# Patient Record
Sex: FEMALE | Race: WHITE | Employment: PART TIME | URBAN - METROPOLITAN AREA
[De-identification: names, ages, dates, MRNs, and addresses within clinical notes are randomized per-mention and may not be internally consistent; named-entity substitution may affect disease eponyms.]

---

## 2017-04-26 ENCOUNTER — ANESTHESIA EVENT (OUTPATIENT)
Dept: ENDOSCOPY | Age: 67
End: 2017-04-26
Payer: COMMERCIAL

## 2017-04-26 ENCOUNTER — ANESTHESIA (OUTPATIENT)
Dept: ENDOSCOPY | Age: 67
End: 2017-04-26
Payer: COMMERCIAL

## 2017-04-26 ENCOUNTER — HOSPITAL ENCOUNTER (OUTPATIENT)
Age: 67
Setting detail: OUTPATIENT SURGERY
Discharge: HOME | End: 2017-04-26
Attending: SURGERY | Admitting: SURGERY
Payer: COMMERCIAL

## 2017-04-26 VITALS
HEART RATE: 81 BPM | WEIGHT: 163 LBS | DIASTOLIC BLOOD PRESSURE: 82 MMHG | SYSTOLIC BLOOD PRESSURE: 138 MMHG | HEIGHT: 62 IN | BODY MASS INDEX: 30 KG/M2 | OXYGEN SATURATION: 99 % | RESPIRATION RATE: 18 BRPM | TEMPERATURE: 98.2 F

## 2017-04-26 PROCEDURE — 74011000250 HC RX REV CODE- 250: Performed by: ANESTHESIOLOGY

## 2017-04-26 PROCEDURE — 76060000032 HC ANESTHESIA 0.5 TO 1 HR: Performed by: SURGERY

## 2017-04-26 PROCEDURE — 74011250636 HC RX REV CODE- 250/636

## 2017-04-26 PROCEDURE — 76040000025: Performed by: SURGERY

## 2017-04-26 PROCEDURE — 74011250636 HC RX REV CODE- 250/636: Performed by: ANESTHESIOLOGY

## 2017-04-26 RX ORDER — SODIUM CHLORIDE, SODIUM LACTATE, POTASSIUM CHLORIDE, CALCIUM CHLORIDE 600; 310; 30; 20 MG/100ML; MG/100ML; MG/100ML; MG/100ML
100 INJECTION, SOLUTION INTRAVENOUS CONTINUOUS
Status: CANCELLED | OUTPATIENT
Start: 2017-04-26

## 2017-04-26 RX ORDER — SODIUM CHLORIDE, SODIUM LACTATE, POTASSIUM CHLORIDE, CALCIUM CHLORIDE 600; 310; 30; 20 MG/100ML; MG/100ML; MG/100ML; MG/100ML
100 INJECTION, SOLUTION INTRAVENOUS CONTINUOUS
Status: DISCONTINUED | OUTPATIENT
Start: 2017-04-26 | End: 2017-04-26 | Stop reason: HOSPADM

## 2017-04-26 RX ORDER — FAMOTIDINE 10 MG/ML
20 INJECTION INTRAVENOUS
Status: COMPLETED | OUTPATIENT
Start: 2017-04-26 | End: 2017-04-26

## 2017-04-26 RX ORDER — PROPOFOL 10 MG/ML
INJECTION, EMULSION INTRAVENOUS
Status: DISCONTINUED | OUTPATIENT
Start: 2017-04-26 | End: 2017-04-26 | Stop reason: HOSPADM

## 2017-04-26 RX ORDER — SODIUM CHLORIDE 0.9 % (FLUSH) 0.9 %
5-10 SYRINGE (ML) INJECTION AS NEEDED
Status: CANCELLED | OUTPATIENT
Start: 2017-04-26

## 2017-04-26 RX ORDER — PROPOFOL 10 MG/ML
INJECTION, EMULSION INTRAVENOUS AS NEEDED
Status: DISCONTINUED | OUTPATIENT
Start: 2017-04-26 | End: 2017-04-26 | Stop reason: HOSPADM

## 2017-04-26 RX ADMIN — PROPOFOL 30 MG: 10 INJECTION, EMULSION INTRAVENOUS at 10:00

## 2017-04-26 RX ADMIN — PROPOFOL 30 MG: 10 INJECTION, EMULSION INTRAVENOUS at 10:03

## 2017-04-26 RX ADMIN — SODIUM CHLORIDE, SODIUM LACTATE, POTASSIUM CHLORIDE, AND CALCIUM CHLORIDE 100 ML/HR: 600; 310; 30; 20 INJECTION, SOLUTION INTRAVENOUS at 09:50

## 2017-04-26 RX ADMIN — PROPOFOL 140 MCG/KG/MIN: 10 INJECTION, EMULSION INTRAVENOUS at 10:00

## 2017-04-26 RX ADMIN — FAMOTIDINE 20 MG: 10 INJECTION, SOLUTION INTRAVENOUS at 10:01

## 2017-04-26 NOTE — ANESTHESIA PREPROCEDURE EVALUATION
Anesthetic History               Review of Systems / Medical History      Pulmonary    COPD: mild    Sleep apnea: No treatment           Neuro/Psych         Psychiatric history     Cardiovascular    Hypertension: well controlled          Hyperlipidemia         GI/Hepatic/Renal     GERD: well controlled           Endo/Other    Diabetes    Arthritis    Comments: Diet controlled DM Other Findings              Physical Exam    Airway  Mallampati: II  TM Distance: 4 - 6 cm  Neck ROM: normal range of motion   Mouth opening: Normal     Cardiovascular    Rhythm: regular           Dental    Dentition: Upper partial plate, Caps/crowns and Implants     Pulmonary  Breath sounds clear to auscultation               Abdominal         Other Findings            Anesthetic Plan    ASA: 2  Anesthesia type: total IV anesthesia          Induction: Intravenous  Anesthetic plan and risks discussed with: Patient

## 2017-04-26 NOTE — PROGRESS NOTES
Patient passing flatus, tolerating po fluids well. Patient escorted to car via wheelchair  by Norma Crane  for discharge home with family. Discharge instructions reviewed. Dr. Bianca Miller spoke with pt and family.

## 2017-04-26 NOTE — ANESTHESIA POSTPROCEDURE EVALUATION
Post-Anesthesia Evaluation and Assessment    Patient: Shanda Jimenez MRN: [id number]  SSN: [id number]    YOB: 1950  Age: 61 y.o. Sex: female       Cardiovascular Function/Vital Signs  Visit Vitals    BP (!) 134/94    Pulse 83    Temp 36.8 °C (98.2 °F)    Resp 17    Ht 5' 2\" (1.575 m)    Wt 73.9 kg (163 lb)    SpO2 97%    BMI 29.81 kg/m2       Patient is status post total IV anesthesia anesthesia for Procedure(s):  COLONOSCOPY / BMI=30. Nausea/Vomiting: None    Postoperative hydration reviewed and adequate. Pain:  Pain Scale 1: Numeric (0 - 10) (04/26/17 0953)  Pain Intensity 1: 0 (04/26/17 0953)   Managed    Neurological Status: At baseline    Mental Status and Level of Consciousness: Arousable    Pulmonary Status:   O2 Device: Nasal cannula (04/26/17 1034)   Adequate oxygenation and airway patent    Complications related to anesthesia: None    Post-anesthesia assessment completed.  No concerns    Signed By: Sami Alvarez MD     April 26, 2017

## 2017-04-26 NOTE — PROCEDURES
[facility]   PROCEDURE NOTE       Name:  Yokasta Carmichael   MR#:  [id number]   :  1950   Account #:  [de-identified]   Date of Adm:  2017       DATE OF PROCEDURE: 2017     PREOPERATIVE DIAGNOSIS: Request for screening colonoscopy. PROCEDURE: Colonoscopy. SURGEON: Danny Cline MD.    ANESTHESIA: Monitored anesthesia care with Dr. Meenu Rodriguez and Amanda Miller CRNA.    ESTIMATED BLOOD LOSS: None. SPECIMENS: None. FINDINGS:   1. Fair prep. 2. Pan diverticular disease. 3. Internal hemorrhoids. 4. External hemorrhoids. HISTORY: A 72-year-old female who I was asked to see for her   initial screening colonoscopy. She never had a colonoscopy   before. She denied any family history of colon cancer, melena,   bright red blood per rectum, weight loss, loss of appetite, or   change in bowel habits. She underwent a bowel prep, which she   said was successful on 2017 and came to the [facility] for the procedure on 2017. I saw   the patient. She was having an IV placed. I went over the   procedure, the risks of bleeding, infection, anesthesia,   perforation of colon. She agreed again to the procedure. DESCRIPTION OF PROCEDURE: She was transported to room #4 [place] [address]. Time-out was done identifying   patient, surgeon, procedure and her birth date 1950. Once   everyone in the room agreed with the time-out, we began the   procedure. Monitored anesthesia care was done by Dr. Meenu Rodriguez and   Amanda Victoria, the CRNA. A PCF-H190L scope was advanced through the anus and all the way   to the cecum. Cecum was identified with the ileocecal valve,   cecal folds, external compression. Right lower quadrant   corresponded to an indentation seen with the scope. The bowel   prep was fair. There was some food material and a lot of viscous   liquid stool throughout the colon.  We withdrew the scope over a   7 minute period of time. She had pan diverticular disease with   some scattered diverticula in the cecum, ascending colon, and   more numerous as you got closer to the sigmoid colon. There were   no lesions noted in the cecum, ascending colon, transverse   colon, descending colon, sigmoid colon. The diverticula in   sigmoid colon were more numerous than in the cecum. The scope   was brought back to the rectum where it was retroflexed. She had   some internal hemorrhoids. Scope was withdrawn. Digital rectal   exam revealed external hemorrhoids as well as good sphincter   tone. No masses were palpated. PLAN: Recommended repeat colonoscopy in 10 years. I do not think   she needs anything else done unless symptoms develop.         Brittany Steiner MD      [facility]   D:  04/26/2017   10:32   T:  04/26/2017   11:20   Job #:  [id number]

## 2017-04-26 NOTE — INTERVAL H&P NOTE
H&P Update:  Jammie Durham was seen and examined. History and physical has been reviewed. The patient has been examined.  There have been no significant clinical changes since the completion of the originally dated History and Physical.    Signed By: Lilibeth Cronin MD     April 26, 2017 8:49 AM

## 2017-04-26 NOTE — IP AVS SNAPSHOT
Konstantin David 
 
 
 [address] [facility]  [address] [facility] 
[phone] Patient: Hadley Casas MRN: [id number] BXK:[id number]/[id number] You are allergic to the following Allergen Reactions Codeine Rash Lidocaine Rash  
    
 Sulfa (Sulfonamide Antibiotics) Nausea and Vomiting Recent Documentation Height Weight BMI OB Status Smoking Status 1.575 m 73.9 kg 29.81 kg/m2 Postmenopausal Former Smoker Emergency Contacts Name Discharge Info Relation Home Work Mobile Tra Costello DISCHARGE CAREGIVER [3] Life Partner [7] [phone] About your hospitalization You were admitted on:  April 26, 2017 You last received care in the:  [facility] You were discharged on:  April 26, 2017 Unit phone number:  [phone] Why you were hospitalized Your primary diagnosis was:  Not on File Providers Seen During Your Hospitalizations Provider Role Specialty Primary office phone Gladys Guillen MD Attending Provider General Surgery [phone] Your Primary Care Physician (PCP) Primary Care Physician Office Phone Office Fax Padilla Lima [phone] Follow-up Information None Your Appointments Monday May 15, 2017 10:30 AM EDT  
LAB with [facility] [address] [place] [address] Primary Care ([facility] INTERNAL MEDICINE) [address] [place] [address] [address] [place] [id number]  
[phone] Monday May 22, 2017  3:00 PM EDT Office Visit with ADAMARIS Owen [address] [place] [address] Primary Care ([facility] MEDIC[facility]) [address] [place] [address] [address] [place] [id number]  
[phone] Monday May 22, 2017  5:00 PM EDT  
[facility] SCREENING with [facility] BI ROOM 2 [address] [facility] ([facility] [address]) [address] [place] [address]  
[phone] ***** NOTE: Appointments for the Mobile Mammography UNIT CANNOT be made on My Chart *****  PATIENT ARRIVAL - Please report 30 minutes early to check in. GENERAL INSTRUCTIONS -  On the day of your exam do not use any bath powder, deodorant or lotions on the chest or armpit area. Wear two-piece outfit for ease of changing. Allow at least 1 hour for test.  
  
    
  
  
 
  
  
  
Current Discharge Medication List  
  
ASK your doctor about these medications Dose & Instructions Dispensing Information Comments Morning Noon Evening Bedtime  
 aspirin 325 mg tablet Commonly known as:  ASPIRIN Your last dose was: Your next dose is:    
   
   
 Dose:  325 mg Take 325 mg by mouth daily. Indications: myocardial infarction prevention, prevention of transient ischemic attack Refills:  0  
     
   
   
   
  
 CALCIUM 600 WITH VITAMIN D3 600 mg(1,500mg) -400 unit Cap Generic drug:  Calcium-Cholecalciferol (D3) Your last dose was: Your next dose is: Take  by mouth daily. Refills:  0  
     
   
   
   
  
 cyclobenzaprine 10 mg tablet Commonly known as:  FLEXERIL Your last dose was: Your next dose is:    
   
   
 Dose:  10 mg Take 1 Tab by mouth two (2) times a day. Quantity:  60 Tab Refills:  2  
     
   
   
   
  
 fenofibric acid 135 mg capsule Commonly known as:  TRILIPIX Your last dose was: Your next dose is:    
   
   
 Dose:  135 mg Take 1 Cap by mouth daily. Quantity:  30 Cap Refills:  5  
     
   
   
   
  
 garlic 8,943 mg Cap Your last dose was: Your next dose is: Take  by mouth daily. Refills:  0 KRILL -20-40-50 mg Cap Generic drug:  krill-omega-3-dha-epa-lipids Your last dose was: Your next dose is:    
   
   
 Dose:  2 Cap Take 2 Caps by mouth daily. Refills:  0 lisinopril 20 mg tablet Commonly known as:  Mervin Bazan Your last dose was: Your next dose is:    
   
   
 Dose:  20 mg Take 1 Tab by mouth daily. Quantity:  30 Tab Refills:  5  
     
   
   
   
  
 melatonin 10 mg Tab Your last dose was: Your next dose is:    
   
   
 Dose:  1 Tab Take 1 Tab by mouth nightly. Refills:  0 MUCINEX PO Your last dose was: Your next dose is: Take  by mouth as needed. Refills:  0  
     
   
   
   
  
 NASONEX 50 mcg/actuation nasal spray Generic drug:  mometasone Your last dose was: Your next dose is:    
   
   
 Dose:  2 Spray 2 Sprays daily as needed. Refills:  0 PARoxetine 40 mg tablet Commonly known as:  PAXIL Your last dose was: Your next dose is:    
   
   
 Dose:  40 mg Take 1 Tab by mouth daily. Quantity:  30 Tab Refills:  11 PRILOSEC OTC PO Your last dose was: Your next dose is:    
   
   
 Dose:  20 mg Take 20 mg by mouth every morning. Refills:  0 PROAIR HFA 90 mcg/actuation inhaler Generic drug:  albuterol Your last dose was: Your next dose is: Take  by inhalation every four (4) hours as needed. Refills:  0 PROBIOTIC PO Your last dose was: Your next dose is:    
   
   
 Dose:  1 Tab Take 1 Tab by mouth daily. Refills:  0  
     
   
   
   
  
 rosuvastatin 10 mg tablet Commonly known as:  CRESTOR Your last dose was: Your next dose is:    
   
   
 Dose:  10 mg Take 1 Tab by mouth nightly. Quantity:  30 Tab Refills:  5  
     
   
   
   
  
 * VITAMIN B-12 500 mcg tablet Generic drug:  cyanocobalamin Your last dose was: Your next dose is:    
   
   
 Dose:  500 mcg Take 500 mcg by mouth daily. Refills:  0 * cyanocobalamin 1,000 mcg/mL injection Commonly known as:  VITAMIN B-12 Your last dose was: Your next dose is:    
   
   
 Dose:  1000 mcg 1 mL by IntraMUSCular route every thirty (30) days. Quantity:  1 mL Refills:  11 VITAMIN D3 5,000 unit Tab tablet Generic drug:  cholecalciferol (VITAMIN D3) Your last dose was: Your next dose is:    
   
   
 Dose:  5000 Units Take 5,000 Units by mouth daily. Refills:  0  
     
   
   
   
  
 * Notice: This list has 2 medication(s) that are the same as other medications prescribed for you. Read the directions carefully, and ask your doctor or other care provider to review them with you. Discharge Instructions Gastrointestinal Colonoscopy/Flexible Sigmoidoscopy - Lower Exam Discharge Instructions 1. Call Dr. Yenifer Miller at [phone] for any problems or questions. 2. Contact the doctors office for follow up appointment as directed 3. Medication may cause drowsiness for several hours, therefore, do not drive or operate machinery for remainder of the day. 4. No alcohol today. 5. Ordinarily, you may resume regular diet and activity after exam unless otherwise specified by your physician. 6. Because of air put into your colon during exam, you may experience some abdominal distension, relieved by the passage of gas, for several hours. 7. Contact your physician if you have any of the following: 
a. Excessive amount of bleeding  large amount when having a bowel movement. Occasional specks of blood with bowel movement would not be unusual. 
b. Severe abdominal pain 
c. Fever or Chills 8. Polyp Removal  follow these additional instructions 
a. Clear liquid diet for the next meal (jell-o, broth, clear drinks) b. Soft diet for 24 hours, then resume regular diet  
c. Take Metamucil  1 tablespoon in juice every morning for 3 days d. No Aspirin, Advil, Aleve, Nuprin, Ibuprofen, or medications that contain these drugs for 2 weeks. Any additional instructions:  Plan to repeat colonoscopy in 10 years or sooner if you have problems. Instructions given to Conner العراقي and other family members. Instructions given by:  Sangeetha Blackmon RN Discharge Orders None Introducing [facility]! Reyna Narvaezafshin introduces Zapproved patient portal. Now you can access parts of your medical record, email your doctor's office, and request medication refills online. 1. In your internet browser, go to [email or web address] 2. Click on the First Time User? Click Here link in the Sign In box. You will see the New Member Sign Up page. 3. Enter your Zapproved Access Code exactly as it appears below. You will not need to use this code after youve completed the sign-up process. If you do not sign up before the expiration date, you must request a new code. · Zapproved Access Code: [email or web address] Expires: 5/21/2017  4:01 PM 
 
4. Enter the last four digits of your Social Security Number (xxxx) and Date of Birth (mm/dd/yyyy) as indicated and click Submit. You will be taken to the next sign-up page. 5. Create a Zapproved ID. This will be your Zapproved login ID and cannot be changed, so think of one that is secure and easy to remember. 6. Create a Zapproved password. You can change your password at any time. 7. Enter your Password Reset Question and Answer. This can be used at a later time if you forget your password. 8. Enter your e-mail address. You will receive e-mail notification when new information is available in [address] 9. Click Sign Up. You can now view and download portions of your medical record. 10. Click the Download Summary menu link to download a portable copy of your medical information.  
 
If you have questions, please visit the Frequently Asked Questions section of the [employer]. Remember, MyChart is NOT to be used for urgent needs. For medical emergencies, dial 911. Now available from your iPhone and Android! General Information Please provide this summary of care documentation to your next provider. Patient Signature:  ____________________________________________________________ Date:  ____________________________________________________________  
  
Theone Doss Provider Signature:  ____________________________________________________________ Date:  ____________________________________________________________

## 2017-04-26 NOTE — DISCHARGE INSTRUCTIONS
Gastrointestinal Colonoscopy/Flexible Sigmoidoscopy - Lower Exam Discharge Instructions  1. Call Dr. Shannan Cosby at [phone] for any problems or questions. 2. Contact the doctors office for follow up appointment as directed  3. Medication may cause drowsiness for several hours, therefore, do not drive or operate machinery for remainder of the day. 4. No alcohol today. 5. Ordinarily, you may resume regular diet and activity after exam unless otherwise specified by your physician. 6. Because of air put into your colon during exam, you may experience some abdominal distension, relieved by the passage of gas, for several hours. 7. Contact your physician if you have any of the following:  a. Excessive amount of bleeding - large amount when having a bowel movement. Occasional specks of blood with bowel movement would not be unusual.  b. Severe abdominal pain  c. Fever or Chills  8. Polyp Removal - follow these additional instructions  a. Clear liquid diet for the next meal (jell-o, broth, clear drinks)  b. Soft diet for 24 hours, then resume regular diet   c. Take Metamucil - 1 tablespoon in juice every morning for 3 days  d. No Aspirin, Advil, Aleve, Nuprin, Ibuprofen, or medications that contain these drugs for 2 weeks. Any additional instructions:  Plan to repeat colonoscopy in 10 years or sooner if you have problems. Instructions given to Tony Jose De Jesus and other family members.   Instructions given by:  Jerardo Montanez RN

## 2017-04-26 NOTE — H&P
Progress Notes  Encounter Date: 17  Cecy Neumann MD   General Surgery      []Hide copied text  []Hover for attribution information           Name: Jerry Ames    MRN: [id number]   : 1950    Age: 61 y.o. Sex: female  Margarita Tai MD         CC:        Chief Complaint   Patient presents with    New Patient       colonoscopy         HPI: The patient is referred here for a colonoscopy by Dr. Rosa Blair. The patient has never had a colonoscopy before. No abdominal pain, nausea or vomiting.       Family hx of colon cancer NO       Previous colonoscopy NO   BRBPR NO   Melena NO   Loss of appetite NO   Weight loss NO       Change in bowel habits NO         HISTORY:          Past Medical History:   Diagnosis Date    Arthritis      Asthma      Burning with urination      Chronic obstructive pulmonary disease (HCC)      CRP elevated 2013    DM (diabetes mellitus), type 2 ([address] 2013    Fibromyalgia 2013    GERD (gastroesophageal reflux disease)      HTN (hypertension), benign 2013    Hypercholesterolemia      Sleep apnea      Vitamin B12 deficiency 2013    Vitamin D deficiency 2013            Past Surgical History:   Procedure Laterality Date    HX BREAST BIOPSY Bilateral 10-15 YRS AGO    HX BREAST LUMPECTOMY         bilateral    HX CARPAL TUNNEL RELEASE         bilateral    HX CHOLECYSTECTOMY        HX GYN         pptl    HX ORTHOPAEDIC                  Prior to Admission medications    Medication Sig Start Date End Date Taking? Authorizing Provider   Calcium-Cholecalciferol, D3, (CALCIUM 600 WITH VITAMIN D3) 600 mg(1,500mg) -400 unit cap Take by mouth.       Historical Provider   rosuvastatin (CRESTOR) 10 mg tablet Take 1 Tab by mouth nightly. 16     ADAMARIS Dsouza   fenofibric acid (TRILIPIX) 135 mg capsule Take 1 Cap by mouth daily.  16     ADAMARIS Higgins   cyanocobalamin (VITAMIN B-12) 1,000 mcg/mL injection 1 mL by IntraMUSCular route every thirty (30) days. 11/21/16     ADAMARIS Dsouza   lisinopril (PRINIVIL, ZESTRIL) 20 mg tablet Take 1 Tab by mouth daily. 11/21/16     ADAMARIS Park   PARoxetine (PAXIL) 40 mg tablet Take 1 Tab by mouth daily. 7/11/16     ADAMARIS Park   cholecalciferol, VITAMIN D3, (VITAMIN D3) 5,000 unit tab tablet Take by mouth daily.       Historical Provider   cyclobenzaprine (FLEXERIL) 10 mg tablet Take 1 Tab by mouth two (2) times a day.  6/22/15     ADAMARIS Park   aspirin (ASPIRIN) 325 mg tablet Take 325 mg by mouth daily.       Historical Provider   melatonin 10 mg tab Take 1 Tab by mouth nightly.       Historical Provider   krill-omega-3-dha-epa-lipids (KRILL OIL) 900-01-95-48 mg cap Take 2 Caps by mouth two (2) times a day.       Historical Provider   garlic 1,940 mg cap Take by mouth daily.       Historical Provider   LACTOBACILLUS ACIDOPHILUS (PROBIOTIC PO) Take 1 Tab by mouth daily.       Historical Provider   OMEPRAZOLE MAGNESIUM (PRILOSEC OTC PO) Take 1 Tab by mouth daily.       Historical Provider   CA CMB NO.1/VIT D3/B-6/FA/B12 (VITAMIN D3, CALCIUM CIT-PHOS, PO) Take by mouth.       Historical Provider   cyanocobalamin (VITAMIN B-12) 500 mcg tablet Take 2,500 mcg by mouth daily.       Historical Provider   albuterol (PROAIR HFA) 90 mcg/actuation inhaler Take by inhalation every four (4) hours as needed.       Historical Provider   mometasone (NASONEX) 50 mcg/actuation nasal spray 2 Sprays daily as needed.       Historical Provider   GUAIFENESIN (MUCINEX PO) Take by mouth as needed.       Historical Provider            Social History   Substance Use Topics    Smoking status: Former Smoker       Packs/day: 1.50       Years: 35.00       Types: Cigarettes       Quit date: 9/15/2016    Smokeless tobacco: Never Used    Alcohol use No      Family History   Problem Relation Age of Onset    Cancer Mother         cervical    Diabetes Mother      Heart Disease Father      Hypertension Father      Elevated Lipids Father      Asthma Sister      Cancer Sister         skin    Cancer Cousin         Lung Cancer    Breast Cancer Neg Hx        .       Allergies   Allergen Reactions    Codeine Unknown (comments)    Lidocaine Unknown (comments)    Sulfa (Sulfonamide Antibiotics) Nausea and Vomiting         Physical Exam:           Visit Vitals    /89    Pulse 79    Ht 5' 2\" (1.575 m)    Wt 163 lb 9.6 oz (74.2 kg)    BMI 29.92 kg/m2         General: Alert, oriented, cooperative white female in no acute distress.         Resp: Breathing is non-labored. Lungs clear to auscultation without wheezing or rhonchi   CV: RRR. No murmurs, rubs or gallops appreciated. Abd: soft non-tender and non-distended without peritoneal signs. +bs   Extremities: No clubbing, cyanosis or edema. Strength intact.        Assessment/Plan: Zandra Hu is a 61 y.o. female who is here for a colonoscopy     1. Off ASA for one week, if on aspirin     2. Bowel prep     3. Colonoscopy with MAC.  I went through the risks of bleeding, perforation of the colon and cardiopulmonary problems that can develop with the use of anesthesia.     Kyle Slaughter MD     [facility] 4/26/17                Electronically signed by Kyle Slaughter MD at 03/24/17 1996 4/26/17             Revision History              Detailed Report             Note shared with patient   Note Details   Author Kyle Slaughter MD File Time 4/26/17   Author Type Physician Status Signed   Last  Kyle Slaughter MD Specialty General Surgery

## 2017-05-22 ENCOUNTER — HOSPITAL ENCOUNTER (OUTPATIENT)
Dept: MAMMOGRAPHY | Age: 67
Discharge: HOME | End: 2017-05-22
Attending: PHYSICIAN ASSISTANT
Payer: COMMERCIAL

## 2017-05-22 DIAGNOSIS — Z12.31 VISIT FOR SCREENING MAMMOGRAM: ICD-10-CM

## 2017-05-22 PROCEDURE — 77067 SCR MAMMO BI INCL CAD: CPT

## 2017-07-10 ENCOUNTER — HOSPITAL ENCOUNTER (OUTPATIENT)
Dept: MAMMOGRAPHY | Age: 67
Discharge: HOME | End: 2017-07-10
Attending: PHYSICIAN ASSISTANT
Payer: MEDICARE

## 2017-07-10 DIAGNOSIS — Z78.0 POSTMENOPAUSAL: ICD-10-CM

## 2017-07-10 PROCEDURE — 77080 DXA BONE DENSITY AXIAL: CPT

## 2018-08-10 ENCOUNTER — HOSPITAL ENCOUNTER (OUTPATIENT)
Dept: MAMMOGRAPHY | Age: 68
Discharge: HOME | End: 2018-08-10
Attending: PHYSICIAN ASSISTANT
Payer: MEDICARE

## 2018-08-10 DIAGNOSIS — Z12.31 ENCOUNTER FOR SCREENING MAMMOGRAM FOR BREAST CANCER: ICD-10-CM

## 2018-08-10 PROCEDURE — 77067 SCR MAMMO BI INCL CAD: CPT

## 2018-08-10 NOTE — PROGRESS NOTES
Normal mammogram other than dense breast tissue. Remind to do monthly self breast exams. Plan to repeat imaging in 1 year.

## 2019-09-23 ENCOUNTER — HOSPITAL ENCOUNTER (OUTPATIENT)
Dept: MAMMOGRAPHY | Age: 69
Discharge: HOME | End: 2019-09-23
Attending: PHYSICIAN ASSISTANT
Payer: MEDICARE

## 2019-09-23 DIAGNOSIS — M89.9 BONE DISORDER: ICD-10-CM

## 2019-09-23 DIAGNOSIS — M85.89 OSTEOPENIA OF MULTIPLE SITES: ICD-10-CM

## 2019-09-23 DIAGNOSIS — Z12.31 ENCOUNTER FOR SCREENING MAMMOGRAM FOR BREAST CANCER: ICD-10-CM

## 2019-09-23 DIAGNOSIS — R92.2 DENSE BREAST TISSUE: ICD-10-CM

## 2019-09-23 PROCEDURE — 77063 BREAST TOMOSYNTHESIS BI: CPT

## 2019-09-23 PROCEDURE — 77080 DXA BONE DENSITY AXIAL: CPT

## 2019-09-23 NOTE — PROGRESS NOTES
Bone density shows osteopenia in multiple regions. We can discuss more of what this means and treatment options at her next visit - just have her remind me.

## 2019-09-24 NOTE — PROGRESS NOTES
Mammogram is stable without any new suspicious findings. Remind to do monthly self breast exams. Plan to repeat imaging in 1 year.

## 2020-06-09 NOTE — H&P (VIEW-ONLY)
Name: Hilda Costa MRN: [id number] : 1950 Age: 78 y.o. Sex: female Becki Dong MD  
 
 
CC:  No chief complaint on file. HPI:  The patient is referred here for a colonoscopy by ADAMARIS Lilly, who works with Dr. Cely Sanchez at [facility]. The patient has been having lower abdominal pain with a recent Hemoccult positive test as well as a positive Cologuard test. I performed her last colonoscopy on 17 with pandiverticular disease and internal/external hemorrhoids found. Family hx of colon cancer NO Previous colonoscopy YES  
BRBPR NO  
Melena YES Loss of appetite NO Weight loss NO Change in bowel habits NO  
 
 
HISTORY: 
 
Past Medical History:  
Diagnosis Date  Chronic obstructive pulmonary disease ([address] PRN inhaler, no home 02  Depression with anxiety  Diverticulosis of colon 2017 Widespread per Colonoscopy  Elevated C-reactive protein (CRP)  Fibromyalgia  GERD (gastroesophageal reflux disease)  Hemorrhoids 2017 Internal & External Per Colonoscopy  HTN (hypertension), benign  Microalbuminuria due to type 2 diabetes mellitus ([address]  Mixed hyperlipidemia  Osteoarthritis  Osteopenia 07/10/2017 Per DEXA  Perennial allergic rhinitis with seasonal variation  Sleep apnea   
 no CPAP  Type 2 diabetes mellitus ([address] 2016  Vitamin B12 deficiency  Vitamin D deficiency Past Surgical History:  
Procedure Laterality Date  COLONOSCOPY N/A 2017 COLONOSCOPY / BMI=30 performed by Juma Nagy MD at [facility] ENDOSCOPY  
 HX BREAST LUMPECTOMY Bilateral   
 2 from R Side & 1 from L Side - All Benign  HX CARPAL TUNNEL RELEASE Right  HX CHOLECYSTECTOMY  HX TUBAL LIGATION Prior to Admission medications Medication Sig Start Date End Date Taking? Authorizing Provider lisinopriL (PRINIVIL, ZESTRIL) 10 mg tablet Take 1 Tab by mouth daily. 3/26/20   Divine Calhoun PA-C  
cyanocobalamin (Vitamin B-12) 1,000 mcg/mL injection 1 mL by IntraMUSCular route every thirty (30) days. 3/26/20   Divine Calhoun PA-C  
apple cider vinegar 500 mg tab Take  by mouth. Provider, Historical  
omeprazole (PRILOSEC) 20 mg capsule Take 20 mg by mouth daily. Provider, Historical  
fenofibrate nanocrystallized 160 mg tab Take 160 mg by mouth daily. 12/30/19   Divine Calhoun PA-C  
glucose blood VI test strips (RELION PRIME TEST STRIPS) strip Use to check blood sugar once daily. Dx: E11.29 12/30/19   Nicholas Moura PA-C  
lancets (RELION ULTRA THIN PLUS LANCETS) misc Use to check glucose once daily. Dx: E11.29 12/30/19   Nicholas Moura PA-C  
linaGLIPtin (TRADJENTA) 5 mg tablet Take 1 Tab by mouth daily. 12/30/19   Herb Calhoun PA-C  
metFORMIN ER (GLUCOPHAGE XR) 750 mg tablet Take 1 Tab by mouth daily (with dinner). 12/30/19   Divine Calhoun PA-C  
PARoxetine (PAXIL) 40 mg tablet Take 1 Tab by mouth daily. 12/30/19   Divine Calhoun PA-C  
rosuvastatin (CRESTOR) 10 mg tablet Take 1 Tab by mouth nightly. 12/30/19   Divine Calhoun PA-C  
xjdce-kbqux-9-dha-epa-lipids (KRILL OIL) 046-21-52-50 mg cap Take  by mouth. Provider, Historical  
guaifenesin/phenylephrine HCl (MUCINEX COLD PO) Take  by mouth. Provider, Historical  
cyclobenzaprine (FLEXERIL) 10 mg tablet Take 1 Tab by mouth two (2) times a day. 3/26/19   Herb Calhoun PA-C  
ascorbic acid, vitamin C, (VITAMIN C) 500 mg tablet Take  by mouth. Provider, Historical  
vitamin E (AQUA GEMS) 400 unit capsule Take  by mouth daily. Provider, Historical  
Blood-Glucose Meter (RELION MICRO GLUCOSE MONITOR) misc Use to check blood sugar once daily.   Dx: E11.29 6/29/18   Nicholas Moura PA-C  
cholecalciferol, VITAMIN D3, (VITAMIN D3) 5,000 unit tab tablet Take 5,000 Units by mouth daily. Provider, Historical  
aspirin (ASPIRIN) 325 mg tablet Take 325 mg by mouth daily. Indications: myocardial infarction prevention, prevention of transient ischemic attack    Provider, Historical  
melatonin 10 mg tab Take 1 Tab by mouth nightly. Provider, Historical  
garlic 2,102 mg cap Take  by mouth daily. Provider, Historical  
LACTOBACILLUS ACIDOPHILUS (PROBIOTIC PO) Take 1 Tab by mouth daily. Provider, Historical  
cyanocobalamin (VITAMIN B-12) 500 mcg tablet Take 500 mcg by mouth daily. Provider, Historical  
albuterol (PROAIR HFA) 90 mcg/actuation inhaler Take  by inhalation every four (4) hours as needed. Provider, Historical  
mometasone (NASONEX) 50 mcg/actuation nasal spray 2 Sprays daily as needed. Provider, Historical  
 
Social History Tobacco Use  Smoking status: Former Smoker Packs/day: 1.50 Years: 35.00 Pack years: 52.50 Types: Cigarettes Last attempt to quit: 9/15/2016 Years since quitting: 3.7  Smokeless tobacco: Never Used Substance Use Topics  Alcohol use: Yes Comment: Rarely  Drug use: No  
 
Family History Problem Relation Age of Onset  Cancer Mother Cervical  
 Diabetes Mother  Heart Disease Father  Hypertension Father  Elevated Lipids Father  Other Father ITP  Cancer Cousin Lung Cancer  Other Sister Skin Cancer  Asthma Sister  Diabetes Sister  Kidney Disease Maternal Grandmother  Kidney Disease Maternal Grandfather  Arthritis Sister  Diabetes Sister  Breast Cancer Neg Hx Herb Salter Allergies Allergen Reactions  Codeine Rash  Lidocaine Rash  Sulfa (Sulfonamide Antibiotics) Nausea and Vomiting Physical Exam:  
 
There were no vitals taken for this visit. General: Alert, oriented, cooperative white female in no acute distress. Resp: Breathing is  non-labored.  Lungs clear to auscultation without wheezing or rhonchi CV: RRR. No murmurs, rubs or gallops appreciated. Abd: soft non-tender and non-distended without peritoneal signs. +bs Extremities: No clubbing, cyanosis or edema. Strength intact. Assessment/Plan:  Yury Alamo is a 78 y.o. female who is here for a colonoscopy due lower abdominal pain, a Hemoccult positive test and a positive Cologuard test as well as some melena. 1. Off ASA for one week, if on aspirin 2. Bowel prep 3. Colonoscopy with MAC. I went through the risks of bleeding, perforation of the colon and cardiopulmonary problems that can develop with the use of anesthesia.  
 
Andrew Black MD 
 
 FACS   6/9/2020  4:26 PM

## 2020-06-18 ENCOUNTER — ANESTHESIA EVENT (OUTPATIENT)
Dept: ENDOSCOPY | Age: 70
End: 2020-06-18
Payer: MEDICARE

## 2020-06-19 ENCOUNTER — HOSPITAL ENCOUNTER (OUTPATIENT)
Age: 70
Setting detail: OUTPATIENT SURGERY
Discharge: HOME | End: 2020-06-19
Attending: SURGERY | Admitting: SURGERY
Payer: MEDICARE

## 2020-06-19 ENCOUNTER — ANESTHESIA (OUTPATIENT)
Dept: ENDOSCOPY | Age: 70
End: 2020-06-19
Payer: MEDICARE

## 2020-06-19 VITALS
BODY MASS INDEX: 28.16 KG/M2 | HEART RATE: 109 BPM | SYSTOLIC BLOOD PRESSURE: 117 MMHG | HEIGHT: 62 IN | OXYGEN SATURATION: 95 % | RESPIRATION RATE: 16 BRPM | DIASTOLIC BLOOD PRESSURE: 66 MMHG | WEIGHT: 153 LBS | TEMPERATURE: 98.6 F

## 2020-06-19 LAB — GLUCOSE BLD STRIP.AUTO-MCNC: 163 MG/DL (ref 65–100)

## 2020-06-19 PROCEDURE — 76040000025: Performed by: SURGERY

## 2020-06-19 PROCEDURE — 74011250636 HC RX REV CODE- 250/636: Performed by: ANESTHESIOLOGY

## 2020-06-19 PROCEDURE — 76060000032 HC ANESTHESIA 0.5 TO 1 HR: Performed by: SURGERY

## 2020-06-19 PROCEDURE — 82962 GLUCOSE BLOOD TEST: CPT

## 2020-06-19 PROCEDURE — 88305 TISSUE EXAM BY PATHOLOGIST: CPT

## 2020-06-19 PROCEDURE — 74011250636 HC RX REV CODE- 250/636: Performed by: NURSE ANESTHETIST, CERTIFIED REGISTERED

## 2020-06-19 PROCEDURE — 77030021593 HC FCPS BIOP ENDOSC BSC -A: Performed by: SURGERY

## 2020-06-19 RX ORDER — OXYCODONE HYDROCHLORIDE 5 MG/1
5 TABLET ORAL
Status: DISCONTINUED | OUTPATIENT
Start: 2020-06-19 | End: 2020-06-19 | Stop reason: HOSPADM

## 2020-06-19 RX ORDER — MIDAZOLAM HYDROCHLORIDE 1 MG/ML
2 INJECTION, SOLUTION INTRAMUSCULAR; INTRAVENOUS
Status: DISCONTINUED | OUTPATIENT
Start: 2020-06-19 | End: 2020-06-19 | Stop reason: HOSPADM

## 2020-06-19 RX ORDER — MIDAZOLAM HYDROCHLORIDE 1 MG/ML
2 INJECTION, SOLUTION INTRAMUSCULAR; INTRAVENOUS ONCE
Status: DISCONTINUED | OUTPATIENT
Start: 2020-06-19 | End: 2020-06-19 | Stop reason: HOSPADM

## 2020-06-19 RX ORDER — OXYCODONE HYDROCHLORIDE 5 MG/1
10 TABLET ORAL
Status: DISCONTINUED | OUTPATIENT
Start: 2020-06-19 | End: 2020-06-19 | Stop reason: HOSPADM

## 2020-06-19 RX ORDER — SODIUM CHLORIDE, SODIUM LACTATE, POTASSIUM CHLORIDE, CALCIUM CHLORIDE 600; 310; 30; 20 MG/100ML; MG/100ML; MG/100ML; MG/100ML
100 INJECTION, SOLUTION INTRAVENOUS CONTINUOUS
Status: DISCONTINUED | OUTPATIENT
Start: 2020-06-19 | End: 2020-06-19 | Stop reason: HOSPADM

## 2020-06-19 RX ORDER — DIPHENHYDRAMINE HYDROCHLORIDE 50 MG/ML
12.5 INJECTION, SOLUTION INTRAMUSCULAR; INTRAVENOUS
Status: DISCONTINUED | OUTPATIENT
Start: 2020-06-19 | End: 2020-06-19 | Stop reason: HOSPADM

## 2020-06-19 RX ORDER — HYDROMORPHONE HYDROCHLORIDE 1 MG/ML
0.5 INJECTION, SOLUTION INTRAMUSCULAR; INTRAVENOUS; SUBCUTANEOUS
Status: DISCONTINUED | OUTPATIENT
Start: 2020-06-19 | End: 2020-06-19 | Stop reason: HOSPADM

## 2020-06-19 RX ORDER — PROPOFOL 10 MG/ML
INJECTION, EMULSION INTRAVENOUS AS NEEDED
Status: DISCONTINUED | OUTPATIENT
Start: 2020-06-19 | End: 2020-06-19 | Stop reason: HOSPADM

## 2020-06-19 RX ORDER — PROPOFOL 10 MG/ML
INJECTION, EMULSION INTRAVENOUS
Status: DISCONTINUED | OUTPATIENT
Start: 2020-06-19 | End: 2020-06-19 | Stop reason: HOSPADM

## 2020-06-19 RX ORDER — ALBUTEROL SULFATE 0.83 MG/ML
2.5 SOLUTION RESPIRATORY (INHALATION) AS NEEDED
Status: DISCONTINUED | OUTPATIENT
Start: 2020-06-19 | End: 2020-06-19 | Stop reason: HOSPADM

## 2020-06-19 RX ORDER — FENTANYL CITRATE 50 UG/ML
100 INJECTION, SOLUTION INTRAMUSCULAR; INTRAVENOUS ONCE
Status: DISCONTINUED | OUTPATIENT
Start: 2020-06-19 | End: 2020-06-19 | Stop reason: HOSPADM

## 2020-06-19 RX ORDER — ONDANSETRON 2 MG/ML
4 INJECTION INTRAMUSCULAR; INTRAVENOUS ONCE
Status: DISCONTINUED | OUTPATIENT
Start: 2020-06-19 | End: 2020-06-19 | Stop reason: HOSPADM

## 2020-06-19 RX ORDER — NALOXONE HYDROCHLORIDE 0.4 MG/ML
0.1 INJECTION, SOLUTION INTRAMUSCULAR; INTRAVENOUS; SUBCUTANEOUS AS NEEDED
Status: DISCONTINUED | OUTPATIENT
Start: 2020-06-19 | End: 2020-06-19 | Stop reason: HOSPADM

## 2020-06-19 RX ADMIN — PROPOFOL 140 MCG/KG/MIN: 10 INJECTION, EMULSION INTRAVENOUS at 09:41

## 2020-06-19 RX ADMIN — SODIUM CHLORIDE, SODIUM LACTATE, POTASSIUM CHLORIDE, AND CALCIUM CHLORIDE 100 ML/HR: 600; 310; 30; 20 INJECTION, SOLUTION INTRAVENOUS at 09:18

## 2020-06-19 RX ADMIN — PROPOFOL 50 MG: 10 INJECTION, EMULSION INTRAVENOUS at 09:41

## 2020-06-19 NOTE — DISCHARGE INSTRUCTIONS
Gastrointestinal Colonoscopy/Flexible Sigmoidoscopy - Lower Exam Discharge Instructions  1. Call Dr. Bo Stoll at [phone] for any problems or questions. 2. Contact the doctors office for follow up appointment as directed  3. Medication may cause drowsiness for several hours, therefore:  · Do not drive or operate machinery for reminder of the day. · No alcohol today. · Do not make any important or legal decisions for 24 hours. · Do not sign any legal documents for 24 hours. 4. Ordinarily, you may resume regular diet and activity after exam unless otherwise specified by your physician. 5. Because of air put into your colon during exam, you may experience some abdominal distension, relieved by the passage of gas, for several hours. 6. Contact your physician if you have any of the following:  · Excessive amount of bleeding - large amount when having a bowel movement. Occasional specks of blood with bowel movement would not be unusual.  · Severe abdominal pain  · Fever or Chills  Any additional instructions: Follow up in office on Tuesday 06/23/20 at 1105. Instructions given to [employer] and other family members.

## 2020-06-19 NOTE — OP NOTES
[address] [place] [address]  OPERATIVE REPORT    Name:  Juan Antonio Blanchard  MR#:  [id number]  :  1950  ACCOUNT #:  [de-identified]  DATE OF SERVICE:  2020    PREOPERATIVE DIAGNOSES:  Positive Hemoccult test and some lower abdominal pain. POSTPROCEDURE DIAGNOSIS:  She had a friable rectal mass at 8-10 cm. She had a stricture at 40 cm which was biopsied but I was unable to traverse this stricture even with a pediatric scope. This basically was a flexible sigmoidoscopy with biopsies rather than a colonoscopy. PROCEDURE PERFORMED:  Flexible Sigmoidoscopy with biopsies. SURGEON:  Shawn Adorno MD    ASSISTANT:  None. ANESTHESIA:  Monitored anesthesia care with Dr. Yolanda Johnson. ESTIMATED BLOOD LOSS:  10 mL. SPECIMENS REMOVED:  Six biopsies were taken of the rectal mass and two biopsies were taken of the sigmoid stricture area. DRAINS:  None. COMPLICATIONS:  None. IMPLANTS:  None. HISTORY:  This is a 28-year-old female who I had seen in the past.  She had a colonoscopy over three years ago which showed pandiverticular disease with internal and external hemorrhoids. She has reported a six-month history of worsening lower abdominal pain. She had a Hemoccult test that was positive and a colonoscopy was scheduled. She was seen in the office. I went through the procedure, risks of bleeding, infection, anesthesia, perforation of the colon. She was agreeable, signed a consent form. PROCEDURE:  The patient was seen in the preop area with her sister and then transported to room #4 at [address] [place] [address]. She was placed on a stretcher in left lateral decubitus position. Oxygen via nasal cannula was administered. The O2 sats and vital signs were monitored throughout the procedure. Time-out was done identifying the patient, surgeon, procedure and her birth date of 1950. When everyone in the room agreed, we began the procedure.   Monitored anesthesia care was done by Dr. Yifan Talley. Because she is an older female, I elected to start with a pediatric scope. The scope was advanced through the anus and in the rectum, there was a friable mass noted right away at 8-10 cm. We biopsied this six times with the biopsy forceps, then we advanced the scope. I got to 40 cm but I could not get past the mucosa. I would normally biopsied it twice but the biopsy forceps and could not traverse this area that was narrowed and strictured, unsure whether this represents a second metachronous lesion. Scope was brought back to the rectum. We retroflexed. She had some grade I internal hemorrhoids which have been noted previously. There was no evidence of any active bleeding from the biopsy sites in the rectal mass. FINDINGS:  1. Friable rectal mass at 8-10 cm.  2.  Stricture at 40 cm which was biopsied, internal hemorrhoids. I was unable to get the pediatric scope through the strictured or narrowed area. PLAN:  CT scan of the abdomen and pelvis with oral and IV contrast for staging. Check a CEA level.       MD PATRICIO [email or web address]  D:  06/19/2020 10:12  T:  06/19/2020 19:43  JOB #:  [id number]

## 2020-06-19 NOTE — PROGRESS NOTES
Bedside report received from Ernesto Piña RN at this time. Care assumed. Pt resting quietly with family at bedside. No acute distress noted. Awaiting Dr. Deepak Shell to come speak with patient and family before discharge.

## 2020-06-19 NOTE — ANESTHESIA POSTPROCEDURE EVALUATION
Procedure(s):  COLONOSCOPY/BMI 28  COLON BIOPSY. total IV anesthesia    Anesthesia Post Evaluation      Multimodal analgesia: multimodal analgesia used between 6 hours prior to anesthesia start to PACU discharge  Patient location during evaluation: PACU  Patient participation: complete - patient participated  Level of consciousness: awake and awake and alert  Pain management: adequate  Airway patency: patent  Anesthetic complications: no  Cardiovascular status: acceptable  Respiratory status: acceptable  Hydration status: acceptable  Post anesthesia nausea and vomiting:  controlled      INITIAL Post-op Vital signs:   Vitals Value Taken Time   /69 6/19/2020 10:51 AM   Temp     Pulse 107 6/19/2020 10:52 AM   Resp 20 6/19/2020 10:42 AM   SpO2 95 % 6/19/2020 10:52 AM   Vitals shown include unvalidated device data.

## 2020-06-19 NOTE — PROGRESS NOTES
Discharge instructions provided to patient and sister. Opportunity for questions provided, all answered. Patient voices no complaints or concerns at this time. Patient wheeled to car by staff and discharged home with sister.

## 2020-06-19 NOTE — INTERVAL H&P NOTE
Update History & Physical 
 
The Patient's History and Physical of 6/11/20 was reviewed with the patient and I examined the patient. There was no change. The surgical site was confirmed by the patient and me. Plan:  The risk, benefits, expected outcome, and alternative to the recommended procedure have been discussed with the patient. Patient understands and wants to proceed with the procedure.  
 
Electronically signed by Bozena Arenas MD on 6/19/2020 at 8:49 AM

## 2020-06-19 NOTE — ROUTINE PROCESS
Patient discharged. Passing flatus. Tolerating po fluids. No acute distress noted. Escorted to car, with family by Yogi Starkey RN.

## 2020-06-23 ENCOUNTER — HOSPITAL ENCOUNTER (OUTPATIENT)
Dept: CT IMAGING | Age: 70
Discharge: HOME | End: 2020-06-23
Attending: SURGERY
Payer: MEDICARE

## 2020-06-23 ENCOUNTER — HOSPITAL ENCOUNTER (OUTPATIENT)
Dept: LAB | Age: 70
Discharge: HOME | End: 2020-06-23
Payer: MEDICARE

## 2020-06-23 DIAGNOSIS — C20 RECTAL CANCER (HCC): ICD-10-CM

## 2020-06-23 DIAGNOSIS — K62.89 RECTAL MASS: ICD-10-CM

## 2020-06-23 LAB
CEA SERPL-MCNC: 2.3 NG/ML (ref 0–3)
CREAT BLD-MCNC: 1 MG/DL (ref 0.8–1.5)

## 2020-06-23 PROCEDURE — 74011636320 HC RX REV CODE- 636/320: Performed by: SURGERY

## 2020-06-23 PROCEDURE — 74177 CT ABD & PELVIS W/CONTRAST: CPT

## 2020-06-23 PROCEDURE — 36415 COLL VENOUS BLD VENIPUNCTURE: CPT

## 2020-06-23 PROCEDURE — 74011000258 HC RX REV CODE- 258: Performed by: SURGERY

## 2020-06-23 PROCEDURE — 82378 CARCINOEMBRYONIC ANTIGEN: CPT

## 2020-06-23 PROCEDURE — 82565 ASSAY OF CREATININE: CPT

## 2020-06-23 RX ORDER — SODIUM CHLORIDE 0.9 % (FLUSH) 0.9 %
10 SYRINGE (ML) INJECTION
Status: COMPLETED | OUTPATIENT
Start: 2020-06-23 | End: 2020-06-23

## 2020-06-23 RX ADMIN — IOPAMIDOL 100 ML: 755 INJECTION, SOLUTION INTRAVENOUS at 13:54

## 2020-06-23 RX ADMIN — Medication 10 ML: at 13:54

## 2020-06-23 RX ADMIN — SODIUM CHLORIDE 100 ML: 900 INJECTION, SOLUTION INTRAVENOUS at 13:54

## 2020-06-23 RX ADMIN — DIATRIZOATE MEGLUMINE AND DIATRIZOATE SODIUM 15 ML: 660; 100 LIQUID ORAL; RECTAL at 13:54

## 2020-06-29 ENCOUNTER — ANESTHESIA EVENT (OUTPATIENT)
Dept: ENDOSCOPY | Age: 70
End: 2020-06-29
Payer: MEDICARE

## 2020-06-29 RX ORDER — SODIUM CHLORIDE 0.9 % (FLUSH) 0.9 %
5-40 SYRINGE (ML) INJECTION AS NEEDED
Status: CANCELLED | OUTPATIENT
Start: 2020-06-29

## 2020-06-29 RX ORDER — SODIUM CHLORIDE 0.9 % (FLUSH) 0.9 %
5-40 SYRINGE (ML) INJECTION EVERY 8 HOURS
Status: CANCELLED | OUTPATIENT
Start: 2020-06-29

## 2020-06-29 RX ORDER — SODIUM CHLORIDE, SODIUM LACTATE, POTASSIUM CHLORIDE, CALCIUM CHLORIDE 600; 310; 30; 20 MG/100ML; MG/100ML; MG/100ML; MG/100ML
100 INJECTION, SOLUTION INTRAVENOUS CONTINUOUS
Status: CANCELLED | OUTPATIENT
Start: 2020-06-29

## 2020-06-29 NOTE — PROGRESS NOTES
Confirmed arrival time with patient. No concerns noted at this time.  will be present. Patient aware of COVID precautions.

## 2020-06-30 ENCOUNTER — ANESTHESIA (OUTPATIENT)
Dept: ENDOSCOPY | Age: 70
End: 2020-06-30
Payer: MEDICARE

## 2020-06-30 ENCOUNTER — HOSPITAL ENCOUNTER (OUTPATIENT)
Age: 70
Setting detail: OUTPATIENT SURGERY
Discharge: HOME | End: 2020-06-30
Attending: INTERNAL MEDICINE | Admitting: INTERNAL MEDICINE
Payer: MEDICARE

## 2020-06-30 VITALS
HEART RATE: 72 BPM | RESPIRATION RATE: 18 BRPM | OXYGEN SATURATION: 98 % | DIASTOLIC BLOOD PRESSURE: 72 MMHG | WEIGHT: 153 LBS | HEIGHT: 62 IN | SYSTOLIC BLOOD PRESSURE: 144 MMHG | TEMPERATURE: 98 F | BODY MASS INDEX: 28.16 KG/M2

## 2020-06-30 LAB — GLUCOSE BLD STRIP.AUTO-MCNC: 95 MG/DL (ref 65–100)

## 2020-06-30 PROCEDURE — 88305 TISSUE EXAM BY PATHOLOGIST: CPT

## 2020-06-30 PROCEDURE — 77030013991 HC SNR POLYP ENDOSC BSC -A: Performed by: INTERNAL MEDICINE

## 2020-06-30 PROCEDURE — 82962 GLUCOSE BLOOD TEST: CPT

## 2020-06-30 PROCEDURE — 74011000250 HC RX REV CODE- 250: Performed by: ANESTHESIOLOGY

## 2020-06-30 PROCEDURE — 74011250636 HC RX REV CODE- 250/636: Performed by: NURSE ANESTHETIST, CERTIFIED REGISTERED

## 2020-06-30 PROCEDURE — 76060000032 HC ANESTHESIA 0.5 TO 1 HR: Performed by: INTERNAL MEDICINE

## 2020-06-30 PROCEDURE — 76040000025: Performed by: INTERNAL MEDICINE

## 2020-06-30 PROCEDURE — 74011250636 HC RX REV CODE- 250/636: Performed by: ANESTHESIOLOGY

## 2020-06-30 PROCEDURE — 77030008969: Performed by: INTERNAL MEDICINE

## 2020-06-30 RX ORDER — SODIUM CHLORIDE, SODIUM LACTATE, POTASSIUM CHLORIDE, CALCIUM CHLORIDE 600; 310; 30; 20 MG/100ML; MG/100ML; MG/100ML; MG/100ML
100 INJECTION, SOLUTION INTRAVENOUS CONTINUOUS
Status: DISCONTINUED | OUTPATIENT
Start: 2020-06-30 | End: 2020-06-30 | Stop reason: HOSPADM

## 2020-06-30 RX ORDER — PROPOFOL 10 MG/ML
INJECTION, EMULSION INTRAVENOUS
Status: DISCONTINUED | OUTPATIENT
Start: 2020-06-30 | End: 2020-06-30 | Stop reason: HOSPADM

## 2020-06-30 RX ORDER — PROPOFOL 10 MG/ML
INJECTION, EMULSION INTRAVENOUS AS NEEDED
Status: DISCONTINUED | OUTPATIENT
Start: 2020-06-30 | End: 2020-06-30 | Stop reason: HOSPADM

## 2020-06-30 RX ORDER — SODIUM CHLORIDE 9 MG/ML
10 INJECTION, SOLUTION INTRAVENOUS CONTINUOUS
Status: DISCONTINUED | OUTPATIENT
Start: 2020-06-30 | End: 2020-06-30 | Stop reason: HOSPADM

## 2020-06-30 RX ADMIN — PROPOFOL 50 MG: 10 INJECTION, EMULSION INTRAVENOUS at 14:20

## 2020-06-30 RX ADMIN — PROPOFOL 140 MCG/KG/MIN: 10 INJECTION, EMULSION INTRAVENOUS at 14:20

## 2020-06-30 RX ADMIN — SODIUM CHLORIDE, SODIUM LACTATE, POTASSIUM CHLORIDE, AND CALCIUM CHLORIDE 100 ML/HR: 600; 310; 30; 20 INJECTION, SOLUTION INTRAVENOUS at 14:00

## 2020-06-30 RX ADMIN — FAMOTIDINE 20 MG: 10 INJECTION, SOLUTION INTRAVENOUS at 14:08

## 2020-06-30 NOTE — ANESTHESIA POSTPROCEDURE EVALUATION
Procedure(s):  RECTAL ENDOSCOPIC ULTRASOUND (EUS)/ 28  SIGMOIDOSCOPY FLEXIBLE  ENDOSCOPIC POLYPECTOMY.     total IV anesthesia    Anesthesia Post Evaluation      Multimodal analgesia: multimodal analgesia used between 6 hours prior to anesthesia start to PACU discharge  Patient location during evaluation: bedside  Patient participation: complete - patient participated  Level of consciousness: awake  Pain management: adequate  Airway patency: patent  Anesthetic complications: no  Cardiovascular status: acceptable and stable  Respiratory status: acceptable and room air  Hydration status: acceptable  Post anesthesia nausea and vomiting:  none      INITIAL Post-op Vital signs:   Vitals Value Taken Time   BP 93/56 6/30/2020  3:00 PM   Temp     Pulse 69 6/30/2020  3:00 PM   Resp 16 6/30/2020  3:00 PM   SpO2 98 % 6/30/2020  3:00 PM

## 2020-06-30 NOTE — ROUTINE PROCESS
Discharge instructions were reviewed with patient. An opportunity was given for questions. Patient verbalized understanding, and has no questions at this time.

## 2020-06-30 NOTE — ANESTHESIA PREPROCEDURE EVALUATION
Relevant Problems   No relevant active problems       Anesthetic History               Review of Systems / Medical History  Patient summary reviewed and pertinent labs reviewed    Pulmonary    COPD: moderate    Sleep apnea           Neuro/Psych              Cardiovascular    Hypertension          Hyperlipidemia         GI/Hepatic/Renal     GERD           Endo/Other    Diabetes: well controlled    Arthritis     Other Findings   Comments: Fibromyalgia  IBS           Physical Exam    Airway  Mallampati: II  TM Distance: 4 - 6 cm  Neck ROM: normal range of motion   Mouth opening: Normal     Cardiovascular  Regular rate and rhythm,  S1 and S2 normal,  no murmur, click, rub, or gallop  Rhythm: regular  Rate: normal         Dental      Comments: Loose upper right side tooth   Pulmonary  Breath sounds clear to auscultation               Abdominal  GI exam deferred       Other Findings            Anesthetic Plan    ASA: 3  Anesthesia type: total IV anesthesia          Induction: Intravenous  Anesthetic plan and risks discussed with: Patient and Spouse

## 2020-06-30 NOTE — PROCEDURES
ENDOSCOPIC ULTRASOUND PROCEDURE NOTE    DATE OF PROCEDURE: 6/30/2020    PRE-OP DIAGNOSIS:  Rectal cancer   Sigmoid stricture     POST-OP DIAGNOSIS:  Rectal cancer-T1 N0 by EUS   Sigmoid polyp   Severe diverticulosis     MEDICATIONS ADMINISTERED: MAC    INSTRUMENT: GFUE [id number]    PROCEDURE:  After obtaining informed consent, the patient was placed in the left lateral position and sedated. The echoendoscope was advanced to the lower GI tract without difficulty. The scope was slowly removed while detailed images of the adjacent organs were obtained. The patient was taken to the recovery area in stable condition. ENDOSCOPIC FINDINGS: Because of the severe sigmoid tortuosity and possible stricture, an EGD scope was advanced into the anus. This was advanced through the sigmoid colon into the descending colon, up to 45 cm. This appears to be approximately descending colon near the splenic flexure. It could not be advanced further because of scope looping. There was severe diverticulosis, and the bowel prep was poor at this level. On slow withdrawal of the scope, there is no definite stricture present, but severe tortuosity and diverticular disease. A 5 mm sessile polyp was identified in the proximal sigmoid colon. Polypectomy performed with a cold snare. The previously identified or rectal cancer is again visualized 8-10 cm from the anal verge. It has a lobular, polypoid appearance. It occupies proximately 25% or less of the lumen circumference. It is located on a fold, with involvement possibly on both sides. Sonographically, the lesion appears fairly superficial. There is definite invasion into the submucosa, but node clear involvement of the muscularis propria. This is consistent with a T1 tumor. There is no evidence of invasion into adjacent structures. The tumor measures at least 6 x 14 mm in cross section. It is hypoechoic, and homogenous. OTHER ORGANS: There was no ascites in the pelvis.   No significant Perirectal or iliac  lymphadenopathy. Estimated blood loss: 0-minimal     PLAN:  For path from colon polyp   The lesion appears to be superficial, and likely amenable to transanal excision. This will likely prove more effective than endoscopic excision given the location on a fold   While the sigmoid colon is severely tortuous, no definite stricture is identified. Consider follow up colonoscopy after treatment of the rectal cancer to ensure there are no additional lesions in the proximal colon. She may require a 2-day prep to ensure adequate visualization, given her severe diverticular disease.   This could be performed by GI or surgery    Jane Pedroza MD  [facility]

## 2020-06-30 NOTE — DISCHARGE INSTRUCTIONS
Gastrointestinal Colonoscopy/Flexible Sigmoidoscopy - Lower Exam Discharge Instructions  1. Call Dr. Robbin Ferreira at [phone] for any problems or questions. 2. Contact the doctors office for follow up appointment as directed  3. Medication may cause drowsiness for several hours, therefore:  · Do not drive or operate machinery for reminder of the day. · No alcohol today. · Do not make any important or legal decisions for 24 hours. · Do not sign any legal documents for 24 hours. 4. Ordinarily, you may resume regular diet and activity after exam unless otherwise specified by your physician. 5. Because of air put into your colon during exam, you may experience some abdominal distension, relieved by the passage of gas, for several hours. 6. Contact your physician if you have any of the following:  · Excessive amount of bleeding - large amount when having a bowel movement. Occasional specks of blood with bowel movement would not be unusual.  · Severe abdominal pain  · Fever or Chills  Any additional instructions:    1. Follow up with surgery and pathology. Instructions given to [employer] and other family members.

## 2020-06-30 NOTE — H&P
Gastroenterology Outpatient History and Physical    Patient: Luane Dandy    Physician: Trina Gordon MD    Chief Complaint: Rectal cancer    History of Present Illness: Sigmoid stricture    Justification for Procedure: As above    History:  Past Medical History:   Diagnosis Date    [facility] ([facility]     NO MEDS X 2 YRS    Depression with anxiety     Diverticulosis of colon 04/26/2017    Widespread per Colonoscopy    Elevated C-reactive protein (CRP)     Fibromyalgia     GERD (gastroesophageal reflux disease)     controlled with med    Hemorrhoids 04/26/2017    Internal & External Per Colonoscopy    HTN (hypertension), benign     controlled with med    IBS (irritable bowel syndrome)     Microalbuminuria due to type 2 diabetes mellitus ([facility]     Mixed hyperlipidemia     Osteoarthritis     Osteopenia 07/10/2017    Per DEXA    Perennial allergic rhinitis with seasonal variation     Rectal bleeding ONSET 10/2019    Sleep apnea     insurance wouldnt pay for cpap-- so pt doesnt have    Type 2 diabetes mellitus ([facility]     type 2- sqbs am avg 120-150's--- no hypo    Vitamin B12 deficiency     Vitamin D deficiency       Past Surgical History:   Procedure Laterality Date    COLONOSCOPY N/A 4/26/2017    COLONOSCOPY / BMI=30 performed by Peggy Obrien MD at [facility] ENDOSCOPY    COLONOSCOPY N/A 6/19/2020    COLONOSCOPY/BMI 28 performed by Yaya Coles MD at [facility] ENDOSCOPY    HX BREAST LUMPECTOMY Bilateral     2 from R Side & 1 from L Side - All Benign    HX CARPAL TUNNEL RELEASE Right     HX CHOLECYSTECTOMY      HX HEENT  22 YRS AGO    MOLE REMOVED FROM NOSE    HX TUBAL LIGATION      SINUS SURGERY PROC UNLISTED  25 YRS AGO      Social History     Socioeconomic History    Marital status:      Spouse name: Not on file    Number of children: Not on file    Years of education: Not on file    Highest education level: Not on file   Tobacco Use    Smoking status: Former Smoker     Packs/day: 1.50     Years: 35.00     Pack years: 52.50     Types: Cigarettes     Last attempt to quit: 9/15/2016     Years since quitting: 3.7    Smokeless tobacco: Never Used   Substance and Sexual Activity    Alcohol use: Not Currently     Comment: none x 3-4 yrs     Drug use: No      Family History   Problem Relation Age of Onset   [address] [facility] Cancer Mother         Cervical    Diabetes Mother     Heart Disease Father     Hypertension Father     Elevated Lipids Father     Other Father         ITP    Cancer Cousin         Lung Cancer    Other Sister         Skin Cancer    Asthma Sister     Diabetes Sister     Kidney Disease Maternal Grandmother     Kidney Disease Maternal Grandfather     Arthritis Sister     Diabetes Sister     Cancer Maternal Uncle     Cancer Paternal Uncle     Breast Cancer Neg Hx        Allergies: Allergies   Allergen Reactions    Codeine Rash    Lidocaine Rash    Sulfa (Sulfonamide Antibiotics) Nausea and Vomiting       Medications:   Prior to Admission medications    Medication Sig Start Date End Date Taking? Authorizing Provider   lisinopriL (PRINIVIL, ZESTRIL) 10 mg tablet Take 1 Tab by mouth daily. 3/26/20  Yes Divine Calhoun PA-C   cyanocobalamin (Vitamin B-12) 1,000 mcg/mL injection 1 mL by IntraMUSCular route every thirty (30) days. 3/26/20  Yes Divine Calhoun PA-C   apple cider vinegar 500 mg tab Take  by mouth. Yes Provider, Historical   omeprazole (PRILOSEC) 20 mg capsule Take 20 mg by mouth daily. Yes Provider, Historical   fenofibrate nanocrystallized 160 mg tab Take 160 mg by mouth daily. 12/30/19  Yes Divine Calhoun PA-C   glucose blood VI test strips (RELION PRIME TEST STRIPS) strip Use to check blood sugar once daily. Dx: E11.29 12/30/19  Yes Divine Calhoun PA-C   lancets (RELION ULTRA THIN PLUS LANCETS) misc Use to check glucose once daily.   Dx: E11.29 12/30/19  Yes Jade Nagy PA-C   linaGLIPtin (TRADJENTA) 5 mg tablet Take 1 Tab by mouth daily. 12/30/19  Yes Divine Calhoun PA-C   metFORMIN ER (GLUCOPHAGE XR) 750 mg tablet Take 1 Tab by mouth daily (with dinner). Patient taking differently: Take 750 mg by mouth daily. 12/30/19  Yes Divine Calhoun PA-C   PARoxetine (PAXIL) 40 mg tablet Take 1 Tab by mouth daily. 12/30/19  Yes Divine Calhoun PA-C   rosuvastatin (CRESTOR) 10 mg tablet Take 1 Tab by mouth nightly. 12/30/19  Yes Divine Calhoun PA-C   gslzo-uwiod-8-dha-epa-lipids (KRILL OIL) 133-54-95-50 mg cap Take  by mouth. Yes Provider, Historical   guaifenesin/phenylephrine HCl (MUCINEX COLD PO) Take  by mouth daily as needed. Yes Provider, Historical   cyclobenzaprine (FLEXERIL) 10 mg tablet Take 1 Tab by mouth two (2) times a day. Patient taking differently: Take 10 mg by mouth two (2) times daily as needed. 3/26/19  Yes Divine Calhoun PA-C   ascorbic acid, vitamin C, (VITAMIN C) 500 mg tablet Take  by mouth. Yes Provider, Historical   vitamin E (AQUA GEMS) 400 unit capsule Take  by mouth daily. Yes Provider, Historical   cholecalciferol, VITAMIN D3, (VITAMIN D3) 5,000 unit tab tablet Take 5,000 Units by mouth daily. Yes Provider, Historical   aspirin (ASPIRIN) 325 mg tablet Take 325 mg by mouth daily. Indications: myocardial infarction prevention, prevention of transient ischemic attack   Yes Provider, Historical   melatonin 10 mg tab Take 1 Tab by mouth nightly. Per pt takes 20 mg each pm   Yes Provider, Historical   garlic 3,477 mg cap Take  by mouth daily. Yes Provider, Historical   LACTOBACILLUS ACIDOPHILUS (PROBIOTIC PO) Take 1 Tab by mouth daily. Yes Provider, Historical   cyanocobalamin (VITAMIN B-12) 500 mcg tablet Take 500 mcg by mouth daily. Yes Provider, Historical   mometasone (NASONEX) 50 mcg/actuation nasal spray 2 Sprays daily as needed. Yes Provider, Historical       Vital Signs: Blood pressure 128/72, pulse 76, temperature 98.3 °F (36.8 °C), resp. rate 16, height 5' 2\" (1.575 m), weight 69.4 kg (153 lb), SpO2 96 %, not currently breastfeeding.     Physical Exam:   General: alert      Heart: regular rate and rhythm   Lungs: no tachypnea, retractions or cyanosis, Heart exam - S1, S2 normal, no murmur, no gallop, rate regular   Abdominal: Bowel sounds are normal, soft, non distended             Plan of Care/Planned Procedure: EUS/ colonoscopy    Signed:  Chang Salas MD 6/30/2020

## 2020-07-05 NOTE — H&P (VIEW-ONLY)
aDate: 2020 Name: Gabino Jo MRN: [id number] : 1950 Age: 78 y.o. Sex: female Deyanira Tejada MD  
 
 
CC:  No chief complaint on file. HPI: 
 
 Gabino Jo is a 78 y.o. female who presents for follow up of rectal cancer after an EUS/colonoscopy was done by Dr. Luna Kumar on 20. Dr. Key Patel procedure note reads as follows: DATE OF PROCEDURE: 2020 
  
PRE-OP DIAGNOSIS: 
Rectal cancer Sigmoid stricture  
  POST-OP DIAGNOSIS: 
Rectal cancer-T1 N0 by EUS Sigmoid polyp Severe diverticulosis  
  MEDICATIONS ADMINISTERED: MAC 
  
INSTRUMENT: GFUE 160 
  
PROCEDURE:  After obtaining informed consent, the patient was placed in the left lateral position and sedated. The echoendoscope was advanced to the lower GI tract without difficulty. The scope was slowly removed while detailed images of the adjacent organs were obtained. The patient was taken to the recovery area in stable condition. 
  
ENDOSCOPIC FINDINGS: Because of the severe sigmoid tortuosity and possible stricture, an EGD scope was advanced into the anus. This was advanced through the sigmoid colon into the descending colon, up to 45 cm. This appears to be approximately descending colon near the splenic flexure. It could not be advanced further because of scope looping. There was severe diverticulosis, and the bowel prep was poor at this level. On slow withdrawal of the scope, there is no definite stricture present, but severe tortuosity and diverticular disease. A 5 mm sessile polyp was identified in the proximal sigmoid colon. Polypectomy performed with a cold snare. The previously identified or rectal cancer is again visualized 8-10 cm from the anal verge. It has a lobular, polypoid appearance. It occupies proximately 25% or less of the lumen circumference. It is located on a fold, with involvement possibly on both sides. Sonographically, the lesion appears fairly superficial. There is definite invasion into the submucosa, but node clear involvement of the muscularis propria. This is consistent with a T1 tumor. There is no evidence of invasion into adjacent structures. The tumor measures at least 6 x 14 mm in cross section. It is hypoechoic, and homogenous. OTHER ORGANS: There was no ascites in the pelvis. No significant Perirectal or iliac  lymphadenopathy. 
  
Estimated blood loss: 0-minimal  
  PLAN: 
For path from colon polyp The lesion appears to be superficial, and likely amenable to transanal excision. This will likely prove more effective than endoscopic excision given the location on a fold While the sigmoid colon is severely tortuous, no definite stricture is identified. Consider follow up colonoscopy after treatment of the rectal cancer to ensure there are no additional lesions in the proximal colon. She may require a 2-day prep to ensure adequate visualization, given her severe diverticular disease. This could be performed by GI or surgery No new problems. PMH: 
 
Past Medical History:  
Diagnosis Date  Chronic obstructive pulmonary disease ([address] NO MEDS X 2 YRS  
 Depression with anxiety  Diverticulosis of colon 04/26/2017 Widespread per Colonoscopy  Elevated C-reactive protein (CRP)  Fibromyalgia  GERD (gastroesophageal reflux disease)   
 controlled with med  Hemorrhoids 04/26/2017 Internal & External Per Colonoscopy  HTN (hypertension), benign   
 controlled with med  IBS (irritable bowel syndrome)  Microalbuminuria due to type 2 diabetes mellitus ([address]  Mixed hyperlipidemia  Osteoarthritis  Osteopenia 07/10/2017 Per DEXA  Perennial allergic rhinitis with seasonal variation  Rectal bleeding ONSET 10/2019  Sleep apnea   
 insurance wouldnt pay for cpap-- so pt doesnt have  Type 2 diabetes mellitus ([address] type 2- sqbs am avg 120-150's--- no hypo  Vitamin B12 deficiency  Vitamin D deficiency PSH: 
 
Past Surgical History:  
Procedure Laterality Date  COLONOSCOPY N/A 4/26/2017 COLONOSCOPY / BMI=30 performed by Vito Layne MD at [facility] ENDOSCOPY  COLONOSCOPY N/A 6/19/2020 COLONOSCOPY/BMI 28 performed by Miguelito Rdz MD at [facility] ENDOSCOPY  FLEXIBLE SIGMOIDOSCOPY N/A 6/30/2020 SIGMOIDOSCOPY FLEXIBLE performed by Philipp Shelton MD at [facility] ENDOSCOPY  
 HX BREAST LUMPECTOMY Bilateral   
 2 from R Side & 1 from L Side - All Benign  HX CARPAL TUNNEL RELEASE Right  HX CHOLECYSTECTOMY  HX HEENT  25 YRS AGO  
 MOLE REMOVED FROM NOSE  
 HX TUBAL LIGATION    
 SINUS SURGERY PROC UNLISTED  25 YRS AGO MEDS: 
 
Current Outpatient Medications Medication Sig  
 lisinopriL (PRINIVIL, ZESTRIL) 10 mg tablet Take 1 Tab by mouth daily.  cyanocobalamin (Vitamin B-12) 1,000 mcg/mL injection 1 mL by IntraMUSCular route every thirty (30) days.  apple cider vinegar 500 mg tab Take  by mouth.  omeprazole (PRILOSEC) 20 mg capsule Take 20 mg by mouth daily.  fenofibrate nanocrystallized 160 mg tab Take 160 mg by mouth daily.  glucose blood VI test strips (RELION PRIME TEST STRIPS) strip Use to check blood sugar once daily. Dx: E11.29  
 lancets (RELION ULTRA THIN PLUS LANCETS) misc Use to check glucose once daily. Dx: E11.29  
 linaGLIPtin (TRADJENTA) 5 mg tablet Take 1 Tab by mouth daily.  metFORMIN ER (GLUCOPHAGE XR) 750 mg tablet Take 1 Tab by mouth daily (with dinner). (Patient taking differently: Take 750 mg by mouth daily.)  PARoxetine (PAXIL) 40 mg tablet Take 1 Tab by mouth daily.  rosuvastatin (CRESTOR) 10 mg tablet Take 1 Tab by mouth nightly.  krill-omega-3-dha-epa-lipids (KRILL OIL) 175-75-38-59 mg cap Take  by mouth.  guaifenesin/phenylephrine HCl (MUCINEX COLD PO) Take  by mouth daily as needed.  cyclobenzaprine (FLEXERIL) 10 mg tablet Take 1 Tab by mouth two (2) times a day. (Patient taking differently: Take 10 mg by mouth two (2) times daily as needed.)  ascorbic acid, vitamin C, (VITAMIN C) 500 mg tablet Take  by mouth.  vitamin E (AQUA GEMS) 400 unit capsule Take  by mouth daily.  cholecalciferol, VITAMIN D3, (VITAMIN D3) 5,000 unit tab tablet Take 5,000 Units by mouth daily.  aspirin (ASPIRIN) 325 mg tablet Take 325 mg by mouth daily. Indications: myocardial infarction prevention, prevention of transient ischemic attack  melatonin 10 mg tab Take 1 Tab by mouth nightly. Per pt takes 20 mg each pm  
 garlic 4,002 mg cap Take  by mouth daily.  LACTOBACILLUS ACIDOPHILUS (PROBIOTIC PO) Take 1 Tab by mouth daily.  cyanocobalamin (VITAMIN B-12) 500 mcg tablet Take 500 mcg by mouth daily.  mometasone (NASONEX) 50 mcg/actuation nasal spray 2 Sprays daily as needed. No current facility-administered medications for this visit. ALLERGIES:   
 
Allergies Allergen Reactions  Codeine Rash  Lidocaine Rash  Sulfa (Sulfonamide Antibiotics) Nausea and Vomiting SH: Social History Tobacco Use  Smoking status: Former Smoker Packs/day: 1.50 Years: 35.00 Pack years: 52.50 Types: Cigarettes Last attempt to quit: 9/15/2016 Years since quitting: 3.8  Smokeless tobacco: Never Used Substance Use Topics  Alcohol use: Not Currently Comment: none x 3-4 yrs  Drug use: No  
 
 
FH: 
 
Family History Problem Relation Age of Onset  Cancer Mother Cervical  
 Diabetes Mother  Heart Disease Father  Hypertension Father  Elevated Lipids Father  Other Father ITP  Cancer Cousin Lung Cancer  Other Sister Skin Cancer  Asthma Sister  Diabetes Sister  Kidney Disease Maternal Grandmother  Kidney Disease Maternal Grandfather  Arthritis Sister  Diabetes Sister  Cancer Maternal Uncle  Cancer Paternal Uncle  Breast Cancer Neg Hx   
 
 
ROS: The patient has no difficulty with chest pain or shortness of breath. No fever or chills. Comprehensive 13 point review of systems was otherwise unremarkable except as noted above. Physical Exam:  
 
There were no vitals taken for this visit. General: Alert, oriented, cooperative white female in no acute distress. Eyes: Sclera are clear. Conjunctiva and lids within normal limits. No icterus. Ears and Nose: no gross deformities to visual inspection, gross hearing intact Neck: Supple, trachea midline. No lymphadenopathy. Resp: Breathing is  non-labored. Lungs clear to auscultation without wheezing or rhonchi CV: RRR. No murmurs, rubs or gallops appreciated. Abd: soft, mild LLQ tenderness to palpation, active BS'S. Psych:  Mood and affect appropriate. Short-term memory and understanding intact Assessment/Plan:  Thelma Mahan is a 78 y.o. female who has signs and symptoms consistent with a superficial rectal cancer. 1.  Bowel prep 2. Transanal excision of a rectal cancer. 3.  I went through the risks of bleeding, infection and anesthesia. I went through potential injury to the large bowel, small bowel bladder and ureters. It may not be possible to excise this lesion via the anus. Other potential problems include DVT/PE, pneumonia, stroke, heart attack, UTI and wound infection. The patient understood the risks and signed the consent form.   
 
Breezy Melgar MD 
 
 [facility]   7/5/2020  9:18 AM

## 2020-07-08 ENCOUNTER — HOSPITAL ENCOUNTER (OUTPATIENT)
Dept: SURGERY | Age: 70
Discharge: HOME | End: 2020-07-08

## 2020-07-09 ENCOUNTER — ANESTHESIA EVENT (OUTPATIENT)
Dept: SURGERY | Age: 70
DRG: 349 | End: 2020-07-09
Payer: MEDICARE

## 2020-07-09 RX ORDER — FENTANYL CITRATE 50 UG/ML
25 INJECTION, SOLUTION INTRAMUSCULAR; INTRAVENOUS ONCE
Status: CANCELLED | OUTPATIENT
Start: 2020-07-09 | End: 2020-07-09

## 2020-07-09 RX ORDER — SODIUM CHLORIDE 9 MG/ML
50 INJECTION, SOLUTION INTRAVENOUS CONTINUOUS
Status: CANCELLED | OUTPATIENT
Start: 2020-07-09 | End: 2020-07-10

## 2020-07-09 NOTE — PERIOP NOTES
Patient verified name and . Order for consent found in EHR; patient verifies procedure. Type 1B surgery, phone assessment update complete- pt had phone assessment 2020 and surgery 2020 at [facility]- pt reports no changes to medical hx or medications since assessment and admission. Orders received. Labs per surgeon: none  Labs per anesthesia protocol: SQBS DOS    Pt questioned if she needed to do bowel prep today. Pt states she has not received any instructions re bowel prep from office. Surgeon's office note 2020 states pt is to perform \"bowel prep. \" Called surgeon's office and spoke with Bianca Alfaro- office staff informed of above and Bianca Alfaro states will call pt on home number now to give instructions. Patient answered medical/surgical history questions at their best of ability. All prior to admission medications documented in [facility]. Patient instructed to take the following medications the day of surgery according to anesthesia guidelines with a small sip of water: prilosec, paxil. Hold all vitamins 7 days prior to surgery and NSAIDS 5 days prior to surgery. Prescription meds to hold: fenofibrate DOS, tradjenta DOS, lisinopril DOS, metformin DOS. Patient instructed on the following:  > 1 visitor allowed at this time. >Arrive at [facility], C Entrance, time of arrival to be called the day before by 1700. >NPO after midnight including gum, mints, and ice chips. >Responsible adult must drive patient to the hospital, stay during surgery, and patient will need supervision 24 hours after anesthesia. >Use antibacterial soap in shower the night before surgery and on the morning of surgery. >All piercings must be removed prior to arrival.    >Leave all valuables (money and jewelry) at home but bring insurance card and ID on DOS. >Do not wear make-up, nail polish, lotions, cologne, perfumes, powders, or oil on skin.     Patient teach back successful and patient demonstrates knowledge of instruction.

## 2020-07-10 ENCOUNTER — ANESTHESIA (OUTPATIENT)
Dept: SURGERY | Age: 70
DRG: 349 | End: 2020-07-10
Payer: MEDICARE

## 2020-07-10 ENCOUNTER — HOSPITAL ENCOUNTER (INPATIENT)
Age: 70
LOS: 2 days | Discharge: HOME | DRG: 349 | End: 2020-07-13
Attending: SURGERY | Admitting: SURGERY
Payer: MEDICARE

## 2020-07-10 PROBLEM — K62.89 RECTAL MASS: Status: ACTIVE | Noted: 2020-07-10

## 2020-07-10 LAB
GLUCOSE BLD STRIP.AUTO-MCNC: 151 MG/DL (ref 65–100)
GLUCOSE BLD STRIP.AUTO-MCNC: 161 MG/DL (ref 65–100)
GLUCOSE BLD STRIP.AUTO-MCNC: 229 MG/DL (ref 65–100)

## 2020-07-10 PROCEDURE — 88305 TISSUE EXAM BY PATHOLOGIST: CPT

## 2020-07-10 PROCEDURE — 74011250636 HC RX REV CODE- 250/636: Performed by: NURSE ANESTHETIST, CERTIFIED REGISTERED

## 2020-07-10 PROCEDURE — 74011000250 HC RX REV CODE- 250

## 2020-07-10 PROCEDURE — 77030014007 HC SPNG HEMSTAT J&J -B: Performed by: SURGERY

## 2020-07-10 PROCEDURE — 74011000250 HC RX REV CODE- 250: Performed by: ANESTHESIOLOGY

## 2020-07-10 PROCEDURE — 74011250637 HC RX REV CODE- 250/637: Performed by: ANESTHESIOLOGY

## 2020-07-10 PROCEDURE — 76010000161 HC OR TIME 1 TO 1.5 HR INTENSV-TIER 1: Performed by: SURGERY

## 2020-07-10 PROCEDURE — 74011250637 HC RX REV CODE- 250/637: Performed by: SURGERY

## 2020-07-10 PROCEDURE — 77030031139 HC SUT VCRL2 J&J -A: Performed by: SURGERY

## 2020-07-10 PROCEDURE — 0DBP7ZZ EXCISION OF RECTUM, VIA NATURAL OR ARTIFICIAL OPENING: ICD-10-PCS | Performed by: SURGERY

## 2020-07-10 PROCEDURE — 74011000250 HC RX REV CODE- 250: Performed by: NURSE ANESTHETIST, CERTIFIED REGISTERED

## 2020-07-10 PROCEDURE — 77030040361 HC SLV COMPR DVT MDII -B: Performed by: SURGERY

## 2020-07-10 PROCEDURE — 82962 GLUCOSE BLOOD TEST: CPT

## 2020-07-10 PROCEDURE — 74011250636 HC RX REV CODE- 250/636: Performed by: SURGERY

## 2020-07-10 PROCEDURE — 76210000006 HC OR PH I REC 0.5 TO 1 HR: Performed by: SURGERY

## 2020-07-10 PROCEDURE — 74011250636 HC RX REV CODE- 250/636: Performed by: ANESTHESIOLOGY

## 2020-07-10 PROCEDURE — 99218 HC RM OBSERVATION: CPT

## 2020-07-10 PROCEDURE — 94760 N-INVAS EAR/PLS OXIMETRY 1: CPT

## 2020-07-10 PROCEDURE — 77030010509 HC AIRWY LMA MSK TELE -A: Performed by: ANESTHESIOLOGY

## 2020-07-10 PROCEDURE — 77030018836 HC SOL IRR NACL ICUM -A: Performed by: SURGERY

## 2020-07-10 PROCEDURE — 76060000033 HC ANESTHESIA 1 TO 1.5 HR: Performed by: SURGERY

## 2020-07-10 PROCEDURE — 77030027138 HC INCENT SPIROMETER -A

## 2020-07-10 RX ORDER — LIDOCAINE HYDROCHLORIDE 20 MG/ML
INJECTION, SOLUTION EPIDURAL; INFILTRATION; INTRACAUDAL; PERINEURAL AS NEEDED
Status: DISCONTINUED | OUTPATIENT
Start: 2020-07-10 | End: 2020-07-10 | Stop reason: HOSPADM

## 2020-07-10 RX ORDER — SODIUM CHLORIDE 0.9 % (FLUSH) 0.9 %
5-40 SYRINGE (ML) INJECTION EVERY 8 HOURS
Status: DISCONTINUED | OUTPATIENT
Start: 2020-07-10 | End: 2020-07-10 | Stop reason: HOSPADM

## 2020-07-10 RX ORDER — DIPHENHYDRAMINE HYDROCHLORIDE 50 MG/ML
12.5 INJECTION, SOLUTION INTRAMUSCULAR; INTRAVENOUS
Status: DISCONTINUED | OUTPATIENT
Start: 2020-07-10 | End: 2020-07-10 | Stop reason: HOSPADM

## 2020-07-10 RX ORDER — ENOXAPARIN SODIUM 100 MG/ML
40 INJECTION SUBCUTANEOUS EVERY 24 HOURS
Status: DISCONTINUED | OUTPATIENT
Start: 2020-07-11 | End: 2020-07-11

## 2020-07-10 RX ORDER — FLUTICASONE PROPIONATE 50 MCG
2 SPRAY, SUSPENSION (ML) NASAL DAILY
Status: DISCONTINUED | OUTPATIENT
Start: 2020-07-11 | End: 2020-07-13 | Stop reason: HOSPADM

## 2020-07-10 RX ORDER — SODIUM CHLORIDE 0.9 % (FLUSH) 0.9 %
5-40 SYRINGE (ML) INJECTION EVERY 8 HOURS
Status: DISCONTINUED | OUTPATIENT
Start: 2020-07-10 | End: 2020-07-13 | Stop reason: HOSPADM

## 2020-07-10 RX ORDER — OXYCODONE AND ACETAMINOPHEN 5; 325 MG/1; MG/1
1 TABLET ORAL
Status: DISCONTINUED | OUTPATIENT
Start: 2020-07-10 | End: 2020-07-13 | Stop reason: HOSPADM

## 2020-07-10 RX ORDER — OXYCODONE AND ACETAMINOPHEN 5; 325 MG/1; MG/1
1 TABLET ORAL AS NEEDED
Status: DISCONTINUED | OUTPATIENT
Start: 2020-07-10 | End: 2020-07-10 | Stop reason: HOSPADM

## 2020-07-10 RX ORDER — LORAZEPAM 2 MG/ML
1 INJECTION INTRAMUSCULAR
Status: DISCONTINUED | OUTPATIENT
Start: 2020-07-10 | End: 2020-07-13 | Stop reason: HOSPADM

## 2020-07-10 RX ORDER — FENTANYL CITRATE 50 UG/ML
INJECTION, SOLUTION INTRAMUSCULAR; INTRAVENOUS AS NEEDED
Status: DISCONTINUED | OUTPATIENT
Start: 2020-07-10 | End: 2020-07-10 | Stop reason: HOSPADM

## 2020-07-10 RX ORDER — CYCLOBENZAPRINE HCL 10 MG
10 TABLET ORAL
Status: DISCONTINUED | OUTPATIENT
Start: 2020-07-10 | End: 2020-07-13 | Stop reason: HOSPADM

## 2020-07-10 RX ORDER — HYDROMORPHONE HYDROCHLORIDE 1 MG/ML
1 INJECTION, SOLUTION INTRAMUSCULAR; INTRAVENOUS; SUBCUTANEOUS
Status: DISCONTINUED | OUTPATIENT
Start: 2020-07-10 | End: 2020-07-13 | Stop reason: HOSPADM

## 2020-07-10 RX ORDER — OXYCODONE HYDROCHLORIDE 5 MG/1
5 TABLET ORAL
Status: DISCONTINUED | OUTPATIENT
Start: 2020-07-10 | End: 2020-07-10 | Stop reason: HOSPADM

## 2020-07-10 RX ORDER — SODIUM CHLORIDE 0.9 % (FLUSH) 0.9 %
5-40 SYRINGE (ML) INJECTION AS NEEDED
Status: DISCONTINUED | OUTPATIENT
Start: 2020-07-10 | End: 2020-07-10 | Stop reason: HOSPADM

## 2020-07-10 RX ORDER — PROPOFOL 10 MG/ML
INJECTION, EMULSION INTRAVENOUS AS NEEDED
Status: DISCONTINUED | OUTPATIENT
Start: 2020-07-10 | End: 2020-07-10 | Stop reason: HOSPADM

## 2020-07-10 RX ORDER — LABETALOL HYDROCHLORIDE 5 MG/ML
5 INJECTION, SOLUTION INTRAVENOUS ONCE
Status: COMPLETED | OUTPATIENT
Start: 2020-07-10 | End: 2020-07-10

## 2020-07-10 RX ORDER — LISINOPRIL 5 MG/1
10 TABLET ORAL DAILY
Status: DISCONTINUED | OUTPATIENT
Start: 2020-07-11 | End: 2020-07-13 | Stop reason: HOSPADM

## 2020-07-10 RX ORDER — SODIUM CHLORIDE AND POTASSIUM CHLORIDE .9; .15 G/100ML; G/100ML
100 SOLUTION INTRAVENOUS CONTINUOUS
Status: DISCONTINUED | OUTPATIENT
Start: 2020-07-10 | End: 2020-07-12

## 2020-07-10 RX ORDER — METFORMIN HYDROCHLORIDE 850 MG/1
850 TABLET ORAL
Status: DISCONTINUED | OUTPATIENT
Start: 2020-07-10 | End: 2020-07-13 | Stop reason: HOSPADM

## 2020-07-10 RX ORDER — SODIUM CHLORIDE 0.9 % (FLUSH) 0.9 %
5-40 SYRINGE (ML) INJECTION AS NEEDED
Status: DISCONTINUED | OUTPATIENT
Start: 2020-07-10 | End: 2020-07-13 | Stop reason: HOSPADM

## 2020-07-10 RX ORDER — ZOLPIDEM TARTRATE 5 MG/1
5 TABLET ORAL
Status: DISCONTINUED | OUTPATIENT
Start: 2020-07-10 | End: 2020-07-13 | Stop reason: HOSPADM

## 2020-07-10 RX ORDER — PAROXETINE HYDROCHLORIDE 20 MG/1
40 TABLET, FILM COATED ORAL DAILY
Status: DISCONTINUED | OUTPATIENT
Start: 2020-07-11 | End: 2020-07-13 | Stop reason: HOSPADM

## 2020-07-10 RX ORDER — DEXTROSE 40 %
1 GEL (GRAM) ORAL AS NEEDED
Status: DISCONTINUED | OUTPATIENT
Start: 2020-07-10 | End: 2020-07-13 | Stop reason: HOSPADM

## 2020-07-10 RX ORDER — METOPROLOL TARTRATE 5 MG/5ML
1.25 INJECTION INTRAVENOUS
Status: DISCONTINUED | OUTPATIENT
Start: 2020-07-10 | End: 2020-07-13 | Stop reason: HOSPADM

## 2020-07-10 RX ORDER — HYDROMORPHONE HYDROCHLORIDE 1 MG/ML
0.5 INJECTION, SOLUTION INTRAMUSCULAR; INTRAVENOUS; SUBCUTANEOUS
Status: DISCONTINUED | OUTPATIENT
Start: 2020-07-10 | End: 2020-07-13 | Stop reason: HOSPADM

## 2020-07-10 RX ORDER — MIDAZOLAM HYDROCHLORIDE 1 MG/ML
2 INJECTION, SOLUTION INTRAMUSCULAR; INTRAVENOUS ONCE
Status: COMPLETED | OUTPATIENT
Start: 2020-07-10 | End: 2020-07-10

## 2020-07-10 RX ORDER — MIDAZOLAM HYDROCHLORIDE 1 MG/ML
2 INJECTION, SOLUTION INTRAMUSCULAR; INTRAVENOUS
Status: DISCONTINUED | OUTPATIENT
Start: 2020-07-10 | End: 2020-07-10 | Stop reason: HOSPADM

## 2020-07-10 RX ORDER — HYDROMORPHONE HYDROCHLORIDE 2 MG/ML
0.5 INJECTION, SOLUTION INTRAMUSCULAR; INTRAVENOUS; SUBCUTANEOUS
Status: DISCONTINUED | OUTPATIENT
Start: 2020-07-10 | End: 2020-07-10 | Stop reason: HOSPADM

## 2020-07-10 RX ORDER — LIDOCAINE HYDROCHLORIDE 10 MG/ML
0.1 INJECTION INFILTRATION; PERINEURAL AS NEEDED
Status: DISCONTINUED | OUTPATIENT
Start: 2020-07-10 | End: 2020-07-10 | Stop reason: HOSPADM

## 2020-07-10 RX ORDER — SODIUM CHLORIDE, SODIUM LACTATE, POTASSIUM CHLORIDE, CALCIUM CHLORIDE 600; 310; 30; 20 MG/100ML; MG/100ML; MG/100ML; MG/100ML
75 INJECTION, SOLUTION INTRAVENOUS CONTINUOUS
Status: DISCONTINUED | OUTPATIENT
Start: 2020-07-10 | End: 2020-07-10 | Stop reason: HOSPADM

## 2020-07-10 RX ORDER — LABETALOL HYDROCHLORIDE 5 MG/ML
INJECTION, SOLUTION INTRAVENOUS
Status: COMPLETED
Start: 2020-07-10 | End: 2020-07-10

## 2020-07-10 RX ORDER — PANTOPRAZOLE SODIUM 40 MG/1
40 TABLET, DELAYED RELEASE ORAL
Status: DISCONTINUED | OUTPATIENT
Start: 2020-07-11 | End: 2020-07-13 | Stop reason: HOSPADM

## 2020-07-10 RX ORDER — FAMOTIDINE 20 MG/1
20 TABLET, FILM COATED ORAL ONCE
Status: DISCONTINUED | OUTPATIENT
Start: 2020-07-10 | End: 2020-07-10 | Stop reason: HOSPADM

## 2020-07-10 RX ORDER — SIMETHICONE 80 MG
80 TABLET,CHEWABLE ORAL
Status: DISCONTINUED | OUTPATIENT
Start: 2020-07-10 | End: 2020-07-13 | Stop reason: HOSPADM

## 2020-07-10 RX ORDER — ENALAPRILAT 1.25 MG/ML
1.25 INJECTION INTRAVENOUS
Status: DISCONTINUED | OUTPATIENT
Start: 2020-07-10 | End: 2020-07-13 | Stop reason: HOSPADM

## 2020-07-10 RX ORDER — CEFAZOLIN SODIUM/WATER 2 G/20 ML
2 SYRINGE (ML) INTRAVENOUS
Status: COMPLETED | OUTPATIENT
Start: 2020-07-10 | End: 2020-07-10

## 2020-07-10 RX ORDER — NALOXONE HYDROCHLORIDE 0.4 MG/ML
0.4 INJECTION, SOLUTION INTRAMUSCULAR; INTRAVENOUS; SUBCUTANEOUS AS NEEDED
Status: DISCONTINUED | OUTPATIENT
Start: 2020-07-10 | End: 2020-07-13 | Stop reason: HOSPADM

## 2020-07-10 RX ORDER — OXYCODONE AND ACETAMINOPHEN 5; 325 MG/1; MG/1
2 TABLET ORAL
Status: DISCONTINUED | OUTPATIENT
Start: 2020-07-10 | End: 2020-07-13 | Stop reason: HOSPADM

## 2020-07-10 RX ORDER — DIPHENHYDRAMINE HYDROCHLORIDE 50 MG/ML
12.5 INJECTION, SOLUTION INTRAMUSCULAR; INTRAVENOUS
Status: DISCONTINUED | OUTPATIENT
Start: 2020-07-10 | End: 2020-07-13 | Stop reason: HOSPADM

## 2020-07-10 RX ORDER — DEXTROSE 50 % IN WATER (D50W) INTRAVENOUS SYRINGE
25-50 AS NEEDED
Status: DISCONTINUED | OUTPATIENT
Start: 2020-07-10 | End: 2020-07-13 | Stop reason: HOSPADM

## 2020-07-10 RX ORDER — SODIUM CHLORIDE, SODIUM LACTATE, POTASSIUM CHLORIDE, CALCIUM CHLORIDE 600; 310; 30; 20 MG/100ML; MG/100ML; MG/100ML; MG/100ML
100 INJECTION, SOLUTION INTRAVENOUS CONTINUOUS
Status: DISCONTINUED | OUTPATIENT
Start: 2020-07-10 | End: 2020-07-10 | Stop reason: HOSPADM

## 2020-07-10 RX ORDER — INSULIN LISPRO 100 [IU]/ML
INJECTION, SOLUTION INTRAVENOUS; SUBCUTANEOUS
Status: DISCONTINUED | OUTPATIENT
Start: 2020-07-10 | End: 2020-07-13 | Stop reason: HOSPADM

## 2020-07-10 RX ORDER — ONDANSETRON 2 MG/ML
INJECTION INTRAMUSCULAR; INTRAVENOUS AS NEEDED
Status: DISCONTINUED | OUTPATIENT
Start: 2020-07-10 | End: 2020-07-10 | Stop reason: HOSPADM

## 2020-07-10 RX ADMIN — FENTANYL CITRATE 25 MCG: 50 INJECTION INTRAMUSCULAR; INTRAVENOUS at 10:42

## 2020-07-10 RX ADMIN — FENTANYL CITRATE 25 MCG: 50 INJECTION INTRAMUSCULAR; INTRAVENOUS at 10:52

## 2020-07-10 RX ADMIN — SODIUM CHLORIDE AND POTASSIUM CHLORIDE 100 ML/HR: 9; 1.49 INJECTION, SOLUTION INTRAVENOUS at 15:21

## 2020-07-10 RX ADMIN — FENTANYL CITRATE 25 MCG: 50 INJECTION INTRAMUSCULAR; INTRAVENOUS at 10:35

## 2020-07-10 RX ADMIN — PROPOFOL 50 MG: 10 INJECTION, EMULSION INTRAVENOUS at 10:49

## 2020-07-10 RX ADMIN — SODIUM CHLORIDE, SODIUM LACTATE, POTASSIUM CHLORIDE, AND CALCIUM CHLORIDE 75 ML/HR: 600; 310; 30; 20 INJECTION, SOLUTION INTRAVENOUS at 09:14

## 2020-07-10 RX ADMIN — METFORMIN HYDROCHLORIDE 850 MG: 850 TABLET, FILM COATED ORAL at 17:20

## 2020-07-10 RX ADMIN — PROPOFOL 50 MG: 10 INJECTION, EMULSION INTRAVENOUS at 10:28

## 2020-07-10 RX ADMIN — Medication 10 ML: at 15:21

## 2020-07-10 RX ADMIN — FENTANYL CITRATE 25 MCG: 50 INJECTION INTRAMUSCULAR; INTRAVENOUS at 10:48

## 2020-07-10 RX ADMIN — FENTANYL CITRATE 50 MCG: 50 INJECTION INTRAMUSCULAR; INTRAVENOUS at 10:13

## 2020-07-10 RX ADMIN — Medication 2 G: at 10:19

## 2020-07-10 RX ADMIN — LIDOCAINE HYDROCHLORIDE 100 MG: 20 INJECTION, SOLUTION EPIDURAL; INFILTRATION; INTRACAUDAL; PERINEURAL at 10:13

## 2020-07-10 RX ADMIN — LABETALOL HYDROCHLORIDE 5 MG: 5 INJECTION, SOLUTION INTRAVENOUS at 11:31

## 2020-07-10 RX ADMIN — PROMETHAZINE HYDROCHLORIDE 6.25 MG: 25 INJECTION INTRAMUSCULAR; INTRAVENOUS at 12:10

## 2020-07-10 RX ADMIN — OXYCODONE HYDROCHLORIDE AND ACETAMINOPHEN 2 TABLET: 5; 325 TABLET ORAL at 23:18

## 2020-07-10 RX ADMIN — LABETALOL HYDROCHLORIDE 5 MG: 5 INJECTION INTRAVENOUS at 11:31

## 2020-07-10 RX ADMIN — Medication 10 ML: at 21:40

## 2020-07-10 RX ADMIN — FENTANYL CITRATE 50 MCG: 50 INJECTION INTRAMUSCULAR; INTRAVENOUS at 10:18

## 2020-07-10 RX ADMIN — ONDANSETRON 4 MG: 2 INJECTION INTRAMUSCULAR; INTRAVENOUS at 10:44

## 2020-07-10 RX ADMIN — OXYCODONE HYDROCHLORIDE AND ACETAMINOPHEN 2 TABLET: 5; 325 TABLET ORAL at 17:23

## 2020-07-10 RX ADMIN — MIDAZOLAM 2 MG: 1 INJECTION INTRAMUSCULAR; INTRAVENOUS at 09:19

## 2020-07-10 RX ADMIN — PROPOFOL 200 MG: 10 INJECTION, EMULSION INTRAVENOUS at 10:13

## 2020-07-10 NOTE — OP NOTES
[address] [address]  OPERATIVE REPORT    Name:  Sha Christianson  MR#:  [id number]  :  1950  ACCOUNT #:  [de-identified]  DATE OF SERVICE:  07/10/2020    PREOPERATIVE DIAGNOSIS:  Rectal mass with biopsy proven cancer. POSTOPERATIVE DIAGNOSIS:  Rectal mass with biopsy proven cancer. PROCEDURE PERFORMED:  Transanal excision of a rectal mass. SURGEON:  Ramon Heller. Benjamín Rizzo MD    ASSISTANT:  None. ANESTHESIA:  General endotracheal anesthesia with Dr. Zahira Shepard. COMPLICATIONS:  None. SPECIMENS REMOVED:  Rectal mass. IMPLANTS:  None. ESTIMATED BLOOD LOSS:  50 mL. DRAINS:  None. HISTORY:  A 57-year-old female who had a colonoscopy done for abdominal pain and some bright red blood per rectum. She was found to have a rectal mass. I did a biopsy that came back as adenocarcinoma. Dr. Timothy May did an endoscopic ultrasound and found that this was a superficial lesion with 25% circumference of the rectum; however, it was on a fold and he said it would be impossible to get out with the colonoscope. I have recommended to the patient a transanal excision. I went through risks of bleeding, infection, anesthesia, injury to the colon, potential incontinence afterwards, potential for recurrence of this lesion. The patient was agreeable, signed the consent form, and was scheduled for today. PROCEDURE:  The patient underwent bowel prep on 2020, came into the [address] operating room where she was seen in the preop area with her long-term boyfriend. She was transported to room #4 where general endotracheal anesthesia was done by Dr. Zahira Shepard and the nurse anesthetist.  The patient was placed in stirrups. The perineum was prepped and draped in the usual sterile manner. A time-out was done identifying the patient, surgeon, procedure, and her birth date of 1950. When everyone in the room agreed, we began the procedure. I palpated the lesion.   It was slightly more proximal than I had anticipated based on my colonoscopy as well as a rectal exam that I did at the end of the colonoscopy. We dilated the anus and then placed the anal retractors to allow exposure. We placed 2 Allis clamps on the mass and 2 Allis clamps on either side of the mass to help suture this tissue together once I had excised the mass. We picked up the 2 Allis clamps with the mass and I put a curved Paez scissors, I cut off the mucosa and the mass came with the 2 Allis clamps. There was a fair amount of bleeding. After this, I quickly put interrupted figure-of-eight sutures of 2-0 Vicryl to bring the tissue together and stop the bleeding. Sutures were somewhat difficult to place as it was further proximal may be 12-14 cm inside from the anal verge. No further bleeding was noted. I irrigated and packed the rectum with 2 large Gelfoams. I injected 30 mL of 0.5% Marcaine around the anus and she was taken to the recovery room in stable condition. The patient tolerated the procedure well. We will keep her for overnight admission and to check her hemoglobin to make sure she does not continue to bleed as this was more proximal and more difficult than I had anticipated.       MD PATRICIO Arredondo/[id number]  D:  07/10/2020 14:00  T:  07/10/2020 15:56  JOB #:  [id number]

## 2020-07-10 NOTE — PERIOP NOTES
Pt hypertensive upon arrival to pacu. Dr. Christoph Silva paged to bedside. Labetalol given per md order.

## 2020-07-10 NOTE — PROGRESS NOTES
07/10/20 1332   Dual Skin Pressure Injury Assessment   Dual Skin Pressure Injury Assessment WDL   Second Care Provider (Based on [address] [place] [address]) Hever Bear RN   Skin Integumentary   Skin Integumentary (WDL) X    Pressure  Injury Documentation No Pressure Injury Noted-Pressure Ulcer Prevention Initiated   Skin Integrity Incision (comment)  (rectum)

## 2020-07-10 NOTE — INTERVAL H&P NOTE
Update History & Physical 
 
The Patient's History and Physical of 7/7/20 was reviewed with the patient and I examined the patient. There was no change. The surgical site was confirmed by the patient and me. Plan:  The risk, benefits, expected outcome, and alternative to the recommended procedure have been discussed with the patient. Patient understands and wants to proceed with the procedure.  
 
Electronically signed by Elo Izquierdo MD on 7/10/2020 at 8:28 AM

## 2020-07-10 NOTE — BRIEF OP NOTE
Brief Postoperative Note    Patient: Juan Jose Estrella  YOB: 1950  MRN: [id number]    Date of Procedure: 7/10/2020     Pre-Op Diagnosis: Rectal cancer ([facility] [C20]    Post-Op Diagnosis: Same    Procedure(s):  TRANS ANAL EXCISION OF A RECTAL MASS    Surgeon(s): Reg Nguyen MD    Surgical Assistant: None    Anesthesia: General plus local    Estimated Blood Loss (mL): less than 339     Complications: None    Specimens:   ID Type Source Tests Collected by Time Destination   1 : RECTAL MASS Preservative   Reg Nguyen MD 7/10/2020 1045 Pathology        Implants: * No implants in log *    Drains: * No LDAs found *    Findings: Rectal mass more proximal than I had anticipated.      Electronically Signed by Dave Rehman MD on 7/10/2020 at 11:05 AM

## 2020-07-10 NOTE — PERIOP NOTES
TRANSFER - OUT REPORT:    Verbal report given to Ludin(name) on [place] ([facility])  being transferred to [facility](unit) for routine post - op       Report consisted of patients Situation, Background, Assessment and   Recommendations(SBAR). Information from the following report(s) SBAR, OR Summary and MAR was reviewed with the receiving nurse. Lines:   Peripheral IV 07/10/20 Right Wrist (Active)   Site Assessment Clean, dry, & intact 07/10/20 1116   Phlebitis Assessment 0 07/10/20 1116   Infiltration Assessment 0 07/10/20 1116   Dressing Status Clean, dry, & intact 07/10/20 1116   Dressing Type Transparent;Tape 07/10/20 1116   Hub Color/Line Status Green 07/10/20 1116   Alcohol Cap Used No 07/10/20 1116        Opportunity for questions and clarification was provided.       Patient transported with:   O2 @ 2 liters  Tech        2

## 2020-07-10 NOTE — PROGRESS NOTES
END OF SHIFT NOTE:    Intake/Output  07/10 0701 - 07/10 1900  In: 8147 [P.O.:300; I.V.:942]  Out: 10    Voiding: YES  Catheter: NO  Drain:              Stool:  0 occurrences. Emesis:  0 occurrences. VITAL SIGNS  Patient Vitals for the past 12 hrs:   Temp Pulse Resp BP SpO2   07/10/20 1622 98.1 °F (36.7 °C) 92 18 110/60 98 %   07/10/20 1332 97.7 °F (36.5 °C) 97 18 107/70 96 %   07/10/20 1231  92 18 (!) 112/97 99 %   07/10/20 1215  99 18 142/90 99 %   07/10/20 1200  88 18 125/85    07/10/20 1155    121/78    07/10/20 1140  79 18 139/74    07/10/20 1135  99 18 (!) 150/93 100 %   07/10/20 1130  98 18 (!) 182/100 100 %   07/10/20 1125  91 18 (!) 200/101    07/10/20 1120  89 18 (!) 204/99    07/10/20 1116 97.4 °F (36.3 °C) 87 18 (!) 197/111 94 %   07/10/20 0825 97.9 °F (36.6 °C) 72 18 156/85 96 %       Pain Assessment  Pain 1  Pain Scale 1: Numeric (0 - 10) (07/10/20 1813)  Pain Intensity 1: 2 (07/10/20 1813)  Patient Stated Pain Goal: 0 (07/10/20 1723)  Pain Reassessment 1: Yes (07/10/20 1813)  Pain Location 1: Rectal (07/10/20 1723)  Pain Description 1: Aching (07/10/20 1723)  Pain Intervention(s) 1: Medication (see MAR) (07/10/20 1723)    Ambulating  Yes with assistance    Additional Information: patient has continued to have slow bleed from the anus. Patient thought she had a bowel movement but it turned out to be a lot of blood clots. 2 percocet 5-325 mg given for pain x 1. Patient was able to walk about 30 feet when she woke up more from the anesthesia. Shift report to be given to oncoming nurse at the bedside.     Chase Judge RN

## 2020-07-10 NOTE — ANESTHESIA PREPROCEDURE EVALUATION
Relevant Problems   No relevant active problems       Anesthetic History   No history of anesthetic complications            Review of Systems / Medical History  Patient summary reviewed and pertinent labs reviewed    Pulmonary    COPD: moderate    Sleep apnea           Neuro/Psych              Cardiovascular    Hypertension: well controlled          Hyperlipidemia    Exercise tolerance: >4 METS     GI/Hepatic/Renal     GERD: well controlled           Endo/Other    Diabetes: well controlled    Arthritis     Other Findings   Comments: Fibromyalgia  IBS           Physical Exam    Airway  Mallampati: II  TM Distance: 4 - 6 cm  Neck ROM: normal range of motion   Mouth opening: Normal     Cardiovascular  Regular rate and rhythm,  S1 and S2 normal,  no murmur, click, rub, or gallop  Rhythm: regular  Rate: normal         Dental      Comments: Loose upper right side tooth  crowns   Pulmonary  Breath sounds clear to auscultation               Abdominal  GI exam deferred       Other Findings            Anesthetic Plan    ASA: 3  Anesthesia type: general          Induction: Intravenous  Anesthetic plan and risks discussed with: Patient and Spouse

## 2020-07-10 NOTE — PROGRESS NOTES
TRANSFER - IN REPORT:    Verbal report received from Delmy, RN(name) on Daija See ([facility])  being received from [facility]) for routine post - op      Report consisted of patients Situation, Background, Assessment and   Recommendations(SBAR). Information from the following report(s) SBAR, Kardex, ED Summary and Procedure Summary was reviewed with the receiving nurse. Opportunity for questions and clarification was provided. Assessment completed upon patients arrival to unit and care assumed.

## 2020-07-11 LAB
ANION GAP SERPL CALC-SCNC: 6 MMOL/L (ref 7–16)
BUN SERPL-MCNC: 17 MG/DL (ref 8–23)
CALCIUM SERPL-MCNC: 7.9 MG/DL (ref 8.3–10.4)
CHLORIDE SERPL-SCNC: 104 MMOL/L (ref 98–107)
CO2 SERPL-SCNC: 26 MMOL/L (ref 21–32)
CREAT SERPL-MCNC: 0.96 MG/DL (ref 0.6–1)
ERYTHROCYTE [DISTWIDTH] IN BLOOD BY AUTOMATED COUNT: 13.2 % (ref 11.9–14.6)
GLUCOSE BLD STRIP.AUTO-MCNC: 130 MG/DL (ref 65–100)
GLUCOSE BLD STRIP.AUTO-MCNC: 132 MG/DL (ref 65–100)
GLUCOSE BLD STRIP.AUTO-MCNC: 181 MG/DL (ref 65–100)
GLUCOSE BLD STRIP.AUTO-MCNC: 206 MG/DL (ref 65–100)
GLUCOSE SERPL-MCNC: 169 MG/DL (ref 65–100)
HCT VFR BLD AUTO: 23.1 % (ref 35.8–46.3)
HCT VFR BLD AUTO: 23.7 % (ref 35.8–46.3)
HCT VFR BLD AUTO: 30.6 % (ref 35.8–46.3)
HGB BLD-MCNC: 10 G/DL (ref 11.7–15.4)
HGB BLD-MCNC: 7.4 G/DL (ref 11.7–15.4)
HGB BLD-MCNC: 7.6 G/DL (ref 11.7–15.4)
MCH RBC QN AUTO: 27.9 PG (ref 26.1–32.9)
MCHC RBC AUTO-ENTMCNC: 32.1 G/DL (ref 31.4–35)
MCV RBC AUTO: 87.1 FL (ref 79.6–97.8)
NRBC # BLD: 0 K/UL (ref 0–0.2)
PLATELET # BLD AUTO: 244 K/UL (ref 150–450)
PMV BLD AUTO: 10.8 FL (ref 9.4–12.3)
POTASSIUM SERPL-SCNC: 4.4 MMOL/L (ref 3.5–5.1)
RBC # BLD AUTO: 2.72 M/UL (ref 4.05–5.2)
SODIUM SERPL-SCNC: 136 MMOL/L (ref 136–145)
WBC # BLD AUTO: 8.3 K/UL (ref 4.3–11.1)

## 2020-07-11 PROCEDURE — 74011250636 HC RX REV CODE- 250/636: Performed by: SURGERY

## 2020-07-11 PROCEDURE — 85027 COMPLETE CBC AUTOMATED: CPT

## 2020-07-11 PROCEDURE — 86900 BLOOD TYPING SEROLOGIC ABO: CPT

## 2020-07-11 PROCEDURE — 30233N1 TRANSFUSION OF NONAUTOLOGOUS RED BLOOD CELLS INTO PERIPHERAL VEIN, PERCUTANEOUS APPROACH: ICD-10-PCS | Performed by: SURGERY

## 2020-07-11 PROCEDURE — 36430 TRANSFUSION BLD/BLD COMPNT: CPT

## 2020-07-11 PROCEDURE — P9016 RBC LEUKOCYTES REDUCED: HCPCS

## 2020-07-11 PROCEDURE — 65270000029 HC RM PRIVATE

## 2020-07-11 PROCEDURE — 74011250637 HC RX REV CODE- 250/637: Performed by: SURGERY

## 2020-07-11 PROCEDURE — 36415 COLL VENOUS BLD VENIPUNCTURE: CPT

## 2020-07-11 PROCEDURE — 86923 COMPATIBILITY TEST ELECTRIC: CPT

## 2020-07-11 PROCEDURE — 82962 GLUCOSE BLOOD TEST: CPT

## 2020-07-11 PROCEDURE — 85018 HEMOGLOBIN: CPT

## 2020-07-11 PROCEDURE — 80048 BASIC METABOLIC PNL TOTAL CA: CPT

## 2020-07-11 RX ORDER — SODIUM CHLORIDE 9 MG/ML
250 INJECTION, SOLUTION INTRAVENOUS AS NEEDED
Status: DISCONTINUED | OUTPATIENT
Start: 2020-07-11 | End: 2020-07-13 | Stop reason: HOSPADM

## 2020-07-11 RX ADMIN — SODIUM CHLORIDE 250 ML: 900 INJECTION, SOLUTION INTRAVENOUS at 08:20

## 2020-07-11 RX ADMIN — PANTOPRAZOLE SODIUM 40 MG: 40 TABLET, DELAYED RELEASE ORAL at 08:19

## 2020-07-11 RX ADMIN — OXYCODONE HYDROCHLORIDE AND ACETAMINOPHEN 2 TABLET: 5; 325 TABLET ORAL at 04:45

## 2020-07-11 RX ADMIN — SODIUM CHLORIDE AND POTASSIUM CHLORIDE 100 ML/HR: 9; 1.49 INJECTION, SOLUTION INTRAVENOUS at 01:13

## 2020-07-11 RX ADMIN — OXYCODONE HYDROCHLORIDE AND ACETAMINOPHEN 2 TABLET: 5; 325 TABLET ORAL at 21:24

## 2020-07-11 RX ADMIN — LISINOPRIL 10 MG: 5 TABLET ORAL at 08:19

## 2020-07-11 RX ADMIN — Medication 10 ML: at 21:24

## 2020-07-11 RX ADMIN — Medication 10 ML: at 13:14

## 2020-07-11 RX ADMIN — OXYCODONE HYDROCHLORIDE AND ACETAMINOPHEN 2 TABLET: 5; 325 TABLET ORAL at 13:27

## 2020-07-11 RX ADMIN — METFORMIN HYDROCHLORIDE 850 MG: 850 TABLET, FILM COATED ORAL at 16:53

## 2020-07-11 RX ADMIN — PAROXETINE HYDROCHLORIDE 40 MG: 20 TABLET, FILM COATED ORAL at 08:19

## 2020-07-11 RX ADMIN — FLUTICASONE PROPIONATE 2 SPRAY: 50 SPRAY, METERED NASAL at 08:18

## 2020-07-11 RX ADMIN — Medication 10 ML: at 05:01

## 2020-07-11 RX ADMIN — SODIUM CHLORIDE AND POTASSIUM CHLORIDE 100 ML/HR: 9; 1.49 INJECTION, SOLUTION INTRAVENOUS at 16:53

## 2020-07-11 NOTE — PROGRESS NOTES
General Surgery Progress Note    7/11/2020    Admit Date: 7/10/2020    Subjective:     Surgery POD #1  Significant drop in H/H after surgery. The tumor was more proximal than I had anticipated with difficulty getting exposure. She has had some old blood noted when she had a BM last night. No chest pain, dizziness or SOB. Objective:     Visit Vitals  /85 (BP 1 Location: Left arm, BP Patient Position: Supine; Head of bed elevated (Comment degrees))   Pulse 98   Temp 98.1 °F (36.7 °C)   Resp 20   Ht 5' 2\" (1.575 m)   Wt 152 lb (68.9 kg)   SpO2 93%   BMI 27.80 kg/m²         Intake/Output Summary (Last 24 hours) at 7/11/2020 0734  Last data filed at 7/11/2020 0454  Gross per 24 hour   Intake 2952.08 ml   Output 710 ml   Net 2242.08 ml        EXAM:  Rectal: eccymosis around the anus, some dried blood noted.         Data Review    Recent Results (from the past 24 hour(s))   GLUCOSE, POC    Collection Time: 07/10/20  9:18 AM   Result Value Ref Range    Glucose (POC) 151 (H) 65 - 100 mg/dL   GLUCOSE, POC    Collection Time: 07/10/20  4:41 PM   Result Value Ref Range    Glucose (POC) 229 (H) 65 - 100 mg/dL   GLUCOSE, POC    Collection Time: 07/10/20  8:42 PM   Result Value Ref Range    Glucose (POC) 161 (H) 65 - 889 mg/dL   METABOLIC PANEL, BASIC    Collection Time: 07/11/20  6:00 AM   Result Value Ref Range    Sodium 136 136 - 145 mmol/L    Potassium 4.4 3.5 - 5.1 mmol/L    Chloride 104 98 - 107 mmol/L    CO2 26 21 - 32 mmol/L    Anion gap 6 (L) 7 - 16 mmol/L    Glucose 169 (H) 65 - 100 mg/dL    BUN 17 8 - 23 MG/DL    Creatinine 0.96 0.6 - 1.0 MG/DL    GFR est AA >60 >60 ml/min/1.73m2    GFR est non-AA >60 >60 ml/min/1.73m2    Calcium 7.9 (L) 8.3 - 10.4 MG/DL   CBC W/O DIFF    Collection Time: 07/11/20  6:00 AM   Result Value Ref Range    WBC 8.3 4.3 - 11.1 K/uL    RBC 2.72 (L) 4.05 - 5.2 M/uL    HGB 7.6 (L) 11.7 - 15.4 g/dL    HCT 23.7 (L) 35.8 - 46.3 %    MCV 87.1 79.6 - 97.8 FL    MCH 27.9 26.1 - 32.9 PG    MCHC 32.1 31.4 - 35.0 g/dL    RDW 13.2 11.9 - 14.6 %    PLATELET 476 693 - 130 K/uL    MPV 10.8 9.4 - 12.3 FL    ABSOLUTE NRBC 0.00 0.0 - 0.2 K/uL        Hospital Problems  Date Reviewed: 7/7/2020          Codes Class Noted POA    * (Principal) Rectal mass ICD-10-CM: K62.89  ICD-9-CM: 787.99  7/10/2020 Yes          1. Type and screen now  2. Transfuse 2 units PRBC'S  3. Hold Lovenox  4. H/H every 12 hours. 5. Continue IVF'S  6. Follow clinically. 7. Dr. Samantha Goetz on call this weekend.   Abdiel Merchant MD.

## 2020-07-11 NOTE — PROGRESS NOTES
END OF SHIFT NOTE:    Intake/Output  07/10 1901 - 07/11 0700  In: 1370.3 [P.O.:600; I.V.:770.3]  Out: 700 [Urine:700]   Voiding: YES  Catheter: YES  Drain:              Stool:  0 occurrences. Stool Assessment  Stool Appearance: Bloody; Other (comment)(w/ clots,s/p rectal mass excision) (07/10/20 1910)    Emesis:  0 occurrences. VITAL SIGNS  Patient Vitals for the past 12 hrs:   Temp Pulse Resp BP SpO2   07/11/20 0300 98.1 °F (36.7 °C) 98 20 145/85 93 %   07/10/20 2315 98.1 °F (36.7 °C) 99 20 112/67 95 %   07/10/20 2144     95 %   07/10/20 1910 98.3 °F (36.8 °C) 93 18 106/71 93 %       Pain Assessment  Pain 1  Pain Scale 1: Visual (07/11/20 0017)  Pain Intensity 1: 0 (07/11/20 0017)  Patient Stated Pain Goal: 0 (07/11/20 0017)  Pain Reassessment 1: Patient resting w/respiratory rate greater than 10 (07/11/20 0017)  Pain Onset 1: post op (07/10/20 2318)  Pain Location 1: Rectal (07/10/20 2318)  Pain Orientation 1: Inner (07/10/20 2318)  Pain Description 1: Aching;Burning (07/10/20 2318)  Pain Intervention(s) 1: Medication (see MAR); Repositioned (07/10/20 2318)    Ambulating  Yes    Additional Information:   Post op care continued. Still w/ blood/clots from rectum;moderate amount;peripad in place. Pain mngt as ordered. Encouraged OOB/ambulate/use of IS. Possible D/C. Shift report given to oncoming nurse LudinRN at the bedside.     Adrian Jacob RN

## 2020-07-12 LAB
ABO + RH BLD: NORMAL
ANION GAP SERPL CALC-SCNC: 5 MMOL/L (ref 7–16)
BLD PROD TYP BPU: NORMAL
BLD PROD TYP BPU: NORMAL
BLOOD GROUP ANTIBODIES SERPL: NORMAL
BPU ID: NORMAL
BPU ID: NORMAL
BUN SERPL-MCNC: 14 MG/DL (ref 8–23)
CALCIUM SERPL-MCNC: 8 MG/DL (ref 8.3–10.4)
CHLORIDE SERPL-SCNC: 111 MMOL/L (ref 98–107)
CO2 SERPL-SCNC: 26 MMOL/L (ref 21–32)
CREAT SERPL-MCNC: 0.85 MG/DL (ref 0.6–1)
CROSSMATCH RESULT,%XM: NORMAL
CROSSMATCH RESULT,%XM: NORMAL
ERYTHROCYTE [DISTWIDTH] IN BLOOD BY AUTOMATED COUNT: 13.5 % (ref 11.9–14.6)
GLUCOSE BLD STRIP.AUTO-MCNC: 133 MG/DL (ref 65–100)
GLUCOSE BLD STRIP.AUTO-MCNC: 136 MG/DL (ref 65–100)
GLUCOSE BLD STRIP.AUTO-MCNC: 156 MG/DL (ref 65–100)
GLUCOSE BLD STRIP.AUTO-MCNC: 185 MG/DL (ref 65–100)
GLUCOSE SERPL-MCNC: 125 MG/DL (ref 65–100)
HCT VFR BLD AUTO: 27.9 % (ref 35.8–46.3)
HCT VFR BLD AUTO: 28.8 % (ref 35.8–46.3)
HGB BLD-MCNC: 9.2 G/DL (ref 11.7–15.4)
HGB BLD-MCNC: 9.4 G/DL (ref 11.7–15.4)
MCH RBC QN AUTO: 28.9 PG (ref 26.1–32.9)
MCHC RBC AUTO-ENTMCNC: 33 G/DL (ref 31.4–35)
MCV RBC AUTO: 87.7 FL (ref 79.6–97.8)
NRBC # BLD: 0 K/UL (ref 0–0.2)
PLATELET # BLD AUTO: 197 K/UL (ref 150–450)
PMV BLD AUTO: 10.8 FL (ref 9.4–12.3)
POTASSIUM SERPL-SCNC: 3.8 MMOL/L (ref 3.5–5.1)
RBC # BLD AUTO: 3.18 M/UL (ref 4.05–5.2)
SODIUM SERPL-SCNC: 142 MMOL/L (ref 136–145)
SPECIMEN EXP DATE BLD: NORMAL
STATUS OF UNIT,%ST: NORMAL
STATUS OF UNIT,%ST: NORMAL
UNIT DIVISION, %UDIV: 0
UNIT DIVISION, %UDIV: 0
WBC # BLD AUTO: 7.6 K/UL (ref 4.3–11.1)

## 2020-07-12 PROCEDURE — 82962 GLUCOSE BLOOD TEST: CPT

## 2020-07-12 PROCEDURE — 65270000029 HC RM PRIVATE

## 2020-07-12 PROCEDURE — 36415 COLL VENOUS BLD VENIPUNCTURE: CPT

## 2020-07-12 PROCEDURE — 80048 BASIC METABOLIC PNL TOTAL CA: CPT

## 2020-07-12 PROCEDURE — 74011250636 HC RX REV CODE- 250/636: Performed by: SURGERY

## 2020-07-12 PROCEDURE — 85018 HEMOGLOBIN: CPT

## 2020-07-12 PROCEDURE — 85027 COMPLETE CBC AUTOMATED: CPT

## 2020-07-12 PROCEDURE — 74011250637 HC RX REV CODE- 250/637: Performed by: SURGERY

## 2020-07-12 RX ADMIN — FLUTICASONE PROPIONATE 2 SPRAY: 50 SPRAY, METERED NASAL at 08:41

## 2020-07-12 RX ADMIN — OXYCODONE HYDROCHLORIDE AND ACETAMINOPHEN 2 TABLET: 5; 325 TABLET ORAL at 20:55

## 2020-07-12 RX ADMIN — Medication 10 ML: at 22:15

## 2020-07-12 RX ADMIN — LISINOPRIL 10 MG: 5 TABLET ORAL at 08:41

## 2020-07-12 RX ADMIN — Medication 10 ML: at 05:44

## 2020-07-12 RX ADMIN — SODIUM CHLORIDE AND POTASSIUM CHLORIDE 100 ML/HR: 9; 1.49 INJECTION, SOLUTION INTRAVENOUS at 04:21

## 2020-07-12 RX ADMIN — PAROXETINE HYDROCHLORIDE 40 MG: 20 TABLET, FILM COATED ORAL at 08:41

## 2020-07-12 RX ADMIN — Medication 10 ML: at 13:28

## 2020-07-12 RX ADMIN — OXYCODONE HYDROCHLORIDE AND ACETAMINOPHEN 2 TABLET: 5; 325 TABLET ORAL at 13:34

## 2020-07-12 RX ADMIN — METFORMIN HYDROCHLORIDE 850 MG: 850 TABLET, FILM COATED ORAL at 16:16

## 2020-07-12 RX ADMIN — PANTOPRAZOLE SODIUM 40 MG: 40 TABLET, DELAYED RELEASE ORAL at 08:41

## 2020-07-12 NOTE — PROGRESS NOTES
END OF SHIFT NOTE:    Intake/Output  07/12 0701 - 07/12 1900  In: 600 [P.O.:600]  Out: 1600 [Urine:1600]   Voiding: YES  Catheter: NO  Drain:              Stool:  2 occurrences. Stool Assessment  Stool Color: Red streaks;Brown (07/12/20 1526)  Stool Appearance: Bloody; Loose (07/12/20 1526)  Stool Amount: Medium (07/12/20 1526)  Stool Source/Status: Rectum (07/12/20 1526)    Emesis:  0 occurrences. VITAL SIGNS  Patient Vitals for the past 12 hrs:   Temp Pulse Resp BP SpO2   07/12/20 1525 98.2 °F (36.8 °C) 82 18 110/68 95 %   07/12/20 1122 99 °F (37.2 °C) (!) 110 18 127/75 95 %   07/12/20 0734 98.8 °F (37.1 °C) 88 18 111/67 98 %       Pain Assessment  Pain 1  Pain Scale 1: Visual (07/12/20 1415)  Pain Intensity 1: 0 (07/12/20 1415)  Patient Stated Pain Goal: 0 (07/12/20 1415)  Pain Reassessment 1: Patient resting w/respiratory rate greater than 10 (07/12/20 1415)  Pain Onset 1: post op (07/12/20 1343)  Pain Location 1: Back;Rectal (07/12/20 1343)  Pain Orientation 1: Lower; Inner (07/12/20 1343)  Pain Description 1: Aching;Burning (07/12/20 1343)  Pain Intervention(s) 1: Medication (see MAR) (07/12/20 1343)    Ambulating  Yes    Additional Information: Patient still having some bloody stools. It is improving. The brief seems to be staying dry and the only time she sees blood is when she wipes now. Given percocet once for pain. Feeling much better today. VSS. No needs voiced. Shift report given to oncoming nurse at the bedside.     Antonio Neil

## 2020-07-12 NOTE — PROGRESS NOTES
END OF SHIFT NOTE:    Intake/Output  07/11 1901 - 07/12 0700  In: 1196.5 [P.O.:360; I.V.:836.5]  Out: 1600 [Urine:1600]   Voiding: YES  Catheter: NO  Drain:              Stool:  0 occurrences. Stool Assessment  Stool Appearance: Bloody; Other (comment)(post rectal mass excision;some rectal bleeding) (07/11/20 1920)    Emesis:  0 occurrences. VITAL SIGNS  Patient Vitals for the past 12 hrs:   Temp Pulse Resp BP SpO2   07/12/20 0257 98.5 °F (36.9 °C) 87 16 105/51 97 %   07/11/20 2326 99.2 °F (37.3 °C) 97 16 102/50 94 %   07/11/20 1937 98.4 °F (36.9 °C) (!) 101 14 119/67 92 %   07/11/20 1828 98.8 °F (37.1 °C) 87 16 122/61 97 %   07/11/20 1745 98.6 °F (37 °C) 91 17 114/57 97 %   07/11/20 1655 98.5 °F (36.9 °C) 95 18 113/61 95 %       Pain Assessment  Pain 1  Pain Scale 1: Numeric (0 - 10) (07/12/20 0115)  Pain Intensity 1: 0 (07/12/20 0115)  Patient Stated Pain Goal: 0 (07/12/20 0115)  Pain Reassessment 1: Yes (07/11/20 2223)  Pain Onset 1: post op (07/11/20 2124)  Pain Location 1: Rectal;Back (07/11/20 2124)  Pain Orientation 1: Inner; Lower (07/11/20 2124)  Pain Description 1: Aching;Burning (07/11/20 2124)  Pain Intervention(s) 1: Medication (see MAR); Environmental changes;Repositioned (07/11/20 2124)    Ambulating  Yes    Additional Information:   Still having rectal bleeding w/ clots overnight. Last hgb 9.2. Pain controlled per pt. Post op care continued. Shift report given to oncoming nurse VANNA Batres at the bedside.     Caro Morales RN

## 2020-07-12 NOTE — PROGRESS NOTES
General Surgery Progress Note    7/12/2020    Admit Date: 7/10/2020    Subjective:     Surgery POD #2  Hgb today 9.4. Received 2u PRBC yesterday. States feeling better today. Reports no longer passing blood clots with BM. Denies chest pain, dizziness or SOB. Objective:     Visit Vitals  /68 (BP 1 Location: Right arm, BP Patient Position: Supine)   Pulse 82   Temp 98.2 °F (36.8 °C)   Resp 18   Ht 5' 2\" (1.575 m)   Wt 152 lb (68.9 kg)   SpO2 95%   BMI 27.80 kg/m²         Intake/Output Summary (Last 24 hours) at 7/12/2020 1728  Last data filed at 7/12/2020 1526  Gross per 24 hour   Intake 2331.5 ml   Output 3200 ml   Net -868.5 ml        EXAM:  Rectal: eccymosis around the anus, some dried blood noted.         Data Review    Recent Results (from the past 24 hour(s))   HGB & HCT    Collection Time: 07/11/20  7:52 PM   Result Value Ref Range    HGB 10.0 (L) 11.7 - 15.4 g/dL    HCT 30.6 (L) 35.8 - 46.3 %   GLUCOSE, POC    Collection Time: 07/11/20  9:09 PM   Result Value Ref Range    Glucose (POC) 132 (H) 65 - 100 mg/dL   CBC W/O DIFF    Collection Time: 07/12/20  3:22 AM   Result Value Ref Range    WBC 7.6 4.3 - 11.1 K/uL    RBC 3.18 (L) 4.05 - 5.2 M/uL    HGB 9.2 (L) 11.7 - 15.4 g/dL    HCT 27.9 (L) 35.8 - 46.3 %    MCV 87.7 79.6 - 97.8 FL    MCH 28.9 26.1 - 32.9 PG    MCHC 33.0 31.4 - 35.0 g/dL    RDW 13.5 11.9 - 14.6 %    PLATELET 482 595 - 868 K/uL    MPV 10.8 9.4 - 12.3 FL    ABSOLUTE NRBC 0.00 0.0 - 0.2 K/uL   METABOLIC PANEL, BASIC    Collection Time: 07/12/20  3:22 AM   Result Value Ref Range    Sodium 142 136 - 145 mmol/L    Potassium 3.8 3.5 - 5.1 mmol/L    Chloride 111 (H) 98 - 107 mmol/L    CO2 26 21 - 32 mmol/L    Anion gap 5 (L) 7 - 16 mmol/L    Glucose 125 (H) 65 - 100 mg/dL    BUN 14 8 - 23 MG/DL    Creatinine 0.85 0.6 - 1.0 MG/DL    GFR est AA >60 >60 ml/min/1.73m2    GFR est non-AA >60 >60 ml/min/1.73m2    Calcium 8.0 (L) 8.3 - 10.4 MG/DL   GLUCOSE, POC    Collection Time: 07/12/20  7:32 AM Result Value Ref Range    Glucose (POC) 136 (H) 65 - 100 mg/dL   GLUCOSE, POC    Collection Time: 07/12/20 11:19 AM   Result Value Ref Range    Glucose (POC) 156 (H) 65 - 100 mg/dL   HGB & HCT    Collection Time: 07/12/20  3:16 PM   Result Value Ref Range    HGB 9.4 (L) 11.7 - 15.4 g/dL    HCT 28.8 (L) 35.8 - 46.3 %   GLUCOSE, POC    Collection Time: 07/12/20  4:02 PM   Result Value Ref Range    Glucose (POC) 185 (H) 65 - 100 mg/dL        Hospital Problems  Date Reviewed: 7/7/2020          Codes Class Noted POA    * (Principal) Rectal mass ICD-10-CM: K62.89  ICD-9-CM: 787.99  7/10/2020 Yes            1. Follow labs  2. Hold Lovenox  3. Diabetic diet  4. DC IVF  5.  Possibly home tomorrow      Marquis Elizabet NP

## 2020-07-12 NOTE — PROGRESS NOTES
Problem: Falls - Risk of  Goal: *Absence of Falls  Description: Document Pedro Brown Fall Risk and appropriate interventions in the flowsheet.   Outcome: Progressing Towards Goal  Note: Fall Risk Interventions:            Medication Interventions: Teach patient to arise slowly    Elimination Interventions: Call light in reach, Elevated toilet seat, Patient to call for help with toileting needs, Stay With Me (per policy), Toilet paper/wipes in reach, Toileting schedule/hourly rounds

## 2020-07-13 VITALS
HEART RATE: 92 BPM | BODY MASS INDEX: 27.97 KG/M2 | TEMPERATURE: 98.6 F | RESPIRATION RATE: 18 BRPM | SYSTOLIC BLOOD PRESSURE: 132 MMHG | DIASTOLIC BLOOD PRESSURE: 80 MMHG | OXYGEN SATURATION: 96 % | WEIGHT: 152 LBS | HEIGHT: 62 IN

## 2020-07-13 LAB
ANION GAP SERPL CALC-SCNC: 4 MMOL/L (ref 7–16)
BUN SERPL-MCNC: 9 MG/DL (ref 8–23)
CALCIUM SERPL-MCNC: 8.8 MG/DL (ref 8.3–10.4)
CHLORIDE SERPL-SCNC: 109 MMOL/L (ref 98–107)
CO2 SERPL-SCNC: 28 MMOL/L (ref 21–32)
CREAT SERPL-MCNC: 0.71 MG/DL (ref 0.6–1)
ERYTHROCYTE [DISTWIDTH] IN BLOOD BY AUTOMATED COUNT: 13.7 % (ref 11.9–14.6)
GLUCOSE BLD STRIP.AUTO-MCNC: 161 MG/DL (ref 65–100)
GLUCOSE SERPL-MCNC: 120 MG/DL (ref 65–100)
HCT VFR BLD AUTO: 28.8 % (ref 35.8–46.3)
HCT VFR BLD AUTO: 29.6 % (ref 35.8–46.3)
HGB BLD-MCNC: 9.4 G/DL (ref 11.7–15.4)
HGB BLD-MCNC: 9.9 G/DL (ref 11.7–15.4)
MCH RBC QN AUTO: 28.6 PG (ref 26.1–32.9)
MCHC RBC AUTO-ENTMCNC: 32.6 G/DL (ref 31.4–35)
MCV RBC AUTO: 87.5 FL (ref 79.6–97.8)
NRBC # BLD: 0 K/UL (ref 0–0.2)
PLATELET # BLD AUTO: 202 K/UL (ref 150–450)
PMV BLD AUTO: 11 FL (ref 9.4–12.3)
POTASSIUM SERPL-SCNC: 3.8 MMOL/L (ref 3.5–5.1)
RBC # BLD AUTO: 3.29 M/UL (ref 4.05–5.2)
SODIUM SERPL-SCNC: 141 MMOL/L (ref 136–145)
WBC # BLD AUTO: 6.6 K/UL (ref 4.3–11.1)

## 2020-07-13 PROCEDURE — 80048 BASIC METABOLIC PNL TOTAL CA: CPT

## 2020-07-13 PROCEDURE — 74011250637 HC RX REV CODE- 250/637: Performed by: SURGERY

## 2020-07-13 PROCEDURE — 85018 HEMOGLOBIN: CPT

## 2020-07-13 PROCEDURE — 36415 COLL VENOUS BLD VENIPUNCTURE: CPT

## 2020-07-13 PROCEDURE — 85027 COMPLETE CBC AUTOMATED: CPT

## 2020-07-13 PROCEDURE — 82962 GLUCOSE BLOOD TEST: CPT

## 2020-07-13 RX ORDER — SIMETHICONE 80 MG
80 TABLET,CHEWABLE ORAL
Status: SHIPPED | COMMUNITY
Start: 2020-07-13 | End: 2020-08-24

## 2020-07-13 RX ADMIN — FLUTICASONE PROPIONATE 2 SPRAY: 50 SPRAY, METERED NASAL at 09:07

## 2020-07-13 RX ADMIN — PAROXETINE HYDROCHLORIDE 40 MG: 20 TABLET, FILM COATED ORAL at 09:04

## 2020-07-13 RX ADMIN — PANTOPRAZOLE SODIUM 40 MG: 40 TABLET, DELAYED RELEASE ORAL at 09:04

## 2020-07-13 RX ADMIN — OXYCODONE HYDROCHLORIDE AND ACETAMINOPHEN 2 TABLET: 5; 325 TABLET ORAL at 10:00

## 2020-07-13 RX ADMIN — LISINOPRIL 10 MG: 5 TABLET ORAL at 09:04

## 2020-07-13 NOTE — DISCHARGE SUMMARY
[facility] [address]  [place], [address] [facility]  [phone]   Discharge Summary     Shazia Han  MRN: [id number]     : 1950     Age: 78 y.o. Admit date: 7/10/2020     Discharge date: 2020  Attending Physician: Suri Medina MD  Primary Discharge Diagnosis:   Principal Problem:    Rectal mass (7/10/2020)      Primary Operations or Procedures Performed :  Procedure(s):  TRANS ANAL EXCISION     Brief History and Reason for Admission: Shazia Han was admitted with the following history of present illness. Shazia Han is a 78 y.o. female who presents for follow up of rectal cancer after an EUS/colonoscopy was done by Dr. Janet Wilkerson on 20. Dr. Rafael Zamora procedure note reads as follows:     DATE OF PROCEDURE: 2020     PRE-OP DIAGNOSIS:  Rectal cancer   Sigmoid stricture      POST-OP DIAGNOSIS:  Rectal cancer-T1 N0 by EUS   Sigmoid polyp   Severe diverticulosis      MEDICATIONS ADMINISTERED: MAC     INSTRUMENT: GFUE 160     PROCEDURE:  After obtaining informed consent, the patient was placed in the left lateral position and sedated.  The echoendoscope was advanced to the lower GI tract without difficulty.  The scope was slowly removed while detailed images of the adjacent organs were obtained. The patient was taken to the recovery area in stable condition.     ENDOSCOPIC FINDINGS: Because of the severe sigmoid tortuosity and possible stricture, an EGD scope was advanced into the anus.  This was advanced through the sigmoid colon into the descending colon, up to 45 cm. This appears to be approximately descending colon near the splenic flexure. It could not be advanced further because of scope looping. There was severe diverticulosis, and the bowel prep was poor at this level. On slow withdrawal of the scope, there is no definite stricture present, but severe tortuosity and diverticular disease.  A 5 mm sessile polyp was identified in the proximal sigmoid colon. Polypectomy performed with a cold snare.  The previously identified or rectal cancer is again visualized 8-10 cm from the anal verge.  It has a lobular, polypoid appearance. It occupies proximately 25% or less of the lumen circumference. It is located on a fold, with involvement possibly on both sides. Sonographically, the lesion appears fairly superficial. There is definite invasion into the submucosa, but node clear involvement of the muscularis propria. This is consistent with a T1 tumor. Dexter Hidden is no evidence of invasion into adjacent structures. The tumor measures at least 6 x 14 mm in cross section. It is hypoechoic, and homogenous.   OTHER ORGANS: There was no ascites in the pelvis.  No significant Perirectal or iliac  lymphadenopathy.     Estimated blood loss: 0-minimal      PLAN:  For path from colon polyp   The lesion appears to be superficial, and likely amenable to transanal excision.  This will likely prove more effective than endoscopic excision given the location on a fold   While the sigmoid colon is severely tortuous, no definite stricture is identified. Consider follow up colonoscopy after treatment of the rectal cancer to ensure there are no additional lesions in the proximal colon.  She may require a 2-day prep to ensure adequate visualization, given her severe diverticular disease.  This could be performed by GI or surgery          Hospital Course: The patient underwent Procedure(s):  TRANS ANAL EXCISION on 7/10/2020. Surgery POD #1  Significant drop in H/H after surgery. The tumor was more proximal than I had anticipated with difficulty getting exposure. She has had some old blood noted when she had a BM last night. No chest pain, dizziness or SOB. Surgery POD #2  Hgb today 9.4. Received 2u PRBC yesterday. States feeling better today. Reports no longer passing blood clots with BM. Denies chest pain, dizziness or SOB.   Anemia due to expected postoperative blood loss      Risk Factors: Rectal mass with Transanal excision.      Clinical Indicators: Post Transanal Excision Rectal Mass  HGB 7.6---7.4 Post transfusion PRBC--HGB 10.00---9.2---9.4. PN 7/11 \" Significant drop in H/H after surgery. The tumor was more proximal than I had anticipated with difficulty getting exposure. She has had some old blood noted when she had a BM last night\"   Treatment: Monitoring, Serial HH, Transfusion 2 U PRBC   Surgery POD #3  The patient is still having some blood in her bowel movements. She has some anxiety on any given day, but is very worried about going home today. She wants to stay until the bleeding has ceased. No SOB, dizziness or SOB. The patient progressed satisfactorily meeting milestones necessary for successful discharge including tolerating a diet, adequate mobility, adequate pain control, and active bowel function. Patient was deemed a good candidate for discharge at the time of morning rounding. They are to follow up as indicated in their provided discharge paperwork. The patient helped develop and voices understanding with the plan of care. They are amenable without reservations at this time to moving forward with discharge. Condition at Discharge: Good    Discharge Medications:   Current Discharge Medication List      START taking these medications    Details   simethicone (MYLICON) 80 mg chewable tablet Take 1 Tab by mouth four (4) times daily as needed for GI Pain. Qty:           CONTINUE these medications which have NOT CHANGED    Details   lisinopriL (PRINIVIL, ZESTRIL) 10 mg tablet Take 1 Tab by mouth daily. Qty: 90 Tab, Refills: 3      cyanocobalamin (Vitamin B-12) 1,000 mcg/mL injection 1 mL by IntraMUSCular route every thirty (30) days. Qty: 3 mL, Refills: 3    Associated Diagnoses: Vitamin B12 deficiency      apple cider vinegar 500 mg tab Take  by mouth. omeprazole (PRILOSEC) 20 mg capsule Take 20 mg by mouth daily. fenofibrate nanocrystallized 160 mg tab Take 160 mg by mouth daily. Qty: 90 Tab, Refills: 3    Associated Diagnoses: Mixed hyperlipidemia      linaGLIPtin (TRADJENTA) 5 mg tablet Take 1 Tab by mouth daily. Qty: 30 Tab, Refills: 5    Associated Diagnoses: Controlled type 2 diabetes mellitus with microalbuminuria, without long-term current use of insulin (HCC)      PARoxetine (PAXIL) 40 mg tablet Take 1 Tab by mouth daily. Qty: 90 Tab, Refills: 3    Associated Diagnoses: Depression with anxiety      rosuvastatin (CRESTOR) 10 mg tablet Take 1 Tab by mouth nightly. Qty: 90 Tab, Refills: 3    Associated Diagnoses: Mixed hyperlipidemia      krill-omega-3-dha-epa-lipids (KRILL OIL) 835-09-73-50 mg cap Take  by mouth. ascorbic acid, vitamin C, (VITAMIN C) 500 mg tablet Take  by mouth.      vitamin E (AQUA GEMS) 400 unit capsule Take  by mouth daily. cholecalciferol, VITAMIN D3, (VITAMIN D3) 5,000 unit tab tablet Take 5,000 Units by mouth daily. melatonin 10 mg tab Take 1 Tab by mouth nightly. Per pt takes 20 mg each pm      garlic 6,496 mg cap Take  by mouth daily. LACTOBACILLUS ACIDOPHILUS (PROBIOTIC PO) Take 1 Tab by mouth daily. cyanocobalamin (VITAMIN B-12) 500 mcg tablet Take 500 mcg by mouth daily. mometasone (NASONEX) 50 mcg/actuation nasal spray 2 Sprays daily as needed. glucose blood VI test strips (RELION PRIME TEST STRIPS) strip Use to check blood sugar once daily. Dx: E11.29  Qty: 100 Strip, Refills: 3    Associated Diagnoses: Controlled type 2 diabetes mellitus with microalbuminuria, without long-term current use of insulin (HCC)      lancets (RELION ULTRA THIN PLUS LANCETS) misc Use to check glucose once daily.   Dx: E11.29  Qty: 100 Each, Refills: 3    Associated Diagnoses: Controlled type 2 diabetes mellitus with microalbuminuria, without long-term current use of insulin (HCC)      metFORMIN ER (GLUCOPHAGE XR) 750 mg tablet Take 1 Tab by mouth daily (with dinner). Qty: 90 Tab, Refills: 3    Associated Diagnoses: Controlled type 2 diabetes mellitus with microalbuminuria, without long-term current use of insulin (HCC)      guaifenesin/phenylephrine HCl (MUCINEX COLD PO) Take  by mouth daily as needed. cyclobenzaprine (FLEXERIL) 10 mg tablet Take 1 Tab by mouth two (2) times a day. Qty: 60 Tab, Refills: 0    Associated Diagnoses: Fibromyalgia         STOP taking these medications       aspirin (ASPIRIN) 325 mg tablet Comments:   Reason for Stopping:                 Disposition: Home    Discharge Instructions/Follow-up Care:        Discharge Instructions/Follow-up Plans:   MD Instructions:     Follow-up with Dr. Ludwin Duggan in 1 week. Keep incisions clean and dry, may remain uncovered. Do not apply lotions, creams or ointments to incisions.     Diet - as tolerated - Soft foods diet. Activity - ambulate - as tolerated - no heavy lifting >10lb. May shower - no tub baths or soaking/submerging.     No driving while taking narcotics. Do not drink alcohol while taking narcotics. Resume other home medications.    Hold your Aspirin until you see Dr. Ludwin Duggan in follow up.      If problems or questions arise, please call our office at [phone].     Greater than 30 minutes were spent discharging the patient       Signed:  Mark Lynch   7/13/2020  11:16 AM

## 2020-07-13 NOTE — CDMP QUERY
Pt admitted with Rectal Mass, Pt noted to have Drop in HGB following Transanal excision of a rectal mass. . If possible, please document in the progress notes and d/c summary if you are evaluating and / or treating any of the following: ? Anemia due to acute blood loss ? Anemia due to chronic blood loss ? Anemia due to postoperative blood loss (please specify if expected or a complication of the surgery) ? Anemia due to chronic disease, please specify disease ? Dilutional anemia 
? Other, please specify ? Clinically unable to determine The medical record reflects the following: 
   Risk Factors: Rectal mass with Transanal excision. Clinical Indicators: Post Transanal Excision Rectal Mass  HGB 7.6---7.4 Post transfusion PRBC--HGB 10.00---9.2---9.4. PN 7/11 \" Significant drop in H/H after surgery. The tumor was more proximal than I had anticipated with difficulty getting exposure. She has had some old blood noted when she had a BM last night\" Treatment: Monitoring, Serial HH, Transfusion 2 U PRBC

## 2020-07-13 NOTE — DISCHARGE INSTRUCTIONS
DISCHARGE SUMMARY from Nurse    PATIENT INSTRUCTIONS:    After general anesthesia or intravenous sedation, for 24 hours or while taking prescription Narcotics:  · Limit your activities  · Do not drive and operate hazardous machinery  · Do not make important personal or business decisions  · Do  not drink alcoholic beverages  · If you have not urinated within 8 hours after discharge, please contact your surgeon on call. Report the following to your surgeon:  · Excessive pain, swelling, redness or odor of or around the surgical area  · Temperature over 100.5  · Nausea and vomiting lasting longer than 4 hours or if unable to take medications  · Any signs of decreased circulation or nerve impairment to extremity: change in color, persistent  numbness, tingling, coldness or increase pain  · Any questions    What to do at Home:  Recommended activity: Activity as tolerated,    *  Please give a list of your current medications to your Primary Care Provider. *  Please update this list whenever your medications are discontinued, doses are      changed, or new medications (including over-the-counter products) are added. *  Please carry medication information at all times in case of emergency situations. These are general instructions for a healthy lifestyle:    No smoking/ No tobacco products/ Avoid exposure to second hand smoke  Surgeon General's Warning:  Quitting smoking now greatly reduces serious risk to your health. Obesity, smoking, and sedentary lifestyle greatly increases your risk for illness    A healthy diet, regular physical exercise & weight monitoring are important for maintaining a healthy lifestyle    You may be retaining fluid if you have a history of heart failure or if you experience any of the following symptoms:  Weight gain of 3 pounds or more overnight or 5 pounds in a week, increased swelling in our hands or feet or shortness of breath while lying flat in bed.   Please call your doctor as soon as you notice any of these symptoms; do not wait until your next office visit. The discharge information has been reviewed with the patient. The patient verbalized understanding. Discharge medications reviewed with the patient and appropriate educational materials and side effects teaching were provided. ___________________________________________________________________________________________________________________________________  Discharge Instructions/Follow-up Plans:   MD Instructions:     Follow-up with Dr. Stan Castañeda in 1 week. Keep incisions clean and dry, may remain uncovered. Do not apply lotions, creams or ointments to incisions.     Diet - as tolerated - Soft foods diet. Activity - ambulate - as tolerated - no heavy lifting >10lb. May shower - no tub baths or soaking/submerging.     No driving while taking narcotics. Do not drink alcohol while taking narcotics. Resume other home medications.    Hold your Aspirin until you see Dr. Stan Castañeda in follow up.      If problems or questions arise, please call our office at [phone].     Greater than 30 minutes were spent discharging the patient

## 2020-07-13 NOTE — PROGRESS NOTES
Care Management Interventions  Transition of Care Consult (CM Consult): Discharge Planning  Current Support Network: Own Home  Confirm Follow Up Transport: Family  Discharge Location  Discharge Placement: Home      Chart screened by  for discharge planning. No needs identified at this time. Please consult  if any new issues arise.

## 2020-07-13 NOTE — PROGRESS NOTES
End of Shift Note: 7p ~ 7a    Intake/Output  Taking PO without difficulty  Voiding: YES    Stool:  2 occurrences. ( Mixed with urine )  Stool Assessment  Stool Color: Red streaks;Brown (07/13/20 0624)  Stool Appearance: Bloody; Loose; Watery (07/13/20 9582)  Stool Amount: Large (07/13/20 6625)  Stool Source/Status: Rectum (07/13/20 0921)    Emesis:  0 occurrences. VITAL SIGNS  Patient Vitals for the past 12 hrs:   Temp Pulse Resp BP SpO2   07/13/20 0250 98.9 °F (37.2 °C) 86 18 106/61 96 %   07/12/20 2336 98.3 °F (36.8 °C) 89 18 104/68 96 %       Pain Assessment  Pain 1  Pain Scale 1: Numeric (0 - 10) (07/13/20 0250)  Pain Intensity 1: 0 (07/13/20 0250)  Patient Stated Pain Goal: 0 (07/13/20 0250)  Pain Reassessment 1: Yes (07/12/20 2135)  Pain Onset 1: Post Op (07/12/20 2055)  Pain Location 1: Back;Rectal (07/12/20 2055)  Pain Orientation 1: Inner; Lower (07/12/20 2055)  Pain Description 1: Aching;Burning (07/12/20 2055)  Pain Intervention(s) 1: Medication (see MAR) (07/12/20 2055)    Ambulating  Yes    Additional Information: Anxious regarding going home secondary to rectal bleeding continuing. Emotional support given. Medicated for pain x 1 this shift. Hgb stable 9.4    Shift report given to oncoming nurse at the bedside.     Deion Carr, RN

## 2020-07-13 NOTE — PROGRESS NOTES
Problem: Falls - Risk of  Goal: *Absence of Falls  Description: Document Crystal Patches Fall Risk and appropriate interventions in the flowsheet.   Outcome: Progressing Towards Goal  Note: Fall Risk Interventions:            Medication Interventions: Teach patient to arise slowly    Elimination Interventions: Call light in reach

## 2020-07-13 NOTE — PROGRESS NOTES
General Surgery Progress Note    7/13/2020    Admit Date: 7/10/2020    Subjective:     Surgery POD #3  The patient is still having some blood in her bowel movements. She has some anxiety on any given day, but is very worried about going home today. She wants to stay until the bleeding has ceased. No SOB, dizziness or SOB. Objective:     Visit Vitals  /61 (BP 1 Location: Right arm, BP Patient Position: At rest)   Pulse 86   Temp 98.9 °F (37.2 °C)   Resp 18   Ht 5' 2\" (1.575 m)   Wt 152 lb (68.9 kg)   SpO2 96%   BMI 27.80 kg/m²         Intake/Output Summary (Last 24 hours) at 7/13/2020 0736  Last data filed at 7/13/2020 6051  Gross per 24 hour   Intake 1320 ml   Output 3350 ml   Net -2030 ml        EXAM:  Rectal: eccymosis around anus, decreased tone.         Data Review    Recent Results (from the past 24 hour(s))   GLUCOSE, POC    Collection Time: 07/12/20 11:19 AM   Result Value Ref Range    Glucose (POC) 156 (H) 65 - 100 mg/dL   HGB & HCT    Collection Time: 07/12/20  3:16 PM   Result Value Ref Range    HGB 9.4 (L) 11.7 - 15.4 g/dL    HCT 28.8 (L) 35.8 - 46.3 %   GLUCOSE, POC    Collection Time: 07/12/20  4:02 PM   Result Value Ref Range    Glucose (POC) 185 (H) 65 - 100 mg/dL   GLUCOSE, POC    Collection Time: 07/12/20  9:09 PM   Result Value Ref Range    Glucose (POC) 133 (H) 65 - 100 mg/dL   CBC W/O DIFF    Collection Time: 07/13/20  3:18 AM   Result Value Ref Range    WBC 6.6 4.3 - 11.1 K/uL    RBC 3.29 (L) 4.05 - 5.2 M/uL    HGB 9.4 (L) 11.7 - 15.4 g/dL    HCT 28.8 (L) 35.8 - 46.3 %    MCV 87.5 79.6 - 97.8 FL    MCH 28.6 26.1 - 32.9 PG    MCHC 32.6 31.4 - 35.0 g/dL    RDW 13.7 11.9 - 14.6 %    PLATELET 337 902 - 765 K/uL    MPV 11.0 9.4 - 12.3 FL    ABSOLUTE NRBC 0.00 0.0 - 0.2 K/uL   METABOLIC PANEL, BASIC    Collection Time: 07/13/20  3:18 AM   Result Value Ref Range    Sodium 141 136 - 145 mmol/L    Potassium 3.8 3.5 - 5.1 mmol/L    Chloride 109 (H) 98 - 107 mmol/L    CO2 28 21 - 32 mmol/L    Anion gap 4 (L) 7 - 16 mmol/L    Glucose 120 (H) 65 - 100 mg/dL    BUN 9 8 - 23 MG/DL    Creatinine 0.71 0.6 - 1.0 MG/DL    GFR est AA >60 >60 ml/min/1.73m2    GFR est non-AA >60 >60 ml/min/1.73m2    Calcium 8.8 8.3 - 10.4 MG/DL        Hospital Problems  Date Reviewed: 7/7/2020          Codes Class Noted POA    * (Principal) Rectal mass ICD-10-CM: K62.89  ICD-9-CM: 787.99  7/10/2020 Yes          1. Follow H/H  2. Stop IVF'S  3. Hold Lovenox  4. OOB/IS. 5. Hopeful for discharge tomorrow.   Conrado Barkley MD.

## 2020-07-14 ENCOUNTER — PATIENT OUTREACH (OUTPATIENT)
Dept: CASE MANAGEMENT | Age: 70
End: 2020-07-14

## 2020-07-14 ENCOUNTER — HOSPITAL ENCOUNTER (EMERGENCY)
Age: 70
Discharge: STILL ADMITTED OR ADMINISTRATIVE | End: 2020-07-14

## 2020-07-14 NOTE — PROGRESS NOTES
This note will not be viewable in [address] 1st Attempt to contact patient for [facility] call, no answer, no VM picked up.  Will attempt to contact patient again within 24 hours

## 2020-07-15 ENCOUNTER — PATIENT OUTREACH (OUTPATIENT)
Dept: CASE MANAGEMENT | Age: 70
End: 2020-07-15

## 2020-07-15 NOTE — PROGRESS NOTES
This note will not be viewable in [address] 2nd attempt to contact patient for [facility] call, no answer, no VM picked up. Will attempt 3rd call within 5 business days.

## 2020-07-16 ENCOUNTER — PATIENT OUTREACH (OUTPATIENT)
Dept: CASE MANAGEMENT | Age: 70
End: 2020-07-16

## 2020-07-16 NOTE — PROGRESS NOTES
This note will not be viewable in [address] 3rd attempt to contact patient for [facility] Call. No answer, left VM for returned call. Episode will be closed at this time as [address] has been unsuccessful in reaching the patient.

## 2020-08-12 ENCOUNTER — PATIENT OUTREACH (OUTPATIENT)
Dept: CASE MANAGEMENT | Age: 70
End: 2020-08-12

## 2020-08-12 ENCOUNTER — HOSPITAL ENCOUNTER (OUTPATIENT)
Dept: LAB | Age: 70
Discharge: HOME | End: 2020-08-12
Payer: MEDICARE

## 2020-08-12 DIAGNOSIS — C20 RECTAL CANCER (HCC): ICD-10-CM

## 2020-08-12 LAB
ALBUMIN SERPL-MCNC: 3.9 G/DL (ref 3.2–4.6)
ALBUMIN/GLOB SERPL: 0.9 {RATIO} (ref 1.2–3.5)
ALP SERPL-CCNC: 64 U/L (ref 50–136)
ALT SERPL-CCNC: 25 U/L (ref 12–65)
ANION GAP SERPL CALC-SCNC: 7 MMOL/L (ref 7–16)
AST SERPL-CCNC: 19 U/L (ref 15–37)
BASOPHILS # BLD: 0.1 K/UL (ref 0–0.2)
BASOPHILS NFR BLD: 2 % (ref 0–2)
BILIRUB SERPL-MCNC: 0.3 MG/DL (ref 0.2–1.1)
BUN SERPL-MCNC: 15 MG/DL (ref 8–23)
CALCIUM SERPL-MCNC: 9.6 MG/DL (ref 8.3–10.4)
CEA SERPL-MCNC: 2.6 NG/ML (ref 0–3)
CHLORIDE SERPL-SCNC: 106 MMOL/L (ref 98–107)
CO2 SERPL-SCNC: 25 MMOL/L (ref 21–32)
CREAT SERPL-MCNC: 1.1 MG/DL (ref 0.6–1)
DIFFERENTIAL METHOD BLD: NORMAL
EOSINOPHIL # BLD: 0.3 K/UL (ref 0–0.8)
EOSINOPHIL NFR BLD: 5 % (ref 0.5–7.8)
ERYTHROCYTE [DISTWIDTH] IN BLOOD BY AUTOMATED COUNT: 13 % (ref 11.9–14.6)
FERRITIN SERPL-MCNC: 13 NG/ML (ref 8–388)
GLOBULIN SER CALC-MCNC: 4.2 G/DL (ref 2.3–3.5)
GLUCOSE SERPL-MCNC: 123 MG/DL (ref 65–100)
HCT VFR BLD AUTO: 39.7 % (ref 35.8–46.3)
HGB BLD-MCNC: 12.7 G/DL (ref 11.7–15.4)
HGB RETIC QN AUTO: 28 PG (ref 29–35)
IMM GRANULOCYTES # BLD AUTO: 0 K/UL (ref 0–0.5)
IMM GRANULOCYTES NFR BLD AUTO: 0 % (ref 0–5)
IMM RETICS NFR: 11.7 % (ref 3–15.9)
IRON SATN MFR SERPL: 13 %
IRON SERPL-MCNC: 55 UG/DL (ref 35–150)
LYMPHOCYTES # BLD: 1.3 K/UL (ref 0.5–4.6)
LYMPHOCYTES NFR BLD: 23 % (ref 13–44)
MAGNESIUM SERPL-MCNC: 1.9 MG/DL (ref 1.8–2.4)
MCH RBC QN AUTO: 27.7 PG (ref 26.1–32.9)
MCHC RBC AUTO-ENTMCNC: 32 G/DL (ref 31.4–35)
MCV RBC AUTO: 86.5 FL (ref 79.6–97.8)
MONOCYTES # BLD: 0.4 K/UL (ref 0.1–1.3)
MONOCYTES NFR BLD: 7 % (ref 4–12)
NEUTS SEG # BLD: 3.5 K/UL (ref 1.7–8.2)
NEUTS SEG NFR BLD: 64 % (ref 43–78)
NRBC # BLD: 0 K/UL (ref 0–0.2)
PLATELET # BLD AUTO: 313 K/UL (ref 150–450)
PMV BLD AUTO: 10.7 FL (ref 9.4–12.3)
POTASSIUM SERPL-SCNC: 4.1 MMOL/L (ref 3.5–5.1)
PROT SERPL-MCNC: 8.1 G/DL (ref 6.3–8.2)
RBC # BLD AUTO: 4.59 M/UL (ref 4.05–5.25)
RETICS # AUTO: 0.07 M/UL (ref 0.03–0.1)
RETICS/RBC NFR AUTO: 1.5 % (ref 0.3–2)
SODIUM SERPL-SCNC: 138 MMOL/L (ref 136–145)
TIBC SERPL-MCNC: 431 UG/DL (ref 250–450)
WBC # BLD AUTO: 5.5 K/UL (ref 4.3–11.1)

## 2020-08-12 PROCEDURE — 83735 ASSAY OF MAGNESIUM: CPT

## 2020-08-12 PROCEDURE — 83540 ASSAY OF IRON: CPT

## 2020-08-12 PROCEDURE — 85046 RETICYTE/HGB CONCENTRATE: CPT

## 2020-08-12 PROCEDURE — 82728 ASSAY OF FERRITIN: CPT

## 2020-08-12 PROCEDURE — 36415 COLL VENOUS BLD VENIPUNCTURE: CPT

## 2020-08-12 PROCEDURE — 80053 COMPREHEN METABOLIC PANEL: CPT

## 2020-08-12 PROCEDURE — 82378 CARCINOEMBRYONIC ANTIGEN: CPT

## 2020-08-12 PROCEDURE — 85025 COMPLETE CBC W/AUTO DIFF WBC: CPT

## 2020-08-12 NOTE — PROGRESS NOTES
Reason for Referral:  Rectal Ca     Referring Provider:  Dr. Estela Moncada. Reuben Orona     Primary Care Provider:  Dr. Marciano Trinidad. Joshua     Family History of Cancer/Hematologic disorders:  Father, ITP; Mother, Cervical Ca; Maternal Uncle, unspecified ca; Cousin, Lung Ca; Paternal Uncle, unspecified ca     Presenting Symptoms:  Fatigue, rectal bleeding     Narrative with recent Results/Procedures/Biopsies and Dates completed:  Ms. Veronica Juárez is a 78year old female. Her medical history includes DM2, osteoarthritis, mixed hyperlipidemia, HTN, GERD, fibromyalgia, depression w/anxiety, COPD, Vit D & Vit B12 deficiency, sleep apnea, rectal bleeding (2019), allergic rhinitis, osteopenia, IBS, diverticulosis. She quit smoking in 2016. Her last colonoscopy was 2017. Ms. Sherrill Urbano first c/o rectal bleeding at 9/302019 OV with ADAMARIS Pichardo. She reported she didn't know if it was from hemorrhoids or something else, but it was not consistent. Hemoccult cards were positive so a cologard study was ordered for screening. There were insurance difficulties so this was postponed. In March 2020, Ms. Sherrill Urbano had c/o of episodic sharp abdominal pain with stool color change, and intermittent blood in stool. Cologard was done in May and was positive. She was referred to GI for colonoscopy and saw Dr. Reuben Orona on 6/11/2020. She had a colonoscopy on 6/19/2020. Pathology revealed rectal adenocarcinoma. 6/23/2020 CT showed chronic sigmoid diverticulitis and tiny pulmonary nodule along periphery of right middle lobe. On 7/10/2020 Ms. Sherrill Urbano underwent an trans anal excision of rectal mass. Pathology again revealed adenocarcinoma. Ms. Sherrill Urbano is referred to [facility] for evaluation and discussion of treatment necessity for rectal adenocarcinoma.     6/19/2020 PATHOLOGY  A: RECTAL MASS BIOPSIES: ADENOCARCINOMA.    B: SIGMOID COLON BIOPSIES: COLONIC MUCOSA WITH REACTIVE/REPARATIVE CHANGES.      6/23/2020 CT ABD PELV W CONT  FINDINGS:    Abdomen:  Lung bases: There is a tiny 4 mm nodule at the periphery of the right middle lobe (series 2 image 1). Liver: 2 cm cyst in lateral left liver lobe. Diffuse mild hepatic steatosis. Biliary: Cholecystectomy. No evidence of intra or extrahepatic biliary dilatation. Pancreas: Normal in size and contour without focal lesion. Spleen: Normal in size and contour without focal lesion. Adrenal glands: Normal in size without focal lesion. Kidneys: Symmetric without evidence of hydronephrosis or nephrolithiasis. Normal enhancement throughout all visualized phases of contrast excretion. Incidental small renal cysts. Bowel: The sigmoid colon is diffusely thickened with numerous predominantly sigmoid, and few descending colonic diverticula, however without evidence of diverticular abscess. A [place] rectal mass is not appreciated. There is no  significant pelvic lymphadenopathy. Retroperitoneum/vasculature: No evidence of significant adenopathy. Unremarkable aorta and IVC. Abdominal soft tissues: Unremarkable. Osseous structures: No acute osseous abnormality. Pelvis:  Unremarkable bladder. No significant adenopathy or free fluid. .  The uterus is unremarkable  IMPRESSION:  1. Chronic sigmoid diverticulitis. 2. Tiny pulmonary nodule along periphery of right middle lobe     7/10/2020 PATHOLOGY  RECTAL MASS: MODERATELY DIFFERENTIATED ADENOCARCINOMA, APPROXIMATELY 0.5 CM IN GREATEST DIMENSION WITH SUBMUCOSAL INVASION, NO INVOLVEMENT OF THE MUSCULARIS PROPRIA IDENTIFIED, MARGINS UNINVOLVED.    Microscopic Description   Surgical Pathology Cancer Case Summary   SPECIMEN   TUMOR   TUMOR SITE: Rectum   HISTOLOGIC TYPE: Adenocarcinoma   HISTOLOGIC GRADE : G2: Moderately differentiated   TUMOR EXTENSION: Tumor invades submucosa   LYMPHOVASCULAR INVASION: Not identified   MARGINS   DEEP MARGIN: Uninvolved by invasive carcinoma   DISTANCE OF INVASIVE CARCINOMA FROM MARGIN: 0.7 cm   MUCOSAL MARGIN: Uninvolved by invasive carcinoma   ADDITIONAL FINDINGS: Tumor arises in a tubular adenoma.        6/23/2020 12:03 7/11/2020 06:00 7/12/2020 03:22 7/13/2020 03:18 7/13/2020 08:05   WBC   8.3 7.6 6.6     RBC   2.72 (L) 3.18 (L) 3.29 (L)     HGB   7.6 (L) 9.2 (L) 9.4 (L) 9.9 (L)   HCT   23.7 (L) 27.9 (L) 28.8 (L) 29.6 (L)   MCV   87.1 87.7 87.5     MCH   27.9 28.9 28.6     MCHC   32.1 33.0 32.6     RDW   13.2 13.5 13.7     PLATELET   613 030 802     MPV   10.8 10.8 11.0     ABSOLUTE NRBC   0.00 0.00 0.00     Sodium   136 142 141     Potassium   4.4 3.8 3.8     Chloride   104 111 (H) 109 (H)     CO2   26 26 28     Anion gap   6 (L) 5 (L) 4 (L)     Glucose   169 (H) 125 (H) 120 (H)     BUN   17 14 9     Creatinine   0.96 0.85 0.71     Calcium   7.9 (L) 8.0 (L) 8.8     GFR est non-AA   >60 >60 >60     GFR est AA   >60 >60 >60     CEA 2.3              Notes from referring Provider:  Referral to Oncology to see if further therapy is necessary.     I met patient today with Dr Joni Hassan for diagnosis Rectal cancer. Dr Joni Hassan reviewed pathology with patient and explained possible plan of care. We will order MSI testing with pathology lab on accession [id number]. CT chest will be ordered. Pt has appt with Dr Emily Estes (surgeon) on Sept 8th 2020. Pt case will be placed on tumor board. Pt is in agreement with plan. Nurse navigation will continue to monitor.

## 2020-08-13 PROBLEM — D50.9 IRON DEFICIENCY ANEMIA: Status: ACTIVE | Noted: 2020-08-13

## 2020-08-18 ENCOUNTER — HOSPITAL ENCOUNTER (OUTPATIENT)
Dept: INFUSION THERAPY | Age: 70
Discharge: HOME | End: 2020-08-18
Payer: MEDICARE

## 2020-08-18 VITALS
OXYGEN SATURATION: 95 % | DIASTOLIC BLOOD PRESSURE: 74 MMHG | RESPIRATION RATE: 16 BRPM | SYSTOLIC BLOOD PRESSURE: 141 MMHG | WEIGHT: 155 LBS | TEMPERATURE: 98 F | BODY MASS INDEX: 29.29 KG/M2 | HEART RATE: 78 BPM

## 2020-08-18 DIAGNOSIS — K62.89 RECTAL MASS: ICD-10-CM

## 2020-08-18 DIAGNOSIS — D50.9 IRON DEFICIENCY ANEMIA, UNSPECIFIED IRON DEFICIENCY ANEMIA TYPE: Primary | ICD-10-CM

## 2020-08-18 PROCEDURE — 74011250636 HC RX REV CODE- 250/636: Performed by: INTERNAL MEDICINE

## 2020-08-18 PROCEDURE — 74011000258 HC RX REV CODE- 258: Performed by: INTERNAL MEDICINE

## 2020-08-18 PROCEDURE — 96374 THER/PROPH/DIAG INJ IV PUSH: CPT

## 2020-08-18 RX ADMIN — SODIUM CHLORIDE 500 ML: 900 INJECTION, SOLUTION INTRAVENOUS at 16:05

## 2020-08-18 RX ADMIN — FERUMOXYTOL 510 MG: 510 INJECTION INTRAVENOUS at 15:49

## 2020-08-18 NOTE — PROGRESS NOTES
Arrived to the [facility]. Injectafer completed. Patient tolerated well. Any issues or concerns during appointment: denies   Patient aware of next infusion appointment on 8/25/20 at 230pm  Discharged ambulatory with self.

## 2020-08-19 ENCOUNTER — HOSPITAL ENCOUNTER (OUTPATIENT)
Dept: CT IMAGING | Age: 70
Discharge: HOME | End: 2020-08-19
Attending: INTERNAL MEDICINE
Payer: MEDICARE

## 2020-08-19 DIAGNOSIS — C20 RECTAL CANCER (HCC): ICD-10-CM

## 2020-08-19 PROCEDURE — 74011000258 HC RX REV CODE- 258: Performed by: INTERNAL MEDICINE

## 2020-08-19 PROCEDURE — 71260 CT THORAX DX C+: CPT

## 2020-08-19 PROCEDURE — 74011636320 HC RX REV CODE- 636/320: Performed by: INTERNAL MEDICINE

## 2020-08-19 RX ORDER — SODIUM CHLORIDE 0.9 % (FLUSH) 0.9 %
10 SYRINGE (ML) INJECTION
Status: COMPLETED | OUTPATIENT
Start: 2020-08-19 | End: 2020-08-19

## 2020-08-19 RX ADMIN — Medication 10 ML: at 09:30

## 2020-08-19 RX ADMIN — IOPAMIDOL 100 ML: 755 INJECTION, SOLUTION INTRAVENOUS at 09:30

## 2020-08-19 RX ADMIN — SODIUM CHLORIDE 100 ML: 900 INJECTION, SOLUTION INTRAVENOUS at 09:30

## 2020-08-25 ENCOUNTER — HOSPITAL ENCOUNTER (OUTPATIENT)
Dept: INFUSION THERAPY | Age: 70
Discharge: HOME | End: 2020-08-25
Payer: MEDICARE

## 2020-08-25 VITALS
RESPIRATION RATE: 16 BRPM | SYSTOLIC BLOOD PRESSURE: 126 MMHG | TEMPERATURE: 97.1 F | DIASTOLIC BLOOD PRESSURE: 77 MMHG | HEART RATE: 107 BPM | OXYGEN SATURATION: 94 %

## 2020-08-25 DIAGNOSIS — K62.89 RECTAL MASS: ICD-10-CM

## 2020-08-25 DIAGNOSIS — D50.9 IRON DEFICIENCY ANEMIA, UNSPECIFIED IRON DEFICIENCY ANEMIA TYPE: Primary | ICD-10-CM

## 2020-08-25 PROCEDURE — 96365 THER/PROPH/DIAG IV INF INIT: CPT

## 2020-08-25 PROCEDURE — 96361 HYDRATE IV INFUSION ADD-ON: CPT

## 2020-08-25 PROCEDURE — 96374 THER/PROPH/DIAG INJ IV PUSH: CPT

## 2020-08-25 PROCEDURE — 74011000258 HC RX REV CODE- 258: Performed by: INTERNAL MEDICINE

## 2020-08-25 PROCEDURE — 74011250636 HC RX REV CODE- 250/636: Performed by: INTERNAL MEDICINE

## 2020-08-25 RX ADMIN — SODIUM CHLORIDE 500 ML: 900 INJECTION, SOLUTION INTRAVENOUS at 14:44

## 2020-08-25 RX ADMIN — FERUMOXYTOL 510 MG: 510 INJECTION INTRAVENOUS at 14:28

## 2020-08-25 NOTE — PROGRESS NOTES
Arrived to the [facility] ambulatory. [email or web address]  completed. Patient tolerated well. Any issues or concerns during appointment: no.  Patient aware of next infusion appointment on  8/26 with Dr Billy Goldberg. Discharged to home ambulatory.

## 2020-08-26 ENCOUNTER — HOSPITAL ENCOUNTER (OUTPATIENT)
Dept: LAB | Age: 70
Discharge: HOME | End: 2020-08-26
Payer: MEDICARE

## 2020-08-26 ENCOUNTER — PATIENT OUTREACH (OUTPATIENT)
Dept: CASE MANAGEMENT | Age: 70
End: 2020-08-26

## 2020-08-26 DIAGNOSIS — C20 RECTAL CANCER (HCC): ICD-10-CM

## 2020-08-26 LAB
ALBUMIN SERPL-MCNC: 4 G/DL (ref 3.2–4.6)
ALBUMIN/GLOB SERPL: 1 {RATIO} (ref 1.2–3.5)
ALP SERPL-CCNC: 79 U/L (ref 50–136)
ALT SERPL-CCNC: 28 U/L (ref 12–65)
ANION GAP SERPL CALC-SCNC: 7 MMOL/L (ref 7–16)
AST SERPL-CCNC: 24 U/L (ref 15–37)
BASOPHILS # BLD: 0.1 K/UL (ref 0–0.2)
BASOPHILS NFR BLD: 2 % (ref 0–2)
BILIRUB SERPL-MCNC: 0.3 MG/DL (ref 0.2–1.1)
BUN SERPL-MCNC: 14 MG/DL (ref 8–23)
CALCIUM SERPL-MCNC: 10 MG/DL (ref 8.3–10.4)
CEA SERPL-MCNC: 2.6 NG/ML (ref 0–3)
CHLORIDE SERPL-SCNC: 107 MMOL/L (ref 98–107)
CO2 SERPL-SCNC: 24 MMOL/L (ref 21–32)
CREAT SERPL-MCNC: 1 MG/DL (ref 0.6–1)
DIFFERENTIAL METHOD BLD: NORMAL
EOSINOPHIL # BLD: 0.2 K/UL (ref 0–0.8)
EOSINOPHIL NFR BLD: 3 % (ref 0.5–7.8)
ERYTHROCYTE [DISTWIDTH] IN BLOOD BY AUTOMATED COUNT: 13.4 % (ref 11.9–14.6)
GLOBULIN SER CALC-MCNC: 4 G/DL (ref 2.3–3.5)
GLUCOSE SERPL-MCNC: 130 MG/DL (ref 65–100)
HCT VFR BLD AUTO: 41.2 % (ref 35.8–46.3)
HGB BLD-MCNC: 13.7 G/DL (ref 11.7–15.4)
IMM GRANULOCYTES # BLD AUTO: 0 K/UL (ref 0–0.5)
IMM GRANULOCYTES NFR BLD AUTO: 0 % (ref 0–5)
LYMPHOCYTES # BLD: 1.9 K/UL (ref 0.5–4.6)
LYMPHOCYTES NFR BLD: 24 % (ref 13–44)
MAGNESIUM SERPL-MCNC: 1.7 MG/DL (ref 1.8–2.4)
MCH RBC QN AUTO: 28 PG (ref 26.1–32.9)
MCHC RBC AUTO-ENTMCNC: 33.3 G/DL (ref 31.4–35)
MCV RBC AUTO: 84.1 FL (ref 79.6–97.8)
MONOCYTES # BLD: 0.7 K/UL (ref 0.1–1.3)
MONOCYTES NFR BLD: 9 % (ref 4–12)
NEUTS SEG # BLD: 4.7 K/UL (ref 1.7–8.2)
NEUTS SEG NFR BLD: 62 % (ref 43–78)
NRBC # BLD: 0 K/UL (ref 0–0.2)
PLATELET # BLD AUTO: 368 K/UL (ref 150–450)
PMV BLD AUTO: 11.1 FL (ref 9.4–12.3)
POTASSIUM SERPL-SCNC: 4.1 MMOL/L (ref 3.5–5.1)
PROT SERPL-MCNC: 8 G/DL (ref 6.3–8.2)
RBC # BLD AUTO: 4.9 M/UL (ref 4.05–5.25)
SODIUM SERPL-SCNC: 138 MMOL/L (ref 136–145)
WBC # BLD AUTO: 7.6 K/UL (ref 4.3–11.1)

## 2020-08-26 PROCEDURE — 82378 CARCINOEMBRYONIC ANTIGEN: CPT

## 2020-08-26 PROCEDURE — 36415 COLL VENOUS BLD VENIPUNCTURE: CPT

## 2020-08-26 PROCEDURE — 83735 ASSAY OF MAGNESIUM: CPT

## 2020-08-26 PROCEDURE — 80053 COMPREHEN METABOLIC PANEL: CPT

## 2020-08-26 PROCEDURE — 85025 COMPLETE CBC W/AUTO DIFF WBC: CPT

## 2020-08-26 NOTE — PROGRESS NOTES
8/26/2020  Pt seen today with Dr Nicola Tolentino follow up rectal cancer. Pt states feeling better since Iron infusions. Dr Nicola Tolentino reviewed recent scans and treatment options with questions/concerns answered. Dr Nicola Tolentino will discuss possible surgical options with Dr Boston Allan, pt aware. Will follow up in one month. Encouraged to call with any questions/concerns. Navigation will continue to follow.

## 2020-09-15 ENCOUNTER — PATIENT OUTREACH (OUTPATIENT)
Dept: CASE MANAGEMENT | Age: 70
End: 2020-09-15

## 2020-09-15 NOTE — PROGRESS NOTES
I saw patient today with Dr Jose Cerna to discuss plan of care. Pt decided she will pursue radiation and Capcitabine 825mg/m2. Made pt aware to start same day as radiation. Chemo ed/FC ordered. Zofran and Compazine sent to patient's pharmacy and explained use. Pt will have radiation visit tomorrow with Dr Serenity Grimm. Explained possible side effects such as nausea, vomiting, diarrhea, constipation and possible risk for infection. Pt encouraged to use good hand lotions on hands and feet 2-4 times a day to protect skin to prevent against hand/foot syndrome.  Nurse navigation will continue to follow

## 2020-09-16 ENCOUNTER — HOSPITAL ENCOUNTER (OUTPATIENT)
Dept: RADIATION ONCOLOGY | Age: 70
Discharge: HOME | End: 2020-09-16
Payer: MEDICARE

## 2020-09-16 VITALS
BODY MASS INDEX: 29.32 KG/M2 | OXYGEN SATURATION: 97 % | DIASTOLIC BLOOD PRESSURE: 71 MMHG | TEMPERATURE: 97.8 F | HEART RATE: 100 BPM | SYSTOLIC BLOOD PRESSURE: 108 MMHG | WEIGHT: 155.2 LBS

## 2020-09-16 PROCEDURE — 99211 OFF/OP EST MAY X REQ PHY/QHP: CPT

## 2020-09-16 NOTE — CONSULTS
Patient: Barbara Barajas MRN: [id number]  SSN: [id number]    YOB: 1950  Age: 78 y.o. Sex: female      Other Providers:  Dr. Laura Whitehead & Dr. Adams Eis: rectal mass    DIAGNOSIS: T1N0 rectal cancer    PREVIOUS TREATMENT:  1) 07/10/2020: Transanal excision    HISTORY OF PRESENT ILLNESS:  Barbara Barajas is a 78 y.o. female who I am seeing at the request of Dr. Laura Whitehead. Her oncologic history began in May when she noticed bloody stool. She underwent colonoscopy which revealed a rectal mass at 8 cm from the verge. Full colonoscopy could not be done due to stricture, even with a pediatric scope. Biopsy was done and this revealed adenocarcinoma. EUS revealed this to be a T1N0 mass. She then underwent transanal excision with pathology demonstrating a moderately differentiated adenoCA measuring 0.5 cm in greatest dimension. Margins were clear but invasion was found to be SM3 (deepest third of the submucosa). Post-op, she is doing well. She has no pain. She reports no weight loss.     PAST MEDICAL HISTORY:    Past Medical History:   Diagnosis Date    Chronic obstructive pulmonary disease ([address]     NO MEDS X 2 YRS    Depression with anxiety     Diverticulosis of colon 04/26/2017    Widespread per Colonoscopy    Elevated C-reactive protein (CRP)     Fibromyalgia     GERD (gastroesophageal reflux disease)     controlled with med    Hemorrhoids 04/26/2017    Internal & External Per Colonoscopy    HTN (hypertension), benign     controlled with med    IBS (irritable bowel syndrome)     Microalbuminuria due to type 2 diabetes mellitus ([address]     Mixed hyperlipidemia     Osteoarthritis     Osteopenia 07/10/2017    Per DEXA    Perennial allergic rhinitis with seasonal variation     Rectal bleeding ONSET 10/2019    Rectal cancer ([address] 06/30/2020    Rectal cancer ([facility] 06/26/2020    Adenocarcinoma    Sleep apnea     insurance wouldnt pay for cpap-- so pt doesnt have    Type 2 diabetes mellitus ([facility]     type 2- oral meds; sqbs am avg 120-150's--- no hypo; hgba1c- 6.5 (12/2019)    Vitamin B12 deficiency     Vitamin D deficiency        The patient denies history of collagen vascular diseases, pacemaker insertion, prior radiation or prior chemotherapy. PAST SURGICAL HISTORY:   Past Surgical History:   Procedure Laterality Date    COLONOSCOPY N/A 4/26/2017    COLONOSCOPY / BMI=30 performed by Mattie Moses MD at [facility] ENDOSCOPY    COLONOSCOPY N/A 6/19/2020    COLONOSCOPY/BMI 28 performed by Renetta Umana MD at [facility] [place] 6/30/2020    SIGMOIDOSCOPY FLEXIBLE performed by Yancy Louis MD at [facility] ENDOSCOPY    HX BREAST LUMPECTOMY Bilateral     2 from R Side & 1 from L Side - All Benign    HX CARPAL TUNNEL RELEASE Right     HX CHOLECYSTECTOMY      HX HEENT  25 YRS AGO    MOLE REMOVED FROM NOSE    HX TUBAL LIGATION      SINUS SURGERY PROC UNLISTED  25 YRS AGO       MEDICATIONS:     Current Outpatient Medications:     ondansetron (ZOFRAN ODT) 8 mg disintegrating tablet, Take 1 Tab by mouth every eight (8) hours as needed for Nausea or Vomiting. Indications: prevent nausea and vomiting from cancer chemotherapy, Disp: 90 Tab, Rfl: 3    prochlorperazine (Compazine) 10 mg tablet, Take 1 Tab by mouth every six (6) hours as needed for Nausea or Vomiting for up to 7 days. , Disp: 90 Tab, Rfl: 3    capecitabine (Xeloda) 500 mg tablet, 2 Tabs two (2) times a day. Take on radiation days to equal 1450mg, Disp: 80 Tab, Rfl: 1    capecitabine (Xeloda) 150 mg tablet, 3 Tabs two (2) times a day. Take with 500mg tabs to make 1450mg twice a day, Disp: 120 Tab, Rfl: 1    linaGLIPtin (TRADJENTA) 5 mg tablet, Take 1 Tab by mouth daily. , Disp: 90 Tab, Rfl: 3    lisinopriL (PRINIVIL, ZESTRIL) 10 mg tablet, Take 1 Tab by mouth daily. , Disp: 90 Tab, Rfl: 3    cyanocobalamin (Vitamin B-12) 1,000 mcg/mL injection, 1 mL by IntraMUSCular route every thirty (30) days. , Disp: 3 mL, Rfl: 3    apple cider vinegar 500 mg tab, Take  by mouth., Disp: , Rfl:     fenofibrate nanocrystallized 160 mg tab, Take 160 mg by mouth daily. , Disp: 90 Tab, Rfl: 3    glucose blood VI test strips (RELION PRIME TEST STRIPS) strip, Use to check blood sugar once daily. Dx: E11.29, Disp: 100 Strip, Rfl: 3    lancets (RELION ULTRA THIN PLUS LANCETS) misc, Use to check glucose once daily. Dx: E11.29, Disp: 100 Each, Rfl: 3    metFORMIN ER (GLUCOPHAGE XR) 750 mg tablet, Take 1 Tab by mouth daily (with dinner). (Patient taking differently: Take 750 mg by mouth daily.), Disp: 90 Tab, Rfl: 3    PARoxetine (PAXIL) 40 mg tablet, Take 1 Tab by mouth daily. , Disp: 90 Tab, Rfl: 3    rosuvastatin (CRESTOR) 10 mg tablet, Take 1 Tab by mouth nightly., Disp: 90 Tab, Rfl: 3    krill-omega-3-dha-epa-lipids (KRILL OIL) 014-10-29-73 mg cap, Take  by mouth., Disp: , Rfl:     guaifenesin/phenylephrine HCl (MUCINEX COLD PO), Take  by mouth daily as needed. , Disp: , Rfl:     cyclobenzaprine (FLEXERIL) 10 mg tablet, Take 1 Tab by mouth two (2) times a day. (Patient taking differently: Take 10 mg by mouth two (2) times daily as needed.), Disp: 60 Tab, Rfl: 0    ascorbic acid, vitamin C, (VITAMIN C) 500 mg tablet, Take  by mouth., Disp: , Rfl:     vitamin E (AQUA GEMS) 400 unit capsule, Take  by mouth daily. , Disp: , Rfl:     cholecalciferol, VITAMIN D3, (VITAMIN D3) 5,000 unit tab tablet, Take 5,000 Units by mouth daily. , Disp: , Rfl:     melatonin 10 mg tab, Take 1 Tab by mouth nightly. Per pt takes 20 mg each pm, Disp: , Rfl:     garlic 0,339 mg cap, Take  by mouth daily. , Disp: , Rfl:     LACTOBACILLUS ACIDOPHILUS (PROBIOTIC PO), Take 1 Tab by mouth daily. , Disp: , Rfl:     cyanocobalamin (VITAMIN B-12) 500 mcg tablet, Take 500 mcg by mouth daily. , Disp: , Rfl:     mometasone (NASONEX) 50 mcg/actuation nasal spray, 2 Sprays daily as needed. , Disp: , Rfl:     ALLERGIES:   Allergies Allergen Reactions    Codeine Rash    Lidocaine Rash    Sulfa (Sulfonamide Antibiotics) Nausea and Vomiting       SOCIAL HISTORY:   Social History     Socioeconomic History    Marital status:      Spouse name: Not on file    Number of children: Not on file    Years of education: Not on file    Highest education level: Not on file   Occupational History    Not on file   Social Needs    Financial resource strain: Not on file    Food insecurity     Worry: Not on file     Inability: Not on file    Transportation needs     Medical: Not on file     Non-medical: Not on file   Tobacco Use    Smoking status: Former Smoker     Packs/day: 1.50     Years: 35.00     Pack years: 52.50     Types: Cigarettes     Last attempt to quit: 9/15/2016     Years since quittin.0    Smokeless tobacco: Never Used   Substance and Sexual Activity    Alcohol use: Not Currently     Comment: none x 3-4 yrs     Drug use: No    Sexual activity: Not on file   Lifestyle    Physical activity     Days per week: Not on file     Minutes per session: Not on file    Stress: Not on file   Relationships    Social connections     Talks on phone: Not on file     Gets together: Not on file     Attends Quaker service: Not on file     Active member of club or organization: Not on file     Attends meetings of clubs or organizations: Not on file     Relationship status: Not on file    Intimate partner violence     Fear of current or ex partner: Not on file     Emotionally abused: Not on file     Physically abused: Not on file     Forced sexual activity: Not on file   Other Topics Concern    Not on file   Social History Narrative    Not on file       FAMILY HISTORY:   Family History   Problem Relation Age of Onset    Cancer Mother         Cervical    Diabetes Mother     Heart Disease Father     Hypertension Father     Elevated Lipids Father     Other Father         Krys Dao Other Sister Skin Cancer    Asthma Sister     Diabetes Sister     Kidney Disease Maternal Grandmother     Kidney Disease Maternal Grandfather     Arthritis Sister     Diabetes Sister     Cancer Maternal Uncle     Cancer Paternal Uncle     Breast Cancer Neg Hx        REVIEW OF SYSTEMS: A full 12-point review of systems was completed and was negative unless noted in the history of present illness.      PHYSICAL EXAMINATION:   ECOG Performance status 0  VITAL SIGNS:   Visit Vitals  /71   Pulse 100   Temp 97.8 °F (36.6 °C)   Wt 70.4 kg (155 lb 3.2 oz)   SpO2 97%   BMI 29.32 kg/m²      General: well developed/nourished adult Female in no acute distress; appears stated age  [de-identified]: normocephalic, atraumatic; EOMI  Neck: supple with full ROM  Respiratory: normal inspiratory effort, no audible wheezes  Extremities: no cyanosis, clubbing, or edema  Musculoskeletal: mobility intact x4; normal ROM in all joints  Skin: no skin lesions identified  Neuro: AOx3; sensation intact x 4; CNII-XII grossly intact  Psych: appropriate affect, insight, and judgement  GI: abdomen soft, non-distended      PATHOLOGY:    Pathology report from surgery reviewed - see HPI    LABORATORY:   Lab Results   Component Value Date/Time    Sodium 138 08/26/2020 03:09 PM    Potassium 4.1 08/26/2020 03:09 PM    Chloride 107 08/26/2020 03:09 PM    CO2 24 08/26/2020 03:09 PM    Anion gap 7 08/26/2020 03:09 PM    Glucose 130 (H) 08/26/2020 03:09 PM    BUN 14 08/26/2020 03:09 PM    Creatinine 1.00 08/26/2020 03:09 PM    GFR est AA >60 08/26/2020 03:09 PM    GFR est non-AA 59 (L) 08/26/2020 03:09 PM    Calcium 10.0 08/26/2020 03:09 PM    Magnesium 1.7 (L) 08/26/2020 03:09 PM    Albumin 4.0 08/26/2020 03:09 PM    Protein, total 8.0 08/26/2020 03:09 PM    Globulin 4.0 (H) 08/26/2020 03:09 PM    A-G Ratio 1.0 (L) 08/26/2020 03:09 PM    ALT (SGPT) 28 08/26/2020 03:09 PM     Lab Results   Component Value Date/Time    WBC 7.6 08/26/2020 03:09 PM    HGB 13.7 08/26/2020 03:09 PM    HCT 41.2 08/26/2020 03:09 PM    PLATELET 617 07/25/2373 03:09 PM       RADIOLOGY:    Ct Chest W Cont    Result Date: 8/19/2020  CT CHEST WITH INTRAVENOUS CONTRAST. INDICATION: Rectal cancer staging exam. COMPARISON: None. TECHNIQUE:   5 mm axial scans from the apices through the diaphragms following 100 cc nonionic intravenous contrast without acute complication. Intravenous contrast was given to increase the sensitivity to neoplasia. Radiation dose reduction techniques were used for this study. Our CT scanners use one or more of the following:  Automated exposure control, adjustment of the mA and or kV according to patient size, iterative reconstruction. FINDINGS: LUNGS: No nodules or masses. No airspace disease. AIRWAYS: Trachea and proximal bronchi grossly patent. PLEURA: No effusion or thickening or calcifications. LYMPH NODES: No enlarged axillary, hilar or mediastinal lymph nodes. HEART: Normal size. CORONARIES: No calcifications. VASCULATURE: Retroesophageal aberrant takeoff of the right subclavian artery, normal variant. Aorta is normal caliber and mildly calcified. UPPER ABDOMEN: Normal size adrenal glands. Small cyst left lobe of liver. SKELETAL/CHEST WALL: DJD, otherwise no gross bony lesions. IMPRESSION:  No pulmonary nodules, adenopathy or evidence of metastatic disease. Ct Abd Pelv W Cont    Result Date: 6/23/2020  CLINICAL HISTORY: The patient is a 78years year old Female presenting with symptoms of rectal mass Technique: Thin slice axial images were obtained through the abdomen and pelvis with intravenous and with oral contrast.  Coronal reformatted images were also provided for review. A total of 100 ml of Iopamidol (ISOVUE-370) 76 % contrast was administered intravenously. All CT scans at this facility are performed using dose reduction/dose modulation techniques, as appropriate the performed exam, including the following: Automated Exposure Control;  Adjustment of the mA and/or kV according to patient size (this includes techniques or standardized protocols for targeted exams where dose is matched to indication/reason for exam); and Use of Iterative Reconstruction Technique. Radiation Exposure Indices: Reference Air Kerma (Ka,r) = 600 mGy-cm COMPARISON:  None. FINDINGS:  Abdomen: Lung bases: There is a tiny 4 mm nodule at the periphery of the right middle lobe (series 2 image 1). Liver: 2 cm cyst in lateral left liver lobe. Diffuse mild hepatic steatosis. Biliary: Cholecystectomy. No evidence of intra or extrahepatic biliary dilatation. Pancreas: Normal in size and contour without focal lesion. Spleen: Normal in size and contour without focal lesion. Adrenal glands: Normal in size without focal lesion. Kidneys: Symmetric without evidence of hydronephrosis or nephrolithiasis. Normal enhancement throughout all visualized phases of contrast excretion. Incidental small renal cysts. Bowel: The sigmoid colon is diffusely thickened with numerous predominantly sigmoid, and few descending colonic diverticula, however without evidence of diverticular abscess. A trinidad rectal mass is not appreciated. There is no significant pelvic lymphadenopathy. Retroperitoneum/vasculature: No evidence of significant adenopathy. Unremarkable aorta and IVC. Abdominal soft tissues: Unremarkable. Osseous structures: No acute osseous abnormality. Pelvis: Unremarkable bladder. No significant adenopathy or free fluid. . The uterus is unremarkable     IMPRESSION: 1. Chronic sigmoid diverticulitis. 2. Tiny pulmonary nodule along periphery of right middle lobe CPT code(s): 10681, 10724      IMPRESSION:  Juan Jose Estrella is a 78 y.o. female with a T1N0 rectal CA s/p transanal excision. PLAN:    Typically, patients don't need adjuvant therapy for a T1N0 rectal CA that undergoes transanal excision; however, having SM3 invasion puts her at risk for local recurrence and regional recurrence.  I estimate a 20% LR risk and a 25% regional recurrence risk. With adjuvant chemo/RT, that risk falls to about 8-10%. I would treat her to 54 Gy in 30 fractions. Dr. Martin Cassette plans to give concurrent Xeloda. Simulation planned for 9/22 with plans to start RT and chemo on 10/7.     Jermain Watters MD   September 16, 2020

## 2020-09-16 NOTE — PROGRESS NOTES
Pt here for the initial RT consult with Dr. Phuong Whatley for rectal cancer. Pt is s/p an excision on 7/10/20 by Dr. [place] Clair and she was noted to have adenocarcinoma with submucosal invasion. The plan is for pt to receive oral chemo (Capcitabine) and RT--she is followed by Dr. Randy Nunez in [address] [place] [address]. An overview of RT was given. Pt denies pain. Pt stated that she believes she is to start chemo on 10/7/20 and is aware that chemo starts the same day as RT. Pt is aware of the CT/Sim appt on 9/22/20. RT consents signed.

## 2020-09-22 ENCOUNTER — HOSPITAL ENCOUNTER (OUTPATIENT)
Dept: RADIATION ONCOLOGY | Age: 70
Discharge: HOME | End: 2020-09-22
Payer: MEDICARE

## 2020-09-22 PROCEDURE — 77470 SPECIAL RADIATION TREATMENT: CPT

## 2020-09-22 NOTE — PROGRESS NOTES
I spoke with Lani Mcdowell regarding her [facility] PPO/PFFS insurance coverage, potential oral medication authorizations, enrollment in the [employer] (paymio) and the [facility] and assistance organization resource sheet. At this time, Lani Mcdowell would like time to review the [employer] and the [employer]. Next, I spoke with Lani Mcdowell regarding her [facility] PPO/PFFS insurance coverage. The patient was given the [address] sheet which displayed her insurance benefits. Next, I spoke with Lani Mcdowell regarding the [address] Notification Letter. I answered questions regarding the costs associated with Medicare Benefits. I explained to Lani Mcdowell the estimated cost of treatment and services for six months under the [employer]. Lani Mcdowell was advised to contact Medicare or their healthcare provider for questions or concerns related to service of care. The Oncology Care Model provides different options of contact for Lani Mcdowell regarding concerns and complaints of treatments and services. Next, I spoke with Lani Mcdowell regarding her Prescription Drug Benefits. Next, I spoke with Lani Jeremias about the financial assistance application. I was given a completed financial assistance application and documentation by the patient. This information will be scanned into the system. I advised the patient to obtain bank statements, work check stubs to complete the application. Next, I spoke with Lani Mcdowell about billing questions and treatment services. Next, I spoke with Lani Mcdowell regarding the [address] document. After reading the form, Lani Mcdowell signed the Limited Power of [facility] document.   Next, I explained the difference in billing for the [address] and [address] [place]. (which is [facility] Billing for the physicians - through [employer]). RAD ONC  Next, I spoke with Maycol Crane regarding the pharmacy that is located here on the [employer] [address], in our gift shop and available to patients. I also told Maycol Crane about the Radiation Oncology Scheduling /Nurse's line and informed him that if he needed any assistance in scheduling or rescheduling her appointments or had any issues regarding her radiation oncology appointments to give that number a call and Ms. Jessica Silva or Ms. Darleen Iglesias will be glad to assist her. Next, I spoke with Maycol Crane regarding potential oral medication authorizations. I told her that if she ever had any problems getting her oral medications filled to give the dedicated [facility]  a call. Most of the time, it is simply an authorization that needs to be done with the insurance company. Next, I gave Maycol Crane flyers about the free Yoga classes for cancer survivors and the Oncology Massage program.  I let her know that he can get a free 30 minute massage. Lastly, I gave Maycol Crane a form with various resource organizations that could assist with specific needs (example:  transportation, lodging, preparing meals, home cleaning)      Maycol Crane expressed understanding of the information above and all questions were answered to her satisfaction.

## 2020-09-24 ENCOUNTER — HOSPITAL ENCOUNTER (OUTPATIENT)
Dept: MAMMOGRAPHY | Age: 70
Discharge: HOME | End: 2020-09-24
Attending: PHYSICIAN ASSISTANT
Payer: MEDICARE

## 2020-09-24 DIAGNOSIS — Z12.31 ENCOUNTER FOR SCREENING MAMMOGRAM FOR BREAST CANCER: ICD-10-CM

## 2020-09-24 DIAGNOSIS — R92.2 DENSE BREAST TISSUE ON MAMMOGRAM: ICD-10-CM

## 2020-09-24 PROCEDURE — 77063 BREAST TOMOSYNTHESIS BI: CPT

## 2020-09-28 NOTE — PROGRESS NOTES
Stable mammogram - no signs of suspicious or cancerous findings. Remind to do monthly self breast exams. Plan to repeat imaging in 1 year.

## 2020-09-29 ENCOUNTER — HOSPITAL ENCOUNTER (OUTPATIENT)
Dept: RADIATION ONCOLOGY | Age: 70
Discharge: HOME | End: 2020-09-29
Payer: MEDICARE

## 2020-09-29 PROCEDURE — 77301 RADIOTHERAPY DOSE PLAN IMRT: CPT

## 2020-09-29 PROCEDURE — 77338 DESIGN MLC DEVICE FOR IMRT: CPT

## 2020-09-29 PROCEDURE — 77300 RADIATION THERAPY DOSE PLAN: CPT

## 2020-10-06 ENCOUNTER — HOSPITAL ENCOUNTER (OUTPATIENT)
Dept: LAB | Age: 70
Discharge: HOME | End: 2020-10-06
Payer: MEDICARE

## 2020-10-06 DIAGNOSIS — C20 RECTAL CANCER (HCC): ICD-10-CM

## 2020-10-06 LAB
ALBUMIN SERPL-MCNC: 3.9 G/DL (ref 3.2–4.6)
ALBUMIN/GLOB SERPL: 0.9 {RATIO} (ref 1.2–3.5)
ALP SERPL-CCNC: 68 U/L (ref 50–136)
ALT SERPL-CCNC: 29 U/L (ref 12–65)
ANION GAP SERPL CALC-SCNC: 5 MMOL/L (ref 7–16)
AST SERPL-CCNC: 18 U/L (ref 15–37)
BASOPHILS # BLD: 0.1 K/UL (ref 0–0.2)
BASOPHILS NFR BLD: 1 % (ref 0–2)
BILIRUB SERPL-MCNC: 0.3 MG/DL (ref 0.2–1.1)
BUN SERPL-MCNC: 20 MG/DL (ref 8–23)
CALCIUM SERPL-MCNC: 9.8 MG/DL (ref 8.3–10.4)
CHLORIDE SERPL-SCNC: 107 MMOL/L (ref 98–107)
CO2 SERPL-SCNC: 27 MMOL/L (ref 21–32)
CREAT SERPL-MCNC: 1 MG/DL (ref 0.6–1)
DIFFERENTIAL METHOD BLD: NORMAL
EOSINOPHIL # BLD: 0.1 K/UL (ref 0–0.8)
EOSINOPHIL NFR BLD: 2 % (ref 0.5–7.8)
ERYTHROCYTE [DISTWIDTH] IN BLOOD BY AUTOMATED COUNT: 14.2 % (ref 11.9–14.6)
FERRITIN SERPL-MCNC: 180 NG/ML (ref 8–388)
GLOBULIN SER CALC-MCNC: 4.2 G/DL (ref 2.3–3.5)
GLUCOSE SERPL-MCNC: 105 MG/DL (ref 65–100)
HCT VFR BLD AUTO: 42.4 % (ref 35.8–46.3)
HGB BLD-MCNC: 14 G/DL (ref 11.7–15.4)
HGB RETIC QN AUTO: 35 PG (ref 29–35)
IMM GRANULOCYTES # BLD AUTO: 0 K/UL (ref 0–0.5)
IMM GRANULOCYTES NFR BLD AUTO: 0 % (ref 0–5)
IMM RETICS NFR: 5.8 % (ref 3–15.9)
IRON SATN MFR SERPL: 34 %
IRON SERPL-MCNC: 111 UG/DL (ref 35–150)
LYMPHOCYTES # BLD: 1.7 K/UL (ref 0.5–4.6)
LYMPHOCYTES NFR BLD: 22 % (ref 13–44)
MCH RBC QN AUTO: 28.1 PG (ref 26.1–32.9)
MCHC RBC AUTO-ENTMCNC: 33 G/DL (ref 31.4–35)
MCV RBC AUTO: 85.1 FL (ref 79.6–97.8)
MONOCYTES # BLD: 0.5 K/UL (ref 0.1–1.3)
MONOCYTES NFR BLD: 7 % (ref 4–12)
NEUTS SEG # BLD: 5.4 K/UL (ref 1.7–8.2)
NEUTS SEG NFR BLD: 68 % (ref 43–78)
NRBC # BLD: 0 K/UL (ref 0–0.2)
PLATELET # BLD AUTO: 363 K/UL (ref 150–450)
PMV BLD AUTO: 10.4 FL (ref 9.4–12.3)
POTASSIUM SERPL-SCNC: 4.1 MMOL/L (ref 3.5–5.1)
PROT SERPL-MCNC: 8.1 G/DL (ref 6.3–8.2)
RBC # BLD AUTO: 4.98 M/UL (ref 4.05–5.25)
RETICS # AUTO: 0.08 M/UL (ref 0.03–0.1)
RETICS/RBC NFR AUTO: 1.6 % (ref 0.3–2)
SODIUM SERPL-SCNC: 139 MMOL/L (ref 136–145)
TIBC SERPL-MCNC: 323 UG/DL (ref 250–450)
WBC # BLD AUTO: 7.8 K/UL (ref 4.3–11.1)

## 2020-10-06 PROCEDURE — 82728 ASSAY OF FERRITIN: CPT

## 2020-10-06 PROCEDURE — 80053 COMPREHEN METABOLIC PANEL: CPT

## 2020-10-06 PROCEDURE — 85025 COMPLETE CBC W/AUTO DIFF WBC: CPT

## 2020-10-06 PROCEDURE — 85046 RETICYTE/HGB CONCENTRATE: CPT

## 2020-10-06 PROCEDURE — 83540 ASSAY OF IRON: CPT

## 2020-10-06 PROCEDURE — 36415 COLL VENOUS BLD VENIPUNCTURE: CPT

## 2020-10-07 ENCOUNTER — HOSPITAL ENCOUNTER (OUTPATIENT)
Dept: RADIATION ONCOLOGY | Age: 70
Discharge: HOME | End: 2020-10-07
Payer: MEDICARE

## 2020-10-07 ENCOUNTER — PATIENT OUTREACH (OUTPATIENT)
Dept: CASE MANAGEMENT | Age: 70
End: 2020-10-07

## 2020-10-07 PROCEDURE — 77386 HC IMRT TRMT DLVR COMPL: CPT

## 2020-10-07 NOTE — PROGRESS NOTES
I saw patient with Dr Jose Ortiz prior to Xeloda/radiation start on 10-7-20. Pt reviews use of Compazine and Zofran to help control nausea. We reviewed using good thick lotions on the hands and feet 2-3 times daily. Pt is aware to take 1450mg Xeloda 2 times a day Mon-Friday and stop when radiation ends. Mouthcare reviewed and rinsing with salt water rinses after each meal and at bedtime. Pt encouraged to call navigation with any questions or concerns.

## 2020-10-08 ENCOUNTER — HOSPITAL ENCOUNTER (OUTPATIENT)
Dept: RADIATION ONCOLOGY | Age: 70
Discharge: HOME | End: 2020-10-08
Payer: MEDICARE

## 2020-10-08 PROCEDURE — 77386 HC IMRT TRMT DLVR COMPL: CPT

## 2020-10-09 ENCOUNTER — HOSPITAL ENCOUNTER (OUTPATIENT)
Dept: RADIATION ONCOLOGY | Age: 70
Discharge: HOME | End: 2020-10-09
Payer: MEDICARE

## 2020-10-09 PROCEDURE — 77386 HC IMRT TRMT DLVR COMPL: CPT

## 2020-10-12 ENCOUNTER — HOSPITAL ENCOUNTER (OUTPATIENT)
Dept: RADIATION ONCOLOGY | Age: 70
Discharge: HOME | End: 2020-10-12
Payer: MEDICARE

## 2020-10-12 PROCEDURE — 77386 HC IMRT TRMT DLVR COMPL: CPT

## 2020-10-13 ENCOUNTER — HOSPITAL ENCOUNTER (OUTPATIENT)
Dept: RADIATION ONCOLOGY | Age: 70
Discharge: HOME | End: 2020-10-13
Payer: MEDICARE

## 2020-10-13 ENCOUNTER — HOSPITAL ENCOUNTER (OUTPATIENT)
Dept: LAB | Age: 70
Discharge: HOME | End: 2020-10-13
Payer: MEDICARE

## 2020-10-13 VITALS
BODY MASS INDEX: 28.93 KG/M2 | SYSTOLIC BLOOD PRESSURE: 125 MMHG | HEART RATE: 71 BPM | DIASTOLIC BLOOD PRESSURE: 74 MMHG | TEMPERATURE: 97.3 F | OXYGEN SATURATION: 97 % | WEIGHT: 153.1 LBS

## 2020-10-13 DIAGNOSIS — C20 RECTAL CANCER (HCC): ICD-10-CM

## 2020-10-13 LAB
ALBUMIN SERPL-MCNC: 3.6 G/DL (ref 3.2–4.6)
ALBUMIN/GLOB SERPL: 0.9 {RATIO} (ref 1.2–3.5)
ALP SERPL-CCNC: 73 U/L (ref 50–136)
ALT SERPL-CCNC: 23 U/L (ref 12–65)
ANION GAP SERPL CALC-SCNC: 6 MMOL/L (ref 7–16)
AST SERPL-CCNC: 15 U/L (ref 15–37)
BASOPHILS # BLD: 0.1 K/UL (ref 0–0.2)
BASOPHILS NFR BLD: 1 % (ref 0–2)
BILIRUB SERPL-MCNC: 0.4 MG/DL (ref 0.2–1.1)
BUN SERPL-MCNC: 15 MG/DL (ref 8–23)
CALCIUM SERPL-MCNC: 10 MG/DL (ref 8.3–10.4)
CHLORIDE SERPL-SCNC: 108 MMOL/L (ref 98–107)
CO2 SERPL-SCNC: 25 MMOL/L (ref 21–32)
CREAT SERPL-MCNC: 1 MG/DL (ref 0.6–1)
DIFFERENTIAL METHOD BLD: NORMAL
EOSINOPHIL # BLD: 0.1 K/UL (ref 0–0.8)
EOSINOPHIL NFR BLD: 3 % (ref 0.5–7.8)
ERYTHROCYTE [DISTWIDTH] IN BLOOD BY AUTOMATED COUNT: 14.5 % (ref 11.9–14.6)
GLOBULIN SER CALC-MCNC: 4.2 G/DL (ref 2.3–3.5)
GLUCOSE SERPL-MCNC: 144 MG/DL (ref 65–100)
HCT VFR BLD AUTO: 40.5 % (ref 35.8–46.3)
HGB BLD-MCNC: 13.5 G/DL (ref 11.7–15.4)
IMM GRANULOCYTES # BLD AUTO: 0 K/UL (ref 0–0.5)
IMM GRANULOCYTES NFR BLD AUTO: 1 % (ref 0–5)
LYMPHOCYTES # BLD: 0.6 K/UL (ref 0.5–4.6)
LYMPHOCYTES NFR BLD: 13 % (ref 13–44)
MAGNESIUM SERPL-MCNC: 2 MG/DL (ref 1.8–2.4)
MCH RBC QN AUTO: 28.4 PG (ref 26.1–32.9)
MCHC RBC AUTO-ENTMCNC: 33.3 G/DL (ref 31.4–35)
MCV RBC AUTO: 85.3 FL (ref 79.6–97.8)
MONOCYTES # BLD: 0.4 K/UL (ref 0.1–1.3)
MONOCYTES NFR BLD: 7 % (ref 4–12)
NEUTS SEG # BLD: 3.6 K/UL (ref 1.7–8.2)
NEUTS SEG NFR BLD: 76 % (ref 43–78)
NRBC # BLD: 0 K/UL (ref 0–0.2)
PLATELET # BLD AUTO: 309 K/UL (ref 150–450)
PMV BLD AUTO: 10.9 FL (ref 9.4–12.3)
POTASSIUM SERPL-SCNC: 4.3 MMOL/L (ref 3.5–5.1)
PROT SERPL-MCNC: 7.8 G/DL (ref 6.3–8.2)
RBC # BLD AUTO: 4.75 M/UL (ref 4.05–5.25)
SODIUM SERPL-SCNC: 139 MMOL/L (ref 136–145)
WBC # BLD AUTO: 4.8 K/UL (ref 4.3–11.1)

## 2020-10-13 PROCEDURE — 77386 HC IMRT TRMT DLVR COMPL: CPT

## 2020-10-13 PROCEDURE — 83735 ASSAY OF MAGNESIUM: CPT

## 2020-10-13 PROCEDURE — 77336 RADIATION PHYSICS CONSULT: CPT

## 2020-10-13 PROCEDURE — 80053 COMPREHEN METABOLIC PANEL: CPT

## 2020-10-13 PROCEDURE — 36415 COLL VENOUS BLD VENIPUNCTURE: CPT

## 2020-10-13 PROCEDURE — 85025 COMPLETE CBC W/AUTO DIFF WBC: CPT

## 2020-10-13 NOTE — PROGRESS NOTES
Patient: Veronica Juárez MRN: [id number]  SSN: [id number]    YOB: 1950  Age: 78 y.o. Sex: female      DIAGNOSIS:  T1N0 rectal cancer    TREATMENT SITE:  Pelvis with concurrent Xeloda    DOSE and FRACTIONATION:  5 of 30 fractions completed; 900 cGy of 5400 cGy received    INTERVAL HISTORY:  Veronica Juárez is a 78 y.o. female being treated for rectal cancer    Week 1: No complaints       OBJECTIVE:  NAD  Visit Vitals  /74 (BP 1 Location: Left arm, BP Patient Position: Sitting)   Pulse 71   Temp 97.3 °F (36.3 °C)   Wt 69.4 kg (153 lb 1.6 oz)   SpO2 97%   BMI 28.93 kg/m²       Lab Results   Component Value Date/Time    Sodium 139 10/06/2020 02:51 PM    Potassium 4.1 10/06/2020 02:51 PM    Chloride 107 10/06/2020 02:51 PM    CO2 27 10/06/2020 02:51 PM    Anion gap 5 (L) 10/06/2020 02:51 PM    Glucose 105 (H) 10/06/2020 02:51 PM    BUN 20 10/06/2020 02:51 PM    Creatinine 1.00 10/06/2020 02:51 PM    GFR est AA >60 10/06/2020 02:51 PM    GFR est non-AA 59 (L) 10/06/2020 02:51 PM    Calcium 9.8 10/06/2020 02:51 PM    Magnesium 1.7 (L) 08/26/2020 03:09 PM    Albumin 3.9 10/06/2020 02:51 PM    Protein, total 8.1 10/06/2020 02:51 PM    Globulin 4.2 (H) 10/06/2020 02:51 PM    A-G Ratio 0.9 (L) 10/06/2020 02:51 PM    ALT (SGPT) 29 10/06/2020 02:51 PM     Lab Results   Component Value Date/Time    WBC 7.8 10/06/2020 02:51 PM    HGB 14.0 10/06/2020 02:51 PM    HCT 42.4 10/06/2020 02:51 PM    PLATELET 486 41/31/4390 02:51 PM       ASSESSMENT and PLAN:  Veronica Juárez is tolerating radiation as anticipated for the current dose and fraction. We will continue on as planned with another treatment visit anticipated next week.         Jeremy Bauer MD   October 13, 2020

## 2020-10-14 ENCOUNTER — HOSPITAL ENCOUNTER (OUTPATIENT)
Dept: RADIATION ONCOLOGY | Age: 70
Discharge: HOME | End: 2020-10-14
Payer: MEDICARE

## 2020-10-14 PROCEDURE — 77386 HC IMRT TRMT DLVR COMPL: CPT

## 2020-10-15 ENCOUNTER — HOSPITAL ENCOUNTER (OUTPATIENT)
Dept: RADIATION ONCOLOGY | Age: 70
Discharge: HOME | End: 2020-10-15
Payer: MEDICARE

## 2020-10-15 PROCEDURE — 77386 HC IMRT TRMT DLVR COMPL: CPT

## 2020-10-16 ENCOUNTER — HOSPITAL ENCOUNTER (OUTPATIENT)
Dept: RADIATION ONCOLOGY | Age: 70
Discharge: HOME | End: 2020-10-16
Payer: MEDICARE

## 2020-10-16 PROCEDURE — 77386 HC IMRT TRMT DLVR COMPL: CPT

## 2020-10-19 ENCOUNTER — HOSPITAL ENCOUNTER (OUTPATIENT)
Dept: RADIATION ONCOLOGY | Age: 70
Discharge: HOME | End: 2020-10-19
Payer: MEDICARE

## 2020-10-19 PROCEDURE — 77386 HC IMRT TRMT DLVR COMPL: CPT

## 2020-10-20 ENCOUNTER — PATIENT OUTREACH (OUTPATIENT)
Dept: CASE MANAGEMENT | Age: 70
End: 2020-10-20

## 2020-10-20 ENCOUNTER — HOSPITAL ENCOUNTER (OUTPATIENT)
Dept: RADIATION ONCOLOGY | Age: 70
Discharge: HOME | End: 2020-10-20
Payer: MEDICARE

## 2020-10-20 ENCOUNTER — HOSPITAL ENCOUNTER (OUTPATIENT)
Dept: LAB | Age: 70
Discharge: HOME | End: 2020-10-20
Payer: MEDICARE

## 2020-10-20 VITALS
OXYGEN SATURATION: 98 % | BODY MASS INDEX: 28.89 KG/M2 | HEART RATE: 94 BPM | SYSTOLIC BLOOD PRESSURE: 136 MMHG | RESPIRATION RATE: 18 BRPM | TEMPERATURE: 96.9 F | WEIGHT: 152.9 LBS | DIASTOLIC BLOOD PRESSURE: 65 MMHG

## 2020-10-20 DIAGNOSIS — C20 RECTAL CANCER (HCC): ICD-10-CM

## 2020-10-20 LAB
ALBUMIN SERPL-MCNC: 3.9 G/DL (ref 3.2–4.6)
ALBUMIN/GLOB SERPL: 1 {RATIO} (ref 1.2–3.5)
ALP SERPL-CCNC: 67 U/L (ref 50–136)
ALT SERPL-CCNC: 25 U/L (ref 12–65)
ANION GAP SERPL CALC-SCNC: 7 MMOL/L (ref 7–16)
AST SERPL-CCNC: 18 U/L (ref 15–37)
BASOPHILS # BLD: 0 K/UL (ref 0–0.2)
BASOPHILS NFR BLD: 1 % (ref 0–2)
BILIRUB SERPL-MCNC: 0.3 MG/DL (ref 0.2–1.1)
BUN SERPL-MCNC: 15 MG/DL (ref 8–23)
CALCIUM SERPL-MCNC: 10 MG/DL (ref 8.3–10.4)
CHLORIDE SERPL-SCNC: 106 MMOL/L (ref 98–107)
CO2 SERPL-SCNC: 24 MMOL/L (ref 21–32)
CREAT SERPL-MCNC: 1 MG/DL (ref 0.6–1)
DIFFERENTIAL METHOD BLD: ABNORMAL
EOSINOPHIL # BLD: 0.2 K/UL (ref 0–0.8)
EOSINOPHIL NFR BLD: 6 % (ref 0.5–7.8)
ERYTHROCYTE [DISTWIDTH] IN BLOOD BY AUTOMATED COUNT: 14.6 % (ref 11.9–14.6)
GLOBULIN SER CALC-MCNC: 3.9 G/DL (ref 2.3–3.5)
GLUCOSE SERPL-MCNC: 173 MG/DL (ref 65–100)
HCT VFR BLD AUTO: 41 % (ref 35.8–46.3)
HGB BLD-MCNC: 13.7 G/DL (ref 11.7–15.4)
IMM GRANULOCYTES # BLD AUTO: 0 K/UL (ref 0–0.5)
IMM GRANULOCYTES NFR BLD AUTO: 1 % (ref 0–5)
LYMPHOCYTES # BLD: 0.5 K/UL (ref 0.5–4.6)
LYMPHOCYTES NFR BLD: 11 % (ref 13–44)
MAGNESIUM SERPL-MCNC: 1.8 MG/DL (ref 1.8–2.4)
MCH RBC QN AUTO: 28.5 PG (ref 26.1–32.9)
MCHC RBC AUTO-ENTMCNC: 33.4 G/DL (ref 31.4–35)
MCV RBC AUTO: 85.4 FL (ref 79.6–97.8)
MONOCYTES # BLD: 0.4 K/UL (ref 0.1–1.3)
MONOCYTES NFR BLD: 9 % (ref 4–12)
NEUTS SEG # BLD: 3.1 K/UL (ref 1.7–8.2)
NEUTS SEG NFR BLD: 73 % (ref 43–78)
NRBC # BLD: 0 K/UL (ref 0–0.2)
PLATELET # BLD AUTO: 290 K/UL (ref 150–450)
PMV BLD AUTO: 10.1 FL (ref 9.4–12.3)
POTASSIUM SERPL-SCNC: 4 MMOL/L (ref 3.5–5.1)
PROT SERPL-MCNC: 7.8 G/DL (ref 6.3–8.2)
RBC # BLD AUTO: 4.8 M/UL (ref 4.05–5.25)
SODIUM SERPL-SCNC: 137 MMOL/L (ref 136–145)
WBC # BLD AUTO: 4.3 K/UL (ref 4.3–11.1)

## 2020-10-20 PROCEDURE — 77386 HC IMRT TRMT DLVR COMPL: CPT

## 2020-10-20 PROCEDURE — 36415 COLL VENOUS BLD VENIPUNCTURE: CPT

## 2020-10-20 PROCEDURE — 85025 COMPLETE CBC W/AUTO DIFF WBC: CPT

## 2020-10-20 PROCEDURE — 77336 RADIATION PHYSICS CONSULT: CPT

## 2020-10-20 PROCEDURE — 83735 ASSAY OF MAGNESIUM: CPT

## 2020-10-20 PROCEDURE — 80053 COMPREHEN METABOLIC PANEL: CPT

## 2020-10-20 NOTE — PROGRESS NOTES
10/20/2020  Patient seen today with [address] NP for pre-chemo Xeloda office visit. Patient advised to use moisture to hands and feet. Use Dermaplast to affected area. Questions and discussion completed to the satisfaction of the patient. Patient wishes to continue on treatment plan. Patient will continue to be followed by navigation.

## 2020-10-20 NOTE — PROGRESS NOTES
Patient: Harley Mendoza MRN: [id number]  SSN: [id number]    YOB: 1950  Age: 78 y.o. Sex: female      DIAGNOSIS:  T1N0 rectal cancer    TREATMENT SITE:  Pelvis with concurrent Xeloda    DOSE and FRACTIONATION:  10 of 30 fractions completed; 1800 cGy of 5400 cGy received    INTERVAL HISTORY:  Harley Mendoza is a 78 y.o. female being treated for rectal cancer    Week 1: No complaints     Week 2: noticed worsening of hemorroids; slight diarrhea    OBJECTIVE:  NAD  Visit Vitals  /65 (BP 1 Location: Left arm, BP Patient Position: Sitting)   Pulse 94   Temp 96.9 °F (36.1 °C)   Resp 18   Wt 69.4 kg (152 lb 14.4 oz)   SpO2 98%   BMI 28.89 kg/m²       Lab Results   Component Value Date/Time    Sodium 137 10/20/2020 11:28 AM    Potassium 4.0 10/20/2020 11:28 AM    Chloride 106 10/20/2020 11:28 AM    CO2 24 10/20/2020 11:28 AM    Anion gap 7 10/20/2020 11:28 AM    Glucose 173 (H) 10/20/2020 11:28 AM    BUN 15 10/20/2020 11:28 AM    Creatinine 1.00 10/20/2020 11:28 AM    GFR est AA >60 10/20/2020 11:28 AM    GFR est non-AA 59 (L) 10/20/2020 11:28 AM    Calcium 10.0 10/20/2020 11:28 AM    Magnesium 1.8 10/20/2020 11:28 AM    Albumin 3.9 10/20/2020 11:28 AM    Protein, total 7.8 10/20/2020 11:28 AM    Globulin 3.9 (H) 10/20/2020 11:28 AM    A-G Ratio 1.0 (L) 10/20/2020 11:28 AM    ALT (SGPT) 25 10/20/2020 11:28 AM     Lab Results   Component Value Date/Time    WBC 4.3 10/20/2020 11:28 AM    HGB 13.7 10/20/2020 11:28 AM    HCT 41.0 10/20/2020 11:28 AM    PLATELET 910 59/81/6963 11:28 AM       ASSESSMENT and PLAN:  Harley Mendoza is tolerating radiation as anticipated for the current dose and fraction. We will continue on as planned with another treatment visit anticipated next week.         Jonny Colbert MD   October 20, 2020

## 2020-10-21 ENCOUNTER — HOSPITAL ENCOUNTER (OUTPATIENT)
Dept: RADIATION ONCOLOGY | Age: 70
Discharge: HOME | End: 2020-10-21
Payer: MEDICARE

## 2020-10-21 PROCEDURE — 77386 HC IMRT TRMT DLVR COMPL: CPT

## 2020-10-22 ENCOUNTER — HOSPITAL ENCOUNTER (OUTPATIENT)
Dept: RADIATION ONCOLOGY | Age: 70
Discharge: HOME | End: 2020-10-22
Payer: MEDICARE

## 2020-10-22 PROCEDURE — 77386 HC IMRT TRMT DLVR COMPL: CPT

## 2020-10-23 ENCOUNTER — HOSPITAL ENCOUNTER (OUTPATIENT)
Dept: RADIATION ONCOLOGY | Age: 70
Discharge: HOME | End: 2020-10-23
Payer: MEDICARE

## 2020-10-23 PROCEDURE — 77386 HC IMRT TRMT DLVR COMPL: CPT

## 2020-10-26 ENCOUNTER — HOSPITAL ENCOUNTER (OUTPATIENT)
Dept: RADIATION ONCOLOGY | Age: 70
Discharge: HOME | End: 2020-10-26
Payer: MEDICARE

## 2020-10-26 PROCEDURE — 77386 HC IMRT TRMT DLVR COMPL: CPT

## 2020-10-27 ENCOUNTER — HOSPITAL ENCOUNTER (OUTPATIENT)
Dept: RADIATION ONCOLOGY | Age: 70
Discharge: HOME | End: 2020-10-27
Payer: MEDICARE

## 2020-10-27 ENCOUNTER — HOSPITAL ENCOUNTER (OUTPATIENT)
Dept: LAB | Age: 70
Discharge: HOME | End: 2020-10-27
Payer: MEDICARE

## 2020-10-27 VITALS
TEMPERATURE: 97.7 F | SYSTOLIC BLOOD PRESSURE: 139 MMHG | BODY MASS INDEX: 29.27 KG/M2 | OXYGEN SATURATION: 95 % | DIASTOLIC BLOOD PRESSURE: 63 MMHG | HEART RATE: 96 BPM | WEIGHT: 154.9 LBS

## 2020-10-27 DIAGNOSIS — C20 RECTAL CANCER (HCC): ICD-10-CM

## 2020-10-27 LAB
ALBUMIN SERPL-MCNC: 3.6 G/DL (ref 3.2–4.6)
ALBUMIN/GLOB SERPL: 1 {RATIO} (ref 1.2–3.5)
ALP SERPL-CCNC: 57 U/L (ref 50–136)
ALT SERPL-CCNC: 25 U/L (ref 12–65)
ANION GAP SERPL CALC-SCNC: 3 MMOL/L (ref 7–16)
AST SERPL-CCNC: 12 U/L (ref 15–37)
BASOPHILS # BLD: 0 K/UL (ref 0–0.2)
BASOPHILS NFR BLD: 0 % (ref 0–2)
BILIRUB SERPL-MCNC: 0.3 MG/DL (ref 0.2–1.1)
BUN SERPL-MCNC: 12 MG/DL (ref 8–23)
CALCIUM SERPL-MCNC: 9.7 MG/DL (ref 8.3–10.4)
CHLORIDE SERPL-SCNC: 108 MMOL/L (ref 98–107)
CO2 SERPL-SCNC: 28 MMOL/L (ref 21–32)
CREAT SERPL-MCNC: 0.9 MG/DL (ref 0.6–1)
DIFFERENTIAL METHOD BLD: ABNORMAL
EOSINOPHIL # BLD: 0.3 K/UL (ref 0–0.8)
EOSINOPHIL NFR BLD: 6 % (ref 0.5–7.8)
ERYTHROCYTE [DISTWIDTH] IN BLOOD BY AUTOMATED COUNT: 15.5 % (ref 11.9–14.6)
GLOBULIN SER CALC-MCNC: 3.6 G/DL (ref 2.3–3.5)
GLUCOSE SERPL-MCNC: 142 MG/DL (ref 65–100)
HCT VFR BLD AUTO: 37.1 % (ref 35.8–46.3)
HGB BLD-MCNC: 12.6 G/DL (ref 11.7–15.4)
IMM GRANULOCYTES # BLD AUTO: 0 K/UL (ref 0–0.5)
IMM GRANULOCYTES NFR BLD AUTO: 1 % (ref 0–5)
LYMPHOCYTES # BLD: 0.3 K/UL (ref 0.5–4.6)
LYMPHOCYTES NFR BLD: 6 % (ref 13–44)
MAGNESIUM SERPL-MCNC: 1.8 MG/DL (ref 1.8–2.4)
MCH RBC QN AUTO: 28.7 PG (ref 26.1–32.9)
MCHC RBC AUTO-ENTMCNC: 34 G/DL (ref 31.4–35)
MCV RBC AUTO: 84.5 FL (ref 79.6–97.8)
MONOCYTES # BLD: 0.4 K/UL (ref 0.1–1.3)
MONOCYTES NFR BLD: 8 % (ref 4–12)
NEUTS SEG # BLD: 3.7 K/UL (ref 1.7–8.2)
NEUTS SEG NFR BLD: 80 % (ref 43–78)
NRBC # BLD: 0 K/UL (ref 0–0.2)
PLATELET # BLD AUTO: 236 K/UL (ref 150–450)
PMV BLD AUTO: 9.8 FL (ref 9.4–12.3)
POTASSIUM SERPL-SCNC: 3.8 MMOL/L (ref 3.5–5.1)
PROT SERPL-MCNC: 7.2 G/DL (ref 6.3–8.2)
RBC # BLD AUTO: 4.39 M/UL (ref 4.05–5.25)
SODIUM SERPL-SCNC: 139 MMOL/L (ref 136–145)
WBC # BLD AUTO: 4.6 K/UL (ref 4.3–11.1)

## 2020-10-27 PROCEDURE — 77336 RADIATION PHYSICS CONSULT: CPT

## 2020-10-27 PROCEDURE — 36415 COLL VENOUS BLD VENIPUNCTURE: CPT

## 2020-10-27 PROCEDURE — 77386 HC IMRT TRMT DLVR COMPL: CPT

## 2020-10-27 PROCEDURE — 83735 ASSAY OF MAGNESIUM: CPT

## 2020-10-27 PROCEDURE — 85025 COMPLETE CBC W/AUTO DIFF WBC: CPT

## 2020-10-27 PROCEDURE — 80053 COMPREHEN METABOLIC PANEL: CPT

## 2020-10-27 NOTE — PROGRESS NOTES
Patient: Devan Kaufman MRN: [id number]  SSN: [id number]    YOB: 1950  Age: 78 y.o. Sex: female      DIAGNOSIS:  T1N0 rectal cancer    TREATMENT SITE:  Pelvis with concurrent Xeloda    DOSE and FRACTIONATION:  15 of 30 fractions completed; 2700 cGy of 5400 cGy received    INTERVAL HISTORY:  Devan Kaufman is a 78 y.o. female being treated for rectal cancer    Week 1: No complaints     Week 2: noticed worsening of hemorroids; slight diarrhea  Week 3: notices cramps and spasms    OBJECTIVE:  NAD  Visit Vitals  /63 (BP 1 Location: Left arm, BP Patient Position: Sitting)   Pulse 96   Temp 97.7 °F (36.5 °C)   Wt 70.3 kg (154 lb 14.4 oz)   SpO2 95%   BMI 29.27 kg/m²       Lab Results   Component Value Date/Time    Sodium 139 10/27/2020 11:49 AM    Potassium 3.8 10/27/2020 11:49 AM    Chloride 108 (H) 10/27/2020 11:49 AM    CO2 28 10/27/2020 11:49 AM    Anion gap 3 (L) 10/27/2020 11:49 AM    Glucose 142 (H) 10/27/2020 11:49 AM    BUN 12 10/27/2020 11:49 AM    Creatinine 0.90 10/27/2020 11:49 AM    GFR est AA >60 10/27/2020 11:49 AM    GFR est non-AA >60 10/27/2020 11:49 AM    Calcium 9.7 10/27/2020 11:49 AM    Magnesium 1.8 10/27/2020 11:49 AM    Albumin 3.6 10/27/2020 11:49 AM    Protein, total 7.2 10/27/2020 11:49 AM    Globulin 3.6 (H) 10/27/2020 11:49 AM    A-G Ratio 1.0 (L) 10/27/2020 11:49 AM    ALT (SGPT) 25 10/27/2020 11:49 AM     Lab Results   Component Value Date/Time    WBC 4.6 10/27/2020 11:49 AM    HGB 12.6 10/27/2020 11:49 AM    HCT 37.1 10/27/2020 11:49 AM    PLATELET 457 12/66/6486 11:49 AM       ASSESSMENT and PLAN:  Devan Kaufman is tolerating radiation as anticipated for the current dose and fraction. We will continue on as planned with another treatment visit anticipated next week. Proctofoam prescribed.       Qing Mera MD   October 27, 2020

## 2020-10-28 ENCOUNTER — HOSPITAL ENCOUNTER (OUTPATIENT)
Dept: RADIATION ONCOLOGY | Age: 70
Discharge: HOME | End: 2020-10-28
Payer: MEDICARE

## 2020-10-28 ENCOUNTER — PATIENT OUTREACH (OUTPATIENT)
Dept: CASE MANAGEMENT | Age: 70
End: 2020-10-28

## 2020-10-28 PROCEDURE — 77386 HC IMRT TRMT DLVR COMPL: CPT

## 2020-10-29 ENCOUNTER — HOSPITAL ENCOUNTER (OUTPATIENT)
Dept: RADIATION ONCOLOGY | Age: 70
Discharge: HOME | End: 2020-10-29
Payer: MEDICARE

## 2020-10-29 PROCEDURE — 77386 HC IMRT TRMT DLVR COMPL: CPT

## 2020-10-30 ENCOUNTER — HOSPITAL ENCOUNTER (OUTPATIENT)
Dept: RADIATION ONCOLOGY | Age: 70
Discharge: HOME | End: 2020-10-30
Payer: MEDICARE

## 2020-10-30 PROCEDURE — 77386 HC IMRT TRMT DLVR COMPL: CPT

## 2020-11-02 ENCOUNTER — HOSPITAL ENCOUNTER (OUTPATIENT)
Dept: RADIATION ONCOLOGY | Age: 70
Discharge: HOME | End: 2020-11-02
Payer: MEDICARE

## 2020-11-02 PROCEDURE — 77386 HC IMRT TRMT DLVR COMPL: CPT

## 2020-11-03 ENCOUNTER — HOSPITAL ENCOUNTER (OUTPATIENT)
Dept: RADIATION ONCOLOGY | Age: 70
Discharge: HOME | End: 2020-11-03
Payer: MEDICARE

## 2020-11-03 ENCOUNTER — HOSPITAL ENCOUNTER (OUTPATIENT)
Dept: LAB | Age: 70
Discharge: HOME | End: 2020-11-03
Payer: MEDICARE

## 2020-11-03 VITALS
SYSTOLIC BLOOD PRESSURE: 132 MMHG | TEMPERATURE: 98.7 F | DIASTOLIC BLOOD PRESSURE: 74 MMHG | OXYGEN SATURATION: 97 % | BODY MASS INDEX: 28.98 KG/M2 | WEIGHT: 153.4 LBS | HEART RATE: 102 BPM

## 2020-11-03 DIAGNOSIS — C20 RECTAL CANCER (HCC): ICD-10-CM

## 2020-11-03 LAB
ALBUMIN SERPL-MCNC: 3.7 G/DL (ref 3.2–4.6)
ALBUMIN/GLOB SERPL: 1 {RATIO} (ref 1.2–3.5)
ALP SERPL-CCNC: 69 U/L (ref 50–136)
ALT SERPL-CCNC: 26 U/L (ref 12–65)
ANION GAP SERPL CALC-SCNC: 8 MMOL/L (ref 7–16)
AST SERPL-CCNC: 18 U/L (ref 15–37)
BASOPHILS # BLD: 0 K/UL (ref 0–0.2)
BASOPHILS NFR BLD: 1 % (ref 0–2)
BILIRUB SERPL-MCNC: 0.6 MG/DL (ref 0.2–1.1)
BUN SERPL-MCNC: 8 MG/DL (ref 8–23)
CALCIUM SERPL-MCNC: 9.8 MG/DL (ref 8.3–10.4)
CEA SERPL-MCNC: 3.6 NG/ML (ref 0–3)
CHLORIDE SERPL-SCNC: 105 MMOL/L (ref 98–107)
CO2 SERPL-SCNC: 25 MMOL/L (ref 21–32)
CREAT SERPL-MCNC: 1 MG/DL (ref 0.6–1)
DIFFERENTIAL METHOD BLD: ABNORMAL
EOSINOPHIL # BLD: 0.3 K/UL (ref 0–0.8)
EOSINOPHIL NFR BLD: 5 % (ref 0.5–7.8)
ERYTHROCYTE [DISTWIDTH] IN BLOOD BY AUTOMATED COUNT: 17.2 % (ref 11.9–14.6)
GLOBULIN SER CALC-MCNC: 3.8 G/DL (ref 2.3–3.5)
GLUCOSE SERPL-MCNC: 162 MG/DL (ref 65–100)
HCT VFR BLD AUTO: 38.3 % (ref 35.8–46.3)
HGB BLD-MCNC: 13.1 G/DL (ref 11.7–15.4)
IMM GRANULOCYTES # BLD AUTO: 0 K/UL (ref 0–0.5)
IMM GRANULOCYTES NFR BLD AUTO: 1 % (ref 0–5)
LYMPHOCYTES # BLD: 0.3 K/UL (ref 0.5–4.6)
LYMPHOCYTES NFR BLD: 4 % (ref 13–44)
MAGNESIUM SERPL-MCNC: 1.6 MG/DL (ref 1.8–2.4)
MCH RBC QN AUTO: 29.4 PG (ref 26.1–32.9)
MCHC RBC AUTO-ENTMCNC: 34.2 G/DL (ref 31.4–35)
MCV RBC AUTO: 85.9 FL (ref 79.6–97.8)
MONOCYTES # BLD: 0.5 K/UL (ref 0.1–1.3)
MONOCYTES NFR BLD: 8 % (ref 4–12)
NEUTS SEG # BLD: 5.1 K/UL (ref 1.7–8.2)
NEUTS SEG NFR BLD: 82 % (ref 43–78)
NRBC # BLD: 0 K/UL (ref 0–0.2)
PLATELET # BLD AUTO: 251 K/UL (ref 150–450)
PMV BLD AUTO: 10 FL (ref 9.4–12.3)
POTASSIUM SERPL-SCNC: 3.4 MMOL/L (ref 3.5–5.1)
PROT SERPL-MCNC: 7.5 G/DL (ref 6.3–8.2)
RBC # BLD AUTO: 4.46 M/UL (ref 4.05–5.25)
SODIUM SERPL-SCNC: 138 MMOL/L (ref 136–145)
WBC # BLD AUTO: 6.3 K/UL (ref 4.3–11.1)

## 2020-11-03 PROCEDURE — 80053 COMPREHEN METABOLIC PANEL: CPT

## 2020-11-03 PROCEDURE — 77336 RADIATION PHYSICS CONSULT: CPT

## 2020-11-03 PROCEDURE — 83735 ASSAY OF MAGNESIUM: CPT

## 2020-11-03 PROCEDURE — 36415 COLL VENOUS BLD VENIPUNCTURE: CPT

## 2020-11-03 PROCEDURE — 77386 HC IMRT TRMT DLVR COMPL: CPT

## 2020-11-03 PROCEDURE — 82378 CARCINOEMBRYONIC ANTIGEN: CPT

## 2020-11-03 PROCEDURE — 85025 COMPLETE CBC W/AUTO DIFF WBC: CPT

## 2020-11-03 RX ORDER — HYDROCORTISONE 25 MG/G
CREAM TOPICAL 4 TIMES DAILY
Qty: 30 G | Refills: 0 | Status: ON HOLD | OUTPATIENT
Start: 2020-11-03 | End: 2021-02-02 | Stop reason: ALTCHOICE

## 2020-11-03 NOTE — PROGRESS NOTES
Pt stated she is unable to afford the Proctofoam that Med Onc. ordered for her rectal discomfort as her insurance does not cover it, and the price is at least $160 out of pocket. Dr. Iam Nielson ordered Proctozone, a similar drug, and with a GoodRx prescription savings card, pt will be able to purchase the Proctozone for under $20.  Pt is aware of the above and will either print the GoodRx coupon from home, or pick one up at the [employer].

## 2020-11-03 NOTE — PROGRESS NOTES
Patient: Aparna Escobar MRN: [id number]  SSN: [id number]    YOB: 1950  Age: 78 y.o. Sex: female      DIAGNOSIS:  T1N0 rectal cancer    TREATMENT SITE:  Pelvis with concurrent Xeloda    DOSE and FRACTIONATION:  20 of 30 fractions completed; 3600 cGy of 5400 cGy received    INTERVAL HISTORY:  Aparna Escobar is a 78 y.o. female being treated for rectal cancer    Week 1: No complaints     Week 2: noticed worsening of hemorroids; slight diarrhea  Week 3: notices cramps and spasms  Week 4: some improvement on dermoplast and muscle relaxers; proctoform not approved by insurance    OBJECTIVE:  NAD  Visit Vitals  /74   Pulse (!) 102   Temp 98.7 °F (37.1 °C)   Wt 69.6 kg (153 lb 6.4 oz)   SpO2 97%   BMI 28.98 kg/m²       Lab Results   Component Value Date/Time    Sodium 138 11/03/2020 10:47 AM    Potassium 3.4 (L) 11/03/2020 10:47 AM    Chloride 105 11/03/2020 10:47 AM    CO2 25 11/03/2020 10:47 AM    Anion gap 8 11/03/2020 10:47 AM    Glucose 162 (H) 11/03/2020 10:47 AM    BUN 8 11/03/2020 10:47 AM    Creatinine 1.00 11/03/2020 10:47 AM    GFR est AA >60 11/03/2020 10:47 AM    GFR est non-AA 59 (L) 11/03/2020 10:47 AM    Calcium 9.8 11/03/2020 10:47 AM    Magnesium 1.6 (L) 11/03/2020 10:47 AM    Albumin 3.7 11/03/2020 10:47 AM    Protein, total 7.5 11/03/2020 10:47 AM    Globulin 3.8 (H) 11/03/2020 10:47 AM    A-G Ratio 1.0 (L) 11/03/2020 10:47 AM    ALT (SGPT) 26 11/03/2020 10:47 AM     Lab Results   Component Value Date/Time    WBC 6.3 11/03/2020 10:47 AM    HGB 13.1 11/03/2020 10:47 AM    HCT 38.3 11/03/2020 10:47 AM    PLATELET 866 41/32/2351 10:47 AM       ASSESSMENT and PLAN:  Aparna Escobar is tolerating radiation as anticipated for the current dose and fraction. We will continue on as planned with another treatment visit anticipated next week.  Proctozone prescribed      Gloria Lehman MD   November 3, 2020

## 2020-11-04 ENCOUNTER — HOSPITAL ENCOUNTER (OUTPATIENT)
Dept: RADIATION ONCOLOGY | Age: 70
Discharge: HOME | End: 2020-11-04
Payer: MEDICARE

## 2020-11-04 ENCOUNTER — PATIENT OUTREACH (OUTPATIENT)
Dept: CASE MANAGEMENT | Age: 70
End: 2020-11-04

## 2020-11-04 PROCEDURE — 77386 HC IMRT TRMT DLVR COMPL: CPT

## 2020-11-05 ENCOUNTER — HOSPITAL ENCOUNTER (OUTPATIENT)
Dept: RADIATION ONCOLOGY | Age: 70
Discharge: HOME | End: 2020-11-05
Payer: MEDICARE

## 2020-11-05 PROCEDURE — 77386 HC IMRT TRMT DLVR COMPL: CPT

## 2020-11-06 ENCOUNTER — HOSPITAL ENCOUNTER (OUTPATIENT)
Dept: RADIATION ONCOLOGY | Age: 70
Discharge: HOME | End: 2020-11-06
Payer: MEDICARE

## 2020-11-06 PROCEDURE — 77386 HC IMRT TRMT DLVR COMPL: CPT

## 2020-11-09 ENCOUNTER — HOSPITAL ENCOUNTER (OUTPATIENT)
Dept: RADIATION ONCOLOGY | Age: 70
Discharge: HOME | End: 2020-11-09
Payer: MEDICARE

## 2020-11-09 PROCEDURE — 77386 HC IMRT TRMT DLVR COMPL: CPT

## 2020-11-10 ENCOUNTER — HOSPITAL ENCOUNTER (OUTPATIENT)
Dept: RADIATION ONCOLOGY | Age: 70
Discharge: HOME | End: 2020-11-10
Payer: MEDICARE

## 2020-11-10 ENCOUNTER — HOSPITAL ENCOUNTER (OUTPATIENT)
Dept: LAB | Age: 70
Discharge: HOME | End: 2020-11-10
Payer: MEDICARE

## 2020-11-10 VITALS
WEIGHT: 149.7 LBS | HEART RATE: 83 BPM | OXYGEN SATURATION: 98 % | DIASTOLIC BLOOD PRESSURE: 65 MMHG | BODY MASS INDEX: 28.29 KG/M2 | TEMPERATURE: 97.1 F | SYSTOLIC BLOOD PRESSURE: 115 MMHG

## 2020-11-10 DIAGNOSIS — C20 RECTAL CANCER (HCC): ICD-10-CM

## 2020-11-10 LAB
ALBUMIN SERPL-MCNC: 3.6 G/DL (ref 3.2–4.6)
ALBUMIN/GLOB SERPL: 0.9 {RATIO} (ref 1.2–3.5)
ALP SERPL-CCNC: 62 U/L (ref 50–136)
ALT SERPL-CCNC: 16 U/L (ref 12–65)
ANION GAP SERPL CALC-SCNC: 6 MMOL/L (ref 7–16)
AST SERPL-CCNC: 13 U/L (ref 15–37)
BASOPHILS # BLD: 0 K/UL (ref 0–0.2)
BASOPHILS NFR BLD: 1 % (ref 0–2)
BILIRUB SERPL-MCNC: 0.5 MG/DL (ref 0.2–1.1)
BUN SERPL-MCNC: 10 MG/DL (ref 8–23)
CALCIUM SERPL-MCNC: 9.7 MG/DL (ref 8.3–10.4)
CHLORIDE SERPL-SCNC: 105 MMOL/L (ref 98–107)
CO2 SERPL-SCNC: 28 MMOL/L (ref 21–32)
CREAT SERPL-MCNC: 0.9 MG/DL (ref 0.6–1)
DIFFERENTIAL METHOD BLD: ABNORMAL
EOSINOPHIL # BLD: 0.3 K/UL (ref 0–0.8)
EOSINOPHIL NFR BLD: 8 % (ref 0.5–7.8)
ERYTHROCYTE [DISTWIDTH] IN BLOOD BY AUTOMATED COUNT: 18.1 % (ref 11.9–14.6)
GLOBULIN SER CALC-MCNC: 3.8 G/DL (ref 2.3–3.5)
GLUCOSE SERPL-MCNC: 106 MG/DL (ref 65–100)
HCT VFR BLD AUTO: 33.6 % (ref 35.8–46.3)
HGB BLD-MCNC: 11.5 G/DL (ref 11.7–15.4)
IMM GRANULOCYTES # BLD AUTO: 0 K/UL (ref 0–0.5)
IMM GRANULOCYTES NFR BLD AUTO: 1 % (ref 0–5)
LYMPHOCYTES # BLD: 0.2 K/UL (ref 0.5–4.6)
LYMPHOCYTES NFR BLD: 5 % (ref 13–44)
MAGNESIUM SERPL-MCNC: 2 MG/DL (ref 1.8–2.4)
MCH RBC QN AUTO: 29.8 PG (ref 26.1–32.9)
MCHC RBC AUTO-ENTMCNC: 34.2 G/DL (ref 31.4–35)
MCV RBC AUTO: 87 FL (ref 79.6–97.8)
MONOCYTES # BLD: 0.6 K/UL (ref 0.1–1.3)
MONOCYTES NFR BLD: 14 % (ref 4–12)
NEUTS SEG # BLD: 3 K/UL (ref 1.7–8.2)
NEUTS SEG NFR BLD: 72 % (ref 43–78)
NRBC # BLD: 0 K/UL (ref 0–0.2)
PLATELET # BLD AUTO: 285 K/UL (ref 150–450)
PMV BLD AUTO: 9.4 FL (ref 9.4–12.3)
POTASSIUM SERPL-SCNC: 3.9 MMOL/L (ref 3.5–5.1)
PROT SERPL-MCNC: 7.4 G/DL (ref 6.3–8.2)
RBC # BLD AUTO: 3.86 M/UL (ref 4.05–5.25)
SODIUM SERPL-SCNC: 139 MMOL/L (ref 136–145)
WBC # BLD AUTO: 4.2 K/UL (ref 4.3–11.1)

## 2020-11-10 PROCEDURE — 77300 RADIATION THERAPY DOSE PLAN: CPT

## 2020-11-10 PROCEDURE — 83735 ASSAY OF MAGNESIUM: CPT

## 2020-11-10 PROCEDURE — 77386 HC IMRT TRMT DLVR COMPL: CPT

## 2020-11-10 PROCEDURE — 80053 COMPREHEN METABOLIC PANEL: CPT

## 2020-11-10 PROCEDURE — 77336 RADIATION PHYSICS CONSULT: CPT

## 2020-11-10 PROCEDURE — 36415 COLL VENOUS BLD VENIPUNCTURE: CPT

## 2020-11-10 PROCEDURE — 85025 COMPLETE CBC W/AUTO DIFF WBC: CPT

## 2020-11-10 NOTE — PROGRESS NOTES
Patient: Charli Byrnes MRN: [id number]  SSN: [id number]    YOB: 1950  Age: 78 y.o. Sex: female      DIAGNOSIS:  T1N0 rectal cancer    TREATMENT SITE:  Pelvis with concurrent Xeloda    DOSE and FRACTIONATION:  25 of 30 fractions completed; 4500 cGy of 5400 cGy received    INTERVAL HISTORY:  Charli Byrnes is a 78 y.o. female being treated for rectal cancer    Week 1: No complaints     Week 2: noticed worsening of hemorroids; slight diarrhea  Week 3: notices cramps and spasms  Week 4: some improvement on dermoplast and muscle relaxers; proctoform not approved by insurance  Week 5: some improvement; no worsening symptoms    OBJECTIVE:  NAD  Visit Vitals  /65 (BP 1 Location: Left arm, BP Patient Position: Sitting)   Pulse 83   Temp 97.1 °F (36.2 °C)   Wt 67.9 kg (149 lb 11.2 oz)   SpO2 98%   BMI 28.29 kg/m²       Lab Results   Component Value Date/Time    Sodium 139 11/10/2020 11:39 AM    Potassium 3.9 11/10/2020 11:39 AM    Chloride 105 11/10/2020 11:39 AM    CO2 28 11/10/2020 11:39 AM    Anion gap 6 (L) 11/10/2020 11:39 AM    Glucose 106 (H) 11/10/2020 11:39 AM    BUN 10 11/10/2020 11:39 AM    Creatinine 0.90 11/10/2020 11:39 AM    GFR est AA >60 11/10/2020 11:39 AM    GFR est non-AA >60 11/10/2020 11:39 AM    Calcium 9.7 11/10/2020 11:39 AM    Magnesium 2.0 11/10/2020 11:39 AM    Albumin 3.6 11/10/2020 11:39 AM    Protein, total 7.4 11/10/2020 11:39 AM    Globulin 3.8 (H) 11/10/2020 11:39 AM    A-G Ratio 0.9 (L) 11/10/2020 11:39 AM    ALT (SGPT) 16 11/10/2020 11:39 AM     Lab Results   Component Value Date/Time    WBC 4.2 (L) 11/10/2020 11:39 AM    HGB 11.5 (L) 11/10/2020 11:39 AM    HCT 33.6 (L) 11/10/2020 11:39 AM    PLATELET 112 27/04/8715 11:39 AM       ASSESSMENT and PLAN:  Charli Byrnes is tolerating radiation as anticipated for the current dose and fraction. We will continue on as planned with another treatment visit anticipated next week.       González Rees MD November 10, 2020

## 2020-11-11 ENCOUNTER — HOSPITAL ENCOUNTER (OUTPATIENT)
Dept: RADIATION ONCOLOGY | Age: 70
Discharge: HOME | End: 2020-11-11
Payer: MEDICARE

## 2020-11-11 PROCEDURE — 77338 DESIGN MLC DEVICE FOR IMRT: CPT

## 2020-11-11 PROCEDURE — 77386 HC IMRT TRMT DLVR COMPL: CPT

## 2020-11-12 ENCOUNTER — HOSPITAL ENCOUNTER (OUTPATIENT)
Dept: RADIATION ONCOLOGY | Age: 70
Discharge: HOME | End: 2020-11-12
Payer: MEDICARE

## 2020-11-12 PROCEDURE — 77386 HC IMRT TRMT DLVR COMPL: CPT

## 2020-11-13 ENCOUNTER — HOSPITAL ENCOUNTER (OUTPATIENT)
Dept: RADIATION ONCOLOGY | Age: 70
Discharge: HOME | End: 2020-11-13
Payer: MEDICARE

## 2020-11-13 PROCEDURE — 77386 HC IMRT TRMT DLVR COMPL: CPT

## 2020-11-16 ENCOUNTER — HOSPITAL ENCOUNTER (OUTPATIENT)
Dept: RADIATION ONCOLOGY | Age: 70
Discharge: HOME | End: 2020-11-16
Payer: MEDICARE

## 2020-11-16 PROCEDURE — 77386 HC IMRT TRMT DLVR COMPL: CPT

## 2020-11-17 ENCOUNTER — HOSPITAL ENCOUNTER (OUTPATIENT)
Dept: RADIATION ONCOLOGY | Age: 70
Discharge: HOME | End: 2020-11-17
Payer: MEDICARE

## 2020-11-18 ENCOUNTER — HOSPITAL ENCOUNTER (OUTPATIENT)
Dept: RADIATION ONCOLOGY | Age: 70
Discharge: HOME | End: 2020-11-18
Payer: MEDICARE

## 2020-11-18 VITALS
BODY MASS INDEX: 28.1 KG/M2 | DIASTOLIC BLOOD PRESSURE: 72 MMHG | RESPIRATION RATE: 18 BRPM | SYSTOLIC BLOOD PRESSURE: 114 MMHG | OXYGEN SATURATION: 99 % | TEMPERATURE: 97.5 F | HEART RATE: 96 BPM | WEIGHT: 148.7 LBS

## 2020-11-18 PROCEDURE — 77386 HC IMRT TRMT DLVR COMPL: CPT

## 2020-11-18 PROCEDURE — 77336 RADIATION PHYSICS CONSULT: CPT

## 2020-11-18 NOTE — PROGRESS NOTES
Patient: Stephanie Arias MRN: [id number]  SSN: [id number]    YOB: 1950  Age: 78 y.o. Sex: female      DIAGNOSIS:  T1N0 rectal cancer    TREATMENT SITE:  Pelvis with concurrent Xeloda    DOSE and FRACTIONATION:  30 of 30 fractions completed; 5400 cGy of 5400 cGy received    INTERVAL HISTORY:  Stephanie Arias is a 78 y.o. female being treated for rectal cancer    Week 1: No complaints     Week 2: noticed worsening of hemorroids; slight diarrhea  Week 3: notices cramps and spasms  Week 4: some improvement on dermoplast and muscle relaxers; proctoform not approved by insurance  Week 5: some improvement; no worsening symptoms  Week 6: continues to have skin irritation; no change since last week. OBJECTIVE:  NAD  Visit Vitals  /72 (BP 1 Location: Left arm, BP Patient Position: Sitting)   Pulse 96   Temp 97.5 °F (36.4 °C)   Resp 18   Wt 67.4 kg (148 lb 11.2 oz)   SpO2 99%   BMI 28.10 kg/m²       Lab Results   Component Value Date/Time    Sodium 139 11/10/2020 11:39 AM    Potassium 3.9 11/10/2020 11:39 AM    Chloride 105 11/10/2020 11:39 AM    CO2 28 11/10/2020 11:39 AM    Anion gap 6 (L) 11/10/2020 11:39 AM    Glucose 106 (H) 11/10/2020 11:39 AM    BUN 10 11/10/2020 11:39 AM    Creatinine 0.90 11/10/2020 11:39 AM    GFR est AA >60 11/10/2020 11:39 AM    GFR est non-AA >60 11/10/2020 11:39 AM    Calcium 9.7 11/10/2020 11:39 AM    Magnesium 2.0 11/10/2020 11:39 AM    Albumin 3.6 11/10/2020 11:39 AM    Protein, total 7.4 11/10/2020 11:39 AM    Globulin 3.8 (H) 11/10/2020 11:39 AM    A-G Ratio 0.9 (L) 11/10/2020 11:39 AM    ALT (SGPT) 16 11/10/2020 11:39 AM     Lab Results   Component Value Date/Time    WBC 4.2 (L) 11/10/2020 11:39 AM    HGB 11.5 (L) 11/10/2020 11:39 AM    HCT 33.6 (L) 11/10/2020 11:39 AM    PLATELET 246 63/06/2171 11:39 AM       ASSESSMENT and PLAN:  Stephanie Arias is tolerating radiation as anticipated for the current dose and fraction.   Skin check in 2 weeks.     Zenobia Murphy MD   November 18, 2020

## 2020-11-23 ENCOUNTER — HOSPITAL ENCOUNTER (OUTPATIENT)
Dept: LAB | Age: 70
Discharge: HOME | End: 2020-11-23
Payer: MEDICARE

## 2020-11-23 DIAGNOSIS — R19.5 POSITIVE COLORECTAL CANCER SCREENING USING COLOGUARD TEST: ICD-10-CM

## 2020-11-23 LAB
ALBUMIN SERPL-MCNC: 3.6 G/DL (ref 3.2–4.6)
ALBUMIN/GLOB SERPL: 1 {RATIO} (ref 1.2–3.5)
ALP SERPL-CCNC: 68 U/L (ref 50–136)
ALT SERPL-CCNC: 22 U/L (ref 12–65)
ANION GAP SERPL CALC-SCNC: 8 MMOL/L (ref 7–16)
AST SERPL-CCNC: 18 U/L (ref 15–37)
BASOPHILS # BLD: 0 K/UL (ref 0–0.2)
BASOPHILS NFR BLD: 0 % (ref 0–2)
BILIRUB SERPL-MCNC: 0.5 MG/DL (ref 0.2–1.1)
BUN SERPL-MCNC: 7 MG/DL (ref 8–23)
CALCIUM SERPL-MCNC: 9.5 MG/DL (ref 8.3–10.4)
CEA SERPL-MCNC: 2.7 NG/ML (ref 0–3)
CHLORIDE SERPL-SCNC: 107 MMOL/L (ref 98–107)
CO2 SERPL-SCNC: 24 MMOL/L (ref 21–32)
CREAT SERPL-MCNC: 1 MG/DL (ref 0.6–1)
DIFFERENTIAL METHOD BLD: ABNORMAL
EOSINOPHIL # BLD: 0.2 K/UL (ref 0–0.8)
EOSINOPHIL NFR BLD: 6 % (ref 0.5–7.8)
ERYTHROCYTE [DISTWIDTH] IN BLOOD BY AUTOMATED COUNT: 20.5 % (ref 11.9–14.6)
GLOBULIN SER CALC-MCNC: 3.6 G/DL (ref 2.3–3.5)
GLUCOSE SERPL-MCNC: 236 MG/DL (ref 65–100)
HCT VFR BLD AUTO: 32.5 % (ref 35.8–46.3)
HGB BLD-MCNC: 11 G/DL (ref 11.7–15.4)
IMM GRANULOCYTES # BLD AUTO: 0 K/UL (ref 0–0.5)
IMM GRANULOCYTES NFR BLD AUTO: 1 % (ref 0–5)
LYMPHOCYTES # BLD: 0.2 K/UL (ref 0.5–4.6)
LYMPHOCYTES NFR BLD: 6 % (ref 13–44)
MCH RBC QN AUTO: 31.2 PG (ref 26.1–32.9)
MCHC RBC AUTO-ENTMCNC: 33.8 G/DL (ref 31.4–35)
MCV RBC AUTO: 92.1 FL (ref 79.6–97.8)
MONOCYTES # BLD: 0.2 K/UL (ref 0.1–1.3)
MONOCYTES NFR BLD: 7 % (ref 4–12)
NEUTS SEG # BLD: 2.7 K/UL (ref 1.7–8.2)
NEUTS SEG NFR BLD: 80 % (ref 43–78)
NRBC # BLD: 0 K/UL (ref 0–0.2)
PLATELET # BLD AUTO: 275 K/UL (ref 150–450)
PMV BLD AUTO: 9.5 FL (ref 9.4–12.3)
POTASSIUM SERPL-SCNC: 3.7 MMOL/L (ref 3.5–5.1)
PROT SERPL-MCNC: 7.2 G/DL (ref 6.3–8.2)
RBC # BLD AUTO: 3.53 M/UL (ref 4.05–5.25)
SODIUM SERPL-SCNC: 139 MMOL/L (ref 136–145)
WBC # BLD AUTO: 3.4 K/UL (ref 4.3–11.1)

## 2020-11-23 PROCEDURE — 82378 CARCINOEMBRYONIC ANTIGEN: CPT

## 2020-11-23 PROCEDURE — 36415 COLL VENOUS BLD VENIPUNCTURE: CPT

## 2020-11-23 PROCEDURE — 85025 COMPLETE CBC W/AUTO DIFF WBC: CPT

## 2020-11-23 PROCEDURE — 80053 COMPREHEN METABOLIC PANEL: CPT

## 2020-12-01 ENCOUNTER — HOSPITAL ENCOUNTER (OUTPATIENT)
Dept: LAB | Age: 70
Discharge: HOME | End: 2020-12-01
Payer: MEDICARE

## 2020-12-01 DIAGNOSIS — C20 RECTAL ADENOCARCINOMA (HCC): ICD-10-CM

## 2020-12-01 LAB
ALBUMIN SERPL-MCNC: 4 G/DL (ref 3.2–4.6)
ALBUMIN/GLOB SERPL: 1.1 {RATIO} (ref 1.2–3.5)
ALP SERPL-CCNC: 66 U/L (ref 50–136)
ALT SERPL-CCNC: 18 U/L (ref 12–65)
ANION GAP SERPL CALC-SCNC: 6 MMOL/L (ref 7–16)
AST SERPL-CCNC: 11 U/L (ref 15–37)
BASOPHILS # BLD: 0 K/UL (ref 0–0.2)
BASOPHILS NFR BLD: 1 % (ref 0–2)
BILIRUB SERPL-MCNC: 0.5 MG/DL (ref 0.2–1.1)
BUN SERPL-MCNC: 10 MG/DL (ref 8–23)
CALCIUM SERPL-MCNC: 9.7 MG/DL (ref 8.3–10.4)
CHLORIDE SERPL-SCNC: 107 MMOL/L (ref 98–107)
CO2 SERPL-SCNC: 26 MMOL/L (ref 21–32)
CREAT SERPL-MCNC: 1 MG/DL (ref 0.6–1)
DIFFERENTIAL METHOD BLD: ABNORMAL
EOSINOPHIL # BLD: 0.2 K/UL (ref 0–0.8)
EOSINOPHIL NFR BLD: 5 % (ref 0.5–7.8)
ERYTHROCYTE [DISTWIDTH] IN BLOOD BY AUTOMATED COUNT: 18.9 % (ref 11.9–14.6)
GLOBULIN SER CALC-MCNC: 3.7 G/DL (ref 2.3–3.5)
GLUCOSE SERPL-MCNC: 143 MG/DL (ref 65–100)
HCT VFR BLD AUTO: 34.1 % (ref 35.8–46.3)
HGB BLD-MCNC: 11.5 G/DL (ref 11.7–15.4)
IMM GRANULOCYTES # BLD AUTO: 0 K/UL (ref 0–0.5)
IMM GRANULOCYTES NFR BLD AUTO: 0 % (ref 0–5)
LYMPHOCYTES # BLD: 0.4 K/UL (ref 0.5–4.6)
LYMPHOCYTES NFR BLD: 11 % (ref 13–44)
MCH RBC QN AUTO: 31.6 PG (ref 26.1–32.9)
MCHC RBC AUTO-ENTMCNC: 33.7 G/DL (ref 31.4–35)
MCV RBC AUTO: 93.7 FL (ref 79.6–97.8)
MONOCYTES # BLD: 0.4 K/UL (ref 0.1–1.3)
MONOCYTES NFR BLD: 10 % (ref 4–12)
NEUTS SEG # BLD: 2.7 K/UL (ref 1.7–8.2)
NEUTS SEG NFR BLD: 73 % (ref 43–78)
NRBC # BLD: 0 K/UL (ref 0–0.2)
PLATELET # BLD AUTO: 323 K/UL (ref 150–450)
PMV BLD AUTO: 9.5 FL (ref 9.4–12.3)
POTASSIUM SERPL-SCNC: 3.9 MMOL/L (ref 3.5–5.1)
PROT SERPL-MCNC: 7.7 G/DL (ref 6.3–8.2)
RBC # BLD AUTO: 3.64 M/UL (ref 4.05–5.25)
SODIUM SERPL-SCNC: 139 MMOL/L (ref 136–145)
WBC # BLD AUTO: 3.6 K/UL (ref 4.3–11.1)

## 2020-12-01 PROCEDURE — 36415 COLL VENOUS BLD VENIPUNCTURE: CPT

## 2020-12-01 PROCEDURE — 85025 COMPLETE CBC W/AUTO DIFF WBC: CPT

## 2020-12-01 PROCEDURE — 80053 COMPREHEN METABOLIC PANEL: CPT

## 2020-12-02 ENCOUNTER — APPOINTMENT (OUTPATIENT)
Dept: RADIATION ONCOLOGY | Age: 70
End: 2020-12-02

## 2020-12-08 ENCOUNTER — HOSPITAL ENCOUNTER (OUTPATIENT)
Dept: CT IMAGING | Age: 70
Discharge: HOME | End: 2020-12-08
Attending: NURSE PRACTITIONER
Payer: MEDICARE

## 2020-12-08 DIAGNOSIS — C20 RECTAL CANCER (HCC): ICD-10-CM

## 2020-12-08 PROCEDURE — 74011000258 HC RX REV CODE- 258: Performed by: NURSE PRACTITIONER

## 2020-12-08 PROCEDURE — 74177 CT ABD & PELVIS W/CONTRAST: CPT

## 2020-12-08 PROCEDURE — 74011000636 HC RX REV CODE- 636: Performed by: NURSE PRACTITIONER

## 2020-12-08 RX ORDER — SODIUM CHLORIDE 0.9 % (FLUSH) 0.9 %
10 SYRINGE (ML) INJECTION
Status: COMPLETED | OUTPATIENT
Start: 2020-12-08 | End: 2020-12-08

## 2020-12-08 RX ADMIN — Medication 10 ML: at 14:39

## 2020-12-08 RX ADMIN — SODIUM CHLORIDE 100 ML: 900 INJECTION, SOLUTION INTRAVENOUS at 14:39

## 2020-12-08 RX ADMIN — IOPAMIDOL 100 ML: 755 INJECTION, SOLUTION INTRAVENOUS at 14:38

## 2020-12-08 RX ADMIN — DIATRIZOATE MEGLUMINE AND DIATRIZOATE SODIUM 15 ML: 660; 100 LIQUID ORAL; RECTAL at 14:39

## 2021-01-11 ENCOUNTER — HOSPITAL ENCOUNTER (OUTPATIENT)
Dept: LAB | Age: 71
Discharge: HOME | End: 2021-01-11

## 2021-01-11 PROCEDURE — 88305 TISSUE EXAM BY PATHOLOGIST: CPT

## 2021-01-18 ENCOUNTER — PATIENT OUTREACH (OUTPATIENT)
Dept: CASE MANAGEMENT | Age: 71
End: 2021-01-18

## 2021-01-18 ENCOUNTER — HOSPITAL ENCOUNTER (OUTPATIENT)
Dept: LAB | Age: 71
Discharge: HOME | End: 2021-01-18
Payer: MEDICARE

## 2021-01-18 DIAGNOSIS — C20 RECTAL ADENOCARCINOMA (HCC): ICD-10-CM

## 2021-01-18 LAB
ALBUMIN SERPL-MCNC: 3.9 G/DL (ref 3.2–4.6)
ALBUMIN/GLOB SERPL: 1.1 {RATIO} (ref 1.2–3.5)
ALP SERPL-CCNC: 73 U/L (ref 50–136)
ALT SERPL-CCNC: 26 U/L (ref 12–65)
ANION GAP SERPL CALC-SCNC: 6 MMOL/L (ref 7–16)
AST SERPL-CCNC: 14 U/L (ref 15–37)
BASOPHILS # BLD: 0 K/UL (ref 0–0.2)
BASOPHILS NFR BLD: 1 % (ref 0–2)
BILIRUB SERPL-MCNC: 0.3 MG/DL (ref 0.2–1.1)
BUN SERPL-MCNC: 12 MG/DL (ref 8–23)
CALCIUM SERPL-MCNC: 9.7 MG/DL (ref 8.3–10.4)
CHLORIDE SERPL-SCNC: 109 MMOL/L (ref 98–107)
CO2 SERPL-SCNC: 25 MMOL/L (ref 21–32)
CREAT SERPL-MCNC: 0.9 MG/DL (ref 0.6–1)
DIFFERENTIAL METHOD BLD: ABNORMAL
EOSINOPHIL # BLD: 0.3 K/UL (ref 0–0.8)
EOSINOPHIL NFR BLD: 4 % (ref 0.5–7.8)
ERYTHROCYTE [DISTWIDTH] IN BLOOD BY AUTOMATED COUNT: 12.1 % (ref 11.9–14.6)
GLOBULIN SER CALC-MCNC: 3.5 G/DL (ref 2.3–3.5)
GLUCOSE SERPL-MCNC: 149 MG/DL (ref 65–100)
HCT VFR BLD AUTO: 38.9 % (ref 35.8–46.3)
HGB BLD-MCNC: 13 G/DL (ref 11.7–15.4)
IMM GRANULOCYTES # BLD AUTO: 0 K/UL (ref 0–0.5)
IMM GRANULOCYTES NFR BLD AUTO: 0 % (ref 0–5)
LYMPHOCYTES # BLD: 0.5 K/UL (ref 0.5–4.6)
LYMPHOCYTES NFR BLD: 7 % (ref 13–44)
MCH RBC QN AUTO: 31.1 PG (ref 26.1–32.9)
MCHC RBC AUTO-ENTMCNC: 33.4 G/DL (ref 31.4–35)
MCV RBC AUTO: 93.1 FL (ref 79.6–97.8)
MONOCYTES # BLD: 0.5 K/UL (ref 0.1–1.3)
MONOCYTES NFR BLD: 8 % (ref 4–12)
NEUTS SEG # BLD: 4.9 K/UL (ref 1.7–8.2)
NEUTS SEG NFR BLD: 80 % (ref 43–78)
NRBC # BLD: 0 K/UL (ref 0–0.2)
PLATELET # BLD AUTO: 273 K/UL (ref 150–450)
PMV BLD AUTO: 10.3 FL (ref 9.4–12.3)
POTASSIUM SERPL-SCNC: 4.2 MMOL/L (ref 3.5–5.1)
PROT SERPL-MCNC: 7.4 G/DL (ref 6.3–8.2)
RBC # BLD AUTO: 4.18 M/UL (ref 4.05–5.25)
SODIUM SERPL-SCNC: 140 MMOL/L (ref 136–145)
WBC # BLD AUTO: 6.1 K/UL (ref 4.3–11.1)

## 2021-01-18 PROCEDURE — 85025 COMPLETE CBC W/AUTO DIFF WBC: CPT

## 2021-01-18 PROCEDURE — 36415 COLL VENOUS BLD VENIPUNCTURE: CPT

## 2021-01-18 PROCEDURE — 80053 COMPREHEN METABOLIC PANEL: CPT

## 2021-01-18 NOTE — PROGRESS NOTES
1/18/2021  Patient seen with Dr Lott for a follow-up visit. Questions and discussion completed to the satisfaction of the patient. Patient scheduled for port placement, Chemo Ed, and first chemo visit with NP. Chemoplan given to patient via AVS. S & S and FOLFOX information placed in AVS as well. Antiemetic prescriptions sent to pharmacy of choice. Patient wishes to continue on treatment plan and navigation will follow.

## 2021-01-27 ENCOUNTER — ANESTHESIA EVENT (OUTPATIENT)
Dept: SURGERY | Age: 71
End: 2021-01-27
Payer: MEDICARE

## 2021-01-27 RX ORDER — NALOXONE HYDROCHLORIDE 0.4 MG/ML
0.1 INJECTION, SOLUTION INTRAMUSCULAR; INTRAVENOUS; SUBCUTANEOUS
Status: CANCELLED | OUTPATIENT
Start: 2021-01-27

## 2021-01-27 RX ORDER — SODIUM CHLORIDE, SODIUM LACTATE, POTASSIUM CHLORIDE, CALCIUM CHLORIDE 600; 310; 30; 20 MG/100ML; MG/100ML; MG/100ML; MG/100ML
75 INJECTION, SOLUTION INTRAVENOUS CONTINUOUS
Status: CANCELLED | OUTPATIENT
Start: 2021-01-27

## 2021-01-27 RX ORDER — HYDROMORPHONE HYDROCHLORIDE 2 MG/ML
0.5 INJECTION, SOLUTION INTRAMUSCULAR; INTRAVENOUS; SUBCUTANEOUS
Status: CANCELLED | OUTPATIENT
Start: 2021-01-27

## 2021-01-27 RX ORDER — SODIUM CHLORIDE, SODIUM LACTATE, POTASSIUM CHLORIDE, CALCIUM CHLORIDE 600; 310; 30; 20 MG/100ML; MG/100ML; MG/100ML; MG/100ML
100 INJECTION, SOLUTION INTRAVENOUS CONTINUOUS
Status: CANCELLED | OUTPATIENT
Start: 2021-01-27 | End: 2021-01-28

## 2021-01-27 RX ORDER — HALOPERIDOL 5 MG/ML
1 INJECTION INTRAMUSCULAR
Status: CANCELLED | OUTPATIENT
Start: 2021-01-27 | End: 2021-01-28

## 2021-01-27 RX ORDER — MIDAZOLAM HYDROCHLORIDE 1 MG/ML
2 INJECTION, SOLUTION INTRAMUSCULAR; INTRAVENOUS
Status: CANCELLED | OUTPATIENT
Start: 2021-01-27 | End: 2021-01-28

## 2021-01-27 RX ORDER — OXYCODONE HYDROCHLORIDE 5 MG/1
5 TABLET ORAL
Status: CANCELLED | OUTPATIENT
Start: 2021-01-27 | End: 2021-01-28

## 2021-01-27 RX ORDER — FLUMAZENIL 0.1 MG/ML
0.2 INJECTION INTRAVENOUS
Status: CANCELLED | OUTPATIENT
Start: 2021-01-27

## 2021-01-27 RX ORDER — DIPHENHYDRAMINE HYDROCHLORIDE 50 MG/ML
12.5 INJECTION, SOLUTION INTRAMUSCULAR; INTRAVENOUS
Status: CANCELLED | OUTPATIENT
Start: 2021-01-27

## 2021-01-27 NOTE — PROGRESS NOTES
I spoke with Sol Lira regarding her [facility] PPO/PFFS insurance coverage  Next, I spoke with Sol Lira regarding her Prescription Drug Benefits through [facility]. Next, I spoke with Sol Lira about the financial assistance application. The patient is currently receiving mathew assistance until 4/30/2021. Next, I spoke with Sol Lira about billing questions and treatment services. We discussed copayments, deductibles, and out of pocket maximums. The [facility] Medicare PPO/PFFS has an OOP max of $7,500 for the Year 2021. We discussed the costs associated with the FOLFOX Medications during infusion treatments. Sol Lira expressed understanding of the information above and all questions were answered to her satisfaction.

## 2021-01-28 ENCOUNTER — ANESTHESIA (OUTPATIENT)
Dept: SURGERY | Age: 71
End: 2021-01-28
Payer: MEDICARE

## 2021-01-28 ENCOUNTER — HOSPITAL ENCOUNTER (OUTPATIENT)
Dept: INTERVENTIONAL RADIOLOGY/VASCULAR | Age: 71
Discharge: HOME | End: 2021-01-28
Attending: INTERNAL MEDICINE
Payer: MEDICARE

## 2021-01-28 ENCOUNTER — HOSPITAL ENCOUNTER (OUTPATIENT)
Age: 71
Setting detail: OUTPATIENT SURGERY
Discharge: HOME | End: 2021-01-28
Attending: RADIOLOGY | Admitting: RADIOLOGY
Payer: MEDICARE

## 2021-01-28 VITALS
BODY MASS INDEX: 29.27 KG/M2 | HEART RATE: 70 BPM | OXYGEN SATURATION: 97 % | DIASTOLIC BLOOD PRESSURE: 75 MMHG | HEIGHT: 61 IN | RESPIRATION RATE: 15 BRPM | SYSTOLIC BLOOD PRESSURE: 130 MMHG | TEMPERATURE: 98.3 F | WEIGHT: 155 LBS

## 2021-01-28 VITALS
OXYGEN SATURATION: 99 % | TEMPERATURE: 97.6 F | SYSTOLIC BLOOD PRESSURE: 112 MMHG | HEART RATE: 70 BPM | DIASTOLIC BLOOD PRESSURE: 81 MMHG | RESPIRATION RATE: 16 BRPM

## 2021-01-28 DIAGNOSIS — C20 RECTAL ADENOCARCINOMA (HCC): ICD-10-CM

## 2021-01-28 LAB — GLUCOSE BLD STRIP.AUTO-MCNC: 156 MG/DL (ref 65–100)

## 2021-01-28 PROCEDURE — 74011250636 HC RX REV CODE- 250/636: Performed by: PHYSICIAN ASSISTANT

## 2021-01-28 PROCEDURE — 76060000032 HC ANESTHESIA 0.5 TO 1 HR

## 2021-01-28 PROCEDURE — 77030031139 HC SUT VCRL2 J&J -A

## 2021-01-28 PROCEDURE — 77030010507 HC ADH SKN DERMBND J&J -B

## 2021-01-28 PROCEDURE — C1894 INTRO/SHEATH, NON-LASER: HCPCS

## 2021-01-28 PROCEDURE — 74011250636 HC RX REV CODE- 250/636: Performed by: REGISTERED NURSE

## 2021-01-28 PROCEDURE — 76060000032 HC ANESTHESIA 0.5 TO 1 HR: Performed by: RADIOLOGY

## 2021-01-28 PROCEDURE — 77030020851 HC CAUT BTRY HI AARM -A

## 2021-01-28 PROCEDURE — C1788 PORT, INDWELLING, IMP: HCPCS

## 2021-01-28 PROCEDURE — 77030031131 HC SUT MXN P COVD -B

## 2021-01-28 PROCEDURE — 77001 FLUOROGUIDE FOR VEIN DEVICE: CPT

## 2021-01-28 PROCEDURE — 82962 GLUCOSE BLOOD TEST: CPT

## 2021-01-28 RX ORDER — CEFAZOLIN SODIUM/WATER 2 G/20 ML
2 SYRINGE (ML) INTRAVENOUS ONCE
Status: COMPLETED | OUTPATIENT
Start: 2021-01-28 | End: 2021-01-28

## 2021-01-28 RX ORDER — LIDOCAINE HYDROCHLORIDE AND EPINEPHRINE 10; 10 MG/ML; UG/ML
2-10 INJECTION, SOLUTION INFILTRATION; PERINEURAL ONCE
Status: DISCONTINUED | OUTPATIENT
Start: 2021-01-28 | End: 2021-01-28

## 2021-01-28 RX ORDER — HEPARIN SODIUM (PORCINE) LOCK FLUSH IV SOLN 100 UNIT/ML 100 UNIT/ML
500 SOLUTION INTRAVENOUS AS NEEDED
Status: DISCONTINUED | OUTPATIENT
Start: 2021-01-28 | End: 2021-02-01 | Stop reason: HOSPADM

## 2021-01-28 RX ORDER — PROPOFOL 10 MG/ML
INJECTION, EMULSION INTRAVENOUS AS NEEDED
Status: DISCONTINUED | OUTPATIENT
Start: 2021-01-28 | End: 2021-01-28 | Stop reason: HOSPADM

## 2021-01-28 RX ORDER — PROPOFOL 10 MG/ML
INJECTION, EMULSION INTRAVENOUS
Status: DISCONTINUED | OUTPATIENT
Start: 2021-01-28 | End: 2021-01-28 | Stop reason: HOSPADM

## 2021-01-28 RX ORDER — SODIUM CHLORIDE, SODIUM LACTATE, POTASSIUM CHLORIDE, CALCIUM CHLORIDE 600; 310; 30; 20 MG/100ML; MG/100ML; MG/100ML; MG/100ML
INJECTION, SOLUTION INTRAVENOUS
Status: DISCONTINUED | OUTPATIENT
Start: 2021-01-28 | End: 2021-01-28 | Stop reason: HOSPADM

## 2021-01-28 RX ORDER — CHLOROPROCAINE HYDROCHLORIDE 30 MG/ML
2-20 INJECTION, SOLUTION EPIDURAL; INFILTRATION; INTRACAUDAL; PERINEURAL ONCE
Status: COMPLETED | OUTPATIENT
Start: 2021-01-28 | End: 2021-01-28

## 2021-01-28 RX ADMIN — PROPOFOL 100 MCG/KG/MIN: 10 INJECTION, EMULSION INTRAVENOUS at 11:18

## 2021-01-28 RX ADMIN — PROPOFOL 50 MG: 10 INJECTION, EMULSION INTRAVENOUS at 11:18

## 2021-01-28 RX ADMIN — Medication 2 G: at 11:22

## 2021-01-28 RX ADMIN — SODIUM CHLORIDE, SODIUM LACTATE, POTASSIUM CHLORIDE, AND CALCIUM CHLORIDE: 600; 310; 30; 20 INJECTION, SOLUTION INTRAVENOUS at 11:07

## 2021-01-28 RX ADMIN — HEPARIN SODIUM (PORCINE) LOCK FLUSH IV SOLN 100 UNIT/ML 500 UNITS: 100 SOLUTION at 11:37

## 2021-01-28 RX ADMIN — CHLOROPROCAINE HYDROCHLORIDE 15 ML: 30 INJECTION, SOLUTION EPIDURAL; INFILTRATION; INTRACAUDAL; PERINEURAL at 11:28

## 2021-01-28 NOTE — ANESTHESIA POSTPROCEDURE EVALUATION
Procedure(s):  IR ANESTHESIA/PORT INSERT.    total IV anesthesia    Anesthesia Post Evaluation      Multimodal analgesia: multimodal analgesia used between 6 hours prior to anesthesia start to PACU discharge  Patient location during evaluation: PACU  Patient participation: complete - patient participated  Level of consciousness: awake  Pain management: adequate  Airway patency: patent  Anesthetic complications: no  Cardiovascular status: acceptable  Respiratory status: spontaneous ventilation and acceptable  Hydration status: acceptable  Post anesthesia nausea and vomiting:  none      INITIAL Post-op Vital signs:   Vitals Value Taken Time   /75 01/28/21 1221   Temp 36.4 °C (97.6 °F) 01/28/21 1152   Pulse 70 01/28/21 1221   Resp 15 01/28/21 1221   SpO2 97 % 01/28/21 1221

## 2021-01-28 NOTE — DISCHARGE INSTRUCTIONS
[address]  Department of Interventional Radiology  [facility]  [phone] Office  [phone] Fax  Implanted Port Discharge Instructions      General Instructions:   A port is like an implanted IV. They are usually ordered for patients who will be getting chemotherapy, but can also be used as an IV for long term antibiotics, large amounts of fluids, and/or blood products. Your blood can be drawn from your port for labs also. Those patients who do not have good veins find the ports convenient as they can get the IV they need with one stick. The port can be used long term, and the care is easy. The device is under the skin, and once the skin heals, care is minimal. All that is required is the nurse who accesses the port will need to flush it with heparinized saline after each use. Ports are usually placed in the chest wall, usually on the right side. But they can be place in the arms and in the abdomen. Home Care Instructions: If your port is in your arm, do not allow blood pressure or other IVs to be place in that arm. Do not allow bra straps or any clothing to rub the skin over the port. Do not bathe or swim until the skin has healed and if the port is accessed. Once it is healed, and when the port is not accessed, it is okay to bathe and swim. Restrict yourself to light activity for the first 5 days after getting the port put in, after that, resume normal activity slowly. You may resume your normal diet and medications. Follow-Up Instructions: Please see your oncologist, or whatever physician ordered the port as he/she has requested of you. Call If: You should call your Physician and/or the Radiology Nurse if you notice redness, pus, swelling, or pain from the area of your incision. Call if you should develop a fever. The nurses who access your port will know to call your doctor if the port does not seem to be working properly. You need to tell the nurses who use the port if you should have any pain or swelling at the site during an infusion. To Reach Us: If you have any questions about your procedure, please call the Interventional Radiology department at [phone]. After business hours (5pm) and weekends, call the answering service at [phone] and ask for the Radiologist on call to be paged. Si tiene Preguntas acerca del procedimiento, por favor llame al departamento de Radiología Intervencional al [phone]. Después de horas de oficina (5 pm) y los fines de [place], llamar al Timothy Garcia al [phone] y pregunte por el Radiologo de [place] Candler Carloshiarnaud. Interventional Radiology General Nurse Discharge    After general anesthesia or intravenous sedation, for 24 hours or while taking prescription Narcotics:  · Limit your activities  · Do not drive and operate hazardous machinery  · Do not make important personal or business decisions  · Do  not drink alcoholic beverages  · If you have not urinated within 8 hours after discharge, please contact your surgeon on call. * Please give a list of your current medications to your Primary Care Provider. * Please update this list whenever your medications are discontinued, doses are     changed, or new medications (including over-the-counter products) are added. * Please carry medication information at all times in case of emergency situations. These are general instructions for a healthy lifestyle:    No smoking/ No tobacco products/ Avoid exposure to second hand smoke  Surgeon General's Warning:  Quitting smoking now greatly reduces serious risk to your health.     Obesity, smoking, and sedentary lifestyle greatly increases your risk for illness  A healthy diet, regular physical exercise & weight monitoring are important for maintaining a healthy lifestyle    You may be retaining fluid if you have a history of heart failure or if you experience any of the following symptoms:  Weight gain of 3 pounds or more overnight or 5 pounds in a week, increased swelling in our hands or feet or shortness of breath while lying flat in bed. Please call your doctor as soon as you notice any of these symptoms; do not wait until your next office visit. Recognize signs and symptoms of STROKE:  F-face looks uneven    A-arms unable to move or move unevenly    S-speech slurred or non-existent    T-time-call 911 as soon as signs and symptoms begin-DO NOT go       Back to bed or wait to see if you get better-TIME IS BRAIN.       Patient Signature:  Date: 1/28/2021  Discharging Nurse: Harmony Davis RN

## 2021-01-28 NOTE — ANESTHESIA PREPROCEDURE EVALUATION
Relevant Problems   No relevant active problems       Anesthetic History   No history of anesthetic complications            Review of Systems / Medical History  Patient summary reviewed and pertinent labs reviewed    Pulmonary    COPD: moderate    Sleep apnea           Neuro/Psych              Cardiovascular    Hypertension: well controlled          Hyperlipidemia    Exercise tolerance: >4 METS  Comments: Echo 1/2020 showed normal LVEF and no significant valvular disease. GI/Hepatic/Renal     GERD: well controlled           Endo/Other    Diabetes: well controlled, type 2    Arthritis and cancer (Rectal Adenocarcinoma)     Other Findings   Comments: Fibromyalgia  IBS         Physical Exam    Airway  Mallampati: II  TM Distance: 4 - 6 cm  Neck ROM: normal range of motion   Mouth opening: Normal    Comments: Significant overbite Cardiovascular  Regular rate and rhythm,  S1 and S2 normal,  no murmur, click, rub, or gallop  Rhythm: regular  Rate: normal         Dental    Dentition: Caps/crowns  Comments: Loose upper right side tooth.     Pulmonary  Breath sounds clear to auscultation               Abdominal  GI exam deferred       Other Findings            Anesthetic Plan    ASA: 3  Anesthesia type: total IV anesthesia          Induction: Intravenous  Anesthetic plan and risks discussed with: Patient and Spouse

## 2021-01-28 NOTE — PROGRESS NOTES
Discharge instructions given. Pt verbalized understanding. Time allowed for  Questions. Ok to go per anesthesia.

## 2021-01-28 NOTE — PROGRESS NOTES
Patient in IR 2 for procedure. Anesthesia has assumed care of patient for the duration of procedure. Please see anesthesia record for documentation.

## 2021-01-28 NOTE — PROCEDURES
Department of Interventional Radiology  [phone]        Interventional Radiology Brief Procedure Note    Patient: Ludwin Ramirez MRN: [id number]  SSN: [id number]    YOB: 1950  Age: 78 y.o. Sex: female      Date of Procedure: 1/28/2021    Pre-Procedure Diagnosis: colorectal cancer    Post-Procedure Diagnosis: SAME    Procedure(s): Venous Chest Port Placement    Brief Description of Procedure: US, fluoro guided right IJ venous chest port placed.     Performed By: Braxton Gray PA-C     Assistants: None    Anesthesia:TIVS/MAC    Estimated Blood Loss: Less than 10ml    Specimens:  None    Implants:  Chest Port Placement    Findings: catheter tip in right atrium     Complications: None    Recommendations: ok to use port     Follow Up: prn    Signed By: Braxton Gray PA-C     January 28, 2021

## 2021-01-28 NOTE — PROGRESS NOTES
Spiritual Care visit. Initial Visit, Pre Surgery Consult. Visit and prayer before patient goes to surgery.     Visit by Osiris Canela M.Ed., Th.B. ,Staff

## 2021-01-28 NOTE — H&P
Department of Interventional Radiology  [phone]    History and Physical    Patient:  Jazmín Delgado MRN:  [id number]  SSN:  [id number]    YOB: 1950  Age:  78 y.o. Sex:  female      Primary Care Provider:  Sebastien Arteaga MD  Referring Physician:  Ananth Hannon MD    Subjective:     Chief Complaint: port    History of the Present Illness: The patient is a 78 y.o. female with colorectal cancer who presents for venous chest port placement. NPO.      Past Medical History:   Diagnosis Date    Depression with anxiety     Diverticulosis of colon 04/26/2017    Widespread per Colonoscopy    Elevated C-reactive protein (CRP)     Enlarged heart     Fibromyalgia     GERD (gastroesophageal reflux disease)     Hemorrhoids 04/26/2017    Internal & External Per Colonoscopy    HTN (hypertension), benign     IBS (irritable bowel syndrome)     Iron deficiency anemia     Microalbuminuria due to type 2 diabetes mellitus ([facility]     Mixed hyperlipidemia     Osteopenia 07/10/2017    Per DEXA    Perennial allergic rhinitis with seasonal variation     [facility] 06/26/2020    Adenocarcinoma    Sleep apnea     Not on CPAP - Insurance Wouldn't Cover CPAP    Type 2 diabetes mellitus ([facility]     type 2- oral meds; sqbs am avg 120-150's--- no hypo; hgba1c- 6.5 (12/2019)    Vitamin B12 deficiency     Vitamin D deficiency      Past Surgical History:   Procedure Laterality Date    COLONOSCOPY N/A 4/26/2017    COLONOSCOPY / BMI=30 performed by Chelsea Tristan MD at [facility] ENDOSCOPY    COLONOSCOPY N/A 6/19/2020    COLONOSCOPY/BMI 28 performed by Matty Betancur MD at [address] [place] [address] N/A 6/30/2020    SIGMOIDOSCOPY FLEXIBLE performed by Gal Rodriguez MD at [facility] ENDOSCOPY    HX BREAST LUMPECTOMY Bilateral     HX CARPAL TUNNEL RELEASE Right     HX CHOLECYSTECTOMY      HX COLONOSCOPY  01/11/2021    Diverticulosis, External Hemorrhoids, Rectal Ulcer    HX PREMALIG/BENIGN SKIN LESION EXCISION      HX TUBAL LIGATION      [place] SINUS SURGERY PROC UNLISTED          Review of Systems:    Pertinent items are noted in the History of Present Illness. Prior to Admission medications    Medication Sig Start Date End Date Taking? Authorizing Provider   ondansetron hcl (ZOFRAN) 8 mg tablet Take 1 Tab by mouth every eight (8) hours as needed for Nausea or Vomiting. 1/18/21   Alfredito Khalil MD   prochlorperazine (Compazine) 10 mg tablet Take 0.5 Tabs by mouth every six (6) hours as needed for Nausea or Vomiting. 1/18/21   Alfredito Khalil MD   omeprazole (PRILOSEC) 20 mg capsule Take 20 mg by mouth daily. Provider, Historical   Fenofibrate Nanocrystallized 160 mg tab Take 160 mg by mouth daily. 12/1/20   Divine Calhoun PA-C   glucose blood VI test strips (ReliOn Prime Test Strips) strip Use to check blood sugar once daily. Dx: E11.29 12/1/20   Deion Ramsey PA-C   metFORMIN ER (GLUCOPHAGE XR) 750 mg tablet Take 1 Tab by mouth daily (with dinner). 12/1/20   Divine Calhoun PA-C   PARoxetine (PAXIL) 40 mg tablet Take 1 Tab by mouth daily. 12/1/20   Divine Calhoun PA-C   rosuvastatin (CRESTOR) 10 mg tablet Take 1 Tab by mouth nightly. 12/1/20   Deion Ramsey PA-C   capecitabine (XELODA) 150 mg tablet  10/29/20   Provider, Historical   capecitabine (XELODA) 500 mg tablet  10/29/20   Provider, Historical   oxyCODONE IR (ROXICODONE) 5 mg immediate release tablet Take 1 Tab by mouth every eight (8) hours as needed for Pain for up to 30 days. Max Daily Amount: 15 mg. 11/23/20 1/25/21  Alfredito Khalil MD   hydrocortisone (Proctozone-HC) 2.5 % rectal cream Insert  into rectum four (4) times daily. Patient taking differently: Insert  into rectum four (4) times daily. 11-11-20. Pt requesting refill. Verbal for 2 -30g tubes given to pharmacy.  EGRN. 11/3/20   Maricel Platt MD   hydrocortisone-pramoxine (PROCTOFOAM [facility] rectal foam Insert 1 Applicator into rectum three (3) times daily. 10/27/20   Farrell Dubin, MD   baclofen (LIORESAL) 10 mg tablet Take 1 Tab by mouth three (3) times daily. 10/27/20   Dee Padgett NP   ondansetron (ZOFRAN ODT) 8 mg disintegrating tablet Take 1 Tab by mouth every eight (8) hours as needed for Nausea or Vomiting. Indications: prevent nausea and vomiting from cancer chemotherapy 9/15/20   Myrna Silver MD   linaGLIPtin (TRADJENTA) 5 mg tablet Take 1 Tab by mouth daily. 8/24/20   Divine Calhoun PA-C   lisinopriL (PRINIVIL, ZESTRIL) 10 mg tablet Take 1 Tab by mouth daily. 3/26/20   Divine Calhoun PA-C   cyanocobalamin (Vitamin B-12) 1,000 mcg/mL injection 1 mL by IntraMUSCular route every thirty (30) days. 3/26/20   Divine Calhoun PA-C   lancets (RELION ULTRA THIN PLUS LANCETS) misc Use to check glucose once daily. Dx: E11.29 12/30/19   Minerva Ga PA-C   igipx-avzmx-3-dha-epa-lipids (KRILL OIL) 206-20-78-50 mg cap Take  by mouth. Provider, Historical   guaifenesin/phenylephrine HCl (MUCINEX COLD PO) Take  by mouth daily as needed. Provider, Historical   cyclobenzaprine (FLEXERIL) 10 mg tablet Take 1 Tab by mouth two (2) times a day. Patient taking differently: Take 10 mg by mouth two (2) times daily as needed. 3/26/19   Libby Calhoun PA-C   ascorbic acid, vitamin C, (VITAMIN C) 500 mg tablet Take  by mouth. Provider, Historical   vitamin E (AQUA GEMS) 400 unit capsule Take  by mouth daily. Provider, Historical   cholecalciferol, VITAMIN D3, (VITAMIN D3) 5,000 unit tab tablet Take 5,000 Units by mouth daily. Provider, Historical   melatonin 10 mg tab Take 1 Tab by mouth nightly. Per pt takes 20 mg each pm    Provider, Historical   LACTOBACILLUS ACIDOPHILUS (PROBIOTIC PO) Take 1 Tab by mouth daily. Provider, Historical   cyanocobalamin (VITAMIN B-12) 500 mcg tablet Take 500 mcg by mouth daily. Provider, Historical   mometasone (NASONEX) 50 mcg/actuation nasal spray 2 Sprays daily as needed. Provider, Historical        Allergies   Allergen Reactions    Codeine Rash    Lidocaine Rash    Sulfa (Sulfonamide Antibiotics) Nausea and Vomiting       Family History   Problem Relation Age of Onset    Cancer Mother         Cervical    Diabetes Mother     Heart Disease Father     Hypertension Father    Susanne Billy Elevated Lipids Father     Other Father         ITP    Emphysema Father     Lung Cancer Cousin     Other Sister         Skin Cancer    Asthma Sister     Diabetes Sister     Kidney Disease Maternal Grandmother     Kidney Disease Maternal Grandfather     Arthritis Sister     Diabetes Sister     Colon Cancer Maternal Uncle     Colon Cancer Paternal Uncle     Emphysema Paternal Aunt     Colon Cancer Cousin     Breast Cancer Neg Hx      Social History     Tobacco Use    Smoking status: Former Smoker     Packs/day: 1.50     Years: 35.00     Pack years: 52.50     Types: Cigarettes     Quit date: 9/15/2016     Years since quittin.3    Smokeless tobacco: Never Used   Substance Use Topics    Alcohol use: Not Currently        Objective:       Physical Examination:    Vitals:    21 0957   BP: (!) 156/72   Pulse: 80   Resp: 18   Temp: 98.3 °F (36.8 °C)   SpO2: 98%   Weight: 70.3 kg (155 lb)   Height: 5' 1\" (1.549 m)       Pain Assessment  Pain Intensity 1: 0 (21 0955)               HEART: regular rate and rhythm  LUNG: clear to auscultation bilaterally  ABDOMEN: normal findings: soft, non-tender  EXTREMITIES: warm, no edema    Laboratory:     Lab Results   Component Value Date/Time    Sodium 140 2021 01:07 PM    Sodium 139 2020 11:58 AM    Potassium 4.2 2021 01:07 PM    Potassium 3.9 2020 11:58 AM    Chloride 109 (H) 2021 01:07 PM    Chloride 107 2020 11:58 AM    CO2 25 2021 01:07 PM    CO2 26 2020 11:58 AM    Anion gap 6 (L) 2021 01:07 PM    Anion gap 6 (L) 2020 11:58 AM    Glucose 149 (H) 2021 01:07 PM    Glucose 143 (H) 12/01/2020 11:58 AM    BUN 12 01/18/2021 01:07 PM    BUN 10 12/01/2020 11:58 AM    Creatinine 0.90 01/18/2021 01:07 PM    Creatinine 1.00 12/01/2020 11:58 AM    GFR est AA >60 01/18/2021 01:07 PM    GFR est AA >60 12/01/2020 11:58 AM    GFR est non-AA >60 01/18/2021 01:07 PM    GFR est non-AA 59 (L) 12/01/2020 11:58 AM    Calcium 9.7 01/18/2021 01:07 PM    Calcium 9.7 12/01/2020 11:58 AM    Magnesium 2.0 11/10/2020 11:39 AM    Magnesium 1.6 (L) 11/03/2020 10:47 AM    Albumin 3.9 01/18/2021 01:07 PM    Albumin 4.0 12/01/2020 11:58 AM    Protein, total 7.4 01/18/2021 01:07 PM    Protein, total 7.7 12/01/2020 11:58 AM    Globulin 3.5 01/18/2021 01:07 PM    Globulin 3.7 (H) 12/01/2020 11:58 AM    A-G Ratio 1.1 (L) 01/18/2021 01:07 PM    A-G Ratio 1.1 (L) 12/01/2020 11:58 AM    ALT (SGPT) 26 01/18/2021 01:07 PM    ALT (SGPT) 18 12/01/2020 11:58 AM     Lab Results   Component Value Date/Time    WBC 6.1 01/18/2021 01:07 PM    WBC 3.6 (L) 12/01/2020 11:58 AM    HGB 13.0 01/18/2021 01:07 PM    HGB 11.5 (L) 12/01/2020 11:58 AM    HCT 38.9 01/18/2021 01:07 PM    HCT 34.1 (L) 12/01/2020 11:58 AM    PLATELET 383 12/79/3619 01:07 PM    PLATELET 808 92/04/5033 11:58 AM     No results found for: APTT, PTP, INR, INREXT    Assessment:     Colorectal cancer    Hospital Problems  Date Reviewed: 1/27/2021    None          Plan:     Planned Procedure:  Port placement    Risks, benefits, and alternatives reviewed with patient and she agrees to proceed with the procedure.       Signed By: Janelle Mendoza PA-C     January 28, 2021

## 2021-02-02 ENCOUNTER — HOSPITAL ENCOUNTER (OUTPATIENT)
Dept: LAB | Age: 71
Discharge: HOME | End: 2021-02-02
Payer: MEDICARE

## 2021-02-02 DIAGNOSIS — C20 RECTAL ADENOCARCINOMA (HCC): ICD-10-CM

## 2021-02-02 LAB
ALBUMIN SERPL-MCNC: 4.1 G/DL (ref 3.2–4.6)
ALBUMIN/GLOB SERPL: 1.2 {RATIO} (ref 1.2–3.5)
ALP SERPL-CCNC: 72 U/L (ref 50–136)
ALT SERPL-CCNC: 23 U/L (ref 12–65)
ANION GAP SERPL CALC-SCNC: 4 MMOL/L (ref 7–16)
AST SERPL-CCNC: 16 U/L (ref 15–37)
BASOPHILS # BLD: 0.1 K/UL (ref 0–0.2)
BASOPHILS NFR BLD: 1 % (ref 0–2)
BILIRUB SERPL-MCNC: 0.4 MG/DL (ref 0.2–1.1)
BUN SERPL-MCNC: 14 MG/DL (ref 8–23)
CALCIUM SERPL-MCNC: 9.7 MG/DL (ref 8.3–10.4)
CEA SERPL-MCNC: 1.8 NG/ML (ref 0–3)
CHLORIDE SERPL-SCNC: 107 MMOL/L (ref 98–107)
CO2 SERPL-SCNC: 29 MMOL/L (ref 21–32)
CREAT SERPL-MCNC: 1 MG/DL (ref 0.6–1)
DIFFERENTIAL METHOD BLD: NORMAL
EOSINOPHIL # BLD: 0.3 K/UL (ref 0–0.8)
EOSINOPHIL NFR BLD: 6 % (ref 0.5–7.8)
ERYTHROCYTE [DISTWIDTH] IN BLOOD BY AUTOMATED COUNT: 12 % (ref 11.9–14.6)
GLOBULIN SER CALC-MCNC: 3.5 G/DL (ref 2.3–3.5)
GLUCOSE SERPL-MCNC: 158 MG/DL (ref 65–100)
HCT VFR BLD AUTO: 41.7 % (ref 35.8–46.3)
HGB BLD-MCNC: 13.8 G/DL (ref 11.7–15.4)
IMM GRANULOCYTES # BLD AUTO: 0 K/UL (ref 0–0.5)
IMM GRANULOCYTES NFR BLD AUTO: 1 % (ref 0–5)
LYMPHOCYTES # BLD: 0.6 K/UL (ref 0.5–4.6)
LYMPHOCYTES NFR BLD: 13 % (ref 13–44)
MCH RBC QN AUTO: 30.6 PG (ref 26.1–32.9)
MCHC RBC AUTO-ENTMCNC: 33.1 G/DL (ref 31.4–35)
MCV RBC AUTO: 92.5 FL (ref 79.6–97.8)
MONOCYTES # BLD: 0.4 K/UL (ref 0.1–1.3)
MONOCYTES NFR BLD: 10 % (ref 4–12)
NEUTS SEG # BLD: 3 K/UL (ref 1.7–8.2)
NEUTS SEG NFR BLD: 69 % (ref 43–78)
NRBC # BLD: 0 K/UL (ref 0–0.2)
PLATELET # BLD AUTO: 297 K/UL (ref 150–450)
PMV BLD AUTO: 10.3 FL (ref 9.4–12.3)
POTASSIUM SERPL-SCNC: 4 MMOL/L (ref 3.5–5.1)
PROT SERPL-MCNC: 7.6 G/DL (ref 6.3–8.2)
RBC # BLD AUTO: 4.51 M/UL (ref 4.05–5.25)
SODIUM SERPL-SCNC: 140 MMOL/L (ref 136–145)
WBC # BLD AUTO: 4.4 K/UL (ref 4.3–11.1)

## 2021-02-02 PROCEDURE — 85025 COMPLETE CBC W/AUTO DIFF WBC: CPT

## 2021-02-02 PROCEDURE — 82378 CARCINOEMBRYONIC ANTIGEN: CPT

## 2021-02-02 PROCEDURE — 80053 COMPREHEN METABOLIC PANEL: CPT

## 2021-02-02 PROCEDURE — 36415 COLL VENOUS BLD VENIPUNCTURE: CPT

## 2021-02-03 ENCOUNTER — HOSPITAL ENCOUNTER (OUTPATIENT)
Dept: INFUSION THERAPY | Age: 71
Discharge: HOME | End: 2021-02-03
Payer: MEDICARE

## 2021-02-03 VITALS
TEMPERATURE: 97 F | WEIGHT: 157 LBS | SYSTOLIC BLOOD PRESSURE: 131 MMHG | RESPIRATION RATE: 16 BRPM | HEART RATE: 73 BPM | DIASTOLIC BLOOD PRESSURE: 73 MMHG | OXYGEN SATURATION: 96 % | BODY MASS INDEX: 29.64 KG/M2 | HEIGHT: 61 IN

## 2021-02-03 DIAGNOSIS — C20 RECTAL ADENOCARCINOMA (HCC): Primary | ICD-10-CM

## 2021-02-03 PROCEDURE — 96368 THER/DIAG CONCURRENT INF: CPT

## 2021-02-03 PROCEDURE — 96415 CHEMO IV INFUSION ADDL HR: CPT

## 2021-02-03 PROCEDURE — 96413 CHEMO IV INFUSION 1 HR: CPT

## 2021-02-03 PROCEDURE — 74011250636 HC RX REV CODE- 250/636: Performed by: INTERNAL MEDICINE

## 2021-02-03 PROCEDURE — 96411 CHEMO IV PUSH ADDL DRUG: CPT

## 2021-02-03 PROCEDURE — 96375 TX/PRO/DX INJ NEW DRUG ADDON: CPT

## 2021-02-03 PROCEDURE — G0498 CHEMO EXTEND IV INFUS W/PUMP: HCPCS

## 2021-02-03 PROCEDURE — 74011000258 HC RX REV CODE- 258: Performed by: INTERNAL MEDICINE

## 2021-02-03 PROCEDURE — 96367 TX/PROPH/DG ADDL SEQ IV INF: CPT

## 2021-02-03 RX ORDER — FLUOROURACIL 50 MG/ML
400 INJECTION, SOLUTION INTRAVENOUS ONCE
Status: COMPLETED | OUTPATIENT
Start: 2021-02-03 | End: 2021-02-03

## 2021-02-03 RX ORDER — HEPARIN 100 UNIT/ML
300-500 SYRINGE INTRAVENOUS AS NEEDED
Status: ACTIVE | OUTPATIENT
Start: 2021-02-03 | End: 2021-02-03

## 2021-02-03 RX ORDER — ACETAMINOPHEN 325 MG/1
650 TABLET ORAL AS NEEDED
Status: ACTIVE | OUTPATIENT
Start: 2021-02-03 | End: 2021-02-03

## 2021-02-03 RX ORDER — SODIUM CHLORIDE 0.9 % (FLUSH) 0.9 %
10 SYRINGE (ML) INJECTION AS NEEDED
Status: ACTIVE | OUTPATIENT
Start: 2021-02-03 | End: 2021-02-03

## 2021-02-03 RX ORDER — EPINEPHRINE 1 MG/ML
0.3 INJECTION, SOLUTION, CONCENTRATE INTRAVENOUS AS NEEDED
Status: ACTIVE | OUTPATIENT
Start: 2021-02-03 | End: 2021-02-03

## 2021-02-03 RX ORDER — DIPHENHYDRAMINE HYDROCHLORIDE 50 MG/ML
50 INJECTION, SOLUTION INTRAMUSCULAR; INTRAVENOUS AS NEEDED
Status: DISPENSED | OUTPATIENT
Start: 2021-02-03 | End: 2021-02-03

## 2021-02-03 RX ORDER — DIPHENHYDRAMINE HYDROCHLORIDE 50 MG/ML
25 INJECTION, SOLUTION INTRAMUSCULAR; INTRAVENOUS AS NEEDED
Status: ACTIVE | OUTPATIENT
Start: 2021-02-03 | End: 2021-02-03

## 2021-02-03 RX ORDER — ALBUTEROL SULFATE 0.83 MG/ML
2.5 SOLUTION RESPIRATORY (INHALATION) AS NEEDED
Status: ACTIVE | OUTPATIENT
Start: 2021-02-03 | End: 2021-02-03

## 2021-02-03 RX ORDER — HYDROCORTISONE SODIUM SUCCINATE 100 MG/2ML
100 INJECTION, POWDER, FOR SOLUTION INTRAMUSCULAR; INTRAVENOUS AS NEEDED
Status: DISPENSED | OUTPATIENT
Start: 2021-02-03 | End: 2021-02-03

## 2021-02-03 RX ORDER — ONDANSETRON 2 MG/ML
8 INJECTION INTRAMUSCULAR; INTRAVENOUS AS NEEDED
Status: ACTIVE | OUTPATIENT
Start: 2021-02-03 | End: 2021-02-03

## 2021-02-03 RX ORDER — SODIUM CHLORIDE 9 MG/ML
10 INJECTION INTRAMUSCULAR; INTRAVENOUS; SUBCUTANEOUS AS NEEDED
Status: ACTIVE | OUTPATIENT
Start: 2021-02-03 | End: 2021-02-03

## 2021-02-03 RX ORDER — DEXTROSE MONOHYDRATE 50 MG/ML
25 INJECTION, SOLUTION INTRAVENOUS CONTINUOUS
Status: ACTIVE | OUTPATIENT
Start: 2021-02-03 | End: 2021-02-03

## 2021-02-03 RX ORDER — ONDANSETRON 2 MG/ML
8 INJECTION INTRAMUSCULAR; INTRAVENOUS ONCE
Status: COMPLETED | OUTPATIENT
Start: 2021-02-03 | End: 2021-02-03

## 2021-02-03 RX ADMIN — LEUCOVORIN CALCIUM 700 MG: 350 INJECTION, POWDER, LYOPHILIZED, FOR SOLUTION INTRAMUSCULAR; INTRAVENOUS at 10:06

## 2021-02-03 RX ADMIN — OXALIPLATIN 149 MG: 5 INJECTION, SOLUTION, CONCENTRATE INTRAVENOUS at 10:06

## 2021-02-03 RX ADMIN — Medication 10 ML: at 09:25

## 2021-02-03 RX ADMIN — FLUOROURACIL 4000 MG: 50 INJECTION, SOLUTION INTRAVENOUS at 12:20

## 2021-02-03 RX ADMIN — DEXTROSE MONOHYDRATE 25 ML/HR: 5 INJECTION, SOLUTION INTRAVENOUS at 09:26

## 2021-02-03 RX ADMIN — DEXAMETHASONE SODIUM PHOSPHATE 12 MG: 4 INJECTION, SOLUTION INTRAMUSCULAR; INTRAVENOUS at 09:42

## 2021-02-03 RX ADMIN — FLUOROURACIL 700 MG: 50 INJECTION, SOLUTION INTRAVENOUS at 12:15

## 2021-02-03 RX ADMIN — ONDANSETRON 8 MG: 2 INJECTION INTRAMUSCULAR; INTRAVENOUS at 09:40

## 2021-02-03 NOTE — PROGRESS NOTES
Arrived to the [facility]. Cycle 1 Day 1 FOLFOX  completed. Patient tolerated well and no adverse reactions noted. Pt educated on medications, side effects, and chemo pump and given spill instructions and materials. Patient aware of next infusion appointment on 02/05/21; pump was placed on at 1220. Discharged home in stable condition.

## 2021-02-05 ENCOUNTER — HOSPITAL ENCOUNTER (OUTPATIENT)
Dept: INFUSION THERAPY | Age: 71
Discharge: HOME | End: 2021-02-05
Payer: MEDICARE

## 2021-02-05 VITALS
SYSTOLIC BLOOD PRESSURE: 116 MMHG | HEART RATE: 99 BPM | DIASTOLIC BLOOD PRESSURE: 74 MMHG | TEMPERATURE: 97.7 F | RESPIRATION RATE: 18 BRPM

## 2021-02-05 DIAGNOSIS — K62.89 RECTAL MASS: ICD-10-CM

## 2021-02-05 DIAGNOSIS — C20 RECTAL ADENOCARCINOMA (HCC): Primary | ICD-10-CM

## 2021-02-05 PROCEDURE — 96523 IRRIG DRUG DELIVERY DEVICE: CPT

## 2021-02-05 RX ORDER — SODIUM CHLORIDE 0.9 % (FLUSH) 0.9 %
10 SYRINGE (ML) INJECTION AS NEEDED
Status: DISCONTINUED | OUTPATIENT
Start: 2021-02-05 | End: 2021-02-06 | Stop reason: HOSPADM

## 2021-02-05 NOTE — PROGRESS NOTES
Arrived to the Infusion Center.  5FU pump dc completed and tolerated well.  Any issues or concerns during appointment: none; denies nausea  Patient given education on process  Instructed patient to notify provider for any issues or worrisome symptoms. They verbalized understanding.   Patient aware of next infusion appointment scheduled for 2/17/21 at 10am  Discharged ambulatory with self.

## 2021-02-16 ENCOUNTER — PATIENT OUTREACH (OUTPATIENT)
Dept: CASE MANAGEMENT | Age: 71
End: 2021-02-16

## 2021-02-16 ENCOUNTER — HOSPITAL ENCOUNTER (OUTPATIENT)
Dept: LAB | Age: 71
Discharge: HOME | End: 2021-02-16
Payer: MEDICARE

## 2021-02-16 DIAGNOSIS — C20 RECTAL ADENOCARCINOMA (HCC): ICD-10-CM

## 2021-02-16 LAB
ALBUMIN SERPL-MCNC: 3.8 G/DL (ref 3.2–4.6)
ALBUMIN/GLOB SERPL: 1.1 {RATIO} (ref 1.2–3.5)
ALP SERPL-CCNC: 79 U/L (ref 50–136)
ALT SERPL-CCNC: 18 U/L (ref 12–65)
ANION GAP SERPL CALC-SCNC: 9 MMOL/L (ref 7–16)
AST SERPL-CCNC: 9 U/L (ref 15–37)
BASOPHILS # BLD: 0 K/UL (ref 0–0.2)
BASOPHILS NFR BLD: 1 % (ref 0–2)
BILIRUB SERPL-MCNC: 0.4 MG/DL (ref 0.2–1.1)
BUN SERPL-MCNC: 8 MG/DL (ref 8–23)
CALCIUM SERPL-MCNC: 9.1 MG/DL (ref 8.3–10.4)
CEA SERPL-MCNC: 2.9 NG/ML (ref 0–3)
CHLORIDE SERPL-SCNC: 104 MMOL/L (ref 98–107)
CO2 SERPL-SCNC: 26 MMOL/L (ref 21–32)
CREAT SERPL-MCNC: 0.9 MG/DL (ref 0.6–1)
DIFFERENTIAL METHOD BLD: ABNORMAL
EOSINOPHIL # BLD: 0.2 K/UL (ref 0–0.8)
EOSINOPHIL NFR BLD: 5 % (ref 0.5–7.8)
ERYTHROCYTE [DISTWIDTH] IN BLOOD BY AUTOMATED COUNT: 12.2 % (ref 11.9–14.6)
GLOBULIN SER CALC-MCNC: 3.6 G/DL (ref 2.3–3.5)
GLUCOSE SERPL-MCNC: 178 MG/DL (ref 65–100)
HCT VFR BLD AUTO: 40.3 % (ref 35.8–46.3)
HGB BLD-MCNC: 13.5 G/DL (ref 11.7–15.4)
IMM GRANULOCYTES # BLD AUTO: 0 K/UL (ref 0–0.5)
IMM GRANULOCYTES NFR BLD AUTO: 1 % (ref 0–5)
LYMPHOCYTES # BLD: 0.5 K/UL (ref 0.5–4.6)
LYMPHOCYTES NFR BLD: 12 % (ref 13–44)
MAGNESIUM SERPL-MCNC: 1.7 MG/DL (ref 1.8–2.4)
MCH RBC QN AUTO: 30.3 PG (ref 26.1–32.9)
MCHC RBC AUTO-ENTMCNC: 33.5 G/DL (ref 31.4–35)
MCV RBC AUTO: 90.4 FL (ref 79.6–97.8)
MONOCYTES # BLD: 0.4 K/UL (ref 0.1–1.3)
MONOCYTES NFR BLD: 11 % (ref 4–12)
NEUTS SEG # BLD: 2.7 K/UL (ref 1.7–8.2)
NEUTS SEG NFR BLD: 69 % (ref 43–78)
NRBC # BLD: 0 K/UL (ref 0–0.2)
PLATELET # BLD AUTO: 257 K/UL (ref 150–450)
PMV BLD AUTO: 10 FL (ref 9.4–12.3)
POTASSIUM SERPL-SCNC: 3.5 MMOL/L (ref 3.5–5.1)
PROT SERPL-MCNC: 7.4 G/DL (ref 6.3–8.2)
RBC # BLD AUTO: 4.46 M/UL (ref 4.05–5.25)
SODIUM SERPL-SCNC: 139 MMOL/L (ref 136–145)
WBC # BLD AUTO: 3.9 K/UL (ref 4.3–11.1)

## 2021-02-16 PROCEDURE — 85025 COMPLETE CBC W/AUTO DIFF WBC: CPT

## 2021-02-16 PROCEDURE — 80053 COMPREHEN METABOLIC PANEL: CPT

## 2021-02-16 PROCEDURE — 82378 CARCINOEMBRYONIC ANTIGEN: CPT

## 2021-02-16 PROCEDURE — 83735 ASSAY OF MAGNESIUM: CPT

## 2021-02-16 PROCEDURE — 36415 COLL VENOUS BLD VENIPUNCTURE: CPT

## 2021-02-16 NOTE — PROGRESS NOTES
I saw patient today with Jorge Mason prior to C2 Folfox. She is feeling well without complaints at present. She verbalizes understanding to call with any questions or concerns.  Nurse navigation is following

## 2021-02-17 ENCOUNTER — HOSPITAL ENCOUNTER (OUTPATIENT)
Dept: INFUSION THERAPY | Age: 71
Discharge: HOME | End: 2021-02-17
Payer: MEDICARE

## 2021-02-17 VITALS
WEIGHT: 155 LBS | HEART RATE: 115 BPM | TEMPERATURE: 98 F | OXYGEN SATURATION: 98 % | RESPIRATION RATE: 18 BRPM | DIASTOLIC BLOOD PRESSURE: 50 MMHG | SYSTOLIC BLOOD PRESSURE: 122 MMHG | BODY MASS INDEX: 29.29 KG/M2

## 2021-02-17 DIAGNOSIS — C20 RECTAL ADENOCARCINOMA (HCC): Primary | ICD-10-CM

## 2021-02-17 PROCEDURE — 96411 CHEMO IV PUSH ADDL DRUG: CPT

## 2021-02-17 PROCEDURE — 96413 CHEMO IV INFUSION 1 HR: CPT

## 2021-02-17 PROCEDURE — 96368 THER/DIAG CONCURRENT INF: CPT

## 2021-02-17 PROCEDURE — 96415 CHEMO IV INFUSION ADDL HR: CPT

## 2021-02-17 PROCEDURE — 74011000258 HC RX REV CODE- 258: Performed by: NURSE PRACTITIONER

## 2021-02-17 PROCEDURE — 96375 TX/PRO/DX INJ NEW DRUG ADDON: CPT

## 2021-02-17 PROCEDURE — 74011250636 HC RX REV CODE- 250/636: Performed by: NURSE PRACTITIONER

## 2021-02-17 PROCEDURE — G0498 CHEMO EXTEND IV INFUS W/PUMP: HCPCS

## 2021-02-17 PROCEDURE — 96367 TX/PROPH/DG ADDL SEQ IV INF: CPT

## 2021-02-17 RX ORDER — FLUOROURACIL 50 MG/ML
400 INJECTION, SOLUTION INTRAVENOUS ONCE
Status: COMPLETED | OUTPATIENT
Start: 2021-02-17 | End: 2021-02-17

## 2021-02-17 RX ORDER — ONDANSETRON 2 MG/ML
8 INJECTION INTRAMUSCULAR; INTRAVENOUS ONCE
Status: COMPLETED | OUTPATIENT
Start: 2021-02-17 | End: 2021-02-17

## 2021-02-17 RX ORDER — SODIUM CHLORIDE 0.9 % (FLUSH) 0.9 %
10 SYRINGE (ML) INJECTION AS NEEDED
Status: ACTIVE | OUTPATIENT
Start: 2021-02-17 | End: 2021-02-17

## 2021-02-17 RX ORDER — DEXTROSE MONOHYDRATE 50 MG/ML
25 INJECTION, SOLUTION INTRAVENOUS CONTINUOUS
Status: ACTIVE | OUTPATIENT
Start: 2021-02-17 | End: 2021-02-17

## 2021-02-17 RX ADMIN — FLUOROURACIL 4000 MG: 50 INJECTION, SOLUTION INTRAVENOUS at 13:22

## 2021-02-17 RX ADMIN — ONDANSETRON 8 MG: 2 INJECTION INTRAMUSCULAR; INTRAVENOUS at 10:40

## 2021-02-17 RX ADMIN — DEXAMETHASONE SODIUM PHOSPHATE 12 MG: 4 INJECTION, SOLUTION INTRAMUSCULAR; INTRAVENOUS at 10:42

## 2021-02-17 RX ADMIN — OXALIPLATIN 148 MG: 5 INJECTION, SOLUTION, CONCENTRATE INTRAVENOUS at 11:15

## 2021-02-17 RX ADMIN — DEXTROSE MONOHYDRATE 696 MG: 50 INJECTION, SOLUTION INTRAVENOUS at 11:15

## 2021-02-17 RX ADMIN — FLUOROURACIL 696 MG: 50 INJECTION, SOLUTION INTRAVENOUS at 13:27

## 2021-02-17 RX ADMIN — Medication 10 ML: at 13:15

## 2021-02-17 RX ADMIN — DEXTROSE MONOHYDRATE 25 ML/HR: 5 INJECTION, SOLUTION INTRAVENOUS at 11:02

## 2021-02-17 NOTE — PROGRESS NOTES
Pt arrived ambulatory, chemo completed, 5FU pump connected and unclamped, pt discharged home aware of appointment at 4pm on Friday

## 2021-02-19 ENCOUNTER — HOSPITAL ENCOUNTER (OUTPATIENT)
Dept: INFUSION THERAPY | Age: 71
Discharge: HOME | End: 2021-02-19
Payer: MEDICARE

## 2021-02-19 VITALS
TEMPERATURE: 97 F | SYSTOLIC BLOOD PRESSURE: 134 MMHG | DIASTOLIC BLOOD PRESSURE: 62 MMHG | HEART RATE: 95 BPM | OXYGEN SATURATION: 97 % | RESPIRATION RATE: 16 BRPM

## 2021-02-19 DIAGNOSIS — C20 RECTAL ADENOCARCINOMA (HCC): Primary | ICD-10-CM

## 2021-02-19 PROCEDURE — 96523 IRRIG DRUG DELIVERY DEVICE: CPT

## 2021-02-19 RX ORDER — SODIUM CHLORIDE 9 MG/ML
10 INJECTION INTRAMUSCULAR; INTRAVENOUS; SUBCUTANEOUS AS NEEDED
Status: DISCONTINUED | OUTPATIENT
Start: 2021-02-19 | End: 2021-02-20 | Stop reason: HOSPADM

## 2021-02-19 RX ADMIN — SODIUM CHLORIDE 10 ML: 9 INJECTION INTRAMUSCULAR; INTRAVENOUS; SUBCUTANEOUS at 16:10

## 2021-02-19 NOTE — PROGRESS NOTES
Arrived to the [facility]. DC pump completed. Provided education on chemo side effects. Patient instructed to report any side affects to ordering provider. Patient tolerated well. Any issues or concerns during appointment: patient reported increased hand tinging and facial tingling with this cycle that has since subsided. Patient aware of next infusion appointment on 3/3 at 11:30am.  Discharged ambulatory to home.

## 2021-03-02 ENCOUNTER — HOSPITAL ENCOUNTER (OUTPATIENT)
Dept: LAB | Age: 71
Discharge: HOME | End: 2021-03-02
Payer: MEDICARE

## 2021-03-02 DIAGNOSIS — E78.2 MIXED HYPERLIPIDEMIA: ICD-10-CM

## 2021-03-02 DIAGNOSIS — R80.9 CONTROLLED TYPE 2 DIABETES MELLITUS WITH MICROALBUMINURIA, WITHOUT LONG-TERM CURRENT USE OF INSULIN (HCC): ICD-10-CM

## 2021-03-02 DIAGNOSIS — E11.29 CONTROLLED TYPE 2 DIABETES MELLITUS WITH MICROALBUMINURIA, WITHOUT LONG-TERM CURRENT USE OF INSULIN (HCC): ICD-10-CM

## 2021-03-02 DIAGNOSIS — C20 RECTAL ADENOCARCINOMA (HCC): ICD-10-CM

## 2021-03-02 LAB
ALBUMIN SERPL-MCNC: 3.4 G/DL (ref 3.2–4.6)
ALBUMIN/GLOB SERPL: 1 {RATIO} (ref 1.2–3.5)
ALP SERPL-CCNC: 94 U/L (ref 50–136)
ALT SERPL-CCNC: 25 U/L (ref 12–65)
ANION GAP SERPL CALC-SCNC: 9 MMOL/L (ref 7–16)
AST SERPL-CCNC: 16 U/L (ref 15–37)
BASOPHILS # BLD: 0 K/UL (ref 0–0.2)
BASOPHILS NFR BLD: 1 % (ref 0–2)
BILIRUB SERPL-MCNC: 0.3 MG/DL (ref 0.2–1.1)
BUN SERPL-MCNC: 6 MG/DL (ref 8–23)
CALCIUM SERPL-MCNC: 8.5 MG/DL (ref 8.3–10.4)
CEA SERPL-MCNC: 3.7 NG/ML (ref 0–3)
CHLORIDE SERPL-SCNC: 107 MMOL/L (ref 98–107)
CHOLEST SERPL-MCNC: 178 MG/DL
CO2 SERPL-SCNC: 26 MMOL/L (ref 21–32)
CREAT SERPL-MCNC: 0.9 MG/DL (ref 0.6–1)
DIFFERENTIAL METHOD BLD: ABNORMAL
EOSINOPHIL # BLD: 0.2 K/UL (ref 0–0.8)
EOSINOPHIL NFR BLD: 5 % (ref 0.5–7.8)
ERYTHROCYTE [DISTWIDTH] IN BLOOD BY AUTOMATED COUNT: 13.1 % (ref 11.9–14.6)
EST. AVERAGE GLUCOSE BLD GHB EST-MCNC: 151 MG/DL
GLOBULIN SER CALC-MCNC: 3.4 G/DL (ref 2.3–3.5)
GLUCOSE SERPL-MCNC: 176 MG/DL (ref 65–100)
HBA1C MFR BLD: 6.9 % (ref 4.2–6.3)
HCT VFR BLD AUTO: 37.2 % (ref 35.8–46.3)
HDLC SERPL-MCNC: 45 MG/DL (ref 40–60)
HDLC SERPL: 4 {RATIO}
HGB BLD-MCNC: 12.8 G/DL (ref 11.7–15.4)
IMM GRANULOCYTES # BLD AUTO: 0.1 K/UL (ref 0–0.5)
IMM GRANULOCYTES NFR BLD AUTO: 1 % (ref 0–5)
LDLC SERPL CALC-MCNC: ABNORMAL MG/DL
LDLC SERPL DIRECT ASSAY-MCNC: 83 MG/DL
LIPID PROFILE,FLP: ABNORMAL
LYMPHOCYTES # BLD: 0.6 K/UL (ref 0.5–4.6)
LYMPHOCYTES NFR BLD: 13 % (ref 13–44)
MAGNESIUM SERPL-MCNC: 1.5 MG/DL (ref 1.8–2.4)
MCH RBC QN AUTO: 30.2 PG (ref 26.1–32.9)
MCHC RBC AUTO-ENTMCNC: 34.4 G/DL (ref 31.4–35)
MCV RBC AUTO: 87.7 FL (ref 79.6–97.8)
MONOCYTES # BLD: 0.6 K/UL (ref 0.1–1.3)
MONOCYTES NFR BLD: 14 % (ref 4–12)
NEUTS SEG # BLD: 2.9 K/UL (ref 1.7–8.2)
NEUTS SEG NFR BLD: 66 % (ref 43–78)
NRBC # BLD: 0 K/UL (ref 0–0.2)
PLATELET # BLD AUTO: 261 K/UL (ref 150–450)
PMV BLD AUTO: 9.9 FL (ref 9.4–12.3)
POTASSIUM SERPL-SCNC: 3.1 MMOL/L (ref 3.5–5.1)
PROT SERPL-MCNC: 6.8 G/DL (ref 6.3–8.2)
RBC # BLD AUTO: 4.24 M/UL (ref 4.05–5.25)
SODIUM SERPL-SCNC: 142 MMOL/L (ref 136–145)
TRIGL SERPL-MCNC: 545 MG/DL (ref 35–150)
VLDLC SERPL CALC-MCNC: 109 MG/DL (ref 6–23)
WBC # BLD AUTO: 4.4 K/UL (ref 4.3–11.1)

## 2021-03-02 PROCEDURE — 83036 HEMOGLOBIN GLYCOSYLATED A1C: CPT

## 2021-03-02 PROCEDURE — 83721 ASSAY OF BLOOD LIPOPROTEIN: CPT

## 2021-03-02 PROCEDURE — 82378 CARCINOEMBRYONIC ANTIGEN: CPT

## 2021-03-02 PROCEDURE — 83735 ASSAY OF MAGNESIUM: CPT

## 2021-03-02 PROCEDURE — 36415 COLL VENOUS BLD VENIPUNCTURE: CPT

## 2021-03-02 PROCEDURE — 85025 COMPLETE CBC W/AUTO DIFF WBC: CPT

## 2021-03-02 PROCEDURE — 80061 LIPID PANEL: CPT

## 2021-03-02 PROCEDURE — 80053 COMPREHEN METABOLIC PANEL: CPT

## 2021-03-03 ENCOUNTER — PATIENT OUTREACH (OUTPATIENT)
Dept: CASE MANAGEMENT | Age: 71
End: 2021-03-03

## 2021-03-03 ENCOUNTER — HOSPITAL ENCOUNTER (OUTPATIENT)
Dept: INFUSION THERAPY | Age: 71
Discharge: HOME | End: 2021-03-03
Payer: MEDICARE

## 2021-03-03 VITALS
SYSTOLIC BLOOD PRESSURE: 135 MMHG | BODY MASS INDEX: 29.51 KG/M2 | OXYGEN SATURATION: 97 % | DIASTOLIC BLOOD PRESSURE: 64 MMHG | WEIGHT: 156.2 LBS | TEMPERATURE: 96.9 F | HEART RATE: 83 BPM | RESPIRATION RATE: 16 BRPM

## 2021-03-03 DIAGNOSIS — C20 RECTAL ADENOCARCINOMA (HCC): Primary | ICD-10-CM

## 2021-03-03 PROCEDURE — 74011000258 HC RX REV CODE- 258: Performed by: INTERNAL MEDICINE

## 2021-03-03 PROCEDURE — 96375 TX/PRO/DX INJ NEW DRUG ADDON: CPT

## 2021-03-03 PROCEDURE — 96415 CHEMO IV INFUSION ADDL HR: CPT

## 2021-03-03 PROCEDURE — 96411 CHEMO IV PUSH ADDL DRUG: CPT

## 2021-03-03 PROCEDURE — 96368 THER/DIAG CONCURRENT INF: CPT

## 2021-03-03 PROCEDURE — G0498 CHEMO EXTEND IV INFUS W/PUMP: HCPCS

## 2021-03-03 PROCEDURE — 74011250636 HC RX REV CODE- 250/636: Performed by: INTERNAL MEDICINE

## 2021-03-03 PROCEDURE — 96413 CHEMO IV INFUSION 1 HR: CPT

## 2021-03-03 RX ORDER — POTASSIUM CHLORIDE 14.9 MG/ML
20 INJECTION INTRAVENOUS ONCE
Status: COMPLETED | OUTPATIENT
Start: 2021-03-03 | End: 2021-03-03

## 2021-03-03 RX ORDER — ONDANSETRON 2 MG/ML
8 INJECTION INTRAMUSCULAR; INTRAVENOUS ONCE
Status: COMPLETED | OUTPATIENT
Start: 2021-03-03 | End: 2021-03-03

## 2021-03-03 RX ORDER — POTASSIUM CHLORIDE 29.8 MG/ML
20 INJECTION INTRAVENOUS ONCE
Status: DISCONTINUED | OUTPATIENT
Start: 2021-03-03 | End: 2021-03-03

## 2021-03-03 RX ORDER — FLUOROURACIL 50 MG/ML
500 INJECTION, SOLUTION INTRAVENOUS ONCE
Status: COMPLETED | OUTPATIENT
Start: 2021-03-03 | End: 2021-03-03

## 2021-03-03 RX ORDER — DEXTROSE MONOHYDRATE 50 MG/ML
25 INJECTION, SOLUTION INTRAVENOUS CONTINUOUS
Status: ACTIVE | OUTPATIENT
Start: 2021-03-03 | End: 2021-03-03

## 2021-03-03 RX ORDER — SODIUM CHLORIDE 0.9 % (FLUSH) 0.9 %
10 SYRINGE (ML) INJECTION AS NEEDED
Status: ACTIVE | OUTPATIENT
Start: 2021-03-03 | End: 2021-03-03

## 2021-03-03 RX ADMIN — DEXTROSE MONOHYDRATE 25 ML/HR: 5 INJECTION, SOLUTION INTRAVENOUS at 11:51

## 2021-03-03 RX ADMIN — ONDANSETRON 8 MG: 2 INJECTION INTRAMUSCULAR; INTRAVENOUS at 12:20

## 2021-03-03 RX ADMIN — FLUOROURACIL 500 MG: 50 INJECTION, SOLUTION INTRAVENOUS at 15:25

## 2021-03-03 RX ADMIN — OXALIPLATIN 114 MG: 5 INJECTION, SOLUTION INTRAVENOUS at 13:10

## 2021-03-03 RX ADMIN — MAGNESIUM SULFATE HEPTAHYDRATE 3 G: 500 INJECTION, SOLUTION INTRAMUSCULAR; INTRAVENOUS at 13:00

## 2021-03-03 RX ADMIN — DEXAMETHASONE SODIUM PHOSPHATE 12 MG: 4 INJECTION, SOLUTION INTRAMUSCULAR; INTRAVENOUS at 12:21

## 2021-03-03 RX ADMIN — FLUOROURACIL 3412.5 MG: 50 INJECTION, SOLUTION INTRAVENOUS at 15:30

## 2021-03-03 RX ADMIN — POTASSIUM CHLORIDE 20 MEQ: 14.9 INJECTION, SOLUTION INTRAVENOUS at 13:00

## 2021-03-03 RX ADMIN — Medication 10 ML: at 11:45

## 2021-03-03 RX ADMIN — LEUCOVORIN CALCIUM 700 MG: 350 INJECTION, POWDER, LYOPHILIZED, FOR SOLUTION INTRAMUSCULAR; INTRAVENOUS at 13:10

## 2021-03-03 NOTE — PROGRESS NOTES
Arrived to the Infusion Center.  Chemo completed. Patient tolerated well. Continuous chemo pump connected to patient and instructions reviewed.  Any issues or concerns during appointment: None.  Patient aware of next infusion appointment on 3/5 (date) at 1600 (time).  Discharged ambulatory in stable condition.

## 2021-03-03 NOTE — PROGRESS NOTES
Saw patient today with Dr Alex Pineda prior to C3 FOLFOX. Pt states she had more acid like symptoms and more nausea this patient treatment. Pt also stated her arms were weak and she felt tingling up and down her arms. We discussed taking anti-nausea meds and Pepcid daily. Pt will start Carafate 4 times a day and we will reduce chemo by 20%. Refill Oxycodone. Nurse navigation will be following.   Pt encouraged to be calling office with any uncontrolled symptoms

## 2021-03-05 ENCOUNTER — HOSPITAL ENCOUNTER (OUTPATIENT)
Dept: INFUSION THERAPY | Age: 71
Discharge: HOME | End: 2021-03-05
Payer: MEDICARE

## 2021-03-05 VITALS
TEMPERATURE: 98 F | DIASTOLIC BLOOD PRESSURE: 70 MMHG | RESPIRATION RATE: 18 BRPM | HEART RATE: 84 BPM | SYSTOLIC BLOOD PRESSURE: 129 MMHG | OXYGEN SATURATION: 100 %

## 2021-03-05 DIAGNOSIS — C20 RECTAL ADENOCARCINOMA (HCC): Primary | ICD-10-CM

## 2021-03-05 PROCEDURE — 96523 IRRIG DRUG DELIVERY DEVICE: CPT

## 2021-03-05 RX ORDER — SODIUM CHLORIDE 0.9 % (FLUSH) 0.9 %
10 SYRINGE (ML) INJECTION AS NEEDED
Status: DISCONTINUED | OUTPATIENT
Start: 2021-03-05 | End: 2021-03-06 | Stop reason: HOSPADM

## 2021-03-05 RX ORDER — ONDANSETRON 2 MG/ML
8 INJECTION INTRAMUSCULAR; INTRAVENOUS ONCE
Status: DISCONTINUED | OUTPATIENT
Start: 2021-03-05 | End: 2021-03-06 | Stop reason: HOSPADM

## 2021-03-05 RX ORDER — SODIUM CHLORIDE 9 MG/ML
1000 INJECTION, SOLUTION INTRAVENOUS CONTINUOUS
Status: DISCONTINUED | OUTPATIENT
Start: 2021-03-05 | End: 2021-03-07 | Stop reason: HOSPADM

## 2021-03-05 RX ADMIN — Medication 10 ML: at 16:00

## 2021-03-05 NOTE — PROGRESS NOTES
Arrived to the [facility]. Adrucil pump completed. Provided education on use of antiemetics    Patient instructed to report any side affects to ordering provider. Patient tolerated without problems. Any issues or concerns during appointment: no.  Patient aware of next infusion appointment on 3/17/21 (date) at 36 (time). Discharged ambulatory.

## 2021-03-16 ENCOUNTER — HOSPITAL ENCOUNTER (OUTPATIENT)
Dept: LAB | Age: 71
Discharge: HOME | End: 2021-03-16
Payer: MEDICARE

## 2021-03-16 ENCOUNTER — PATIENT OUTREACH (OUTPATIENT)
Dept: CASE MANAGEMENT | Age: 71
End: 2021-03-16

## 2021-03-16 DIAGNOSIS — C20 RECTAL ADENOCARCINOMA (HCC): ICD-10-CM

## 2021-03-16 LAB
ALBUMIN SERPL-MCNC: 3.5 G/DL (ref 3.2–4.6)
ALBUMIN/GLOB SERPL: 1 {RATIO} (ref 1.2–3.5)
ALP SERPL-CCNC: 90 U/L (ref 50–136)
ALT SERPL-CCNC: 23 U/L (ref 12–65)
ANION GAP SERPL CALC-SCNC: 6 MMOL/L (ref 7–16)
AST SERPL-CCNC: 13 U/L (ref 15–37)
BASOPHILS # BLD: 0 K/UL (ref 0–0.2)
BASOPHILS NFR BLD: 1 % (ref 0–2)
BILIRUB SERPL-MCNC: 0.5 MG/DL (ref 0.2–1.1)
BUN SERPL-MCNC: 10 MG/DL (ref 8–23)
CALCIUM SERPL-MCNC: 9.2 MG/DL (ref 8.3–10.4)
CHLORIDE SERPL-SCNC: 109 MMOL/L (ref 98–107)
CO2 SERPL-SCNC: 24 MMOL/L (ref 21–32)
CREAT SERPL-MCNC: 0.9 MG/DL (ref 0.6–1)
DIFFERENTIAL METHOD BLD: ABNORMAL
EOSINOPHIL # BLD: 0.2 K/UL (ref 0–0.8)
EOSINOPHIL NFR BLD: 4 % (ref 0.5–7.8)
ERYTHROCYTE [DISTWIDTH] IN BLOOD BY AUTOMATED COUNT: 14.7 % (ref 11.9–14.6)
GLOBULIN SER CALC-MCNC: 3.6 G/DL (ref 2.3–3.5)
GLUCOSE SERPL-MCNC: 245 MG/DL (ref 65–100)
HCT VFR BLD AUTO: 39.4 % (ref 35.8–46.3)
HGB BLD-MCNC: 13.2 G/DL (ref 11.7–15.4)
IMM GRANULOCYTES # BLD AUTO: 0 K/UL (ref 0–0.5)
IMM GRANULOCYTES NFR BLD AUTO: 1 % (ref 0–5)
LYMPHOCYTES # BLD: 0.4 K/UL (ref 0.5–4.6)
LYMPHOCYTES NFR BLD: 11 % (ref 13–44)
MAGNESIUM SERPL-MCNC: 1.7 MG/DL (ref 1.8–2.4)
MCH RBC QN AUTO: 30.1 PG (ref 26.1–32.9)
MCHC RBC AUTO-ENTMCNC: 33.5 G/DL (ref 31.4–35)
MCV RBC AUTO: 90 FL (ref 79.6–97.8)
MONOCYTES # BLD: 0.3 K/UL (ref 0.1–1.3)
MONOCYTES NFR BLD: 9 % (ref 4–12)
NEUTS SEG # BLD: 2.8 K/UL (ref 1.7–8.2)
NEUTS SEG NFR BLD: 75 % (ref 43–78)
NRBC # BLD: 0 K/UL (ref 0–0.2)
PLATELET # BLD AUTO: 217 K/UL (ref 150–450)
PMV BLD AUTO: 10.1 FL (ref 9.4–12.3)
POTASSIUM SERPL-SCNC: 3.7 MMOL/L (ref 3.5–5.1)
PROT SERPL-MCNC: 7.1 G/DL (ref 6.3–8.2)
RBC # BLD AUTO: 4.38 M/UL (ref 4.05–5.25)
SODIUM SERPL-SCNC: 139 MMOL/L (ref 136–145)
WBC # BLD AUTO: 3.8 K/UL (ref 4.3–11.1)

## 2021-03-16 PROCEDURE — 36415 COLL VENOUS BLD VENIPUNCTURE: CPT

## 2021-03-16 PROCEDURE — 85025 COMPLETE CBC W/AUTO DIFF WBC: CPT

## 2021-03-16 PROCEDURE — 83735 ASSAY OF MAGNESIUM: CPT

## 2021-03-16 PROCEDURE — 80053 COMPREHEN METABOLIC PANEL: CPT

## 2021-03-16 NOTE — PROGRESS NOTES
Saw patient today prior to C4 folfox. Pt states she had skin peeling at the ends of finger tips and toes but all is healed now. She is using thick creams and lotions to hands and feet like Udder Cream and will continue. She feels Carafate is working and will continue 3-4 times day.  Nurse navigation is following

## 2021-03-17 ENCOUNTER — HOSPITAL ENCOUNTER (OUTPATIENT)
Dept: INFUSION THERAPY | Age: 71
Discharge: HOME | End: 2021-03-17
Payer: MEDICARE

## 2021-03-17 VITALS
OXYGEN SATURATION: 98 % | SYSTOLIC BLOOD PRESSURE: 133 MMHG | HEART RATE: 93 BPM | DIASTOLIC BLOOD PRESSURE: 57 MMHG | RESPIRATION RATE: 18 BRPM | TEMPERATURE: 98.1 F | BODY MASS INDEX: 28.98 KG/M2 | WEIGHT: 153.4 LBS

## 2021-03-17 DIAGNOSIS — C20 RECTAL ADENOCARCINOMA (HCC): Primary | ICD-10-CM

## 2021-03-17 PROCEDURE — 74011250636 HC RX REV CODE- 250/636: Performed by: NURSE PRACTITIONER

## 2021-03-17 PROCEDURE — 96411 CHEMO IV PUSH ADDL DRUG: CPT

## 2021-03-17 PROCEDURE — 74011000258 HC RX REV CODE- 258: Performed by: NURSE PRACTITIONER

## 2021-03-17 PROCEDURE — 96415 CHEMO IV INFUSION ADDL HR: CPT

## 2021-03-17 PROCEDURE — 96375 TX/PRO/DX INJ NEW DRUG ADDON: CPT

## 2021-03-17 PROCEDURE — 96368 THER/DIAG CONCURRENT INF: CPT

## 2021-03-17 PROCEDURE — 96413 CHEMO IV INFUSION 1 HR: CPT

## 2021-03-17 PROCEDURE — G0498 CHEMO EXTEND IV INFUS W/PUMP: HCPCS

## 2021-03-17 RX ORDER — DEXTROSE MONOHYDRATE 50 MG/ML
25 INJECTION, SOLUTION INTRAVENOUS CONTINUOUS
Status: ACTIVE | OUTPATIENT
Start: 2021-03-17 | End: 2021-03-17

## 2021-03-17 RX ORDER — FLUOROURACIL 50 MG/ML
400 INJECTION, SOLUTION INTRAVENOUS ONCE
Status: COMPLETED | OUTPATIENT
Start: 2021-03-17 | End: 2021-03-17

## 2021-03-17 RX ORDER — ONDANSETRON 2 MG/ML
8 INJECTION INTRAMUSCULAR; INTRAVENOUS ONCE
Status: COMPLETED | OUTPATIENT
Start: 2021-03-17 | End: 2021-03-17

## 2021-03-17 RX ORDER — SODIUM CHLORIDE 0.9 % (FLUSH) 0.9 %
10 SYRINGE (ML) INJECTION AS NEEDED
Status: ACTIVE | OUTPATIENT
Start: 2021-03-17 | End: 2021-03-17

## 2021-03-17 RX ADMIN — FLUOROURACIL 4000 MG: 50 INJECTION, SOLUTION INTRAVENOUS at 15:55

## 2021-03-17 RX ADMIN — LEUCOVORIN CALCIUM 692 MG: 350 INJECTION, POWDER, LYOPHILIZED, FOR SOLUTION INTRAMUSCULAR; INTRAVENOUS at 13:40

## 2021-03-17 RX ADMIN — OXALIPLATIN 147 MG: 5 INJECTION, SOLUTION INTRAVENOUS at 13:40

## 2021-03-17 RX ADMIN — Medication 10 ML: at 15:55

## 2021-03-17 RX ADMIN — FLUOROURACIL 692 MG: 50 INJECTION, SOLUTION INTRAVENOUS at 15:48

## 2021-03-17 RX ADMIN — DEXTROSE MONOHYDRATE 25 ML/HR: 5 INJECTION, SOLUTION INTRAVENOUS at 12:10

## 2021-03-17 RX ADMIN — Medication 10 ML: at 12:10

## 2021-03-17 RX ADMIN — ONDANSETRON 8 MG: 2 INJECTION INTRAMUSCULAR; INTRAVENOUS at 12:33

## 2021-03-17 RX ADMIN — DEXAMETHASONE SODIUM PHOSPHATE 12 MG: 4 INJECTION, SOLUTION INTRAMUSCULAR; INTRAVENOUS at 12:35

## 2021-03-17 NOTE — ADDENDUM NOTE
Encounter addended by: Tony Orosco [facility] on: 3/17/2021 12:04 PM   Actions taken: Jose created or edited

## 2021-03-17 NOTE — PROGRESS NOTES
Patient tolerated chemotherapy well. No reactions. Fluorouracil in Elastomeric infusion pump connected to port to infuse over 46 hrs. See MAR. Patient/family instructed to notify [facility] on call number [phone] of any problems, questions or concerns with pump. Patient/family reminded to use chemotherapy precautions at home. Allowed opportunity for patient/family to ask questions. Verbalized understanding of instructions. Patient discharged ambulatory. Next appointment is 03/19/21 at 1330 with Ishaan.

## 2021-03-19 ENCOUNTER — HOSPITAL ENCOUNTER (OUTPATIENT)
Dept: INFUSION THERAPY | Age: 71
Discharge: HOME | End: 2021-03-19
Payer: MEDICARE

## 2021-03-19 VITALS
TEMPERATURE: 98.2 F | SYSTOLIC BLOOD PRESSURE: 141 MMHG | RESPIRATION RATE: 16 BRPM | DIASTOLIC BLOOD PRESSURE: 81 MMHG | HEART RATE: 95 BPM | OXYGEN SATURATION: 95 %

## 2021-03-19 DIAGNOSIS — C20 RECTAL ADENOCARCINOMA (HCC): Primary | ICD-10-CM

## 2021-03-19 PROCEDURE — 96523 IRRIG DRUG DELIVERY DEVICE: CPT

## 2021-03-19 RX ORDER — SODIUM CHLORIDE 0.9 % (FLUSH) 0.9 %
10 SYRINGE (ML) INJECTION AS NEEDED
Status: DISCONTINUED | OUTPATIENT
Start: 2021-03-19 | End: 2021-03-20 | Stop reason: HOSPADM

## 2021-03-19 RX ADMIN — Medication 10 ML: at 13:52

## 2021-03-19 NOTE — PROGRESS NOTES
Arrived to the [facility]. 5FU pump dc completed and tolerated well. Any issues or concerns during appointment: none; denies nausea or dizziness  Patient given education on process  Instructed patient to notify provider for any issues or worrisome symptoms. They verbalized understanding. Patient aware of next infusion appointment scheduled for 3/31/21 at 11am  Discharged ambulatory with self.

## 2021-03-30 ENCOUNTER — PATIENT OUTREACH (OUTPATIENT)
Dept: CASE MANAGEMENT | Age: 71
End: 2021-03-30

## 2021-03-30 ENCOUNTER — HOSPITAL ENCOUNTER (OUTPATIENT)
Dept: LAB | Age: 71
Discharge: HOME | End: 2021-03-30
Payer: MEDICARE

## 2021-03-30 DIAGNOSIS — C20 RECTAL ADENOCARCINOMA (HCC): ICD-10-CM

## 2021-03-30 DIAGNOSIS — Z79.899 HIGH RISK MEDICATION USE: ICD-10-CM

## 2021-03-30 PROBLEM — E87.6 HYPOKALEMIA: Status: ACTIVE | Noted: 2021-03-30

## 2021-03-30 LAB
ALBUMIN SERPL-MCNC: 3.7 G/DL (ref 3.2–4.6)
ALBUMIN/GLOB SERPL: 1.1 {RATIO} (ref 1.2–3.5)
ALP SERPL-CCNC: 82 U/L (ref 50–136)
ALT SERPL-CCNC: 22 U/L (ref 12–65)
ANION GAP SERPL CALC-SCNC: 7 MMOL/L (ref 7–16)
AST SERPL-CCNC: 15 U/L (ref 15–37)
BASOPHILS # BLD: 0 K/UL (ref 0–0.2)
BASOPHILS NFR BLD: 1 % (ref 0–2)
BILIRUB SERPL-MCNC: 0.5 MG/DL (ref 0.2–1.1)
BUN SERPL-MCNC: 7 MG/DL (ref 8–23)
CALCIUM SERPL-MCNC: 9.6 MG/DL (ref 8.3–10.4)
CEA SERPL-MCNC: 3 NG/ML (ref 0–3)
CHLORIDE SERPL-SCNC: 107 MMOL/L (ref 98–107)
CO2 SERPL-SCNC: 25 MMOL/L (ref 21–32)
CREAT SERPL-MCNC: 0.8 MG/DL (ref 0.6–1)
DIFFERENTIAL METHOD BLD: ABNORMAL
EOSINOPHIL # BLD: 0.1 K/UL (ref 0–0.8)
EOSINOPHIL NFR BLD: 3 % (ref 0.5–7.8)
ERYTHROCYTE [DISTWIDTH] IN BLOOD BY AUTOMATED COUNT: 16.8 % (ref 11.9–14.6)
GLOBULIN SER CALC-MCNC: 3.5 G/DL (ref 2.3–3.5)
GLUCOSE SERPL-MCNC: 184 MG/DL (ref 65–100)
HCT VFR BLD AUTO: 38.5 % (ref 35.8–46.3)
HGB BLD-MCNC: 13.3 G/DL (ref 11.7–15.4)
IMM GRANULOCYTES # BLD AUTO: 0 K/UL (ref 0–0.5)
IMM GRANULOCYTES NFR BLD AUTO: 0 % (ref 0–5)
LYMPHOCYTES # BLD: 0.3 K/UL (ref 0.5–4.6)
LYMPHOCYTES NFR BLD: 9 % (ref 13–44)
MAGNESIUM SERPL-MCNC: 1.8 MG/DL (ref 1.8–2.4)
MCH RBC QN AUTO: 30.9 PG (ref 26.1–32.9)
MCHC RBC AUTO-ENTMCNC: 34.5 G/DL (ref 31.4–35)
MCV RBC AUTO: 89.5 FL (ref 79.6–97.8)
MONOCYTES # BLD: 0.5 K/UL (ref 0.1–1.3)
MONOCYTES NFR BLD: 13 % (ref 4–12)
NEUTS SEG # BLD: 2.8 K/UL (ref 1.7–8.2)
NEUTS SEG NFR BLD: 73 % (ref 43–78)
NRBC # BLD: 0 K/UL (ref 0–0.2)
PLATELET # BLD AUTO: 220 K/UL (ref 150–450)
PMV BLD AUTO: 10 FL (ref 9.4–12.3)
POTASSIUM SERPL-SCNC: 3.3 MMOL/L (ref 3.5–5.1)
PROT SERPL-MCNC: 7.2 G/DL (ref 6.3–8.2)
RBC # BLD AUTO: 4.3 M/UL (ref 4.05–5.2)
SODIUM SERPL-SCNC: 139 MMOL/L (ref 136–145)
T4 FREE SERPL-MCNC: 1.1 NG/DL (ref 0.78–1.46)
TSH SERPL DL<=0.005 MIU/L-ACNC: 0.98 UIU/ML (ref 0.36–3.74)
WBC # BLD AUTO: 3.7 K/UL (ref 4.3–11.1)

## 2021-03-30 PROCEDURE — 36415 COLL VENOUS BLD VENIPUNCTURE: CPT

## 2021-03-30 PROCEDURE — 80053 COMPREHEN METABOLIC PANEL: CPT

## 2021-03-30 PROCEDURE — 85025 COMPLETE CBC W/AUTO DIFF WBC: CPT

## 2021-03-30 PROCEDURE — 83735 ASSAY OF MAGNESIUM: CPT

## 2021-03-30 PROCEDURE — 84439 ASSAY OF FREE THYROXINE: CPT

## 2021-03-30 PROCEDURE — 82378 CARCINOEMBRYONIC ANTIGEN: CPT

## 2021-03-30 PROCEDURE — 84443 ASSAY THYROID STIM HORMONE: CPT

## 2021-03-30 NOTE — PROGRESS NOTES
I saw patient today with Douglas Old prior to B6Oadsfc. She states dryness in her hands and fingertips have improved since last cycle. Pt will continue to use lotions on hands and feet. Pt otherwise states she is doing well and not complaining.  Nurse navigation is following

## 2021-03-31 ENCOUNTER — HOSPITAL ENCOUNTER (OUTPATIENT)
Dept: INFUSION THERAPY | Age: 71
Discharge: HOME | End: 2021-03-31
Payer: MEDICARE

## 2021-03-31 DIAGNOSIS — C20 RECTAL ADENOCARCINOMA (HCC): ICD-10-CM

## 2021-03-31 DIAGNOSIS — E87.6 HYPOKALEMIA: Primary | ICD-10-CM

## 2021-03-31 PROBLEM — Z92.3 HISTORY OF THERAPEUTIC RADIATION: Status: ACTIVE | Noted: 2021-03-31

## 2021-03-31 PROCEDURE — 96368 THER/DIAG CONCURRENT INF: CPT

## 2021-03-31 PROCEDURE — 74011250636 HC RX REV CODE- 250/636: Performed by: NURSE PRACTITIONER

## 2021-03-31 PROCEDURE — G0498 CHEMO EXTEND IV INFUS W/PUMP: HCPCS

## 2021-03-31 PROCEDURE — 96411 CHEMO IV PUSH ADDL DRUG: CPT

## 2021-03-31 PROCEDURE — 96415 CHEMO IV INFUSION ADDL HR: CPT

## 2021-03-31 PROCEDURE — 74011000258 HC RX REV CODE- 258: Performed by: NURSE PRACTITIONER

## 2021-03-31 PROCEDURE — 96375 TX/PRO/DX INJ NEW DRUG ADDON: CPT

## 2021-03-31 PROCEDURE — 74011250637 HC RX REV CODE- 250/637: Performed by: NURSE PRACTITIONER

## 2021-03-31 PROCEDURE — 96413 CHEMO IV INFUSION 1 HR: CPT

## 2021-03-31 RX ORDER — POTASSIUM CHLORIDE 750 MG/1
40 TABLET, EXTENDED RELEASE ORAL
Status: COMPLETED | OUTPATIENT
Start: 2021-03-31 | End: 2021-03-31

## 2021-03-31 RX ORDER — DEXTROSE MONOHYDRATE 50 MG/ML
25 INJECTION, SOLUTION INTRAVENOUS CONTINUOUS
Status: DISCONTINUED | OUTPATIENT
Start: 2021-03-31 | End: 2021-04-01 | Stop reason: HOSPADM

## 2021-03-31 RX ORDER — ONDANSETRON 2 MG/ML
8 INJECTION INTRAMUSCULAR; INTRAVENOUS ONCE
Status: COMPLETED | OUTPATIENT
Start: 2021-03-31 | End: 2021-03-31

## 2021-03-31 RX ORDER — SODIUM CHLORIDE 0.9 % (FLUSH) 0.9 %
10 SYRINGE (ML) INJECTION AS NEEDED
Status: DISCONTINUED | OUTPATIENT
Start: 2021-03-31 | End: 2021-04-01 | Stop reason: HOSPADM

## 2021-03-31 RX ORDER — FLUOROURACIL 50 MG/ML
400 INJECTION, SOLUTION INTRAVENOUS ONCE
Status: COMPLETED | OUTPATIENT
Start: 2021-03-31 | End: 2021-03-31

## 2021-03-31 RX ADMIN — FLUOROURACIL 4000 MG: 50 INJECTION, SOLUTION INTRAVENOUS at 17:25

## 2021-03-31 RX ADMIN — DEXAMETHASONE SODIUM PHOSPHATE 12 MG: 4 INJECTION, SOLUTION INTRAMUSCULAR; INTRAVENOUS at 14:25

## 2021-03-31 RX ADMIN — LEUCOVORIN CALCIUM 696 MG: 350 INJECTION, POWDER, LYOPHILIZED, FOR SOLUTION INTRAMUSCULAR; INTRAVENOUS at 15:11

## 2021-03-31 RX ADMIN — FLUOROURACIL 696 MG: 50 INJECTION, SOLUTION INTRAVENOUS at 17:20

## 2021-03-31 RX ADMIN — OXALIPLATIN 148 MG: 5 INJECTION, SOLUTION INTRAVENOUS at 15:11

## 2021-03-31 RX ADMIN — Medication 10 ML: at 13:49

## 2021-03-31 RX ADMIN — ONDANSETRON 8 MG: 2 INJECTION INTRAMUSCULAR; INTRAVENOUS at 14:21

## 2021-03-31 RX ADMIN — POTASSIUM CHLORIDE 40 MEQ: 750 TABLET, EXTENDED RELEASE ORAL at 14:21

## 2021-03-31 RX ADMIN — DEXTROSE MONOHYDRATE 25 ML/HR: 5 INJECTION, SOLUTION INTRAVENOUS at 13:50

## 2021-03-31 NOTE — PROGRESS NOTES
[facility] Chemo completed. Patient tolerated well. Continous chemo pump connected and instructions reviewed. Any issues or concerns during appointment: None. Patient aware of next infusion appointment on 4/2 (date) at  (time). Discharged ambulatory in stable condition.

## 2021-04-02 ENCOUNTER — HOSPITAL ENCOUNTER (OUTPATIENT)
Dept: INFUSION THERAPY | Age: 71
Discharge: HOME | End: 2021-04-02
Payer: MEDICARE

## 2021-04-02 VITALS
TEMPERATURE: 98.1 F | DIASTOLIC BLOOD PRESSURE: 61 MMHG | HEART RATE: 87 BPM | OXYGEN SATURATION: 97 % | SYSTOLIC BLOOD PRESSURE: 119 MMHG | RESPIRATION RATE: 18 BRPM

## 2021-04-02 DIAGNOSIS — E87.6 HYPOKALEMIA: Primary | ICD-10-CM

## 2021-04-02 DIAGNOSIS — C20 RECTAL ADENOCARCINOMA (HCC): ICD-10-CM

## 2021-04-02 PROCEDURE — 96523 IRRIG DRUG DELIVERY DEVICE: CPT

## 2021-04-02 RX ORDER — SODIUM CHLORIDE 0.9 % (FLUSH) 0.9 %
10 SYRINGE (ML) INJECTION AS NEEDED
Status: DISCONTINUED | OUTPATIENT
Start: 2021-04-02 | End: 2021-04-03 | Stop reason: HOSPADM

## 2021-04-02 RX ADMIN — Medication 10 ML: at 15:53

## 2021-04-02 NOTE — PROGRESS NOTES
Arrived to the [facility]. Chemotherapy pump completed and disconnected. Patient tolerated well. Pt denies any complaints. Any issues or concerns during appointment: none. Patient aware of next infusion appointment on 4-14-21 (date) at 36 (time). Discharged via ambulatory.

## 2021-04-13 ENCOUNTER — HOSPITAL ENCOUNTER (OUTPATIENT)
Dept: LAB | Age: 71
Discharge: HOME | End: 2021-04-13
Payer: MEDICARE

## 2021-04-13 DIAGNOSIS — C20 RECTAL ADENOCARCINOMA (HCC): ICD-10-CM

## 2021-04-13 LAB
ALBUMIN SERPL-MCNC: 3.7 G/DL (ref 3.2–4.6)
ALBUMIN/GLOB SERPL: 1 {RATIO} (ref 1.2–3.5)
ALP SERPL-CCNC: 102 U/L (ref 50–136)
ALT SERPL-CCNC: 28 U/L (ref 12–65)
ANION GAP SERPL CALC-SCNC: 5 MMOL/L (ref 7–16)
AST SERPL-CCNC: 22 U/L (ref 15–37)
BASOPHILS # BLD: 0.1 K/UL (ref 0–0.2)
BASOPHILS NFR BLD: 2 % (ref 0–2)
BILIRUB SERPL-MCNC: 0.6 MG/DL (ref 0.2–1.1)
BUN SERPL-MCNC: 7 MG/DL (ref 8–23)
CALCIUM SERPL-MCNC: 9.2 MG/DL (ref 8.3–10.4)
CEA SERPL-MCNC: 4.4 NG/ML (ref 0–3)
CHLORIDE SERPL-SCNC: 106 MMOL/L (ref 98–107)
CO2 SERPL-SCNC: 27 MMOL/L (ref 21–32)
CREAT SERPL-MCNC: 1 MG/DL (ref 0.6–1)
DIFFERENTIAL METHOD BLD: ABNORMAL
EOSINOPHIL # BLD: 0.1 K/UL (ref 0–0.8)
EOSINOPHIL NFR BLD: 3 % (ref 0.5–7.8)
ERYTHROCYTE [DISTWIDTH] IN BLOOD BY AUTOMATED COUNT: 18.4 % (ref 11.9–14.6)
GLOBULIN SER CALC-MCNC: 3.6 G/DL (ref 2.3–3.5)
GLUCOSE SERPL-MCNC: 189 MG/DL (ref 65–100)
HCT VFR BLD AUTO: 39.1 % (ref 35.8–46.3)
HGB BLD-MCNC: 13.1 G/DL (ref 11.7–15.4)
IMM GRANULOCYTES # BLD AUTO: 0 K/UL (ref 0–0.5)
IMM GRANULOCYTES NFR BLD AUTO: 1 % (ref 0–5)
LYMPHOCYTES # BLD: 0.3 K/UL (ref 0.5–4.6)
LYMPHOCYTES NFR BLD: 10 % (ref 13–44)
MAGNESIUM SERPL-MCNC: 1.6 MG/DL (ref 1.8–2.4)
MCH RBC QN AUTO: 31.5 PG (ref 26.1–32.9)
MCHC RBC AUTO-ENTMCNC: 33.5 G/DL (ref 31.4–35)
MCV RBC AUTO: 94 FL (ref 79.6–97.8)
MONOCYTES # BLD: 0.6 K/UL (ref 0.1–1.3)
MONOCYTES NFR BLD: 18 % (ref 4–12)
NEUTS SEG # BLD: 2.3 K/UL (ref 1.7–8.2)
NEUTS SEG NFR BLD: 66 % (ref 43–78)
NRBC # BLD: 0 K/UL (ref 0–0.2)
PLATELET # BLD AUTO: 220 K/UL (ref 150–450)
PMV BLD AUTO: 10.4 FL (ref 9.4–12.3)
POTASSIUM SERPL-SCNC: 3.6 MMOL/L (ref 3.5–5.1)
PROT SERPL-MCNC: 7.3 G/DL (ref 6.3–8.2)
RBC # BLD AUTO: 4.16 M/UL (ref 4.05–5.2)
SODIUM SERPL-SCNC: 138 MMOL/L (ref 136–145)
WBC # BLD AUTO: 3.4 K/UL (ref 4.3–11.1)

## 2021-04-13 PROCEDURE — 85025 COMPLETE CBC W/AUTO DIFF WBC: CPT

## 2021-04-13 PROCEDURE — 36415 COLL VENOUS BLD VENIPUNCTURE: CPT

## 2021-04-13 PROCEDURE — 82378 CARCINOEMBRYONIC ANTIGEN: CPT

## 2021-04-13 PROCEDURE — 80053 COMPREHEN METABOLIC PANEL: CPT

## 2021-04-13 PROCEDURE — 83735 ASSAY OF MAGNESIUM: CPT

## 2021-04-14 ENCOUNTER — HOSPITAL ENCOUNTER (OUTPATIENT)
Dept: INFUSION THERAPY | Age: 71
Discharge: HOME | End: 2021-04-14
Payer: MEDICARE

## 2021-04-14 ENCOUNTER — PATIENT OUTREACH (OUTPATIENT)
Dept: CASE MANAGEMENT | Age: 71
End: 2021-04-14

## 2021-04-14 VITALS
OXYGEN SATURATION: 96 % | BODY MASS INDEX: 28.95 KG/M2 | SYSTOLIC BLOOD PRESSURE: 135 MMHG | DIASTOLIC BLOOD PRESSURE: 54 MMHG | HEART RATE: 92 BPM | TEMPERATURE: 98.2 F | RESPIRATION RATE: 18 BRPM | WEIGHT: 153.2 LBS

## 2021-04-14 DIAGNOSIS — C20 RECTAL ADENOCARCINOMA (HCC): ICD-10-CM

## 2021-04-14 DIAGNOSIS — E87.6 HYPOKALEMIA: Primary | ICD-10-CM

## 2021-04-14 DIAGNOSIS — K62.89 RECTAL MASS: ICD-10-CM

## 2021-04-14 PROCEDURE — 96413 CHEMO IV INFUSION 1 HR: CPT

## 2021-04-14 PROCEDURE — 96411 CHEMO IV PUSH ADDL DRUG: CPT

## 2021-04-14 PROCEDURE — 74011250636 HC RX REV CODE- 250/636: Performed by: INTERNAL MEDICINE

## 2021-04-14 PROCEDURE — G0498 CHEMO EXTEND IV INFUS W/PUMP: HCPCS

## 2021-04-14 PROCEDURE — 96415 CHEMO IV INFUSION ADDL HR: CPT

## 2021-04-14 PROCEDURE — 96368 THER/DIAG CONCURRENT INF: CPT

## 2021-04-14 PROCEDURE — 74011000258 HC RX REV CODE- 258: Performed by: INTERNAL MEDICINE

## 2021-04-14 PROCEDURE — 96375 TX/PRO/DX INJ NEW DRUG ADDON: CPT

## 2021-04-14 RX ORDER — DEXTROSE MONOHYDRATE 50 MG/ML
25 INJECTION, SOLUTION INTRAVENOUS CONTINUOUS
Status: ACTIVE | OUTPATIENT
Start: 2021-04-14 | End: 2021-04-14

## 2021-04-14 RX ORDER — ONDANSETRON 2 MG/ML
8 INJECTION INTRAMUSCULAR; INTRAVENOUS ONCE
Status: COMPLETED | OUTPATIENT
Start: 2021-04-14 | End: 2021-04-14

## 2021-04-14 RX ORDER — FLUOROURACIL 50 MG/ML
400 INJECTION, SOLUTION INTRAVENOUS ONCE
Status: COMPLETED | OUTPATIENT
Start: 2021-04-14 | End: 2021-04-14

## 2021-04-14 RX ORDER — SODIUM CHLORIDE 0.9 % (FLUSH) 0.9 %
10 SYRINGE (ML) INJECTION AS NEEDED
Status: ACTIVE | OUTPATIENT
Start: 2021-04-14 | End: 2021-04-14

## 2021-04-14 RX ADMIN — ONDANSETRON 8 MG: 2 INJECTION INTRAMUSCULAR; INTRAVENOUS at 12:03

## 2021-04-14 RX ADMIN — FLUOROURACIL 688 MG: 50 INJECTION, SOLUTION INTRAVENOUS at 14:56

## 2021-04-14 RX ADMIN — DEXAMETHASONE SODIUM PHOSPHATE 12 MG: 4 INJECTION, SOLUTION INTRAMUSCULAR; INTRAVENOUS at 12:05

## 2021-04-14 RX ADMIN — LEUCOVORIN CALCIUM 688 MG: 350 INJECTION, POWDER, LYOPHILIZED, FOR SOLUTION INTRAMUSCULAR; INTRAVENOUS at 12:43

## 2021-04-14 RX ADMIN — Medication 10 ML: at 14:56

## 2021-04-14 RX ADMIN — OXALIPLATIN 146 MG: 5 INJECTION, SOLUTION INTRAVENOUS at 12:43

## 2021-04-14 RX ADMIN — DEXTROSE MONOHYDRATE 25 ML/HR: 5 INJECTION, SOLUTION INTRAVENOUS at 12:34

## 2021-04-14 RX ADMIN — FLUOROURACIL 4000 MG: 50 INJECTION, SOLUTION INTRAVENOUS at 15:03

## 2021-04-14 NOTE — PROGRESS NOTES
Pt arrived  Ambulatory, chemo completed, pt tolerated well, chemo pump connected and unclamped, pt discharged home aware of appointment at 4:30 on Friday

## 2021-04-14 NOTE — PROGRESS NOTES
Saw patient on 4-13-21 prior to C6 Folfox. She is doing well overall and learning to manage any symptoms from chemotherapy. Pt denies complaints at this time.  Nurse navigation will continue to follow

## 2021-04-16 ENCOUNTER — HOSPITAL ENCOUNTER (OUTPATIENT)
Dept: INFUSION THERAPY | Age: 71
Discharge: HOME | End: 2021-04-16
Payer: MEDICARE

## 2021-04-16 VITALS
RESPIRATION RATE: 18 BRPM | DIASTOLIC BLOOD PRESSURE: 79 MMHG | OXYGEN SATURATION: 96 % | TEMPERATURE: 98 F | HEART RATE: 103 BPM | SYSTOLIC BLOOD PRESSURE: 111 MMHG

## 2021-04-16 DIAGNOSIS — E87.6 HYPOKALEMIA: Primary | ICD-10-CM

## 2021-04-16 DIAGNOSIS — C20 RECTAL ADENOCARCINOMA (HCC): ICD-10-CM

## 2021-04-16 PROCEDURE — 96523 IRRIG DRUG DELIVERY DEVICE: CPT

## 2021-04-16 PROCEDURE — 74011000250 HC RX REV CODE- 250: Performed by: INTERNAL MEDICINE

## 2021-04-16 RX ORDER — SODIUM CHLORIDE 9 MG/ML
10 INJECTION INTRAMUSCULAR; INTRAVENOUS; SUBCUTANEOUS AS NEEDED
Status: DISCONTINUED | OUTPATIENT
Start: 2021-04-16 | End: 2021-04-17 | Stop reason: HOSPADM

## 2021-04-16 RX ADMIN — SODIUM CHLORIDE 10 ML: 9 INJECTION, SOLUTION INTRAMUSCULAR; INTRAVENOUS; SUBCUTANEOUS at 16:34

## 2021-04-16 NOTE — PROGRESS NOTES
Arrived to the [facility]. Assessment completed . 5 FU Elastomeric pump completed. Patient tolerated without problems. Any issues or concerns during appointment: None  Instructed to call Dr Janneth Martinez  with any side effects or concerns  Patient aware of next infusion appointment on 4/28/(date) at 9 AM (time).   Discharged ambulatory

## 2021-04-27 ENCOUNTER — HOSPITAL ENCOUNTER (OUTPATIENT)
Dept: LAB | Age: 71
Discharge: HOME | End: 2021-04-27
Payer: MEDICARE

## 2021-04-27 DIAGNOSIS — C20 RECTAL ADENOCARCINOMA (HCC): ICD-10-CM

## 2021-04-27 LAB
ALBUMIN SERPL-MCNC: 3.5 G/DL (ref 3.2–4.6)
ALBUMIN/GLOB SERPL: 1 {RATIO} (ref 1.2–3.5)
ALP SERPL-CCNC: 106 U/L (ref 50–136)
ALT SERPL-CCNC: 28 U/L (ref 12–65)
ANION GAP SERPL CALC-SCNC: 10 MMOL/L (ref 7–16)
AST SERPL-CCNC: 23 U/L (ref 15–37)
BASOPHILS # BLD: 0 K/UL (ref 0–0.2)
BASOPHILS NFR BLD: 1 % (ref 0–2)
BILIRUB SERPL-MCNC: 0.5 MG/DL (ref 0.2–1.1)
BUN SERPL-MCNC: 9 MG/DL (ref 8–23)
CALCIUM SERPL-MCNC: 9.5 MG/DL (ref 8.3–10.4)
CEA SERPL-MCNC: 4.2 NG/ML (ref 0–3)
CHLORIDE SERPL-SCNC: 105 MMOL/L (ref 98–107)
CO2 SERPL-SCNC: 23 MMOL/L (ref 21–32)
CREAT SERPL-MCNC: 1 MG/DL (ref 0.6–1)
DIFFERENTIAL METHOD BLD: ABNORMAL
EOSINOPHIL # BLD: 0.1 K/UL (ref 0–0.8)
EOSINOPHIL NFR BLD: 3 % (ref 0.5–7.8)
ERYTHROCYTE [DISTWIDTH] IN BLOOD BY AUTOMATED COUNT: 19 % (ref 11.9–14.6)
GLOBULIN SER CALC-MCNC: 3.6 G/DL (ref 2.3–3.5)
GLUCOSE SERPL-MCNC: 255 MG/DL (ref 65–100)
HCT VFR BLD AUTO: 36.8 % (ref 35.8–46.3)
HGB BLD-MCNC: 12.6 G/DL (ref 11.7–15.4)
IMM GRANULOCYTES # BLD AUTO: 0 K/UL (ref 0–0.5)
IMM GRANULOCYTES NFR BLD AUTO: 1 % (ref 0–5)
LYMPHOCYTES # BLD: 0.3 K/UL (ref 0.5–4.6)
LYMPHOCYTES NFR BLD: 8 % (ref 13–44)
MAGNESIUM SERPL-MCNC: 1.8 MG/DL (ref 1.8–2.4)
MCH RBC QN AUTO: 32.7 PG (ref 26.1–32.9)
MCHC RBC AUTO-ENTMCNC: 34.2 G/DL (ref 31.4–35)
MCV RBC AUTO: 95.6 FL (ref 79.6–97.8)
MONOCYTES # BLD: 0.6 K/UL (ref 0.1–1.3)
MONOCYTES NFR BLD: 16 % (ref 4–12)
NEUTS SEG # BLD: 2.6 K/UL (ref 1.7–8.2)
NEUTS SEG NFR BLD: 71 % (ref 43–78)
NRBC # BLD: 0 K/UL (ref 0–0.2)
PLATELET # BLD AUTO: 200 K/UL (ref 150–450)
PMV BLD AUTO: 10.6 FL (ref 9.4–12.3)
POTASSIUM SERPL-SCNC: 3.6 MMOL/L (ref 3.5–5.1)
PROT SERPL-MCNC: 7.1 G/DL (ref 6.3–8.2)
RBC # BLD AUTO: 3.85 M/UL (ref 4.05–5.2)
SODIUM SERPL-SCNC: 138 MMOL/L (ref 136–145)
WBC # BLD AUTO: 3.5 K/UL (ref 4.3–11.1)

## 2021-04-27 PROCEDURE — 83735 ASSAY OF MAGNESIUM: CPT

## 2021-04-27 PROCEDURE — 80053 COMPREHEN METABOLIC PANEL: CPT

## 2021-04-27 PROCEDURE — 36415 COLL VENOUS BLD VENIPUNCTURE: CPT

## 2021-04-27 PROCEDURE — 82378 CARCINOEMBRYONIC ANTIGEN: CPT

## 2021-04-27 PROCEDURE — 85025 COMPLETE CBC W/AUTO DIFF WBC: CPT

## 2021-04-28 ENCOUNTER — HOSPITAL ENCOUNTER (OUTPATIENT)
Dept: INFUSION THERAPY | Age: 71
Discharge: HOME | End: 2021-04-28
Payer: MEDICARE

## 2021-04-28 VITALS
WEIGHT: 152.2 LBS | DIASTOLIC BLOOD PRESSURE: 90 MMHG | SYSTOLIC BLOOD PRESSURE: 146 MMHG | HEART RATE: 78 BPM | BODY MASS INDEX: 28.76 KG/M2 | TEMPERATURE: 98.2 F | OXYGEN SATURATION: 95 % | RESPIRATION RATE: 18 BRPM

## 2021-04-28 DIAGNOSIS — C20 RECTAL ADENOCARCINOMA (HCC): Primary | ICD-10-CM

## 2021-04-28 PROCEDURE — G0498 CHEMO EXTEND IV INFUS W/PUMP: HCPCS

## 2021-04-28 PROCEDURE — 96375 TX/PRO/DX INJ NEW DRUG ADDON: CPT

## 2021-04-28 PROCEDURE — 96368 THER/DIAG CONCURRENT INF: CPT

## 2021-04-28 PROCEDURE — 96411 CHEMO IV PUSH ADDL DRUG: CPT

## 2021-04-28 PROCEDURE — 36593 DECLOT VASCULAR DEVICE: CPT

## 2021-04-28 PROCEDURE — 96367 TX/PROPH/DG ADDL SEQ IV INF: CPT

## 2021-04-28 PROCEDURE — 96415 CHEMO IV INFUSION ADDL HR: CPT

## 2021-04-28 PROCEDURE — 74011250636 HC RX REV CODE- 250/636: Performed by: NURSE PRACTITIONER

## 2021-04-28 PROCEDURE — 74011250636 HC RX REV CODE- 250/636: Performed by: OBSTETRICS & GYNECOLOGY

## 2021-04-28 PROCEDURE — 74011000250 HC RX REV CODE- 250: Performed by: OBSTETRICS & GYNECOLOGY

## 2021-04-28 PROCEDURE — 74011000258 HC RX REV CODE- 258: Performed by: NURSE PRACTITIONER

## 2021-04-28 PROCEDURE — 96413 CHEMO IV INFUSION 1 HR: CPT

## 2021-04-28 RX ORDER — ONDANSETRON 2 MG/ML
8 INJECTION INTRAMUSCULAR; INTRAVENOUS ONCE
Status: COMPLETED | OUTPATIENT
Start: 2021-04-28 | End: 2021-04-28

## 2021-04-28 RX ORDER — DEXTROSE MONOHYDRATE 50 MG/ML
25 INJECTION, SOLUTION INTRAVENOUS CONTINUOUS
Status: ACTIVE | OUTPATIENT
Start: 2021-04-28 | End: 2021-04-28

## 2021-04-28 RX ORDER — SODIUM CHLORIDE 0.9 % (FLUSH) 0.9 %
10 SYRINGE (ML) INJECTION AS NEEDED
Status: ACTIVE | OUTPATIENT
Start: 2021-04-28 | End: 2021-04-28

## 2021-04-28 RX ORDER — FLUOROURACIL 50 MG/ML
400 INJECTION, SOLUTION INTRAVENOUS ONCE
Status: COMPLETED | OUTPATIENT
Start: 2021-04-28 | End: 2021-04-28

## 2021-04-28 RX ADMIN — FLUOROURACIL 692 MG: 50 INJECTION, SOLUTION INTRAVENOUS at 13:12

## 2021-04-28 RX ADMIN — OXALIPLATIN 147 MG: 5 INJECTION, SOLUTION INTRAVENOUS at 11:05

## 2021-04-28 RX ADMIN — Medication 10 ML: at 13:15

## 2021-04-28 RX ADMIN — LEUCOVORIN CALCIUM 692 MG: 350 INJECTION, POWDER, LYOPHILIZED, FOR SOLUTION INTRAMUSCULAR; INTRAVENOUS at 11:04

## 2021-04-28 RX ADMIN — DEXTROSE MONOHYDRATE 25 ML/HR: 5 INJECTION, SOLUTION INTRAVENOUS at 10:30

## 2021-04-28 RX ADMIN — DEXAMETHASONE SODIUM PHOSPHATE 12 MG: 4 INJECTION, SOLUTION INTRAMUSCULAR; INTRAVENOUS at 10:34

## 2021-04-28 RX ADMIN — ALTEPLASE 2 MG: 2.2 INJECTION, POWDER, LYOPHILIZED, FOR SOLUTION INTRAVENOUS at 09:30

## 2021-04-28 RX ADMIN — ONDANSETRON 8 MG: 2 INJECTION INTRAMUSCULAR; INTRAVENOUS at 10:32

## 2021-04-28 RX ADMIN — FLUOROURACIL 4000 MG: 50 INJECTION, SOLUTION INTRAVENOUS at 13:17

## 2021-04-30 ENCOUNTER — HOSPITAL ENCOUNTER (OUTPATIENT)
Dept: INFUSION THERAPY | Age: 71
Discharge: HOME | End: 2021-04-30
Payer: MEDICARE

## 2021-04-30 VITALS
SYSTOLIC BLOOD PRESSURE: 127 MMHG | RESPIRATION RATE: 18 BRPM | OXYGEN SATURATION: 93 % | HEART RATE: 102 BPM | DIASTOLIC BLOOD PRESSURE: 81 MMHG | TEMPERATURE: 98.3 F

## 2021-04-30 DIAGNOSIS — C20 RECTAL ADENOCARCINOMA (HCC): Primary | ICD-10-CM

## 2021-04-30 PROCEDURE — 96523 IRRIG DRUG DELIVERY DEVICE: CPT

## 2021-04-30 RX ORDER — SODIUM CHLORIDE 0.9 % (FLUSH) 0.9 %
10 SYRINGE (ML) INJECTION AS NEEDED
Status: DISCONTINUED | OUTPATIENT
Start: 2021-04-30 | End: 2021-05-01 | Stop reason: HOSPADM

## 2021-04-30 RX ADMIN — Medication 10 ML: at 13:31

## 2021-04-30 NOTE — PROGRESS NOTES
Arrived to the [facility]. 5FU complete on arrival. Pt c/o worsening neuropathy to BLE. Provided education on neuropathy and to be cautious when ambulating. Patient instructed to report any side affects to ordering provider. Patient tolerated well. Patient aware of next infusion appointment on 5/12 (date) at [phone] (time). Discharged ambulatory, no distress noted.

## 2021-05-11 ENCOUNTER — HOSPITAL ENCOUNTER (OUTPATIENT)
Dept: LAB | Age: 71
Discharge: HOME | End: 2021-05-11
Payer: MEDICARE

## 2021-05-11 DIAGNOSIS — C20 RECTAL ADENOCARCINOMA (HCC): ICD-10-CM

## 2021-05-11 LAB
ALBUMIN SERPL-MCNC: 3.5 G/DL (ref 3.2–4.6)
ALBUMIN/GLOB SERPL: 1 {RATIO} (ref 1.2–3.5)
ALP SERPL-CCNC: 95 U/L (ref 50–136)
ALT SERPL-CCNC: 27 U/L (ref 12–65)
ANION GAP SERPL CALC-SCNC: 7 MMOL/L (ref 7–16)
AST SERPL-CCNC: 26 U/L (ref 15–37)
BASOPHILS # BLD: 0 K/UL (ref 0–0.2)
BASOPHILS NFR BLD: 1 % (ref 0–2)
BILIRUB SERPL-MCNC: 0.4 MG/DL (ref 0.2–1.1)
BUN SERPL-MCNC: 8 MG/DL (ref 8–23)
CALCIUM SERPL-MCNC: 9.2 MG/DL (ref 8.3–10.4)
CEA SERPL-MCNC: 3.6 NG/ML (ref 0–3)
CHLORIDE SERPL-SCNC: 107 MMOL/L (ref 98–107)
CO2 SERPL-SCNC: 25 MMOL/L (ref 21–32)
CREAT SERPL-MCNC: 0.9 MG/DL (ref 0.6–1)
DIFFERENTIAL METHOD BLD: ABNORMAL
EOSINOPHIL # BLD: 0.1 K/UL (ref 0–0.8)
EOSINOPHIL NFR BLD: 2 % (ref 0.5–7.8)
ERYTHROCYTE [DISTWIDTH] IN BLOOD BY AUTOMATED COUNT: 18.7 % (ref 11.9–14.6)
GLOBULIN SER CALC-MCNC: 3.4 G/DL (ref 2.3–3.5)
GLUCOSE SERPL-MCNC: 180 MG/DL (ref 65–100)
HCT VFR BLD AUTO: 36.8 % (ref 35.8–46.3)
HGB BLD-MCNC: 12.6 G/DL (ref 11.7–15.4)
IMM GRANULOCYTES # BLD AUTO: 0 K/UL (ref 0–0.5)
IMM GRANULOCYTES NFR BLD AUTO: 1 % (ref 0–5)
LYMPHOCYTES # BLD: 0.3 K/UL (ref 0.5–4.6)
LYMPHOCYTES NFR BLD: 10 % (ref 13–44)
MAGNESIUM SERPL-MCNC: 1.8 MG/DL (ref 1.8–2.4)
MCH RBC QN AUTO: 33.2 PG (ref 26.1–32.9)
MCHC RBC AUTO-ENTMCNC: 34.2 G/DL (ref 31.4–35)
MCV RBC AUTO: 97.1 FL (ref 79.6–97.8)
MONOCYTES # BLD: 0.5 K/UL (ref 0.1–1.3)
MONOCYTES NFR BLD: 19 % (ref 4–12)
NEUTS SEG # BLD: 1.8 K/UL (ref 1.7–8.2)
NEUTS SEG NFR BLD: 67 % (ref 43–78)
NRBC # BLD: 0 K/UL (ref 0–0.2)
PLATELET # BLD AUTO: 174 K/UL (ref 150–450)
PMV BLD AUTO: 10.4 FL (ref 9.4–12.3)
POTASSIUM SERPL-SCNC: 3.7 MMOL/L (ref 3.5–5.1)
PROT SERPL-MCNC: 6.9 G/DL (ref 6.3–8.2)
RBC # BLD AUTO: 3.79 M/UL (ref 4.05–5.2)
SODIUM SERPL-SCNC: 139 MMOL/L (ref 136–145)
WBC # BLD AUTO: 2.7 K/UL (ref 4.3–11.1)

## 2021-05-11 PROCEDURE — 82378 CARCINOEMBRYONIC ANTIGEN: CPT

## 2021-05-11 PROCEDURE — 80053 COMPREHEN METABOLIC PANEL: CPT

## 2021-05-11 PROCEDURE — 36415 COLL VENOUS BLD VENIPUNCTURE: CPT

## 2021-05-11 PROCEDURE — 83735 ASSAY OF MAGNESIUM: CPT

## 2021-05-11 PROCEDURE — 85025 COMPLETE CBC W/AUTO DIFF WBC: CPT

## 2021-05-12 ENCOUNTER — PATIENT OUTREACH (OUTPATIENT)
Dept: CASE MANAGEMENT | Age: 71
End: 2021-05-12

## 2021-05-13 ENCOUNTER — HOSPITAL ENCOUNTER (OUTPATIENT)
Dept: INFUSION THERAPY | Age: 71
Discharge: HOME | End: 2021-05-13
Payer: MEDICARE

## 2021-05-13 VITALS
SYSTOLIC BLOOD PRESSURE: 132 MMHG | BODY MASS INDEX: 28.47 KG/M2 | RESPIRATION RATE: 18 BRPM | WEIGHT: 150.8 LBS | HEART RATE: 95 BPM | TEMPERATURE: 98.4 F | OXYGEN SATURATION: 98 % | HEIGHT: 61 IN | DIASTOLIC BLOOD PRESSURE: 80 MMHG

## 2021-05-13 DIAGNOSIS — C20 RECTAL ADENOCARCINOMA (HCC): ICD-10-CM

## 2021-05-13 DIAGNOSIS — E87.6 HYPOKALEMIA: Primary | ICD-10-CM

## 2021-05-13 PROCEDURE — 96411 CHEMO IV PUSH ADDL DRUG: CPT

## 2021-05-13 PROCEDURE — 96413 CHEMO IV INFUSION 1 HR: CPT

## 2021-05-13 PROCEDURE — 74011250636 HC RX REV CODE- 250/636: Performed by: INTERNAL MEDICINE

## 2021-05-13 PROCEDURE — 96368 THER/DIAG CONCURRENT INF: CPT

## 2021-05-13 PROCEDURE — 74011000258 HC RX REV CODE- 258: Performed by: INTERNAL MEDICINE

## 2021-05-13 PROCEDURE — 96415 CHEMO IV INFUSION ADDL HR: CPT

## 2021-05-13 PROCEDURE — G0498 CHEMO EXTEND IV INFUS W/PUMP: HCPCS

## 2021-05-13 PROCEDURE — 96375 TX/PRO/DX INJ NEW DRUG ADDON: CPT

## 2021-05-13 RX ORDER — SODIUM CHLORIDE 0.9 % (FLUSH) 0.9 %
10 SYRINGE (ML) INJECTION AS NEEDED
Status: ACTIVE | OUTPATIENT
Start: 2021-05-13 | End: 2021-05-13

## 2021-05-13 RX ORDER — DEXTROSE MONOHYDRATE 50 MG/ML
25 INJECTION, SOLUTION INTRAVENOUS CONTINUOUS
Status: ACTIVE | OUTPATIENT
Start: 2021-05-13 | End: 2021-05-13

## 2021-05-13 RX ORDER — FLUOROURACIL 50 MG/ML
400 INJECTION, SOLUTION INTRAVENOUS ONCE
Status: COMPLETED | OUTPATIENT
Start: 2021-05-13 | End: 2021-05-13

## 2021-05-13 RX ORDER — ONDANSETRON 2 MG/ML
8 INJECTION INTRAMUSCULAR; INTRAVENOUS ONCE
Status: COMPLETED | OUTPATIENT
Start: 2021-05-13 | End: 2021-05-13

## 2021-05-13 RX ADMIN — OXALIPLATIN 146 MG: 5 INJECTION, SOLUTION INTRAVENOUS at 11:55

## 2021-05-13 RX ADMIN — Medication 10 ML: at 14:04

## 2021-05-13 RX ADMIN — FLUOROURACIL 688 MG: 50 INJECTION, SOLUTION INTRAVENOUS at 14:00

## 2021-05-13 RX ADMIN — ONDANSETRON 8 MG: 2 INJECTION INTRAMUSCULAR; INTRAVENOUS at 11:32

## 2021-05-13 RX ADMIN — DEXAMETHASONE SODIUM PHOSPHATE 12 MG: 4 INJECTION, SOLUTION INTRAMUSCULAR; INTRAVENOUS at 11:32

## 2021-05-13 RX ADMIN — DEXTROSE MONOHYDRATE 25 ML/HR: 5 INJECTION, SOLUTION INTRAVENOUS at 11:30

## 2021-05-13 RX ADMIN — LEUCOVORIN CALCIUM 688 MG: 350 INJECTION, POWDER, LYOPHILIZED, FOR SOLUTION INTRAMUSCULAR; INTRAVENOUS at 11:55

## 2021-05-13 RX ADMIN — FLUOROURACIL 4000 MG: 50 INJECTION, SOLUTION INTRAVENOUS at 14:04

## 2021-05-13 NOTE — PROGRESS NOTES
Pt arrived ambulatory, folfox completed, chemo pump connected and unclamped,discharged home ambulatory, aware of appointment Saturday at 2:30

## 2021-05-15 ENCOUNTER — HOSPITAL ENCOUNTER (OUTPATIENT)
Dept: INFUSION THERAPY | Age: 71
Discharge: HOME | End: 2021-05-15
Payer: MEDICARE

## 2021-05-15 VITALS
SYSTOLIC BLOOD PRESSURE: 112 MMHG | RESPIRATION RATE: 18 BRPM | HEART RATE: 76 BPM | OXYGEN SATURATION: 96 % | TEMPERATURE: 98.1 F | DIASTOLIC BLOOD PRESSURE: 72 MMHG

## 2021-05-15 PROCEDURE — 96523 IRRIG DRUG DELIVERY DEVICE: CPT

## 2021-05-15 RX ORDER — SODIUM CHLORIDE 0.9 % (FLUSH) 0.9 %
10 SYRINGE (ML) INJECTION AS NEEDED
Status: DISCONTINUED | OUTPATIENT
Start: 2021-05-15 | End: 2021-05-17 | Stop reason: HOSPADM

## 2021-05-15 RX ADMIN — Medication 10 ML: at 14:40

## 2021-07-03 ENCOUNTER — HOSPITAL ENCOUNTER (EMERGENCY)
Age: 71
Discharge: HOME | End: 2021-07-03
Attending: STUDENT IN AN ORGANIZED HEALTH CARE EDUCATION/TRAINING PROGRAM
Payer: MEDICARE

## 2021-07-03 ENCOUNTER — APPOINTMENT (OUTPATIENT)
Dept: GENERAL RADIOLOGY | Age: 71
End: 2021-07-03
Attending: STUDENT IN AN ORGANIZED HEALTH CARE EDUCATION/TRAINING PROGRAM
Payer: MEDICARE

## 2021-07-03 VITALS
SYSTOLIC BLOOD PRESSURE: 120 MMHG | HEIGHT: 61 IN | BODY MASS INDEX: 27.94 KG/M2 | OXYGEN SATURATION: 95 % | TEMPERATURE: 98.7 F | DIASTOLIC BLOOD PRESSURE: 74 MMHG | WEIGHT: 148 LBS | RESPIRATION RATE: 16 BRPM | HEART RATE: 68 BPM

## 2021-07-03 DIAGNOSIS — S32.040A COMPRESSION FRACTURE OF L4 VERTEBRA, INITIAL ENCOUNTER (HCC): Primary | ICD-10-CM

## 2021-07-03 LAB
ALBUMIN SERPL-MCNC: 3.6 G/DL (ref 3.2–4.6)
ALBUMIN/GLOB SERPL: 0.9 {RATIO} (ref 1.2–3.5)
ALP SERPL-CCNC: 69 U/L (ref 50–130)
ALT SERPL-CCNC: 14 U/L (ref 12–65)
ANION GAP SERPL CALC-SCNC: 9 MMOL/L (ref 7–16)
APPEARANCE UR: CLEAR
AST SERPL-CCNC: 18 U/L (ref 15–37)
BASOPHILS # BLD: 0 K/UL (ref 0–0.2)
BASOPHILS NFR BLD: 0 % (ref 0–2)
BILIRUB SERPL-MCNC: 0.4 MG/DL (ref 0.2–1.1)
BILIRUB UR QL: NEGATIVE
BUN SERPL-MCNC: 11 MG/DL (ref 8–23)
CALCIUM SERPL-MCNC: 10.8 MG/DL (ref 8.3–10.4)
CHLORIDE SERPL-SCNC: 107 MMOL/L (ref 98–107)
CO2 SERPL-SCNC: 25 MMOL/L (ref 21–32)
COLOR UR: YELLOW
CREAT SERPL-MCNC: 0.82 MG/DL (ref 0.6–1)
DIFFERENTIAL METHOD BLD: ABNORMAL
EOSINOPHIL # BLD: 0.1 K/UL (ref 0–0.8)
EOSINOPHIL NFR BLD: 2 % (ref 0.5–7.8)
ERYTHROCYTE [DISTWIDTH] IN BLOOD BY AUTOMATED COUNT: 12.2 % (ref 11.9–14.6)
GLOBULIN SER CALC-MCNC: 3.8 G/DL (ref 2.3–3.5)
GLUCOSE SERPL-MCNC: 120 MG/DL (ref 65–100)
GLUCOSE UR STRIP.AUTO-MCNC: NEGATIVE MG/DL
HCT VFR BLD AUTO: 32.2 % (ref 35.8–46.3)
HGB BLD-MCNC: 11.1 G/DL (ref 11.7–15.4)
HGB UR QL STRIP: NEGATIVE
IMM GRANULOCYTES # BLD AUTO: 0 K/UL (ref 0–0.5)
IMM GRANULOCYTES NFR BLD AUTO: 0 % (ref 0–5)
KETONES UR QL STRIP.AUTO: NEGATIVE MG/DL
LEUKOCYTE ESTERASE UR QL STRIP.AUTO: NEGATIVE
LYMPHOCYTES # BLD: 0.2 K/UL (ref 0.5–4.6)
LYMPHOCYTES NFR BLD: 5 % (ref 13–44)
MCH RBC QN AUTO: 32.7 PG (ref 26.1–32.9)
MCHC RBC AUTO-ENTMCNC: 34.5 G/DL (ref 31.4–35)
MCV RBC AUTO: 95 FL (ref 79.6–97.8)
MONOCYTES # BLD: 0.5 K/UL (ref 0.1–1.3)
MONOCYTES NFR BLD: 10 % (ref 4–12)
NEUTS SEG # BLD: 4.1 K/UL (ref 1.7–8.2)
NEUTS SEG NFR BLD: 83 % (ref 43–78)
NITRITE UR QL STRIP.AUTO: NEGATIVE
NRBC # BLD: 0 K/UL (ref 0–0.2)
PH UR STRIP: 6 [PH] (ref 5–9)
PLATELET # BLD AUTO: 292 K/UL (ref 150–450)
PMV BLD AUTO: 10.1 FL (ref 9.4–12.3)
POTASSIUM SERPL-SCNC: 3.8 MMOL/L (ref 3.5–5.1)
PROT SERPL-MCNC: 7.4 G/DL (ref 6.3–8.2)
PROT UR STRIP-MCNC: NEGATIVE MG/DL
RBC # BLD AUTO: 3.39 M/UL (ref 4.05–5.2)
SODIUM SERPL-SCNC: 141 MMOL/L (ref 136–145)
SP GR UR REFRACTOMETRY: 1.01 (ref 1–1.02)
UROBILINOGEN UR QL STRIP.AUTO: 1 EU/DL (ref 0.2–1)
WBC # BLD AUTO: 4.9 K/UL (ref 4.3–11.1)

## 2021-07-03 PROCEDURE — 80053 COMPREHEN METABOLIC PANEL: CPT

## 2021-07-03 PROCEDURE — 72100 X-RAY EXAM L-S SPINE 2/3 VWS: CPT

## 2021-07-03 PROCEDURE — 96372 THER/PROPH/DIAG INJ SC/IM: CPT

## 2021-07-03 PROCEDURE — 85025 COMPLETE CBC W/AUTO DIFF WBC: CPT

## 2021-07-03 PROCEDURE — 81003 URINALYSIS AUTO W/O SCOPE: CPT

## 2021-07-03 PROCEDURE — 74011250637 HC RX REV CODE- 250/637: Performed by: STUDENT IN AN ORGANIZED HEALTH CARE EDUCATION/TRAINING PROGRAM

## 2021-07-03 PROCEDURE — 99284 EMERGENCY DEPT VISIT MOD MDM: CPT

## 2021-07-03 PROCEDURE — 74011250636 HC RX REV CODE- 250/636: Performed by: STUDENT IN AN ORGANIZED HEALTH CARE EDUCATION/TRAINING PROGRAM

## 2021-07-03 RX ORDER — CYCLOBENZAPRINE HCL 10 MG
10 TABLET ORAL
Status: COMPLETED | OUTPATIENT
Start: 2021-07-03 | End: 2021-07-03

## 2021-07-03 RX ORDER — HYDROCODONE BITARTRATE AND ACETAMINOPHEN 5; 325 MG/1; MG/1
1 TABLET ORAL
Qty: 10 TABLET | Refills: 0 | Status: SHIPPED | OUTPATIENT
Start: 2021-07-03 | End: 2021-07-06

## 2021-07-03 RX ORDER — KETOROLAC TROMETHAMINE 30 MG/ML
15 INJECTION, SOLUTION INTRAMUSCULAR; INTRAVENOUS
Status: COMPLETED | OUTPATIENT
Start: 2021-07-03 | End: 2021-07-03

## 2021-07-03 RX ORDER — IBUPROFEN 600 MG/1
600 TABLET ORAL
Qty: 20 TABLET | Refills: 0 | Status: SHIPPED | OUTPATIENT
Start: 2021-07-03 | End: 2021-08-24 | Stop reason: CLARIF

## 2021-07-03 RX ORDER — DEXAMETHASONE SODIUM PHOSPHATE 100 MG/10ML
10 INJECTION INTRAMUSCULAR; INTRAVENOUS
Status: COMPLETED | OUTPATIENT
Start: 2021-07-03 | End: 2021-07-03

## 2021-07-03 RX ADMIN — DEXAMETHASONE SODIUM PHOSPHATE 10 MG: 10 INJECTION INTRAMUSCULAR; INTRAVENOUS at 10:19

## 2021-07-03 RX ADMIN — KETOROLAC TROMETHAMINE 15 MG: 30 INJECTION, SOLUTION INTRAMUSCULAR; INTRAVENOUS at 10:19

## 2021-07-03 RX ADMIN — CYCLOBENZAPRINE 10 MG: 10 TABLET, FILM COATED ORAL at 10:19

## 2021-07-03 NOTE — ED NOTES
I have reviewed discharge instructions with the patient. The patient verbalized understanding. Patient left ED via Discharge Method: ambulatory to Home with spouse. Opportunity for questions and clarification provided. Patient given 2 scripts. To continue your aftercare when you leave the hospital, you may receive an automated call from our care team to check in on how you are doing. This is a free service and part of our promise to provide the best care and service to meet your aftercare needs.  If you have questions, or wish to unsubscribe from this service please call [phone]. Thank you for Choosing our [facility] Emergency Department.

## 2021-07-03 NOTE — ED PROVIDER NOTES
73-year-old female patient presents to the emergency department with reports of worsening right lower back pain. Patient states symptoms have been present for several days. She reports history of sciatic nerve dysfunction with similar symptoms however states this is much worse than normal.  She states she initially felt symptoms on the left side however this resolved prior to her developing discomfort on the right. She describes sharp stabbing pain in the right lower lumbar region with radiation to the posterior aspect of the right lower leg. This pain is worsened with any type of movement, bending or lifting. She states pain is improved with rest.  She is taken Motrin and Percocet for her symptoms without relief. She denies numbness tingling or weakness in the lower extremities and reports no significant change and bowel or bladder control. She denies dysuria or hematuria. She notes occasional blood in her stool secondary to history of hemorrhoids. She denies associated fever or chills, cough, congestion nausea or vomiting.            Past Medical History:   Diagnosis Date    Depression with anxiety     Diverticulosis of colon 04/26/2017    Widespread per Colonoscopy    Elevated C-reactive protein (CRP)     Enlarged heart     Fibromyalgia     GERD (gastroesophageal reflux disease)     Hemorrhoids 04/26/2017    Internal & External Per Colonoscopy    HTN (hypertension), benign     Hypokalemia 3/30/2021    IBS (irritable bowel syndrome)     Iron deficiency anemia     Microalbuminuria due to type 2 diabetes mellitus ([address]     Mixed hyperlipidemia     Osteopenia 07/10/2017    Per DEXA    Perennial allergic rhinitis with seasonal variation     Rectal cancer ([address] 06/26/2020    Adenocarcinoma    Sleep apnea     Not on CPAP - Insurance Wouldn't Cover CPAP    Type 2 diabetes mellitus ([facility]     type 2- oral meds; sqbs am avg 120-150's--- no hypo; hgba1c- 6.5 (12/2019)    Vitamin B12 deficiency     Vitamin D deficiency        Past Surgical History:   Procedure Laterality Date    COLONOSCOPY N/A 2017    COLONOSCOPY / BMI=30 performed by Charly Jeff MD at [facility] ENDOSCOPY    COLONOSCOPY N/A 2020    COLONOSCOPY/BMI 28 performed by Baldomero Yepez MD at [address] [place] [address] N/A 2020    SIGMOIDOSCOPY FLEXIBLE performed by Ashley Benitez MD at [facility] ENDOSCOPY    HX BREAST LUMPECTOMY Bilateral     HX CARPAL TUNNEL RELEASE Right     HX CHOLECYSTECTOMY      HX COLONOSCOPY  2021    Diverticulosis, External Hemorrhoids, Rectal Ulcer - Pathology = Hyperplastic Mucosa, Due     HX PREMALIG/BENIGN SKIN LESION EXCISION      HX TUBAL LIGATION      HX VASCULAR ACCESS      IR INSERT TUNL CVC W PORT OVER 5 YEARS  2021    [place] SINUS SURGERY PROC UNLISTED           Family History:   Problem Relation Age of Onset    Cancer Mother         Cervical    Diabetes Mother     Heart Disease Father     Hypertension Father     Elevated Lipids Father     Other Father         ITP    Emphysema Father     Lung Cancer Cousin     Other Sister         Skin Cancer    Asthma Sister     Diabetes Sister     Kidney Disease Maternal Grandmother     Kidney Disease Maternal Grandfather     Arthritis Sister     Diabetes Sister     Colon Cancer Maternal Uncle     Colon Cancer Paternal Uncle     Emphysema Paternal Aunt     Colon Cancer Cousin     Breast Cancer Neg Hx        Social History     Socioeconomic History    Marital status:      Spouse name: Not on file    Number of children: Not on file    Years of education: Not on file    Highest education level: Not on file   Occupational History    Not on file   Tobacco Use    Smoking status: Former Smoker     Packs/day: 1.50     Years: 35.00     Pack years: 52.50     Types: Cigarettes     Quit date: 9/15/2016     Years since quittin.8    Smokeless tobacco: Never Used   Vaping Use    Vaping Use: Never used   Substance and Sexual Activity    Alcohol use: Not Currently    Drug use: No    Sexual activity: Not on file   Other Topics Concern     Service Not Asked    Blood Transfusions Not Asked    Caffeine Concern Not Asked    Occupational Exposure Not Asked    Hobby Hazards Not Asked    Sleep Concern Not Asked    Stress Concern Not Asked    Weight Concern Not Asked    Special Diet Not Asked    Back Care Not Asked    Exercise Not Asked    Bike Helmet Not Asked   [address] Not Asked    Self-Exams Not Asked   Social History Narrative    Not on file     Social Determinants of Health     Financial Resource Strain:     Difficulty of Paying Living Expenses:    Food Insecurity:     Worried About Running Out of Food in the Last Year:     [address] in the Last Year:    Transportation Needs:     Lack of Transportation (Medical):  Lack of Transportation (Non-Medical):    Physical Activity:     Days of Exercise per Week:     Minutes of Exercise per Session:    Stress:     Feeling of Stress :    Social Connections:     Frequency of Communication with Friends and Family:     Frequency of Social Gatherings with Friends and Family:     Attends Mandaen Services:     Active Member of Clubs or Organizations:     Attends Club or Organization Meetings:     Marital Status:    Intimate Partner Violence:     Fear of Current or Ex-Partner:     Emotionally Abused:     Physically Abused:     Sexually Abused: ALLERGIES: Codeine, Lidocaine, and Sulfa (sulfonamide antibiotics)    Review of Systems   Constitutional: Negative for chills, diaphoresis and fever. HENT: Negative for congestion, sneezing and sore throat. Eyes: Negative for visual disturbance. Respiratory: Negative for cough, chest tightness, shortness of breath and wheezing. Cardiovascular: Negative for chest pain and leg swelling. Gastrointestinal: Positive for blood in stool.  Negative for abdominal pain, diarrhea, nausea and vomiting. Endocrine: Negative for polyuria. Genitourinary: Negative for difficulty urinating, dysuria, flank pain, hematuria and urgency. Musculoskeletal: Positive for back pain. Negative for myalgias, neck pain and neck stiffness. Skin: Negative for color change and rash. Neurological: Negative for dizziness, syncope, speech difficulty, weakness, light-headedness, numbness and headaches. Psychiatric/Behavioral: Negative for behavioral problems. All other systems reviewed and are negative. Vitals:    07/03/21 0931 07/03/21 0947   BP: (!) 133/90    Pulse: 100    Resp: 16    Temp: 98.7 °F (37.1 °C)    SpO2: 97% 97%   Weight: 67.1 kg (148 lb)    Height: 5' 1\" (1.549 m)             Physical Exam  Vitals and nursing note reviewed. Constitutional:       General: She is not in acute distress. Appearance: She is well-developed. She is not diaphoretic. Comments: Alert and oriented to person place and time. No acute distress, speaks in clear, fluid sentences. HENT:      Head: Normocephalic and atraumatic. Right Ear: External ear normal.      Left Ear: External ear normal.      Nose: Nose normal.   Eyes:      Pupils: Pupils are equal, round, and reactive to light. Cardiovascular:      Rate and Rhythm: Normal rate and regular rhythm. Heart sounds: Normal heart sounds. No murmur heard. No friction rub. No gallop. Pulmonary:      Effort: Pulmonary effort is normal. No respiratory distress. Breath sounds: Normal breath sounds. No stridor. No decreased breath sounds, wheezing, rhonchi or rales. Chest:      Chest wall: No tenderness. Abdominal:      General: There is no distension. Palpations: Abdomen is soft. There is no mass. Tenderness: There is no abdominal tenderness. There is no guarding or rebound. Hernia: No hernia is present. Musculoskeletal:         General: No tenderness or deformity. Normal range of motion.         Arms: Cervical back: Normal range of motion. Comments: There is pain over the lower lumbar region on the right side and the musculature on palpation. There is no significant discomfort with palpation of the midline thoracic or lumbar spine, no palpable step-off or deformity is noted. Straight leg raise on the right side is positive, weakly positive on the left. Skin:     General: Skin is warm and dry. Neurological:      Mental Status: She is alert and oriented to person, place, and time. Cranial Nerves: No cranial nerve deficit. MDM  Number of Diagnoses or Management Options  Diagnosis management comments: Symptoms consistent with sciatic nerve dysfunction secondary to low back pain. Patient has no identifiable red flag symptoms such as neurologic changes, associated fever or chills or loss of bowel or bladder control. Will obtain plain film imaging of lower lumbar spine.   Patient does report a history of hemorrhoids, intermittent bleeding in her stool, will obtain basic labs and urinalysis that she voices concern for UTI as a cause of symptoms         Amount and/or Complexity of Data Reviewed  Clinical lab tests: reviewed and ordered  Tests in the radiology section of CPT®: ordered and reviewed  Tests in the medicine section of CPT®: reviewed and ordered  Independent visualization of images, tracings, or specimens: yes    Risk of Complications, Morbidity, and/or Mortality  Presenting problems: moderate  Diagnostic procedures: low  Management options: moderate    Patient Progress  Patient progress: stable         Procedures

## 2021-07-03 NOTE — PROGRESS NOTES
NSR Contact Note    Called by [facility] ED physician with pt with new onset back pain and nondescript LE radiculopathy. No reported weakness, no bowel or bladder incont reported on ED MD exam.  Imaging reviewed - Xray Lsp with minimal L4 compression deformity without instability or signficant alignment abnormality. Given lack of trauma and hx of ongoing CA treatment, would recommend MRI Lsp w/ and w/o contrast, though if pt does not have acute neurologic complaints, can be done as an outpt. Recommend LSO brace for comfort to be worn when up, OOB. Outpt MRI Lsp c/s contrast, and f/u with Dr. John Browne in next 1-2wks in clinic. Please call with questions, concerns, or if we can be of any further assistance in the management of this patient.

## 2021-07-03 NOTE — DISCHARGE INSTRUCTIONS
Wear the back brace as discussed. Take the medication prescribed as directed for pain. Arrange follow-up with the neurosurgical service as discussed.

## 2021-07-06 ENCOUNTER — HOSPITAL ENCOUNTER (OUTPATIENT)
Dept: CT IMAGING | Age: 71
Discharge: HOME | End: 2021-07-06
Attending: INTERNAL MEDICINE

## 2021-07-06 DIAGNOSIS — C20 RECTAL CANCER (HCC): ICD-10-CM

## 2021-07-06 RX ORDER — SODIUM CHLORIDE 0.9 % (FLUSH) 0.9 %
10 SYRINGE (ML) INJECTION
Status: COMPLETED | OUTPATIENT
Start: 2021-07-06 | End: 2021-07-06

## 2021-07-06 RX ADMIN — Medication 10 ML: at 10:29

## 2021-07-08 ENCOUNTER — HOSPITAL ENCOUNTER (OUTPATIENT)
Dept: LAB | Age: 71
Discharge: HOME | End: 2021-07-08
Payer: MEDICARE

## 2021-07-08 ENCOUNTER — PATIENT OUTREACH (OUTPATIENT)
Dept: CASE MANAGEMENT | Age: 71
End: 2021-07-08

## 2021-07-08 DIAGNOSIS — C20 RECTAL ADENOCARCINOMA (HCC): ICD-10-CM

## 2021-07-08 LAB
ALBUMIN SERPL-MCNC: 3.7 G/DL (ref 3.2–4.6)
ALBUMIN/GLOB SERPL: 0.9 {RATIO} (ref 1.2–3.5)
ALP SERPL-CCNC: 81 U/L (ref 50–136)
ALT SERPL-CCNC: 17 U/L (ref 12–65)
ANION GAP SERPL CALC-SCNC: 5 MMOL/L (ref 7–16)
AST SERPL-CCNC: 14 U/L (ref 15–37)
BASOPHILS # BLD: 0 K/UL (ref 0–0.2)
BASOPHILS NFR BLD: 1 % (ref 0–2)
BILIRUB SERPL-MCNC: 0.4 MG/DL (ref 0.2–1.1)
BUN SERPL-MCNC: 12 MG/DL (ref 8–23)
CALCIUM SERPL-MCNC: 9 MG/DL (ref 8.3–10.4)
CEA SERPL-MCNC: 1.3 NG/ML (ref 0–3)
CHLORIDE SERPL-SCNC: 109 MMOL/L (ref 98–107)
CO2 SERPL-SCNC: 26 MMOL/L (ref 21–32)
CREAT SERPL-MCNC: 1.1 MG/DL (ref 0.6–1)
DIFFERENTIAL METHOD BLD: ABNORMAL
EOSINOPHIL # BLD: 0.1 K/UL (ref 0–0.8)
EOSINOPHIL NFR BLD: 5 % (ref 0.5–7.8)
ERYTHROCYTE [DISTWIDTH] IN BLOOD BY AUTOMATED COUNT: 12.4 % (ref 11.9–14.6)
FERRITIN SERPL-MCNC: 130 NG/ML (ref 8–388)
GLOBULIN SER CALC-MCNC: 4 G/DL (ref 2.3–3.5)
GLUCOSE SERPL-MCNC: 134 MG/DL (ref 65–100)
HCT VFR BLD AUTO: 33.4 % (ref 35.8–46.3)
HGB BLD-MCNC: 10.8 G/DL (ref 11.7–15.4)
HGB RETIC QN AUTO: 33 PG (ref 29–35)
IMM GRANULOCYTES # BLD AUTO: 0 K/UL (ref 0–0.5)
IMM GRANULOCYTES NFR BLD AUTO: 1 % (ref 0–5)
IMM RETICS NFR: 16 % (ref 3–15.9)
IRON SATN MFR SERPL: 29 %
IRON SERPL-MCNC: 95 UG/DL (ref 35–150)
LYMPHOCYTES # BLD: 0.3 K/UL (ref 0.5–4.6)
LYMPHOCYTES NFR BLD: 12 % (ref 13–44)
MAGNESIUM SERPL-MCNC: 1.8 MG/DL (ref 1.8–2.4)
MCH RBC QN AUTO: 32.6 PG (ref 26.1–32.9)
MCHC RBC AUTO-ENTMCNC: 32.3 G/DL (ref 31.4–35)
MCV RBC AUTO: 100.9 FL (ref 79.6–97.8)
MONOCYTES # BLD: 0.3 K/UL (ref 0.1–1.3)
MONOCYTES NFR BLD: 12 % (ref 4–12)
NEUTS SEG # BLD: 1.8 K/UL (ref 1.7–8.2)
NEUTS SEG NFR BLD: 70 % (ref 43–78)
NRBC # BLD: 0 K/UL (ref 0–0.2)
PLATELET # BLD AUTO: 337 K/UL (ref 150–450)
PMV BLD AUTO: 9.7 FL (ref 9.4–12.3)
POTASSIUM SERPL-SCNC: 3.9 MMOL/L (ref 3.5–5.1)
PROT SERPL-MCNC: 7.7 G/DL (ref 6.3–8.2)
RBC # BLD AUTO: 3.31 M/UL (ref 4.05–5.2)
RETICS # AUTO: 0.08 M/UL (ref 0.03–0.1)
RETICS/RBC NFR AUTO: 2.3 % (ref 0.3–2)
SODIUM SERPL-SCNC: 140 MMOL/L (ref 136–145)
TIBC SERPL-MCNC: 332 UG/DL (ref 250–450)
WBC # BLD AUTO: 2.6 K/UL (ref 4.3–11.1)

## 2021-07-08 PROCEDURE — 82728 ASSAY OF FERRITIN: CPT

## 2021-07-08 PROCEDURE — 36415 COLL VENOUS BLD VENIPUNCTURE: CPT

## 2021-07-08 PROCEDURE — 80053 COMPREHEN METABOLIC PANEL: CPT

## 2021-07-08 PROCEDURE — 83540 ASSAY OF IRON: CPT

## 2021-07-08 PROCEDURE — 85046 RETICYTE/HGB CONCENTRATE: CPT

## 2021-07-08 PROCEDURE — 82378 CARCINOEMBRYONIC ANTIGEN: CPT

## 2021-07-08 PROCEDURE — 83735 ASSAY OF MAGNESIUM: CPT

## 2021-07-08 PROCEDURE — 85025 COMPLETE CBC W/AUTO DIFF WBC: CPT

## 2021-07-08 NOTE — PROGRESS NOTES
Saw patient today with Dr Flakita Doss for follow up. Pt received scan results which are good. Pt however has been diagnosed w recent spinal fx and is seeing Dr Aarti Woods upcoming. We will also refer to Dr Deepika Mcclellan for possible colonoscopy since pt states she has experience rectal bleeding lately. We will see pt back in 1 month for follow up.  Navigation will not sign off yet

## 2021-07-12 PROBLEM — M54.31 SCIATICA OF RIGHT SIDE: Status: ACTIVE | Noted: 2021-07-12

## 2021-07-12 PROBLEM — S32.040A COMPRESSION FRACTURE OF FOURTH LUMBAR VERTEBRA (HCC): Status: ACTIVE | Noted: 2021-07-12

## 2021-07-30 ENCOUNTER — HOSPITAL ENCOUNTER (OUTPATIENT)
Dept: MRI IMAGING | Age: 71
Discharge: HOME | End: 2021-07-30
Attending: NEUROLOGICAL SURGERY
Payer: MEDICARE

## 2021-07-30 DIAGNOSIS — S32.040A COMPRESSION FRACTURE OF L4 VERTEBRA, INITIAL ENCOUNTER (HCC): ICD-10-CM

## 2021-07-30 DIAGNOSIS — M54.31 SCIATICA OF RIGHT SIDE: ICD-10-CM

## 2021-07-30 PROCEDURE — A9576 INJ PROHANCE MULTIPACK: HCPCS | Performed by: NEUROLOGICAL SURGERY

## 2021-07-30 PROCEDURE — 74011636320 HC RX REV CODE- 636/320: Performed by: NEUROLOGICAL SURGERY

## 2021-07-30 PROCEDURE — 72158 MRI LUMBAR SPINE W/O & W/DYE: CPT

## 2021-07-30 RX ORDER — SODIUM CHLORIDE 0.9 % (FLUSH) 0.9 %
10 SYRINGE (ML) INJECTION
Status: COMPLETED | OUTPATIENT
Start: 2021-07-30 | End: 2021-07-30

## 2021-07-30 RX ADMIN — Medication 10 ML: at 16:16

## 2021-07-30 RX ADMIN — GADOTERIDOL 13 ML: 279.3 INJECTION, SOLUTION INTRAVENOUS at 16:16

## 2021-08-04 ENCOUNTER — HOSPITAL ENCOUNTER (OUTPATIENT)
Dept: LAB | Age: 71
Discharge: HOME | End: 2021-08-04
Payer: MEDICARE

## 2021-08-04 DIAGNOSIS — R87.612 LGSIL ON PAP SMEAR OF CERVIX: ICD-10-CM

## 2021-08-04 PROCEDURE — 88142 CYTOPATH C/V THIN LAYER: CPT

## 2021-08-09 ENCOUNTER — HOSPITAL ENCOUNTER (OUTPATIENT)
Dept: LAB | Age: 71
Discharge: HOME | End: 2021-08-09
Payer: MEDICARE

## 2021-08-09 DIAGNOSIS — C20 RECTAL CANCER (HCC): ICD-10-CM

## 2021-08-09 DIAGNOSIS — D50.9 IRON DEFICIENCY ANEMIA, UNSPECIFIED IRON DEFICIENCY ANEMIA TYPE: ICD-10-CM

## 2021-08-09 LAB
ALBUMIN SERPL-MCNC: 4.2 G/DL (ref 3.2–4.6)
ALBUMIN/GLOB SERPL: 1.2 {RATIO} (ref 1.2–3.5)
ALP SERPL-CCNC: 82 U/L (ref 50–136)
ALT SERPL-CCNC: 22 U/L (ref 12–65)
ANION GAP SERPL CALC-SCNC: 6 MMOL/L (ref 7–16)
AST SERPL-CCNC: 15 U/L (ref 15–37)
BASOPHILS # BLD: 0 K/UL (ref 0–0.2)
BASOPHILS NFR BLD: 1 % (ref 0–2)
BILIRUB SERPL-MCNC: 0.3 MG/DL (ref 0.2–1.1)
BUN SERPL-MCNC: 17 MG/DL (ref 8–23)
CALCIUM SERPL-MCNC: 10 MG/DL (ref 8.3–10.4)
CHLORIDE SERPL-SCNC: 106 MMOL/L (ref 98–107)
CO2 SERPL-SCNC: 25 MMOL/L (ref 21–32)
CREAT SERPL-MCNC: 1 MG/DL (ref 0.6–1)
DIFFERENTIAL METHOD BLD: ABNORMAL
EOSINOPHIL # BLD: 0.1 K/UL (ref 0–0.8)
EOSINOPHIL NFR BLD: 3 % (ref 0.5–7.8)
ERYTHROCYTE [DISTWIDTH] IN BLOOD BY AUTOMATED COUNT: 13.3 % (ref 11.9–14.6)
FERRITIN SERPL-MCNC: 34 NG/ML (ref 8–388)
GLOBULIN SER CALC-MCNC: 3.5 G/DL (ref 2.3–3.5)
GLUCOSE SERPL-MCNC: 158 MG/DL (ref 65–100)
HCT VFR BLD AUTO: 29.9 % (ref 35.8–46.3)
HGB BLD-MCNC: 9.8 G/DL (ref 11.7–15.4)
IMM GRANULOCYTES # BLD AUTO: 0 K/UL (ref 0–0.5)
IMM GRANULOCYTES NFR BLD AUTO: 1 % (ref 0–5)
LYMPHOCYTES # BLD: 0.5 K/UL (ref 0.5–4.6)
LYMPHOCYTES NFR BLD: 10 % (ref 13–44)
MCH RBC QN AUTO: 31.1 PG (ref 26.1–32.9)
MCHC RBC AUTO-ENTMCNC: 32.8 G/DL (ref 31.4–35)
MCV RBC AUTO: 94.9 FL (ref 79.6–97.8)
MONOCYTES # BLD: 0.4 K/UL (ref 0.1–1.3)
MONOCYTES NFR BLD: 8 % (ref 4–12)
NEUTS SEG # BLD: 4 K/UL (ref 1.7–8.2)
NEUTS SEG NFR BLD: 78 % (ref 43–78)
NRBC # BLD: 0 K/UL (ref 0–0.2)
PLATELET # BLD AUTO: 379 K/UL (ref 150–450)
PMV BLD AUTO: 9.4 FL (ref 9.4–12.3)
POTASSIUM SERPL-SCNC: 3.9 MMOL/L (ref 3.5–5.1)
PROT SERPL-MCNC: 7.7 G/DL (ref 6.3–8.2)
RBC # BLD AUTO: 3.15 M/UL (ref 4.05–5.2)
SODIUM SERPL-SCNC: 137 MMOL/L (ref 136–145)
WBC # BLD AUTO: 5 K/UL (ref 4.3–11.1)

## 2021-08-09 PROCEDURE — 85025 COMPLETE CBC W/AUTO DIFF WBC: CPT

## 2021-08-09 PROCEDURE — 80053 COMPREHEN METABOLIC PANEL: CPT

## 2021-08-09 PROCEDURE — 36415 COLL VENOUS BLD VENIPUNCTURE: CPT

## 2021-08-09 PROCEDURE — 82728 ASSAY OF FERRITIN: CPT

## 2021-08-10 ENCOUNTER — PATIENT OUTREACH (OUTPATIENT)
Dept: CASE MANAGEMENT | Age: 71
End: 2021-08-10

## 2021-08-10 RX ORDER — ONDANSETRON 2 MG/ML
8 INJECTION INTRAMUSCULAR; INTRAVENOUS AS NEEDED
Status: CANCELLED | OUTPATIENT
Start: 2021-08-30

## 2021-08-10 RX ORDER — SODIUM CHLORIDE 0.9 % (FLUSH) 0.9 %
10 SYRINGE (ML) INJECTION AS NEEDED
Status: CANCELLED | OUTPATIENT
Start: 2021-08-30

## 2021-08-10 RX ORDER — ONDANSETRON 2 MG/ML
8 INJECTION INTRAMUSCULAR; INTRAVENOUS AS NEEDED
Status: CANCELLED | OUTPATIENT
Start: 2021-08-23

## 2021-08-10 RX ORDER — HEPARIN 100 UNIT/ML
300-500 SYRINGE INTRAVENOUS AS NEEDED
Status: CANCELLED
Start: 2021-08-23

## 2021-08-10 RX ORDER — DIPHENHYDRAMINE HYDROCHLORIDE 50 MG/ML
25 INJECTION, SOLUTION INTRAMUSCULAR; INTRAVENOUS AS NEEDED
Status: CANCELLED
Start: 2021-08-30

## 2021-08-10 RX ORDER — ACETAMINOPHEN 325 MG/1
650 TABLET ORAL AS NEEDED
Status: CANCELLED
Start: 2021-08-30

## 2021-08-10 RX ORDER — DIPHENHYDRAMINE HYDROCHLORIDE 50 MG/ML
50 INJECTION, SOLUTION INTRAMUSCULAR; INTRAVENOUS AS NEEDED
Status: CANCELLED
Start: 2021-08-23

## 2021-08-10 RX ORDER — SODIUM CHLORIDE 0.9 % (FLUSH) 0.9 %
10 SYRINGE (ML) INJECTION AS NEEDED
Status: CANCELLED | OUTPATIENT
Start: 2021-08-23

## 2021-08-10 RX ORDER — DIPHENHYDRAMINE HYDROCHLORIDE 50 MG/ML
25 INJECTION, SOLUTION INTRAMUSCULAR; INTRAVENOUS AS NEEDED
Status: CANCELLED
Start: 2021-08-23

## 2021-08-10 RX ORDER — EPINEPHRINE 1 MG/ML
0.3 INJECTION, SOLUTION, CONCENTRATE INTRAVENOUS AS NEEDED
Status: CANCELLED | OUTPATIENT
Start: 2021-08-23

## 2021-08-10 RX ORDER — SODIUM CHLORIDE 9 MG/ML
10 INJECTION INTRAMUSCULAR; INTRAVENOUS; SUBCUTANEOUS AS NEEDED
Status: CANCELLED | OUTPATIENT
Start: 2021-08-23

## 2021-08-10 RX ORDER — HYDROCORTISONE SODIUM SUCCINATE 100 MG/2ML
100 INJECTION, POWDER, FOR SOLUTION INTRAMUSCULAR; INTRAVENOUS AS NEEDED
Status: CANCELLED | OUTPATIENT
Start: 2021-08-23

## 2021-08-10 RX ORDER — EPINEPHRINE 1 MG/ML
0.3 INJECTION, SOLUTION, CONCENTRATE INTRAVENOUS AS NEEDED
Status: CANCELLED | OUTPATIENT
Start: 2021-08-30

## 2021-08-10 RX ORDER — DIPHENHYDRAMINE HYDROCHLORIDE 50 MG/ML
50 INJECTION, SOLUTION INTRAMUSCULAR; INTRAVENOUS AS NEEDED
Status: CANCELLED
Start: 2021-08-30

## 2021-08-10 RX ORDER — SODIUM CHLORIDE 9 MG/ML
10 INJECTION INTRAMUSCULAR; INTRAVENOUS; SUBCUTANEOUS AS NEEDED
Status: CANCELLED | OUTPATIENT
Start: 2021-08-30

## 2021-08-10 RX ORDER — ACETAMINOPHEN 325 MG/1
650 TABLET ORAL AS NEEDED
Status: CANCELLED
Start: 2021-08-23

## 2021-08-10 RX ORDER — ALBUTEROL SULFATE 0.83 MG/ML
2.5 SOLUTION RESPIRATORY (INHALATION) AS NEEDED
Status: CANCELLED
Start: 2021-08-23

## 2021-08-10 RX ORDER — HYDROCORTISONE SODIUM SUCCINATE 100 MG/2ML
100 INJECTION, POWDER, FOR SOLUTION INTRAMUSCULAR; INTRAVENOUS AS NEEDED
Status: CANCELLED | OUTPATIENT
Start: 2021-08-30

## 2021-08-10 RX ORDER — ALBUTEROL SULFATE 0.83 MG/ML
2.5 SOLUTION RESPIRATORY (INHALATION) AS NEEDED
Status: CANCELLED
Start: 2021-08-30

## 2021-08-10 RX ORDER — HEPARIN 100 UNIT/ML
300-500 SYRINGE INTRAVENOUS AS NEEDED
Status: CANCELLED
Start: 2021-08-30

## 2021-08-19 ENCOUNTER — PATIENT OUTREACH (OUTPATIENT)
Dept: CASE MANAGEMENT | Age: 71
End: 2021-08-19

## 2021-08-19 PROBLEM — S32.000G COMPRESSION FRACTURE OF LUMBAR VERTEBRA WITH DELAYED HEALING: Status: ACTIVE | Noted: 2021-07-12

## 2021-08-19 NOTE — PROGRESS NOTES
Patient phoned because she feels the Gabapentin is not helping with her Neuropathy. We discussed starting Cymbalta at her last visit and she would like to give it a try. Script sent to her preferred pharmacy. Encouraged to call with questions. Navigation will continue to follow.

## 2021-08-23 ENCOUNTER — HOSPITAL ENCOUNTER (OUTPATIENT)
Dept: INFUSION THERAPY | Age: 71
Discharge: HOME | End: 2021-08-23
Payer: MEDICARE

## 2021-08-23 VITALS
SYSTOLIC BLOOD PRESSURE: 124 MMHG | DIASTOLIC BLOOD PRESSURE: 61 MMHG | TEMPERATURE: 97.9 F | HEART RATE: 91 BPM | OXYGEN SATURATION: 98 % | RESPIRATION RATE: 16 BRPM

## 2021-08-23 DIAGNOSIS — D50.9 IRON DEFICIENCY ANEMIA, UNSPECIFIED IRON DEFICIENCY ANEMIA TYPE: Primary | ICD-10-CM

## 2021-08-23 PROCEDURE — 96361 HYDRATE IV INFUSION ADD-ON: CPT

## 2021-08-23 PROCEDURE — 74011000258 HC RX REV CODE- 258: Performed by: INTERNAL MEDICINE

## 2021-08-23 PROCEDURE — 74011250636 HC RX REV CODE- 250/636: Performed by: INTERNAL MEDICINE

## 2021-08-23 PROCEDURE — 96365 THER/PROPH/DIAG IV INF INIT: CPT

## 2021-08-23 RX ORDER — SODIUM CHLORIDE 0.9 % (FLUSH) 0.9 %
10 SYRINGE (ML) INJECTION AS NEEDED
Status: DISCONTINUED | OUTPATIENT
Start: 2021-08-23 | End: 2021-08-24 | Stop reason: HOSPADM

## 2021-08-23 RX ADMIN — SODIUM CHLORIDE 500 ML: 900 INJECTION, SOLUTION INTRAVENOUS at 16:00

## 2021-08-23 RX ADMIN — Medication 10 ML: at 16:00

## 2021-08-23 RX ADMIN — FERUMOXYTOL 510 MG: 510 INJECTION INTRAVENOUS at 16:22

## 2021-08-23 RX ADMIN — Medication 10 ML: at 17:15

## 2021-08-23 NOTE — PROGRESS NOTES
Pt arrived ambulatory today at 1517, to receive IV iron. Pt tolerated without difficulty. Patient discharged via ambulatory accompanied by self. Instructed to notify physician of any problems, questions or concerns. Allowed opportunity for patient/family to ask questions. Verbalized understanding. Next appointment is August 30 at  with [facility] [phone].

## 2021-08-24 ENCOUNTER — HOSPITAL ENCOUNTER (OUTPATIENT)
Dept: SURGERY | Age: 71
Discharge: HOME | End: 2021-08-24
Payer: MEDICARE

## 2021-08-24 VITALS
RESPIRATION RATE: 17 BRPM | TEMPERATURE: 97.3 F | HEART RATE: 82 BPM | SYSTOLIC BLOOD PRESSURE: 118 MMHG | DIASTOLIC BLOOD PRESSURE: 81 MMHG | OXYGEN SATURATION: 96 % | BODY MASS INDEX: 27.24 KG/M2 | HEIGHT: 61 IN | WEIGHT: 144.3 LBS

## 2021-08-24 LAB
EST. AVERAGE GLUCOSE BLD GHB EST-MCNC: 111 MG/DL
GLUCOSE BLD STRIP.AUTO-MCNC: 115 MG/DL (ref 65–100)
HBA1C MFR BLD: 5.5 % (ref 4.2–6.3)
HGB BLD-MCNC: 7.7 G/DL (ref 11.7–15.4)
SERVICE CMNT-IMP: ABNORMAL

## 2021-08-24 PROCEDURE — 82962 GLUCOSE BLOOD TEST: CPT

## 2021-08-24 PROCEDURE — 83036 HEMOGLOBIN GLYCOSYLATED A1C: CPT

## 2021-08-24 PROCEDURE — 85018 HEMOGLOBIN: CPT

## 2021-08-24 RX ORDER — MULTIVITAMIN/FOLIC ACID/BIOTIN 400-2000
TABLET ORAL DAILY
COMMUNITY

## 2021-08-24 RX ORDER — VITAMIN E 268 MG
CAPSULE ORAL EVERY EVENING
COMMUNITY

## 2021-08-24 NOTE — PERIOP NOTES
Patient verified name, , and surgery as listed in [facility]. Patient provided medical/health information and PTA medications to the best of their ability. TYPE  CASE: IB  Orders per surgeon: none received  Labs per surgeon: HAIC per protocol. Labs per anesthesia protocol: POC glucose today and DOS. Results 115  EKG:  None required per protocol. Dr. Leia Rogers aware of abnormal Hgb and reviewed chart. Dr. Leia Rogers gave verbal order to repeat Hgb prior to surgery and inform Dr. Bladimir Pan. Patient informed of results and need for repeat outpatient lab. She is seeing Dr. Remington Fields tomorrow to report rectal bleeding she's had since tumor removal. She requested to have outpatient lab at [facility] with Iron infusion 2021. Will contact her nurse navigator tomorrow and try to arrange. Dr. Torey Garcia office called and [facility] RN in office informed of abnormal Hgb. She will inform Dr. Bladimir Pan and agrees with Dr. Bob Campbell. Chart  placed for follow up. Patient COVID test date 2021. Covid 19 vaccine series completed . Patient 's vaccine card scanned into computer 2021 under media for verification. Patient provided with and instructed on education handouts including Guide to Surgery, blood transfusions, pain management, and hand hygiene for the family and community, and [facility] brochure. Hibiclens and instructions given per hospital policy. Original medication prescription bottles were visualized during patient appointment. Patient teach back successful and patient demonstrates knowledge of instruction.

## 2021-08-24 NOTE — PERIOP NOTES
Recent Results (from the past 12 hour(s))   HEMOGLOBIN    Collection Time: 08/24/21 11:56 AM   Result Value Ref Range    HGB 7.7 (L) 11.7 - 15.4 g/dL   HEMOGLOBIN A1C WITH EAG    Collection Time: 08/24/21 11:57 AM   Result Value Ref Range    Hemoglobin A1c 5.5 4.2 - 6.3 %    Est. average glucose 111 mg/dL   GLUCOSE, POC    Collection Time: 08/24/21 12:02 PM   Result Value Ref Range    Glucose (POC) 115 (H) 65 - 100 mg/dL    Performed by Messi Moncada routed to Dr. Naima Murphy. Dr. Filiberto Birch office called and spoke with Conner Ignacio. She stated she will inform Dr. Naima Murphy of lab results and that anesthesia is repeating hemoglobin prior to surgery.

## 2021-08-24 NOTE — PERIOP NOTES
PLEASE CONTINUE TAKING ALL PRESCRIPTION MEDICATIONS UP TO THE DAY OF SURGERY UNLESS OTHERWISE DIRECTED BELOW. DISCONTINUE all vitamins and supplements 7 days prior to surgery. DISCONTINUE Non-Steriodal Anti-Inflammatory (NSAIDS) such as Advil and Aleve 5 days prior to surgery. Home Medications to take  the day of surgery    Duloxetine (Cymbalta)  Gabapentin (Neurontin)   Omeprazole (Prilosec)  Paroxetine (Paxil)        Home Medications   to Hold   Fenofibrate   Linagliptin (Tradjenta)   Liisinopril (Prinivil)  Metformin (Glucophage)     Comments    Covid test 8/30/2021 @  [facility] [address] [place] [address]    On the day before surgery please take Acetaminophen 1000mg in the morning and then again before bed. You may substitute for Tylenol 650 mg. Please do not bring home medications with you on the day of surgery unless otherwise directed by your nurse. If you are instructed to bring home medications, please give them to your nurse as they will be administered by the nursing staff. If you have any questions, please call [facility] [phone] or [facility] [phone]. A copy of this note was provided to the patient for reference.

## 2021-08-25 NOTE — PERIOP NOTES
Patient has an appointment with Dr. Deepika Mcclellan today for rectal bleeding, office called to inform them of patient's complaints of rectal bleeding yesterday and Hgb of 7.7. Patient denied any symptoms of shortness of breath, chest pain, dizziness yesterday. When I spoke with patient yesterday to inform her of abnormal Hgb and to keep appointment with Dr. Deepika Mcclellan, instructed her if she had any of these symptoms to go to emergency room. Patient verbalized understanding.

## 2021-08-26 ENCOUNTER — PATIENT OUTREACH (OUTPATIENT)
Dept: CASE MANAGEMENT | Age: 71
End: 2021-08-26

## 2021-08-26 NOTE — PROGRESS NOTES
Pt called and stated her Hgb had been dropping and she was due for back surgery next week on 9-2-21. She stated surgeon was requesting she have Hgb prior to procedure.  Will order on 8-30-21

## 2021-08-30 ENCOUNTER — HOSPITAL ENCOUNTER (OUTPATIENT)
Dept: INFUSION THERAPY | Age: 71
Discharge: HOME | End: 2021-08-30
Payer: MEDICARE

## 2021-08-30 ENCOUNTER — HOSPITAL ENCOUNTER (OUTPATIENT)
Dept: LAB | Age: 71
Discharge: HOME | End: 2021-08-30
Payer: MEDICARE

## 2021-08-30 VITALS
HEART RATE: 76 BPM | OXYGEN SATURATION: 98 % | SYSTOLIC BLOOD PRESSURE: 116 MMHG | RESPIRATION RATE: 18 BRPM | DIASTOLIC BLOOD PRESSURE: 66 MMHG | TEMPERATURE: 98 F

## 2021-08-30 DIAGNOSIS — D50.9 IRON DEFICIENCY ANEMIA, UNSPECIFIED IRON DEFICIENCY ANEMIA TYPE: ICD-10-CM

## 2021-08-30 DIAGNOSIS — D50.9 IRON DEFICIENCY ANEMIA, UNSPECIFIED IRON DEFICIENCY ANEMIA TYPE: Primary | ICD-10-CM

## 2021-08-30 LAB
BASOPHILS # BLD: 0 K/UL (ref 0–0.2)
BASOPHILS NFR BLD: 1 % (ref 0–2)
DIFFERENTIAL METHOD BLD: ABNORMAL
EOSINOPHIL # BLD: 0.1 K/UL (ref 0–0.8)
EOSINOPHIL NFR BLD: 2 % (ref 0.5–7.8)
ERYTHROCYTE [DISTWIDTH] IN BLOOD BY AUTOMATED COUNT: 15.8 % (ref 11.9–14.6)
HCT VFR BLD AUTO: 27.7 % (ref 35.8–46.3)
HGB BLD-MCNC: 8.5 G/DL (ref 11.7–15.4)
IMM GRANULOCYTES # BLD AUTO: 0 K/UL (ref 0–0.5)
IMM GRANULOCYTES NFR BLD AUTO: 1 % (ref 0–5)
LYMPHOCYTES # BLD: 0.4 K/UL (ref 0.5–4.6)
LYMPHOCYTES NFR BLD: 9 % (ref 13–44)
MCH RBC QN AUTO: 30.1 PG (ref 26.1–32.9)
MCHC RBC AUTO-ENTMCNC: 30.7 G/DL (ref 31.4–35)
MCV RBC AUTO: 98.2 FL (ref 79.6–97.8)
MONOCYTES # BLD: 0.4 K/UL (ref 0.1–1.3)
MONOCYTES NFR BLD: 9 % (ref 4–12)
NEUTS SEG # BLD: 3.8 K/UL (ref 1.7–8.2)
NEUTS SEG NFR BLD: 80 % (ref 43–78)
NRBC # BLD: 0 K/UL (ref 0–0.2)
PLATELET # BLD AUTO: 417 K/UL (ref 150–450)
PMV BLD AUTO: 9.8 FL (ref 9.4–12.3)
RBC # BLD AUTO: 2.82 M/UL (ref 4.05–5.2)
WBC # BLD AUTO: 4.8 K/UL (ref 4.3–11.1)

## 2021-08-30 PROCEDURE — 85025 COMPLETE CBC W/AUTO DIFF WBC: CPT

## 2021-08-30 PROCEDURE — 74011250636 HC RX REV CODE- 250/636: Performed by: INTERNAL MEDICINE

## 2021-08-30 PROCEDURE — 36415 COLL VENOUS BLD VENIPUNCTURE: CPT

## 2021-08-30 PROCEDURE — 96374 THER/PROPH/DIAG INJ IV PUSH: CPT

## 2021-08-30 PROCEDURE — 96361 HYDRATE IV INFUSION ADD-ON: CPT

## 2021-08-30 PROCEDURE — 74011000258 HC RX REV CODE- 258: Performed by: INTERNAL MEDICINE

## 2021-08-30 RX ORDER — SODIUM CHLORIDE 0.9 % (FLUSH) 0.9 %
10 SYRINGE (ML) INJECTION AS NEEDED
Status: DISCONTINUED | OUTPATIENT
Start: 2021-08-30 | End: 2021-08-31 | Stop reason: HOSPADM

## 2021-08-30 RX ADMIN — Medication 10 ML: at 15:45

## 2021-08-30 RX ADMIN — SODIUM CHLORIDE 500 ML: 900 INJECTION, SOLUTION INTRAVENOUS at 16:12

## 2021-08-30 RX ADMIN — FERUMOXYTOL 510 MG: 510 INJECTION INTRAVENOUS at 15:57

## 2021-08-30 RX ADMIN — Medication 10 ML: at 16:45

## 2021-08-31 ENCOUNTER — ANESTHESIA EVENT (OUTPATIENT)
Dept: SURGERY | Age: 71
End: 2021-08-31
Payer: MEDICARE

## 2021-08-31 NOTE — PERIOP NOTES
Message left for Nick Perry at Dr [facility]. Notified that pt hgb was 8.5 8/30/21 and pt did receive iron infusion.

## 2021-09-01 ENCOUNTER — HOSPITAL ENCOUNTER (OUTPATIENT)
Age: 71
Setting detail: OUTPATIENT SURGERY
Discharge: HOME | End: 2021-09-01
Attending: NEUROLOGICAL SURGERY | Admitting: NEUROLOGICAL SURGERY
Payer: MEDICARE

## 2021-09-01 ENCOUNTER — APPOINTMENT (OUTPATIENT)
Dept: GENERAL RADIOLOGY | Age: 71
End: 2021-09-01
Attending: NEUROLOGICAL SURGERY
Payer: MEDICARE

## 2021-09-01 ENCOUNTER — ANESTHESIA (OUTPATIENT)
Dept: SURGERY | Age: 71
End: 2021-09-01
Payer: MEDICARE

## 2021-09-01 VITALS
DIASTOLIC BLOOD PRESSURE: 75 MMHG | BODY MASS INDEX: 26.47 KG/M2 | TEMPERATURE: 98.2 F | WEIGHT: 140.2 LBS | OXYGEN SATURATION: 93 % | HEIGHT: 61 IN | SYSTOLIC BLOOD PRESSURE: 138 MMHG | RESPIRATION RATE: 19 BRPM | HEART RATE: 93 BPM

## 2021-09-01 DIAGNOSIS — S32.040A COMPRESSION FRACTURE OF L4 VERTEBRA, INITIAL ENCOUNTER (HCC): Primary | ICD-10-CM

## 2021-09-01 LAB
GLUCOSE BLD STRIP.AUTO-MCNC: 112 MG/DL (ref 65–100)
SERVICE CMNT-IMP: ABNORMAL

## 2021-09-01 PROCEDURE — 88341 IMHCHEM/IMCYTCHM EA ADD ANTB: CPT

## 2021-09-01 PROCEDURE — 76010000161 HC OR TIME 1 TO 1.5 HR INTENSV-TIER 1: Performed by: NEUROLOGICAL SURGERY

## 2021-09-01 PROCEDURE — 77030008468 HC STPLR SKN VISIS TELE -A: Performed by: NEUROLOGICAL SURGERY

## 2021-09-01 PROCEDURE — 77030019908 HC STETH ESOPH SIMS -A: Performed by: ANESTHESIOLOGY

## 2021-09-01 PROCEDURE — 82962 GLUCOSE BLOOD TEST: CPT

## 2021-09-01 PROCEDURE — 76210000006 HC OR PH I REC 0.5 TO 1 HR: Performed by: NEUROLOGICAL SURGERY

## 2021-09-01 PROCEDURE — 74011000250 HC RX REV CODE- 250: Performed by: NURSE ANESTHETIST, CERTIFIED REGISTERED

## 2021-09-01 PROCEDURE — 77030039425 HC BLD LARYNG TRULITE DISP TELE -A: Performed by: ANESTHESIOLOGY

## 2021-09-01 PROCEDURE — 88305 TISSUE EXAM BY PATHOLOGIST: CPT

## 2021-09-01 PROCEDURE — 77030018673: Performed by: NEUROLOGICAL SURGERY

## 2021-09-01 PROCEDURE — 77030026359 HC KT XPNDR II KYPH -I2: Performed by: NEUROLOGICAL SURGERY

## 2021-09-01 PROCEDURE — C1713 ANCHOR/SCREW BN/BN,TIS/BN: HCPCS | Performed by: NEUROLOGICAL SURGERY

## 2021-09-01 PROCEDURE — 77030011218 HC DEV BIOP BN KYPH -C: Performed by: NEUROLOGICAL SURGERY

## 2021-09-01 PROCEDURE — 88311 DECALCIFY TISSUE: CPT

## 2021-09-01 PROCEDURE — 22515 PERQ VERTEBRAL AUGMENTATION: CPT | Performed by: NEUROLOGICAL SURGERY

## 2021-09-01 PROCEDURE — 74011000250 HC RX REV CODE- 250: Performed by: NEUROLOGICAL SURGERY

## 2021-09-01 PROCEDURE — 76210000020 HC REC RM PH II FIRST 0.5 HR: Performed by: NEUROLOGICAL SURGERY

## 2021-09-01 PROCEDURE — 72100 X-RAY EXAM L-S SPINE 2/3 VWS: CPT

## 2021-09-01 PROCEDURE — 88342 IMHCHEM/IMCYTCHM 1ST ANTB: CPT

## 2021-09-01 PROCEDURE — 74011250637 HC RX REV CODE- 250/637: Performed by: NEUROLOGICAL SURGERY

## 2021-09-01 PROCEDURE — 22514 PERQ VERTEBRAL AUGMENTATION: CPT | Performed by: NEUROLOGICAL SURGERY

## 2021-09-01 PROCEDURE — 74011250637 HC RX REV CODE- 250/637: Performed by: ANESTHESIOLOGY

## 2021-09-01 PROCEDURE — 76060000033 HC ANESTHESIA 1 TO 1.5 HR: Performed by: NEUROLOGICAL SURGERY

## 2021-09-01 PROCEDURE — 77030037088 HC TUBE ENDOTRACH ORAL NSL COVD-A: Performed by: ANESTHESIOLOGY

## 2021-09-01 PROCEDURE — 74011000636 HC RX REV CODE- 636: Performed by: NEUROLOGICAL SURGERY

## 2021-09-01 PROCEDURE — 74011250636 HC RX REV CODE- 250/636: Performed by: NEUROLOGICAL SURGERY

## 2021-09-01 PROCEDURE — 2709999900 HC NON-CHARGEABLE SUPPLY: Performed by: NEUROLOGICAL SURGERY

## 2021-09-01 PROCEDURE — 74011250636 HC RX REV CODE- 250/636: Performed by: NURSE ANESTHETIST, CERTIFIED REGISTERED

## 2021-09-01 PROCEDURE — 74011250636 HC RX REV CODE- 250/636: Performed by: ANESTHESIOLOGY

## 2021-09-01 DEVICE — BONE CEMENT CX01A XPEDE US
Type: IMPLANTABLE DEVICE | Site: SPINE LUMBAR | Status: FUNCTIONAL
Brand: KYPHON® XPEDE™ BONE CEMENT

## 2021-09-01 RX ORDER — SODIUM CHLORIDE 0.9 % (FLUSH) 0.9 %
5-40 SYRINGE (ML) INJECTION AS NEEDED
Status: DISCONTINUED | OUTPATIENT
Start: 2021-09-01 | End: 2021-09-01 | Stop reason: HOSPADM

## 2021-09-01 RX ORDER — CEFAZOLIN SODIUM/WATER 2 G/20 ML
2 SYRINGE (ML) INTRAVENOUS ONCE
Status: COMPLETED | OUTPATIENT
Start: 2021-09-01 | End: 2021-09-01

## 2021-09-01 RX ORDER — ACETAMINOPHEN 500 MG
1000 TABLET ORAL ONCE
Status: COMPLETED | OUTPATIENT
Start: 2021-09-01 | End: 2021-09-01

## 2021-09-01 RX ORDER — OXYCODONE HYDROCHLORIDE 5 MG/1
10 TABLET ORAL
Status: COMPLETED | OUTPATIENT
Start: 2021-09-01 | End: 2021-09-01

## 2021-09-01 RX ORDER — OXYCODONE AND ACETAMINOPHEN 7.5; 325 MG/1; MG/1
1 TABLET ORAL
Qty: 2 TABLET | Refills: 0 | Status: SHIPPED | OUTPATIENT
Start: 2021-09-01 | End: 2021-09-08

## 2021-09-01 RX ORDER — PROPOFOL 10 MG/ML
INJECTION, EMULSION INTRAVENOUS AS NEEDED
Status: DISCONTINUED | OUTPATIENT
Start: 2021-09-01 | End: 2021-09-01 | Stop reason: HOSPADM

## 2021-09-01 RX ORDER — ROCURONIUM BROMIDE 10 MG/ML
INJECTION, SOLUTION INTRAVENOUS AS NEEDED
Status: DISCONTINUED | OUTPATIENT
Start: 2021-09-01 | End: 2021-09-01 | Stop reason: HOSPADM

## 2021-09-01 RX ORDER — DEXAMETHASONE SODIUM PHOSPHATE 4 MG/ML
INJECTION, SOLUTION INTRA-ARTICULAR; INTRALESIONAL; INTRAMUSCULAR; INTRAVENOUS; SOFT TISSUE AS NEEDED
Status: DISCONTINUED | OUTPATIENT
Start: 2021-09-01 | End: 2021-09-01 | Stop reason: HOSPADM

## 2021-09-01 RX ORDER — BUPIVACAINE HYDROCHLORIDE AND EPINEPHRINE 5; 5 MG/ML; UG/ML
INJECTION, SOLUTION EPIDURAL; INTRACAUDAL; PERINEURAL AS NEEDED
Status: DISCONTINUED | OUTPATIENT
Start: 2021-09-01 | End: 2021-09-01 | Stop reason: HOSPADM

## 2021-09-01 RX ORDER — SODIUM CHLORIDE, SODIUM LACTATE, POTASSIUM CHLORIDE, CALCIUM CHLORIDE 600; 310; 30; 20 MG/100ML; MG/100ML; MG/100ML; MG/100ML
100 INJECTION, SOLUTION INTRAVENOUS CONTINUOUS
Status: DISCONTINUED | OUTPATIENT
Start: 2021-09-01 | End: 2021-09-01 | Stop reason: HOSPADM

## 2021-09-01 RX ORDER — SODIUM CHLORIDE 0.9 % (FLUSH) 0.9 %
5-40 SYRINGE (ML) INJECTION EVERY 8 HOURS
Status: DISCONTINUED | OUTPATIENT
Start: 2021-09-01 | End: 2021-09-01 | Stop reason: HOSPADM

## 2021-09-01 RX ORDER — EPHEDRINE SULFATE/0.9% NACL/PF 50 MG/5 ML
SYRINGE (ML) INTRAVENOUS AS NEEDED
Status: DISCONTINUED | OUTPATIENT
Start: 2021-09-01 | End: 2021-09-01 | Stop reason: HOSPADM

## 2021-09-01 RX ORDER — ONDANSETRON 2 MG/ML
INJECTION INTRAMUSCULAR; INTRAVENOUS AS NEEDED
Status: DISCONTINUED | OUTPATIENT
Start: 2021-09-01 | End: 2021-09-01 | Stop reason: HOSPADM

## 2021-09-01 RX ORDER — MIDAZOLAM HYDROCHLORIDE 1 MG/ML
2 INJECTION, SOLUTION INTRAMUSCULAR; INTRAVENOUS
Status: DISCONTINUED | OUTPATIENT
Start: 2021-09-01 | End: 2021-09-01 | Stop reason: HOSPADM

## 2021-09-01 RX ORDER — HYDROMORPHONE HYDROCHLORIDE 2 MG/ML
0.5 INJECTION, SOLUTION INTRAMUSCULAR; INTRAVENOUS; SUBCUTANEOUS
Status: DISCONTINUED | OUTPATIENT
Start: 2021-09-01 | End: 2021-09-01 | Stop reason: HOSPADM

## 2021-09-01 RX ORDER — FENTANYL CITRATE 50 UG/ML
INJECTION, SOLUTION INTRAMUSCULAR; INTRAVENOUS AS NEEDED
Status: DISCONTINUED | OUTPATIENT
Start: 2021-09-01 | End: 2021-09-01 | Stop reason: HOSPADM

## 2021-09-01 RX ADMIN — HYDROMORPHONE HYDROCHLORIDE 0.5 MG: 2 INJECTION INTRAMUSCULAR; INTRAVENOUS; SUBCUTANEOUS at 17:52

## 2021-09-01 RX ADMIN — Medication 10 MG: at 17:00

## 2021-09-01 RX ADMIN — Medication 10 MG: at 16:57

## 2021-09-01 RX ADMIN — Medication 120 MG: at 17:04

## 2021-09-01 RX ADMIN — CEFAZOLIN 2 G: 1 INJECTION, POWDER, FOR SOLUTION INTRAVENOUS at 16:30

## 2021-09-01 RX ADMIN — OXYCODONE 5 MG: 5 TABLET ORAL at 18:06

## 2021-09-01 RX ADMIN — Medication 3 AMPULE: at 14:16

## 2021-09-01 RX ADMIN — SODIUM CHLORIDE, SODIUM LACTATE, POTASSIUM CHLORIDE, AND CALCIUM CHLORIDE 100 ML/HR: 600; 310; 30; 20 INJECTION, SOLUTION INTRAVENOUS at 14:45

## 2021-09-01 RX ADMIN — DEXAMETHASONE SODIUM PHOSPHATE 10 MG: 4 INJECTION, SOLUTION INTRAMUSCULAR; INTRAVENOUS at 16:40

## 2021-09-01 RX ADMIN — ONDANSETRON 4 MG: 2 INJECTION INTRAMUSCULAR; INTRAVENOUS at 16:40

## 2021-09-01 RX ADMIN — PROPOFOL 180 MG: 10 INJECTION, EMULSION INTRAVENOUS at 16:25

## 2021-09-01 RX ADMIN — FENTANYL CITRATE 100 MCG: 50 INJECTION INTRAMUSCULAR; INTRAVENOUS at 16:25

## 2021-09-01 RX ADMIN — ROCURONIUM BROMIDE 40 MG: 10 INJECTION, SOLUTION INTRAVENOUS at 16:25

## 2021-09-01 RX ADMIN — Medication 15 MG: at 17:02

## 2021-09-01 RX ADMIN — ACETAMINOPHEN 1000 MG: 500 TABLET ORAL at 14:16

## 2021-09-01 NOTE — PROGRESS NOTES
- Visited as requested, to offer spiritual support and prayer prior to surgery. Pt's partner, Willetta Severance, was present.      Hayden Mcginnis MDiv, [address] [place] [address]

## 2021-09-01 NOTE — DISCHARGE INSTRUCTIONS
Kyphoplasty Discharge Instructions    General information: This procedure is done to help with the back pain that is associated with compression fractures in the spine. The Kyphoplasty involves placing a balloon into the space of the vertebrae that is fractured, blowing up the balloon, therefore realigning the broken pieces of bone, and then injecting cement into the space to strengthen the vertebrae. The pain experienced from compression fractures is caused by the vertebrae not being stabilized. The cement stabilizes the bone, therefore reducing the pain. Home care instructions: You can resume your regular diet and medication regimen. Do not lift anything heavier than a gallon of milk or do anything strenuous for the next 24 hours. You will notice a dressing on your lower back after your procedure. This dressing can be removed in 24 hours. Showering is acceptable in 24 hours but you should refrain from tub baths or swimming for 5 days. Call if:   You should call your physician if you have any bleeding other than a small spot on your bandage. Call if you have any signs of infection, fever, or increased pain at the site. Call if you should have new or worsening pain in your back, or if you lose control of your bladder or bowel. Any tingling or loss of feeling or movement in your legs should also be reported. Medication Interaction:  During your procedure you potentially received a medication or medications which may reduce the effectiveness of oral contraceptives. Please consider other forms of contraception for 1 month following your procedure if you are currently using oral contraceptives as your primary form of birth control. In addition to this, we recommend continuing your oral contraceptive as prescribed, unless otherwise instructed by your physician, during this time.     After general anesthesia or intravenous sedation, for 24 hours or while taking prescription Narcotics:  · Limit your activities  · A responsible adult needs to be with you for the next 24 hours  · Do not drive and operate hazardous machinery  · Do not make important personal or business decisions  · Do not drink alcoholic beverages  · If you have not urinated within 8 hours after discharge, and you are experiencing discomfort from urinary retention, please go to the nearest Emergency Dept. · If you have sleep apnea and have a CPAP machine, please use it for all naps and sleeping. · Please use caution when taking narcotics and any of your home medications that may cause drowsiness. *  Please give a list of your current medications to your Primary Care Provider. *  Please update this list whenever your medications are discontinued, doses are      changed, or new medications (including over-the-counter products) are added. *  Please carry medication information at all times in case of emergency situations. These are general instructions for a healthy lifestyle:  No smoking/ No tobacco products/ Avoid exposure to second hand smoke  Surgeon General's Warning:  Quitting smoking now greatly reduces serious risk to your health. Obesity, smoking, and sedentary lifestyle greatly increases your risk for illness  A healthy diet, regular physical exercise & weight monitoring are important for maintaining a healthy lifestyle    You may be retaining fluid if you have a history of heart failure or if you experience any of the following symptoms:  Weight gain of 3 pounds or more overnight or 5 pounds in a week, increased swelling in our hands or feet or shortness of breath while lying flat in bed. Please call your doctor as soon as you notice any of these symptoms; do not wait until your next office visit.

## 2021-09-01 NOTE — ANESTHESIA PREPROCEDURE EVALUATION
Relevant Problems   No relevant active problems       Anesthetic History   No history of anesthetic complications            Review of Systems / Medical History  Patient summary reviewed and pertinent labs reviewed    Pulmonary    COPD: moderate    Sleep apnea           Neuro/Psych              Cardiovascular    Hypertension: well controlled          Hyperlipidemia    Exercise tolerance: >4 METS  Comments: Echo 1/2020 showed normal LVEF and no significant valvular disease. GI/Hepatic/Renal     GERD: well controlled           Endo/Other    Diabetes: well controlled, type 2    Arthritis and cancer (Rectal Adenocarcinoma)     Other Findings   Comments: Fibromyalgia  IBS           Physical Exam    Airway  Mallampati: II  TM Distance: 4 - 6 cm  Neck ROM: normal range of motion   Mouth opening: Normal    Comments: Significant overbite Cardiovascular  Regular rate and rhythm,  S1 and S2 normal,  no murmur, click, rub, or gallop  Rhythm: regular  Rate: normal         Dental    Dentition: Caps/crowns and Bridges  Comments: Loose upper right side tooth.     Pulmonary  Breath sounds clear to auscultation               Abdominal  GI exam deferred       Other Findings            Anesthetic Plan    ASA: 3  Anesthesia type: general          Induction: Intravenous  Anesthetic plan and risks discussed with: Patient and Spouse

## 2021-09-01 NOTE — PERIOP NOTES
Debrief completed Yes    Correct procedure Yes    Count completed and verified Yes    Specimen collected and verified Yes    Wound classification Yes    Other N/A

## 2021-09-01 NOTE — BRIEF OP NOTE
Brief Postoperative Note    Patient: Pascual Guo  YOB: 1950  MRN: [id number]    Date of Procedure: 9/1/2021     Pre-Op Diagnosis: Compression fracture of L4 lumbar vertebra, closed, initial encounter ([facility] [S32.040A]  Closed compression fracture of L5 lumbar vertebra, with delayed healing, subsequent encounter [S32.050G]    Post-Op Diagnosis: SAME    Procedure(s):  KYPHOPLASTY  LUMBAR 4 AND LUMBAR 5, BONE BIOPSY    Surgeon(s):  Darrell Epley, MD    Surgical Assistant: None    Anesthesia: General     Estimated Blood Loss (mL): MINIMAL    Complications: NONE    Specimens:   ID Type Source Tests Collected by Time Destination   1 : Bone Biopsy, L 4 Preservative Lumbar  Darrell Epley, MD 9/1/2021 9261 Pathology        Implants:   Implant Name Type Inv.  Item Serial No.  Lot No. LRB No. Used Action   CEMENT BNE Coralyn Fran  CEMENT  [facility]_ [id number] N/A 1 Implanted       Drains: * No LDAs found *    Findings: FILL OF BOTH  MORE IN  L4    Electronically Signed by Chas Thompson MD on 9/1/2021 at 5:18 PM

## 2021-09-02 NOTE — ANESTHESIA POSTPROCEDURE EVALUATION
Procedure(s):  KYPHOPLASTY  LUMBAR 4 AND LUMBAR 5, BONE BIOPSY. general    Anesthesia Post Evaluation      Multimodal analgesia: multimodal analgesia used between 6 hours prior to anesthesia start to PACU discharge  Patient location during evaluation: PACU  Patient participation: complete - patient participated  Level of consciousness: awake and alert  Pain management: adequate  Airway patency: patent  Anesthetic complications: no  Cardiovascular status: acceptable  Respiratory status: acceptable  Hydration status: acceptable  Post anesthesia nausea and vomiting:  none      INITIAL Post-op Vital signs:   Vitals Value Taken Time   /66 09/01/21 1822   Temp 36.8 °C (98.2 °F) 09/01/21 1815   Pulse 89 09/01/21 1822   Resp 19 09/01/21 1815   SpO2 93 % 09/01/21 1822   Vitals shown include unvalidated device data.

## 2021-09-02 NOTE — OP NOTES
[address] [facility]  OPERATIVE REPORT    Name:  Swapnil Alegre  MR#:  [id number]  :  1950  ACCOUNT #:  [de-identified]  DATE OF SERVICE:  2021    PREOPERATIVE DIAGNOSIS:  Acute wedge compression fracture deformity, L4 vertebral body and L5 vertebral body. POSTOPERATIVE DIAGNOSIS:  Acute wedge compression fracture deformity, L4 vertebral body and L5 vertebral body. PROCEDURES PERFORMED:  1. L4 kyphoplasty. 2.  L5 kyphoplasty. 3.  L4 bone biopsy, vertebral.  4.  Continuous intraoperative fluoroscopy. SURGEON:  Bianca Yap MD    ASSISTANT:  None. ANESTHESIA:  General endotracheal.    COMPLICATIONS:  None. SPECIMENS REMOVED:  L4 vertebral body bone biopsy. IMPLANTS:  See below. ESTIMATED BLOOD LOSS:  Minimal.    PREPARATIONS:  ChloraPrep. HISTORY OF PRESENT ILLNESS:  A 72-year-old female status post fall with persistent back pain refractory to conservative measures. MRI scan was positive for acute compression fractures, wedge vertebral fractures, L4 greater than L5. Both acute. She was admitted for surgery as conservative measures have failed. OPERATIVE NOTE:  The patient was brought to the operative room and carefully placed under general endotracheal anesthesia without complication. She was carefully turned prone on the OSI bed on top of the [facility] frame. AP and lateral fluoroscopy was used to locate the pedicles of L4 and L5 bilaterally and incisions were drawn out, infiltrated with Xylocaine with epinephrine and the back was prepped and draped in usual sterile fashion. Small stab incisions were made at each pedicle level at L4 and L5 bilaterally. Docking trocars were placed first on the left side at L4 then at the right side L4, next at L5 on the left, followed by L5 on the right.   Through the docking trocar on the left L4 level, a bone biopsy needle was inserted deep into the vertebral body and a core biopsy was obtained and sent for permanent pathological identification. The drill was used at each interspace to provide a pathway for the balloons which were placed in each space and inflated to PSI of 200 with elevation of the fractures noted. A 7.5 mL of methyl methacrylate cement were packed at 1.5 mm increments in the L4 vertebral body with excellent fill and cross bridging noted. No extravasation of cement. The docking trocars were removed uneventfully. At L5 however, only approximately 4.5 mL of methyl methacrylate cement could be placed. The superior endplate defect caused some leakage of the cement into the disc space, but not into the spinal canal.  There was cross filling of the fracture noted, mostly linear. However, more fill of the fracture was noted on the patient's right side than on the patient's left side. The cement hardened. The docking trocars were removed without tail of cement noted. Small staples were placed and sterile dressings were placed. Final x-rays were obtained, which confirmed methyl methacrylate cement and cross bridging of both areas. The patient tolerated the procedure well, was turned supine, awakened, extubated, and taken to PACU in stable condition. There were no obvious complications.         MD BIANCA [email or web address]  D:  09/01/2021 17:26  T:  09/01/2021 22:05  JOB #:  [id number]

## 2021-09-08 ENCOUNTER — HOSPITAL ENCOUNTER (EMERGENCY)
Age: 71
Discharge: HOME | End: 2021-09-08
Attending: EMERGENCY MEDICINE
Payer: MEDICARE

## 2021-09-08 ENCOUNTER — APPOINTMENT (OUTPATIENT)
Dept: CT IMAGING | Age: 71
End: 2021-09-08
Attending: EMERGENCY MEDICINE
Payer: MEDICARE

## 2021-09-08 ENCOUNTER — APPOINTMENT (OUTPATIENT)
Dept: GENERAL RADIOLOGY | Age: 71
End: 2021-09-08
Attending: EMERGENCY MEDICINE
Payer: MEDICARE

## 2021-09-08 VITALS
WEIGHT: 133 LBS | TEMPERATURE: 98.2 F | DIASTOLIC BLOOD PRESSURE: 77 MMHG | OXYGEN SATURATION: 93 % | SYSTOLIC BLOOD PRESSURE: 163 MMHG | HEIGHT: 61 IN | RESPIRATION RATE: 20 BRPM | BODY MASS INDEX: 25.11 KG/M2 | HEART RATE: 121 BPM

## 2021-09-08 DIAGNOSIS — R10.84 ABDOMINAL PAIN, GENERALIZED: Primary | ICD-10-CM

## 2021-09-08 LAB
ABO + RH BLD: NORMAL
ANION GAP SERPL CALC-SCNC: 11 MMOL/L (ref 7–16)
BASOPHILS # BLD: 0 K/UL (ref 0–0.2)
BASOPHILS NFR BLD: 1 % (ref 0–2)
BLOOD GROUP ANTIBODIES SERPL: NORMAL
BUN SERPL-MCNC: 11 MG/DL (ref 8–23)
CALCIUM SERPL-MCNC: 9.7 MG/DL (ref 8.3–10.4)
CHLORIDE SERPL-SCNC: 105 MMOL/L (ref 98–107)
CO2 SERPL-SCNC: 24 MMOL/L (ref 21–32)
CREAT SERPL-MCNC: 0.77 MG/DL (ref 0.6–1)
DIFFERENTIAL METHOD BLD: ABNORMAL
EOSINOPHIL # BLD: 0.1 K/UL (ref 0–0.8)
EOSINOPHIL NFR BLD: 1 % (ref 0.5–7.8)
ERYTHROCYTE [DISTWIDTH] IN BLOOD BY AUTOMATED COUNT: 16.2 % (ref 11.9–14.6)
GLUCOSE SERPL-MCNC: 179 MG/DL (ref 65–100)
HCT VFR BLD AUTO: 30.8 % (ref 35.8–46.3)
HGB BLD-MCNC: 9.7 G/DL (ref 11.7–15.4)
IMM GRANULOCYTES # BLD AUTO: 0 K/UL (ref 0–0.5)
IMM GRANULOCYTES NFR BLD AUTO: 1 % (ref 0–5)
LYMPHOCYTES # BLD: 0.3 K/UL (ref 0.5–4.6)
LYMPHOCYTES NFR BLD: 6 % (ref 13–44)
MCH RBC QN AUTO: 30.6 PG (ref 26.1–32.9)
MCHC RBC AUTO-ENTMCNC: 31.5 G/DL (ref 31.4–35)
MCV RBC AUTO: 97.2 FL (ref 79.6–97.8)
MONOCYTES # BLD: 0.3 K/UL (ref 0.1–1.3)
MONOCYTES NFR BLD: 8 % (ref 4–12)
NEUTS SEG # BLD: 3.7 K/UL (ref 1.7–8.2)
NEUTS SEG NFR BLD: 83 % (ref 43–78)
NRBC # BLD: 0 K/UL (ref 0–0.2)
PLATELET # BLD AUTO: 451 K/UL (ref 150–450)
PMV BLD AUTO: 9.8 FL (ref 9.4–12.3)
POTASSIUM SERPL-SCNC: 3.5 MMOL/L (ref 3.5–5.1)
RBC # BLD AUTO: 3.17 M/UL (ref 4.05–5.2)
SODIUM SERPL-SCNC: 140 MMOL/L (ref 136–145)
SPECIMEN EXP DATE BLD: NORMAL
WBC # BLD AUTO: 4.4 K/UL (ref 4.3–11.1)

## 2021-09-08 PROCEDURE — 74022 RADEX COMPL AQT ABD SERIES: CPT

## 2021-09-08 PROCEDURE — 74011250637 HC RX REV CODE- 250/637: Performed by: EMERGENCY MEDICINE

## 2021-09-08 PROCEDURE — 96374 THER/PROPH/DIAG INJ IV PUSH: CPT

## 2021-09-08 PROCEDURE — 80048 BASIC METABOLIC PNL TOTAL CA: CPT

## 2021-09-08 PROCEDURE — 85025 COMPLETE CBC W/AUTO DIFF WBC: CPT

## 2021-09-08 PROCEDURE — 99284 EMERGENCY DEPT VISIT MOD MDM: CPT

## 2021-09-08 PROCEDURE — 74177 CT ABD & PELVIS W/CONTRAST: CPT

## 2021-09-08 PROCEDURE — 74011000636 HC RX REV CODE- 636: Performed by: EMERGENCY MEDICINE

## 2021-09-08 PROCEDURE — 74011000258 HC RX REV CODE- 258: Performed by: EMERGENCY MEDICINE

## 2021-09-08 PROCEDURE — 74011250636 HC RX REV CODE- 250/636: Performed by: EMERGENCY MEDICINE

## 2021-09-08 PROCEDURE — 86901 BLOOD TYPING SEROLOGIC RH(D): CPT

## 2021-09-08 RX ORDER — MAG HYDROX/ALUMINUM HYD/SIMETH 200-200-20
30 SUSPENSION, ORAL (FINAL DOSE FORM) ORAL
Status: COMPLETED | OUTPATIENT
Start: 2021-09-08 | End: 2021-09-08

## 2021-09-08 RX ORDER — SODIUM CHLORIDE 0.9 % (FLUSH) 0.9 %
10 SYRINGE (ML) INJECTION
Status: COMPLETED | OUTPATIENT
Start: 2021-09-08 | End: 2021-09-08

## 2021-09-08 RX ORDER — HYDROGEN PEROXIDE 3 %
20 SOLUTION, NON-ORAL MISCELLANEOUS DAILY
COMMUNITY
End: 2022-03-31 | Stop reason: ALTCHOICE

## 2021-09-08 RX ORDER — ONDANSETRON 2 MG/ML
4 INJECTION INTRAMUSCULAR; INTRAVENOUS
Status: COMPLETED | OUTPATIENT
Start: 2021-09-08 | End: 2021-09-08

## 2021-09-08 RX ADMIN — IOPAMIDOL 100 ML: 755 INJECTION, SOLUTION INTRAVENOUS at 12:29

## 2021-09-08 RX ADMIN — SODIUM CHLORIDE 100 ML: 900 INJECTION, SOLUTION INTRAVENOUS at 12:29

## 2021-09-08 RX ADMIN — ALUMINUM HYDROXIDE, MAGNESIUM HYDROXIDE, DIMETHICONE 30 ML: 200; 200; 20 LIQUID ORAL at 11:54

## 2021-09-08 RX ADMIN — Medication 10 ML: at 12:29

## 2021-09-08 RX ADMIN — ONDANSETRON 4 MG: 2 INJECTION INTRAMUSCULAR; INTRAVENOUS at 10:55

## 2021-09-08 RX ADMIN — DIATRIZOATE MEGLUMINE AND DIATRIZOATE SODIUM 15 ML: 660; 100 LIQUID ORAL; RECTAL at 10:56

## 2021-09-08 NOTE — ED PROVIDER NOTES
Patient is a 42-year-old female presenting to the emergency department today with abdominal pain diffusely after having colonoscopy done this morning. Dr. Ruma Lezama the gastroenterologist called and sent the patient over to the ER for further evaluation. She has a history of colon cancer and he said she had a very torturous colon and is concerned about a possible perforation. The patient says the pain has started to improve after she has passed gas several times since the procedure.            Past Medical History:   Diagnosis Date    COVID-19 vaccine series completed 05/12/2021    Eloise Shields Depression with anxiety     Diverticulosis of colon 04/26/2017    Widespread per Colonoscopy    Elevated C-reactive protein (CRP)     Enlarged heart     Fibromyalgia     GERD (gastroesophageal reflux disease)     treat with OTC    Hemorrhoids 04/26/2017    Internal & External Per Colonoscopy    Hiatal hernia     HTN (hypertension), benign     managed with med    Hypokalemia 3/30/2021    IBS (irritable bowel syndrome)     Iron deficiency anemia     Microalbuminuria due to type 2 diabetes mellitus ([address]     Mixed hyperlipidemia     Osteopenia 07/10/2017    Per DEXA    Perennial allergic rhinitis with seasonal variation     Peripheral neuropathy     finger and hand pain    Rectal cancer ([address] 06/26/2020    Adenocarcinoma, removed with colonoscopy    Sleep apnea     Not on CPAP     Type 2 diabetes mellitus ([address]     type 2- oral meds; sqbs am avg 120-150's--- no hypo; HA1C 6.3, 6/3/2021    Vitamin B12 deficiency     Vitamin D deficiency        Past Surgical History:   Procedure Laterality Date    COLONOSCOPY N/A 4/26/2017    COLONOSCOPY / BMI=30 performed by Jose Whaley MD at [facility] ENDOSCOPY    COLONOSCOPY N/A 6/19/2020    COLONOSCOPY/BMI 28 performed by Davina Flores MD at [address] [place] [address] N/A 6/30/2020    SIGMOIDOSCOPY FLEXIBLE performed by Khari Grubbs MD at [facility] ENDOSCOPY    HX BREAST LUMPECTOMY Bilateral     HX CARPAL TUNNEL RELEASE Right     HX CHOLECYSTECTOMY      HX COLONOSCOPY  2021    Diverticulosis, External Hemorrhoids, Rectal Ulcer - Pathology = Hyperplastic Mucosa, Due     HX ENDOSCOPY  2021    Erosive Esophagitis    HX PREMALIG/BENIGN SKIN LESION EXCISION      HX TUBAL LIGATION      HX VASCULAR ACCESS      IR INSERT TUNL CVC W PORT OVER 5 YEARS  2021    VA SINUS SURGERY PROC UNLISTED           Family History:   Problem Relation Age of Onset    Cancer Mother         Cervical    Diabetes Mother     Heart Disease Father     Hypertension Father     Elevated Lipids Father     Other Father         ITP    Emphysema Father     Lung Cancer Cousin     Other Sister         Skin Cancer    Asthma Sister     Diabetes Sister     Kidney Disease Maternal Grandmother     Kidney Disease Maternal Grandfather     Arthritis Sister     Diabetes Sister     Colon Cancer Maternal Uncle     Colon Cancer Paternal Uncle     Emphysema Paternal Aunt     Colon Cancer Cousin     Breast Cancer Neg Hx        Social History     Socioeconomic History    Marital status:      Spouse name: Not on file    Number of children: Not on file    Years of education: Not on file    Highest education level: Not on file   Occupational History    Not on file   Tobacco Use    Smoking status: Former Smoker     Packs/day: 1.50     Years: 35.00     Pack years: 52.50     Types: Cigarettes     Quit date: 9/15/2016     Years since quittin.9    Smokeless tobacco: Never Used   Vaping Use    Vaping Use: Never used   Substance and Sexual Activity    Alcohol use: Not Currently    Drug use: No    Sexual activity: Not on file   Other Topics Concern     Service Not Asked    Blood Transfusions Not Asked    Caffeine Concern Not Asked    Occupational Exposure Not Asked    Hobby Hazards Not Asked    Sleep Concern Not Asked    Stress Concern Not Asked    Weight Concern Not Asked    Special Diet Not Asked    Back Care Not Asked    Exercise Not Asked    Bike Helmet Not Asked   [address] Not Asked    Self-Exams Not Asked   Social History Narrative    Not on file     Social Determinants of Health     Financial Resource Strain:     Difficulty of Paying Living Expenses:    Food Insecurity:     Worried About Running Out of Food in the Last Year:     Ran Out of Food in the Last Year:    Transportation Needs:     Lack of Transportation (Medical):  Lack of Transportation (Non-Medical):    Physical Activity:     Days of Exercise per Week:     Minutes of Exercise per Session:    Stress:     Feeling of Stress :    Social Connections:     Frequency of Communication with Friends and Family:     Frequency of Social Gatherings with Friends and Family:     Attends Spiritism Services:     Active Member of Clubs or Organizations:     Attends Club or Organization Meetings:     Marital Status:    Intimate Partner Violence:     Fear of Current or Ex-Partner:     Emotionally Abused:     Physically Abused:     Sexually Abused: ALLERGIES: Codeine, Lidocaine, and Sulfa (sulfonamide antibiotics)    Review of Systems   Gastrointestinal: Positive for abdominal distention, abdominal pain and nausea. All other systems reviewed and are negative. Vitals:    09/08/21 1039 09/08/21 1200 09/08/21 1400   BP: (!) 140/75 131/69 (!) 163/77   Pulse: 84 97 (!) 121   Resp: 20     Temp: 98.2 °F (36.8 °C)     SpO2: 100% 95% 93%   Weight: 60.3 kg (133 lb)     Height: 5' 1\" (1.549 m)              Physical Exam     GENERAL:The patient has Body mass index is 25.13 kg/m². Well-hydrated. VITAL SIGNS: Heart rate, blood pressure, respiratory rate reviewed as recorded in  nurse's notes  EYES: Pupils reactive. Extraocular motion intact. No conjunctival redness or drainage. NECK: Supple, no meningeal signs. Trachea midline. No masses or thyromegaly.   LUNGS: Breath sounds clear and equal bilaterally no accessory muscle use  CHEST: No deformity  CARDIOVASCULAR: Regular rate and rhythm  ABDOMEN: Tenderness diffusely throughout the abdomen with some abdominal distention and guarding noted. No rigidity present. Positive bowel sounds in all 4 quadrants. EXTREMITIES: No clubbing or cyanosis. No joint swelling. Normal muscle tone. No  restricted range of motion appreciated. NEUROLOGIC: Sensation is grossly intact. Cranial nerve exam reveals face is  symmetrical, tongue is midline speech is clear. SKIN: No rash or petechiae. Good skin turgor palpated. PSYCHIATRIC: Alert and oriented. Appropriate behavior and judgment. MDM  Number of Diagnoses or Management Options  Diagnosis management comments: Viral infection, gastroenteritis, viral adenitis, pseudomembranous colitis, inflammatory  bowel disease, infectious diarrhea    Abdominal wall pain,     Constipation, fecal impaction, small bowel obstruction, partial small bowel obstruction,  Ileus    UTI, pyelonephritis, renal colic, ureteral stone     Peptic ulcer disease, esophagitis, GERD    Pancreatitis, pancreatic pseudocyst,    hepatic cirrhosis, GI bleed, esophageal varices, poisoning,    gallbladder disease, cholecystitis, diverticulitis, appendicitis, appendicitis with rupture,    ingestion of foreign material         Amount and/or Complexity of Data Reviewed  Clinical lab tests: reviewed and ordered  Tests in the radiology section of CPT®: ordered and reviewed  Tests in the medicine section of CPT®: ordered and reviewed  Review and summarize past medical records: yes  Discuss the patient with other providers: yes  Independent visualization of images, tracings, or specimens: yes      ED Course as of Sep 08 1407   Wed Sep 08, 2021   1404 IMPRESSION  No free air or free fluid. Possible sigmoid mural thickening and  stranding densities in the mesorectal fat suggesting some inflammation. Any  biopsies performed? Correlation with endoscopy report will be helpful. CT ABD PELV W CONT [KH]   8288 Patient's pain is almost completely resolved now that she has expelled most of the gas from her colonoscopy this morning. Patient is ready to be discharged to home. []   0498 I have updated Dr. Lady Correa about the findings in the emergency department and she will follow up with his office outpatient as scheduled previously.     [KH]      ED Course User Index  [KH] Phyllis Warner, DO       Procedures

## 2021-09-08 NOTE — ED NOTES
I have reviewed discharge instructions with the patient. The patient verbalized understanding. Patient left ED via Discharge Method: wheelchair to Home with family/friend. Opportunity for questions and clarification provided. Patient given 0 scripts. To continue your aftercare when you leave the hospital, you may receive an automated call from our care team to check in on how you are doing. This is a free service and part of our promise to provide the best care and service to meet your aftercare needs.  If you have questions, or wish to unsubscribe from this service please call [phone]. Thank you for Choosing our [facility] Emergency Department.

## 2021-09-08 NOTE — ED TRIAGE NOTES
Pt has had endoscopy, colonoscopy and back surgery in the past two weeks. Pt seen by PCP today and sent to ED. Pt states abdomen is abnormally distended and rigid.

## 2021-09-15 ENCOUNTER — TRANSCRIBE ORDER (OUTPATIENT)
Dept: SCHEDULING | Age: 71
End: 2021-09-15

## 2021-09-15 DIAGNOSIS — Z12.31 VISIT FOR SCREENING MAMMOGRAM: Primary | ICD-10-CM

## 2021-10-05 ENCOUNTER — HOSPITAL ENCOUNTER (OUTPATIENT)
Dept: CT IMAGING | Age: 71
Discharge: HOME | End: 2021-10-05
Attending: INTERNAL MEDICINE
Payer: MEDICARE

## 2021-10-05 DIAGNOSIS — C20 RECTAL CANCER (HCC): ICD-10-CM

## 2021-10-05 PROCEDURE — 74177 CT ABD & PELVIS W/CONTRAST: CPT

## 2021-10-05 PROCEDURE — 74011000258 HC RX REV CODE- 258: Performed by: INTERNAL MEDICINE

## 2021-10-05 PROCEDURE — 74011000636 HC RX REV CODE- 636: Performed by: INTERNAL MEDICINE

## 2021-10-05 RX ORDER — SODIUM CHLORIDE 0.9 % (FLUSH) 0.9 %
10 SYRINGE (ML) INJECTION
Status: COMPLETED | OUTPATIENT
Start: 2021-10-05 | End: 2021-10-05

## 2021-10-05 RX ADMIN — SODIUM CHLORIDE 100 ML: 900 INJECTION, SOLUTION INTRAVENOUS at 11:12

## 2021-10-05 RX ADMIN — IOPAMIDOL 100 ML: 755 INJECTION, SOLUTION INTRAVENOUS at 11:11

## 2021-10-05 RX ADMIN — DIATRIZOATE MEGLUMINE AND DIATRIZOATE SODIUM 15 ML: 660; 100 LIQUID ORAL; RECTAL at 11:14

## 2021-10-05 RX ADMIN — Medication 10 ML: at 11:14

## 2021-10-11 ENCOUNTER — PATIENT OUTREACH (OUTPATIENT)
Dept: CASE MANAGEMENT | Age: 71
End: 2021-10-11

## 2021-10-11 ENCOUNTER — HOSPITAL ENCOUNTER (OUTPATIENT)
Dept: LAB | Age: 71
Discharge: HOME | End: 2021-10-11
Payer: MEDICARE

## 2021-10-11 DIAGNOSIS — D50.9 IRON DEFICIENCY ANEMIA, UNSPECIFIED IRON DEFICIENCY ANEMIA TYPE: ICD-10-CM

## 2021-10-11 LAB
FERRITIN SERPL-MCNC: 22 NG/ML (ref 8–388)
HGB RETIC QN AUTO: 32 PG (ref 29–35)
IMM RETICS NFR: 10.3 % (ref 3–15.9)
IRON SATN MFR SERPL: 15 %
IRON SERPL-MCNC: 63 UG/DL (ref 35–150)
RETICS # AUTO: 0.06 M/UL (ref 0.03–0.1)
RETICS/RBC NFR AUTO: 1.4 % (ref 0.3–2)
TIBC SERPL-MCNC: 424 UG/DL (ref 250–450)

## 2021-10-11 PROCEDURE — 82728 ASSAY OF FERRITIN: CPT

## 2021-10-11 PROCEDURE — 36415 COLL VENOUS BLD VENIPUNCTURE: CPT

## 2021-10-11 PROCEDURE — 83540 ASSAY OF IRON: CPT

## 2021-10-11 PROCEDURE — 85046 RETICYTE/HGB CONCENTRATE: CPT

## 2021-10-18 ENCOUNTER — HOSPITAL ENCOUNTER (OUTPATIENT)
Dept: INFUSION THERAPY | Age: 71
Discharge: HOME | End: 2021-10-18
Payer: MEDICARE

## 2021-10-18 VITALS
SYSTOLIC BLOOD PRESSURE: 116 MMHG | DIASTOLIC BLOOD PRESSURE: 47 MMHG | TEMPERATURE: 98.1 F | HEART RATE: 88 BPM | OXYGEN SATURATION: 98 % | RESPIRATION RATE: 18 BRPM

## 2021-10-18 DIAGNOSIS — C20 RECTAL ADENOCARCINOMA (HCC): ICD-10-CM

## 2021-10-18 DIAGNOSIS — D50.9 IRON DEFICIENCY ANEMIA, UNSPECIFIED IRON DEFICIENCY ANEMIA TYPE: ICD-10-CM

## 2021-10-18 DIAGNOSIS — K62.89 RECTAL MASS: ICD-10-CM

## 2021-10-18 DIAGNOSIS — E87.6 HYPOKALEMIA: Primary | ICD-10-CM

## 2021-10-18 PROCEDURE — 96361 HYDRATE IV INFUSION ADD-ON: CPT

## 2021-10-18 PROCEDURE — 96374 THER/PROPH/DIAG INJ IV PUSH: CPT

## 2021-10-18 PROCEDURE — 74011000258 HC RX REV CODE- 258: Performed by: INTERNAL MEDICINE

## 2021-10-18 PROCEDURE — 74011250636 HC RX REV CODE- 250/636: Performed by: INTERNAL MEDICINE

## 2021-10-18 RX ORDER — SODIUM CHLORIDE 0.9 % (FLUSH) 0.9 %
10 SYRINGE (ML) INJECTION AS NEEDED
Status: DISCONTINUED | OUTPATIENT
Start: 2021-10-18 | End: 2021-10-19 | Stop reason: HOSPADM

## 2021-10-18 RX ADMIN — Medication 10 ML: at 16:23

## 2021-10-18 RX ADMIN — SODIUM CHLORIDE 500 ML: 900 INJECTION, SOLUTION INTRAVENOUS at 15:51

## 2021-10-18 RX ADMIN — FERUMOXYTOL 510 MG: 510 INJECTION INTRAVENOUS at 15:36

## 2021-10-18 NOTE — PROGRESS NOTES
Pt arrived ambulatory, fereheme completed, pt tolerated well, monitored for 30 min, no adverse side effects, discharged home ambulatory

## 2021-10-25 ENCOUNTER — HOSPITAL ENCOUNTER (OUTPATIENT)
Dept: INFUSION THERAPY | Age: 71
Discharge: HOME | End: 2021-10-25
Payer: MEDICARE

## 2021-10-25 VITALS
HEART RATE: 96 BPM | SYSTOLIC BLOOD PRESSURE: 146 MMHG | TEMPERATURE: 98.1 F | RESPIRATION RATE: 18 BRPM | DIASTOLIC BLOOD PRESSURE: 55 MMHG | OXYGEN SATURATION: 98 %

## 2021-10-25 DIAGNOSIS — C20 RECTAL ADENOCARCINOMA (HCC): ICD-10-CM

## 2021-10-25 DIAGNOSIS — D50.9 IRON DEFICIENCY ANEMIA, UNSPECIFIED IRON DEFICIENCY ANEMIA TYPE: ICD-10-CM

## 2021-10-25 DIAGNOSIS — K62.89 RECTAL MASS: ICD-10-CM

## 2021-10-25 DIAGNOSIS — E87.6 HYPOKALEMIA: Primary | ICD-10-CM

## 2021-10-25 PROCEDURE — 96365 THER/PROPH/DIAG IV INF INIT: CPT

## 2021-10-25 PROCEDURE — 74011250636 HC RX REV CODE- 250/636: Performed by: INTERNAL MEDICINE

## 2021-10-25 PROCEDURE — 96361 HYDRATE IV INFUSION ADD-ON: CPT

## 2021-10-25 PROCEDURE — 74011000258 HC RX REV CODE- 258: Performed by: INTERNAL MEDICINE

## 2021-10-25 RX ORDER — SODIUM CHLORIDE 0.9 % (FLUSH) 0.9 %
10 SYRINGE (ML) INJECTION AS NEEDED
Status: DISCONTINUED | OUTPATIENT
Start: 2021-10-25 | End: 2021-10-26 | Stop reason: HOSPADM

## 2021-10-25 RX ADMIN — Medication 10 ML: at 16:05

## 2021-10-25 RX ADMIN — SODIUM CHLORIDE 500 ML: 900 INJECTION, SOLUTION INTRAVENOUS at 14:55

## 2021-10-25 RX ADMIN — FERUMOXYTOL 510 MG: 510 INJECTION INTRAVENOUS at 15:15

## 2021-10-25 RX ADMIN — Medication 10 ML: at 14:55

## 2021-10-25 NOTE — PROGRESS NOTES
Pt arrived ambulatory today at 1439, to receive IV feraheme. Pt tolerated without difficulty. Patient discharged via ambulatory accompanied by self. Instructed to notify physician of any problems, questions or concerns. Allowed opportunity for patient/family to ask questions. Verbalized understanding. Next appointment is Feb 14, 2022 at 1100 with [facility].

## 2021-10-27 ENCOUNTER — HOSPITAL ENCOUNTER (OUTPATIENT)
Dept: MAMMOGRAPHY | Age: 71
Discharge: HOME | End: 2021-10-27
Attending: PHYSICIAN ASSISTANT
Payer: MEDICARE

## 2021-10-27 DIAGNOSIS — M89.9 BONE DISORDER: ICD-10-CM

## 2021-10-27 DIAGNOSIS — M85.89 OSTEOPENIA OF MULTIPLE SITES: ICD-10-CM

## 2021-10-27 DIAGNOSIS — Z12.31 VISIT FOR SCREENING MAMMOGRAM: ICD-10-CM

## 2021-10-27 PROCEDURE — 77080 DXA BONE DENSITY AXIAL: CPT

## 2021-10-27 PROCEDURE — 77063 BREAST TOMOSYNTHESIS BI: CPT

## 2021-10-28 NOTE — PROGRESS NOTES
Pt notified that her mammogram shows no suspicious areas of concern. Reminded to do monthly self breast exams. Plan to repeat imaging in 1 year. Pt verbalized understanding.

## 2021-10-28 NOTE — PROGRESS NOTES
Pt notified that her bone density shows osteoporosis. Pt verbalized understanding and is scheduled for 11/1/21 to discuss treatments options.

## 2021-10-28 NOTE — PROGRESS NOTES
Bone density shows osteoporosis. Please schedule her an appointment to review treatment options as hers is fairly severe and needs to be treated.

## 2021-10-28 NOTE — PROGRESS NOTES
Mammogram shows no suspicious areas of concern. Remind to do monthly self breast exams. Plan to repeat imaging in 1 year.

## 2021-12-10 ENCOUNTER — HOSPITAL ENCOUNTER (OUTPATIENT)
Dept: GENERAL RADIOLOGY | Age: 71
Discharge: HOME | End: 2021-12-10
Attending: NEUROLOGICAL SURGERY
Payer: MEDICARE

## 2021-12-10 DIAGNOSIS — S32.040A COMPRESSION FRACTURE OF L4 VERTEBRA, INITIAL ENCOUNTER (HCC): ICD-10-CM

## 2021-12-10 DIAGNOSIS — S32.050G COMPRESSION FRACTURE OF L5 VERTEBRA WITH DELAYED HEALING, SUBSEQUENT ENCOUNTER: ICD-10-CM

## 2021-12-10 PROCEDURE — 72100 X-RAY EXAM L-S SPINE 2/3 VWS: CPT

## 2021-12-14 ENCOUNTER — HOSPITAL ENCOUNTER (OUTPATIENT)
Dept: LAB | Age: 71
Discharge: HOME | End: 2021-12-14
Payer: MEDICARE

## 2021-12-14 DIAGNOSIS — C20 RECTAL CANCER (HCC): ICD-10-CM

## 2021-12-14 DIAGNOSIS — D50.9 IRON DEFICIENCY ANEMIA, UNSPECIFIED IRON DEFICIENCY ANEMIA TYPE: ICD-10-CM

## 2021-12-14 LAB
BASOPHILS # BLD: 0.1 K/UL (ref 0–0.2)
BASOPHILS NFR BLD: 1 % (ref 0–2)
DIFFERENTIAL METHOD BLD: ABNORMAL
EOSINOPHIL # BLD: 0.2 K/UL (ref 0–0.8)
EOSINOPHIL NFR BLD: 4 % (ref 0.5–7.8)
ERYTHROCYTE [DISTWIDTH] IN BLOOD BY AUTOMATED COUNT: 14.7 % (ref 11.9–14.6)
FERRITIN SERPL-MCNC: 223 NG/ML (ref 8–388)
HCT VFR BLD AUTO: 42.1 % (ref 35.8–46.3)
HGB BLD-MCNC: 13.9 G/DL (ref 11.7–15.4)
IMM GRANULOCYTES # BLD AUTO: 0 K/UL (ref 0–0.5)
IMM GRANULOCYTES NFR BLD AUTO: 0 % (ref 0–5)
IRON SATN MFR SERPL: 25 %
IRON SERPL-MCNC: 83 UG/DL (ref 35–150)
LYMPHOCYTES # BLD: 0.7 K/UL (ref 0.5–4.6)
LYMPHOCYTES NFR BLD: 13 % (ref 13–44)
MCH RBC QN AUTO: 29 PG (ref 26.1–32.9)
MCHC RBC AUTO-ENTMCNC: 33 G/DL (ref 31.4–35)
MCV RBC AUTO: 87.7 FL (ref 79.6–97.8)
MONOCYTES # BLD: 0.5 K/UL (ref 0.1–1.3)
MONOCYTES NFR BLD: 9 % (ref 4–12)
NEUTS SEG # BLD: 3.8 K/UL (ref 1.7–8.2)
NEUTS SEG NFR BLD: 72 % (ref 43–78)
NRBC # BLD: 0 K/UL (ref 0–0.2)
PLATELET # BLD AUTO: 345 K/UL (ref 150–450)
PMV BLD AUTO: 10 FL (ref 9.4–12.3)
RBC # BLD AUTO: 4.8 M/UL (ref 4.05–5.2)
TIBC SERPL-MCNC: 328 UG/DL (ref 250–450)
WBC # BLD AUTO: 5.2 K/UL (ref 4.3–11.1)

## 2021-12-14 PROCEDURE — 36415 COLL VENOUS BLD VENIPUNCTURE: CPT

## 2021-12-14 PROCEDURE — 82728 ASSAY OF FERRITIN: CPT

## 2021-12-14 PROCEDURE — 83540 ASSAY OF IRON: CPT

## 2021-12-14 PROCEDURE — 85025 COMPLETE CBC W/AUTO DIFF WBC: CPT

## 2022-02-04 ENCOUNTER — HOSPITAL ENCOUNTER (OUTPATIENT)
Dept: CT IMAGING | Age: 72
Discharge: HOME | End: 2022-02-04
Attending: INTERNAL MEDICINE
Payer: MEDICARE

## 2022-02-04 DIAGNOSIS — C20 RECTAL CANCER (HCC): ICD-10-CM

## 2022-02-04 LAB — CREAT BLD-MCNC: 0.91 MG/DL (ref 0.8–1.5)

## 2022-02-04 PROCEDURE — 74011000636 HC RX REV CODE- 636: Performed by: INTERNAL MEDICINE

## 2022-02-04 PROCEDURE — 74177 CT ABD & PELVIS W/CONTRAST: CPT

## 2022-02-04 PROCEDURE — 82565 ASSAY OF CREATININE: CPT

## 2022-02-04 PROCEDURE — 74011000258 HC RX REV CODE- 258: Performed by: INTERNAL MEDICINE

## 2022-02-04 RX ORDER — SODIUM CHLORIDE 0.9 % (FLUSH) 0.9 %
10 SYRINGE (ML) INJECTION
Status: COMPLETED | OUTPATIENT
Start: 2022-02-04 | End: 2022-02-04

## 2022-02-04 RX ADMIN — SODIUM CHLORIDE 100 ML: 9 INJECTION, SOLUTION INTRAVENOUS at 12:47

## 2022-02-04 RX ADMIN — IOPAMIDOL 100 ML: 755 INJECTION, SOLUTION INTRAVENOUS at 12:47

## 2022-02-04 RX ADMIN — DIATRIZOATE MEGLUMINE AND DIATRIZOATE SODIUM 15 ML: 660; 100 LIQUID ORAL; RECTAL at 12:47

## 2022-02-04 RX ADMIN — Medication 10 ML: at 12:47

## 2022-02-14 ENCOUNTER — HOSPITAL ENCOUNTER (OUTPATIENT)
Dept: LAB | Age: 72
Discharge: HOME | End: 2022-02-14
Payer: MEDICARE

## 2022-02-14 DIAGNOSIS — D50.9 IRON DEFICIENCY ANEMIA, UNSPECIFIED IRON DEFICIENCY ANEMIA TYPE: ICD-10-CM

## 2022-02-14 DIAGNOSIS — C20 RECTAL CANCER (HCC): ICD-10-CM

## 2022-02-14 LAB
ALBUMIN SERPL-MCNC: 3.9 G/DL (ref 3.2–4.6)
ALBUMIN/GLOB SERPL: 1.1 {RATIO} (ref 1.2–3.5)
ALP SERPL-CCNC: 86 U/L (ref 50–136)
ALT SERPL-CCNC: 26 U/L (ref 12–65)
ANION GAP SERPL CALC-SCNC: 5 MMOL/L (ref 7–16)
AST SERPL-CCNC: 23 U/L (ref 15–37)
BASOPHILS # BLD: 0 K/UL (ref 0–0.2)
BASOPHILS NFR BLD: 1 % (ref 0–2)
BILIRUB SERPL-MCNC: 0.5 MG/DL (ref 0.2–1.1)
BUN SERPL-MCNC: 14 MG/DL (ref 8–23)
CALCIUM SERPL-MCNC: 9.8 MG/DL (ref 8.3–10.4)
CEA SERPL-MCNC: 1.7 NG/ML (ref 0–3)
CHLORIDE SERPL-SCNC: 108 MMOL/L (ref 98–107)
CO2 SERPL-SCNC: 26 MMOL/L (ref 21–32)
CREAT SERPL-MCNC: 0.9 MG/DL (ref 0.6–1)
DIFFERENTIAL METHOD BLD: ABNORMAL
EOSINOPHIL # BLD: 0.2 K/UL (ref 0–0.8)
EOSINOPHIL NFR BLD: 4 % (ref 0.5–7.8)
ERYTHROCYTE [DISTWIDTH] IN BLOOD BY AUTOMATED COUNT: 13.1 % (ref 11.9–14.6)
FERRITIN SERPL-MCNC: 144 NG/ML (ref 8–388)
GLOBULIN SER CALC-MCNC: 3.5 G/DL (ref 2.3–3.5)
GLUCOSE SERPL-MCNC: 142 MG/DL (ref 65–100)
HCT VFR BLD AUTO: 37.9 % (ref 35.8–46.3)
HGB BLD-MCNC: 12.6 G/DL (ref 11.7–15.4)
HGB RETIC QN AUTO: 36 PG (ref 29–35)
IMM GRANULOCYTES # BLD AUTO: 0 K/UL (ref 0–0.5)
IMM GRANULOCYTES NFR BLD AUTO: 0 % (ref 0–5)
IMM RETICS NFR: 12.5 % (ref 3–15.9)
IRON SATN MFR SERPL: 28 %
IRON SERPL-MCNC: 90 UG/DL (ref 35–150)
LYMPHOCYTES # BLD: 0.7 K/UL (ref 0.5–4.6)
LYMPHOCYTES NFR BLD: 16 % (ref 13–44)
MCH RBC QN AUTO: 30.1 PG (ref 26.1–32.9)
MCHC RBC AUTO-ENTMCNC: 33.2 G/DL (ref 31.4–35)
MCV RBC AUTO: 90.5 FL (ref 79.6–97.8)
MONOCYTES # BLD: 0.4 K/UL (ref 0.1–1.3)
MONOCYTES NFR BLD: 9 % (ref 4–12)
NEUTS SEG # BLD: 2.9 K/UL (ref 1.7–8.2)
NEUTS SEG NFR BLD: 69 % (ref 43–78)
NRBC # BLD: 0 K/UL (ref 0–0.2)
PLATELET # BLD AUTO: 294 K/UL (ref 150–450)
PMV BLD AUTO: 9.7 FL (ref 9.4–12.3)
POTASSIUM SERPL-SCNC: 4 MMOL/L (ref 3.5–5.1)
PROT SERPL-MCNC: 7.4 G/DL (ref 6.3–8.2)
RBC # BLD AUTO: 4.19 M/UL (ref 4.05–5.2)
RETICS # AUTO: 0.1 M/UL (ref 0.03–0.1)
RETICS/RBC NFR AUTO: 2.3 % (ref 0.3–2)
SODIUM SERPL-SCNC: 139 MMOL/L (ref 136–145)
TIBC SERPL-MCNC: 320 UG/DL (ref 250–450)
WBC # BLD AUTO: 4.1 K/UL (ref 4.3–11.1)

## 2022-02-14 PROCEDURE — 85025 COMPLETE CBC W/AUTO DIFF WBC: CPT

## 2022-02-14 PROCEDURE — 36415 COLL VENOUS BLD VENIPUNCTURE: CPT

## 2022-02-14 PROCEDURE — 82728 ASSAY OF FERRITIN: CPT

## 2022-02-14 PROCEDURE — 82378 CARCINOEMBRYONIC ANTIGEN: CPT

## 2022-02-14 PROCEDURE — 83540 ASSAY OF IRON: CPT

## 2022-02-14 PROCEDURE — 85046 RETICYTE/HGB CONCENTRATE: CPT

## 2022-02-14 PROCEDURE — 80053 COMPREHEN METABOLIC PANEL: CPT

## 2022-03-18 PROBLEM — M54.31 SCIATICA OF RIGHT SIDE: Status: ACTIVE | Noted: 2021-07-12

## 2022-03-18 PROBLEM — C20 RECTAL ADENOCARCINOMA (HCC): Status: ACTIVE | Noted: 2021-01-18

## 2022-03-18 PROBLEM — Z92.3 HISTORY OF THERAPEUTIC RADIATION: Status: ACTIVE | Noted: 2021-03-31

## 2022-03-19 PROBLEM — E87.6 HYPOKALEMIA: Status: ACTIVE | Noted: 2021-03-30

## 2022-03-19 PROBLEM — D50.9 IRON DEFICIENCY ANEMIA: Status: ACTIVE | Noted: 2020-08-13

## 2022-03-19 PROBLEM — K62.89 RECTAL MASS: Status: ACTIVE | Noted: 2020-07-10

## 2022-03-19 PROBLEM — S32.040A COMPRESSION FRACTURE OF FOURTH LUMBAR VERTEBRA (HCC): Status: ACTIVE | Noted: 2021-07-12

## 2022-04-26 DIAGNOSIS — D50.9 IRON DEFICIENCY ANEMIA, UNSPECIFIED IRON DEFICIENCY ANEMIA TYPE: ICD-10-CM

## 2022-04-26 DIAGNOSIS — C20 RECTAL ADENOCARCINOMA (HCC): Primary | ICD-10-CM

## 2022-06-01 RX ORDER — TRIAMCINOLONE ACETONIDE 1 MG/G
CREAM TOPICAL
Qty: 45 G | OUTPATIENT
Start: 2022-06-01

## 2022-06-16 ENCOUNTER — HOSPITAL ENCOUNTER (OUTPATIENT)
Dept: CT IMAGING | Age: 72
Discharge: HOME | End: 2022-06-19
Payer: MEDICARE

## 2022-06-16 DIAGNOSIS — C20 RECTAL ADENOCARCINOMA (HCC): ICD-10-CM

## 2022-06-16 LAB — CREAT BLD-MCNC: 0.95 MG/DL (ref 0.8–1.5)

## 2022-06-16 PROCEDURE — 6360000004 HC RX CONTRAST MEDICATION: Performed by: PHYSICIAN ASSISTANT

## 2022-06-16 PROCEDURE — 82565 ASSAY OF CREATININE: CPT

## 2022-06-16 PROCEDURE — 74177 CT ABD & PELVIS W/CONTRAST: CPT

## 2022-06-16 PROCEDURE — 2580000003 HC RX 258: Performed by: PHYSICIAN ASSISTANT

## 2022-06-16 RX ORDER — SODIUM CHLORIDE 0.9 % (FLUSH) 0.9 %
10 SYRINGE (ML) INJECTION
Status: COMPLETED | OUTPATIENT
Start: 2022-06-16 | End: 2022-06-16

## 2022-06-16 RX ORDER — 0.9 % SODIUM CHLORIDE 0.9 %
100 INTRAVENOUS SOLUTION INTRAVENOUS
Status: COMPLETED | OUTPATIENT
Start: 2022-06-16 | End: 2022-06-16

## 2022-06-16 RX ADMIN — DIATRIZOATE MEGLUMINE AND DIATRIZOATE SODIUM 15 ML: 660; 100 LIQUID ORAL; RECTAL at 10:40

## 2022-06-16 RX ADMIN — IOPAMIDOL 100 ML: 755 INJECTION, SOLUTION INTRAVENOUS at 10:38

## 2022-06-16 RX ADMIN — SODIUM CHLORIDE, PRESERVATIVE FREE 10 ML: 5 INJECTION INTRAVENOUS at 10:39

## 2022-06-16 RX ADMIN — SODIUM CHLORIDE 100 ML: 9 INJECTION, SOLUTION INTRAVENOUS at 10:39

## 2022-06-20 ENCOUNTER — HOSPITAL ENCOUNTER (OUTPATIENT)
Dept: LAB | Age: 72
Discharge: HOME | End: 2022-06-23
Payer: MEDICARE

## 2022-06-20 ENCOUNTER — OFFICE VISIT (OUTPATIENT)
Dept: ONCOLOGY | Age: 72
End: 2022-06-20
Payer: MEDICARE

## 2022-06-20 VITALS
DIASTOLIC BLOOD PRESSURE: 79 MMHG | TEMPERATURE: 98.1 F | OXYGEN SATURATION: 95 % | WEIGHT: 152.1 LBS | HEIGHT: 61 IN | RESPIRATION RATE: 22 BRPM | HEART RATE: 79 BPM | SYSTOLIC BLOOD PRESSURE: 164 MMHG | BODY MASS INDEX: 28.72 KG/M2

## 2022-06-20 DIAGNOSIS — D50.9 IRON DEFICIENCY ANEMIA, UNSPECIFIED IRON DEFICIENCY ANEMIA TYPE: ICD-10-CM

## 2022-06-20 DIAGNOSIS — C20 RECTAL ADENOCARCINOMA (HCC): ICD-10-CM

## 2022-06-20 DIAGNOSIS — C20 RECTAL ADENOCARCINOMA (HCC): Primary | ICD-10-CM

## 2022-06-20 DIAGNOSIS — G62.9 NEUROPATHY: ICD-10-CM

## 2022-06-20 LAB
ALBUMIN SERPL-MCNC: 4.1 G/DL (ref 3.2–4.6)
ALBUMIN/GLOB SERPL: 1.2 {RATIO} (ref 1.2–3.5)
ALP SERPL-CCNC: 102 U/L (ref 50–136)
ALT SERPL-CCNC: 39 U/L (ref 12–65)
ANION GAP SERPL CALC-SCNC: 5 MMOL/L (ref 7–16)
AST SERPL-CCNC: 28 U/L (ref 15–37)
BASOPHILS # BLD: 0.1 K/UL (ref 0–0.2)
BASOPHILS NFR BLD: 2 % (ref 0–2)
BILIRUB SERPL-MCNC: 0.4 MG/DL (ref 0.2–1.1)
BUN SERPL-MCNC: 12 MG/DL (ref 8–23)
CALCIUM SERPL-MCNC: 9.8 MG/DL (ref 8.3–10.4)
CEA SERPL-MCNC: 1.9 NG/ML (ref 0–3)
CHLORIDE SERPL-SCNC: 107 MMOL/L (ref 98–107)
CO2 SERPL-SCNC: 28 MMOL/L (ref 21–32)
CREAT SERPL-MCNC: 1 MG/DL (ref 0.6–1)
DIFFERENTIAL METHOD BLD: ABNORMAL
EOSINOPHIL # BLD: 0.2 K/UL (ref 0–0.8)
EOSINOPHIL NFR BLD: 5 % (ref 0.5–7.8)
ERYTHROCYTE [DISTWIDTH] IN BLOOD BY AUTOMATED COUNT: 13.6 % (ref 11.9–14.6)
FERRITIN SERPL-MCNC: 80 NG/ML (ref 8–388)
GLOBULIN SER CALC-MCNC: 3.4 G/DL (ref 2.3–3.5)
GLUCOSE SERPL-MCNC: 154 MG/DL (ref 65–100)
HCT VFR BLD AUTO: 39.8 % (ref 35.8–46.3)
HGB BLD-MCNC: 12.9 G/DL (ref 11.7–15.4)
HGB RETIC QN AUTO: 35 PG (ref 29–35)
IMM GRANULOCYTES # BLD AUTO: 0 K/UL (ref 0–0.5)
IMM GRANULOCYTES NFR BLD AUTO: 0 % (ref 0–5)
IMM RETICS NFR: 11 % (ref 3–15.9)
IRON SATN MFR SERPL: 24 %
IRON SERPL-MCNC: 82 UG/DL (ref 35–150)
LYMPHOCYTES # BLD: 0.7 K/UL (ref 0.5–4.6)
LYMPHOCYTES NFR BLD: 19 % (ref 13–44)
MCH RBC QN AUTO: 29.1 PG (ref 26.1–32.9)
MCHC RBC AUTO-ENTMCNC: 32.4 G/DL (ref 31.4–35)
MCV RBC AUTO: 89.8 FL (ref 79.6–97.8)
MONOCYTES # BLD: 0.4 K/UL (ref 0.1–1.3)
MONOCYTES NFR BLD: 10 % (ref 4–12)
NEUTS SEG # BLD: 2.4 K/UL (ref 1.7–8.2)
NEUTS SEG NFR BLD: 64 % (ref 43–78)
NRBC # BLD: 0 K/UL (ref 0–0.2)
PLATELET # BLD AUTO: 302 K/UL (ref 150–450)
PMV BLD AUTO: 10.3 FL (ref 9.4–12.3)
POTASSIUM SERPL-SCNC: 4.1 MMOL/L (ref 3.5–5.1)
PROT SERPL-MCNC: 7.5 G/DL (ref 6.3–8.2)
RBC # BLD AUTO: 4.43 M/UL (ref 4.05–5.2)
RETICS # AUTO: 0.08 M/UL (ref 0.03–0.1)
RETICS/RBC NFR AUTO: 1.9 % (ref 0.3–2)
SODIUM SERPL-SCNC: 140 MMOL/L (ref 136–145)
TIBC SERPL-MCNC: 341 UG/DL (ref 250–450)
WBC # BLD AUTO: 3.7 K/UL (ref 4.3–11.1)

## 2022-06-20 PROCEDURE — 1090F PRES/ABSN URINE INCON ASSESS: CPT | Performed by: INTERNAL MEDICINE

## 2022-06-20 PROCEDURE — 99214 OFFICE O/P EST MOD 30 MIN: CPT | Performed by: INTERNAL MEDICINE

## 2022-06-20 PROCEDURE — 1123F ACP DISCUSS/DSCN MKR DOCD: CPT | Performed by: INTERNAL MEDICINE

## 2022-06-20 PROCEDURE — 85046 RETICYTE/HGB CONCENTRATE: CPT

## 2022-06-20 PROCEDURE — G8417 CALC BMI ABV UP PARAM F/U: HCPCS | Performed by: INTERNAL MEDICINE

## 2022-06-20 PROCEDURE — 82728 ASSAY OF FERRITIN: CPT

## 2022-06-20 PROCEDURE — G8427 DOCREV CUR MEDS BY ELIG CLIN: HCPCS | Performed by: INTERNAL MEDICINE

## 2022-06-20 PROCEDURE — 80053 COMPREHEN METABOLIC PANEL: CPT

## 2022-06-20 PROCEDURE — 85025 COMPLETE CBC W/AUTO DIFF WBC: CPT

## 2022-06-20 PROCEDURE — G8399 PT W/DXA RESULTS DOCUMENT: HCPCS | Performed by: INTERNAL MEDICINE

## 2022-06-20 PROCEDURE — 3017F COLORECTAL CA SCREEN DOC REV: CPT | Performed by: INTERNAL MEDICINE

## 2022-06-20 PROCEDURE — 83540 ASSAY OF IRON: CPT

## 2022-06-20 PROCEDURE — 1036F TOBACCO NON-USER: CPT | Performed by: INTERNAL MEDICINE

## 2022-06-20 PROCEDURE — 82378 CARCINOEMBRYONIC ANTIGEN: CPT

## 2022-06-20 PROCEDURE — 36415 COLL VENOUS BLD VENIPUNCTURE: CPT

## 2022-06-20 RX ORDER — MONTELUKAST SODIUM 4 MG/1
TABLET, CHEWABLE ORAL
COMMUNITY
Start: 2022-06-06

## 2022-06-20 RX ORDER — GABAPENTIN 300 MG/1
600 CAPSULE ORAL 3 TIMES DAILY
Qty: 540 CAPSULE | Refills: 3 | Status: SHIPPED | OUTPATIENT
Start: 2022-06-20 | End: 2023-06-15

## 2022-06-20 ASSESSMENT — PATIENT HEALTH QUESTIONNAIRE - PHQ9
SUM OF ALL RESPONSES TO PHQ QUESTIONS 1-9: 0
SUM OF ALL RESPONSES TO PHQ QUESTIONS 1-9: 0
1. LITTLE INTEREST OR PLEASURE IN DOING THINGS: 0
SUM OF ALL RESPONSES TO PHQ QUESTIONS 1-9: 0
SUM OF ALL RESPONSES TO PHQ QUESTIONS 1-9: 0
SUM OF ALL RESPONSES TO PHQ9 QUESTIONS 1 & 2: 0
2. FEELING DOWN, DEPRESSED OR HOPELESS: 0

## 2022-06-20 NOTE — PATIENT INSTRUCTIONS
Patient Instructions from Today's Visit    Reason for Visit:  Follow up    Plan: We will increase your gabapentin to 600 mg three times day. Your blood work and CT scan looked good! No evidence of cancer. Follow Up:   Follow up in 4 months with another scan    Recent Lab Results:  Hospital Outpatient Visit on 06/20/2022   Component Date Value Ref Range Status    WBC 06/20/2022 3.7* 4.3 - 11.1 K/uL Final    RBC 06/20/2022 4.43  4.05 - 5.2 M/uL Final    Hemoglobin 06/20/2022 12.9  11.7 - 15.4 g/dL Final    Hematocrit 06/20/2022 39.8  35.8 - 46.3 % Final    MCV 06/20/2022 89.8  79.6 - 97.8 FL Final    MCH 06/20/2022 29.1  26.1 - 32.9 PG Final    MCHC 06/20/2022 32.4  31.4 - 35.0 g/dL Final    RDW 06/20/2022 13.6  11.9 - 14.6 % Final    Platelets 63/09/0914 302  150 - 450 K/uL Final    MPV 06/20/2022 10.3  9.4 - 12.3 FL Final    nRBC 06/20/2022 0.00  0.0 - 0.2 K/uL Final    **Note: Absolute NRBC parameter is now reported with Hemogram**    Seg Neutrophils 06/20/2022 64  43 - 78 % Final    Lymphocytes 06/20/2022 19  13 - 44 % Final    Monocytes 06/20/2022 10  4.0 - 12.0 % Final    Eosinophils % 06/20/2022 5  0.5 - 7.8 % Final    Basophils 06/20/2022 2  0.0 - 2.0 % Final    Immature Granulocytes 06/20/2022 0  0.0 - 5.0 % Final    Segs Absolute 06/20/2022 2.4  1.7 - 8.2 K/UL Final    Absolute Lymph # 06/20/2022 0.7  0.5 - 4.6 K/UL Final    Absolute Mono # 06/20/2022 0.4  0.1 - 1.3 K/UL Final    Absolute Eos # 06/20/2022 0.2  0.0 - 0.8 K/UL Final    Basophils Absolute 06/20/2022 0.1  0.0 - 0.2 K/UL Final    Absolute Immature Granulocyte 06/20/2022 0.0  0.0 - 0.5 K/UL Final    Differential Type 06/20/2022 AUTOMATED    Final    Sodium 06/20/2022 140  136 - 145 mmol/L Final    Potassium 06/20/2022 4.1  3.5 - 5.1 mmol/L Final    Chloride 06/20/2022 107  98 - 107 mmol/L Final    CO2 06/20/2022 28  21 - 32 mmol/L Final    Anion Gap 06/20/2022 5* 7 - 16 mmol/L Final    Glucose 06/20/2022 154* 65 - 100 mg/dL Final    BUN 06/20/2022 12  8 - 23 MG/DL Final    CREATININE 06/20/2022 1.00  0.6 - 1.0 MG/DL Final    GFR  06/20/2022 >60  >60 ml/min/1.73m2 Final    GFR Non- 06/20/2022 58* >60 ml/min/1.73m2 Final    Comment:      Estimated GFR is calculated using the Modification of Diet in Renal Disease (MDRD) Study equation, reported for both  Americans (GFRAA) and non- Americans (GFRNA), and normalized to 1.73m2 body surface area. The physician must decide which value applies to the patient. The MDRD study equation should only be used in individuals age 25 or older. It has not been validated for the following: pregnant women, patients with serious comorbid conditions,or on certain medications, or persons with extremes of body size, muscle mass, or nutritional status.  Calcium 06/20/2022 9.8  8.3 - 10.4 MG/DL Final    Total Bilirubin 06/20/2022 0.4  0.2 - 1.1 MG/DL Final    ALT 06/20/2022 39  12 - 65 U/L Final    AST 06/20/2022 28  15 - 37 U/L Final    Alk Phosphatase 06/20/2022 102  50 - 136 U/L Final    Total Protein 06/20/2022 7.5  6.3 - 8.2 g/dL Final    Albumin 06/20/2022 4.1  3.2 - 4.6 g/dL Final    Globulin 06/20/2022 3.4  2.3 - 3.5 g/dL Final    Albumin/Globulin Ratio 06/20/2022 1.2  1.2 - 3.5   Final    Ferritin 06/20/2022 80  8 - 388 NG/ML Final    Reticulocyte Count,Automated 06/20/2022 1.9  0.3 - 2.0 % Final    Absolute Retic # 06/20/2022 0.0846  0.026 - 0.095 M/ul Final    Immature Retic Fraction 06/20/2022 11.0  3.0 - 15.9 % Final    Retic Hemoglobin conc. 06/20/2022 35  29 - 35 pg Final    Iron 06/20/2022 82  35 - 150 ug/dL Final    Comment: Known Interfering Substances section:  \"Iron values may be falsely elevated in  serum samples from patients with  anticoagulants (e.g., hemodialysis patients). \"  Limitations of Procedure section:  \"Turbidity resulting from precipitation of  fibrinogen in the serum of patients treated  with anticoagulants (e.g. hemodialysis  patients) may cause spuriously elevated  iron results. \"      TIBC 06/20/2022 341  250 - 450 ug/dL Final    TRANSFERRIN SATURATION 06/20/2022 24  >20 % Final    CEA 06/20/2022 1.9  0.0 - 3.0 ng/mL Final    Comment: Nonsmoker:  <3.0 ng/mL  Smoker:     <5.0 ng/mL  [employer]. Patient's results of tumor marker testing may not be comparable to labs using different manufacturers/methods. Hospital Outpatient Visit on 06/16/2022   Component Date Value Ref Range Status    POC Creatinine 06/16/2022 0.95  0.8 - 1.5 mg/dL Final    POC GFR  06/16/2022 >60  >60 ml/min/1.73m2 Final    Glomerular Filtration Rate, POC 06/16/2022 >60  >60 ml/min/1.73m2 Final    Comment: (NOTE)  Estimated GFR is calculated using the Modification of Diet in Renal   Disease (MDRD) Study Equation, reported for both  Americans   (GFRAA) and non- Americans (GFRNA), and normalized to 1.73m2   body surface area. The physician must decide which value applies to   the patient. The MDRD study equation should only be used in   individuals age 25 or older. It has not been validated for the   following: pregnant women, patients with serious comorbid conditions,   or on certain medications, or persons with extremes of body size,   muscle mass, or nutritional status. Treatment Summary has been discussed and given to patient: n/a        -------------------------------------------------------------------------------------------------------------------  Please call our office at [phone] if you have any  of the following symptoms:   · Fever of 100.5 or greater  · Chills  · Shortness of breath  · Swelling or pain in one leg    After office hours an answering service is available and will contact a provider for emergencies or if you are experiencing any of the above symptoms.  Patient did express an interest in My Chart.   My Chart log in information explained on the after visit summary printout at the [facility] desk.     Aurora Rizo RN

## 2022-06-20 NOTE — PROGRESS NOTES
Data Source: Patient, [facility] record. 6/20/2022    12:23 PM    Trisha Jolly [id number]    81 y.o. Patient Encounter: [address] Visit    Cancer Diagnosis:  Rectal adenocarcinoma  Stage:  1  Performance Status:  1  Code Status:  Not discussed  Pain Score (0-10):  1  Pain Medication related Constipation:  na  Onc History (Copied from prior):   78 female, works in [employer] for [employer], ex-smoker (quit 2016), h/o vitamin D deficiency, DM, MATTY, obese habitus, dyslipidemia, hypertension, fibromyalgia, diverticulosis of colon, depression, COPD, cholecystectomy, GERD, hiatal hernia, IBS, more recently presented to Dr. Mani Salazar 6/20 for colonoscopy needs after reporting blood per rectum times multiple months as well as a positive Cologuard test in 5/20. Colonoscopy 6/19/2020 showed a friable rectal mass at 8 to 10 cm. She also had a stricture at 40 cm which was biopsied but scope could not be traversed beyond the stricture even with pediatric scope (essentially rendering patient with a flex sigmoidoscopy with biopsies rather than a colonoscopy). Rectal mass biopsies showed adenocarcinoma. Sigmoid colon biopsies with only reactive/reparative changes. Patient was subsequently seen by Dr. Temple Osgood with EUS 6/30/2020 showing rectal lesion to be felt fairly superficial.  There was felt to be definite invasion into the submucosa but no clear involvement of muscularis propria. No involvement of adjacent structures. Disease was felt to be T1N0. Given the lesion was felt to be superficial and likely amenable to transanal excision. Sigmoid colon was felt severely tortuous however no definite stricture was identified. Recommendation was to consider follow-up colonoscopy after treatment of rectal cancer to ensure no additional lesions in the proximal colon.   Patient has had CTAP with contrast 6/23/2020 showing chronic sigmoid diverticulitis, and a tiny 4 mm nodule in RML. A 2 cm cyst in left liver noted. Significantly no evidence of metastatic disease. Patient was subsequently seen by Dr. BARLOW [facility] and underwent transanal excision of rectal mass 7/10/2020. Final pathology reveals moderately differentiated 0.5 cm adenocarcinoma with submucosal invasion however no involvement of muscularis propria. Margins were negative. No lymphovascular invasion seen. Tumor was felt to arise in a tubular adenoma. Hospice Referral:  na  Interval History:  6/20/2022: Reports doing reasonably. Recently saw GI Dr. Kenneth Randolph, now on colestipol which is helping with bowel urgency. Occasional blood per rectum, likely related to her known radiation colitis. Neuropathy improved over time but per patient suboptimally controlled, will increase gabapentin to 600 3 times daily. Blood work unremarkable, iron stores adequate, CEA staying low, CT CAP with CR, stable RML subcentimeter pulmonary nodule, stable left-sided hepatic cyst, unchanged treatment related changes and presacral fat and posterior rectum. REVIEW OF SYSTEMS:  As mentioned above, all other systems were reviewed in full and are negative.     Past Medical History:   Diagnosis Date    Compression fracture of fifth lumbar vertebra ([address] 07/30/2021    Compression fracture of fourth lumbar vertebra ([address] 07/03/2021    COVID-19 vaccine series completed 05/12/2021    Moderna    Depression with anxiety     Diverticulosis of colon 04/26/2017    Widespread per Colonoscopy    Elevated C-reactive protein (CRP)     Fibromyalgia     GERD (gastroesophageal reflux disease)     treat with OTC    Hemorrhoids 04/26/2017    Internal & External Per Colonoscopy    Hiatal hernia     HTN (hypertension), benign     managed with med    Hypokalemia 3/30/2021    IBS (irritable bowel syndrome)     Iron deficiency anemia     Iron Infusions 10/18 & 10/27    LGSIL of cervix of undetermined significance 03/09/2021    Repeat in August = Negative    Microalbuminuria due to type 2 diabetes mellitus ([facility]     Mixed hyperlipidemia     Osteopenia 07/10/2017    Per DEXA    Perennial allergic rhinitis with seasonal variation     Peripheral neuropathy     Chemo Induced    Radiation colitis     Rectal cancer ([facility] 06/26/2020    Adenocarcinoma, removed with colonoscopy    Sleep apnea     Not on CPAP     Type 2 diabetes mellitus ([facility]     type 2- oral meds; sqbs am avg 120-150's--- no hypo; HA1C 6.3, 6/3/2021    Vitamin B12 deficiency     Vitamin D deficiency        Past Surgical History:   Procedure Laterality Date    BREAST LUMPECTOMY Bilateral     CARPAL TUNNEL RELEASE Right     CHOLECYSTECTOMY      COLONOSCOPY  01/11/2021    Diverticulosis, External Hemorrhoids, Rectal Ulcer - Pathology = Hyperplastic Mucosa, Due 2023    COLONOSCOPY N/A 6/19/2020    COLONOSCOPY/BMI 28 performed by Lon Garcia MD at [facility] ENDOSCOPY    COLONOSCOPY N/A 4/26/2017    COLONOSCOPY / BMI=30 performed by Osmel Tinoco MD at [facility] ENDOSCOPY    COLONOSCOPY  09/08/2021    Diverticulosis, External Hemorrhoids, Radiation Colitis, Hyperplastic Polyp    FLEXIBLE SIGMOIDOSCOPY N/A 6/30/2020    SIGMOIDOSCOPY FLEXIBLE performed by Rock Christina MD at [facility] ENDOSCOPY    IR KYPHOPLASTY LUMBAR FIRST LEVEL  09/01/2021    L4 & L5    IR PORT PLACEMENT EQUAL OR GREATER THAN 5 YEARS  1/28/2021    IR PORT PLACEMENT EQUAL OR GREATER THAN 5 YEARS  1/28/2021    IR PORT PLACEMENT EQUAL OR GREATER THAN 5 YEARS 1/28/2021 [facility] RADIOLOGY SPECIALS    PRE-MALIGNANT / BENIGN SKIN LESION EXCISION      SINUS SURGERY PROC UNLISTED      TUBAL LIGATION      UPPER GASTROINTESTINAL ENDOSCOPY  08/27/2021    Erosive Esophagitis    UPPER GASTROINTESTINAL ENDOSCOPY  08/24/2021    VASCULAR SURGERY         Current Outpatient Medications   Medication Sig Dispense Refill    colestipol (COLESTID) 1 g tablet TAKE TWO TABLETS BY MOUTH ONE TIME DAILY      ascorbic acid (VITAMIN C) 500 MG tablet Take 250 mg by mouth      vitamin D3 (CHOLECALCIFEROL) 125 MCG (5000 UT) TABS tablet Take 5,000 Units by mouth daily      cyanocobalamin 1000 MCG/ML injection Inject 1,000 mcg into the muscle every 30 days      cyanocobalamin 500 MCG tablet Take 500 mcg by mouth daily      DULoxetine (CYMBALTA) 20 MG extended release capsule Take 20 mg by mouth daily      fenofibrate (TRIGLIDE) 160 MG tablet Take 160 mg by mouth daily      gabapentin (NEURONTIN) 300 MG capsule Take 300 mg by mouth 3 times daily.  lisinopril (PRINIVIL;ZESTRIL) 10 MG tablet Take 10 mg by mouth daily      Melatonin 10 MG TABS Take 1 tablet by mouth      metFORMIN (GLUCOPHAGE-XR) 750 MG extended release tablet Take 750 mg by mouth Daily with supper      mometasone (NASONEX) 50 MCG/ACT nasal spray 2 sprays daily as needed      omeprazole (PRILOSEC) 40 MG delayed release capsule Take 1 capsule by mouth daily      PARoxetine (PAXIL) 40 MG tablet Take 40 mg by mouth daily      rosuvastatin (CRESTOR) 10 MG tablet Take 10 mg by mouth      teriparatide (FORTEO) 620 MCG/2.48ML SOPN injection Inject 20 mcg into the skin daily      triamcinolone (KENALOG) 0.1 % cream Apply thin layer to affected areas bid      vitamin E 400 UNIT capsule Take by mouth every evening       No current facility-administered medications for this visit.        Social History     Socioeconomic History    Marital status:      Spouse name: None    Number of children: None    Years of education: None    Highest education level: None   Occupational History    None   Tobacco Use    Smoking status: Former Smoker     Packs/day: 1.50     Quit date: 9/15/2016     Years since quittin.7    Smokeless tobacco: Never Used   Substance and Sexual Activity    Alcohol use: Not Currently    Drug use: No    Sexual activity: None   Other Topics Concern    None   Social History Narrative    None     Social Determinants of Health     Financial Resource Strain:     Difficulty of Paying Living Expenses: Not on file   Food Insecurity:     Worried About Running Out of Food in the Last Year: Not on file    Mirna of Food in the Last Year: Not on file   Transportation Needs:     Lack of Transportation (Medical): Not on file    Lack of Transportation (Non-Medical):  Not on file   Physical Activity:     Days of Exercise per Week: Not on file    Minutes of Exercise per Session: Not on file   Stress:     Feeling of Stress : Not on file   Social Connections:     Frequency of Communication with Friends and Family: Not on file    Frequency of Social Gatherings with Friends and Family: Not on file    Attends Jewish Services: Not on file    Active Member of Clubs or Organizations: Not on file    Attends Club or Organization Meetings: Not on file    Marital Status: Not on file   Intimate Partner Violence:     Fear of Current or Ex-Partner: Not on file    Emotionally Abused: Not on file    Physically Abused: Not on file    Sexually Abused: Not on file   Housing Stability:     Unable to Pay for Housing in the Last Year: Not on file    Number of Places Lived in the Last Year: Not on file    Unstable Housing in the Last Year: Not on file       Family History   Problem Relation Age of Onset    Diabetes Sister     Arthritis Sister     Colon Cancer Maternal Uncle     Colon Cancer Paternal Uncle     Emphysema Paternal Aunt     Colon Cancer Cousin     Breast Cancer Neg Hx     Cancer Mother         Cervical    Diabetes Mother     Heart Disease Father     Hypertension Father     Elevated Lipids Father     Other Father         ITP    Emphysema Father     Lung Cancer Cousin     Other Sister         Skin Cancer    Asthma Sister     Diabetes Sister     Kidney Disease Maternal Grandmother     Kidney Disease Maternal Grandfather     Asthma Sister        Allergies   Allergen Reactions    Codeine Rash    Lidocaine Rash    Sulfa Antibiotics Nausea And Vomiting       PHYSICAL EXAMINATION:  General Appearance: Healthy appearing patient in no acute distress  Vitals reviewed. BP (!) 164/79 (Site: Left Upper Arm, Position: Standing, Cuff Size: Medium Adult)   Pulse 79   Temp 98.1 °F (36.7 °C) (Oral)   Resp 22   Ht 5' 1\" (1.549 m)   Wt 152 lb 1.6 oz (69 kg)   SpO2 95%   Breastfeeding No   BMI 28.74 kg/m²   HEENT: No oral or pharyngeal masses, ulceration or thrush noted, no sinus tenderness. Neck is supple with no thyromegaly or JVD noted. Lymph Nodes: No lymphadenopathy noted in the occipital, pre and post auricular, cervical, supra and infraclavicular, axillary, epitrochlear, inguinal, and popliteal region. Breasts: No palpable masses, nipple discharge or skin retraction  Lungs/Thorax: Clear to auscultation, no accessory muscles of respiration being used. Heart: Regular rate and rhythm, normal S1, S2, no appreciable murmurs, rubs, gallops  Abdomen: Soft, nontender, bowel sounds present, no appreciable hepatosplenomegaly, no palpable masses  Extremeties: Good pulses bilaterally, no peripheral edema. Skin: Normal skin tone with no rash, petechiae, ecchymosis noted. Musculoskeletal: No pain on palpation over bony prominence, no edema, no evidence of gout, no joint or bony deformity  Neurologic: Grossly intact    LABS/IMAGING:    Lab Results   Component Value Date    WBC 3.7 06/20/2022    HGB 12.9 06/20/2022    HCT 39.8 06/20/2022     06/20/2022    MCV 89.8 06/20/2022       Lab Results   Component Value Date     06/20/2022    K 4.1 06/20/2022     06/20/2022    CO2 28 06/20/2022    BUN 12 06/20/2022    GFRAA >60 06/20/2022    GLOB 3.4 06/20/2022    ALT 39 06/20/2022       Above results reviewed with patient.      ASSESSMENT:  78 female, works in [place] for [employer], ex-smoker (quit 2016), h/o vitamin D deficiency, DM, MATTY, obese habitus, dyslipidemia, hypertension, fibromyalgia, diverticulosis of colon, depression, COPD, cholecystectomy, GERD, hiatal hernia, IBS, more recently presented to Dr. Corinne Dozier 6/20 for colonoscopy needs after reporting blood per rectum times multiple months as well as a positive Cologuard test in 5/20. Colonoscopy 6/19/2020 showed a friable rectal mass at 8 to 10 cm. She also had a stricture at 40 cm which was biopsied but scope could not be traversed beyond the stricture even with pediatric scope (essentially rendering patient with a flex sigmoidoscopy with biopsies rather than a colonoscopy). Rectal mass biopsies showed adenocarcinoma. Sigmoid colon biopsies with only reactive/reparative changes. Patient was subsequently seen by Dr. Rajiv Juan with EUS 6/30/2020 showing rectal lesion to be felt fairly superficial.  There was felt to be definite invasion into the submucosa but no clear involvement of muscularis propria. No involvement of adjacent structures. Disease was felt to be T1N0. Given the lesion was felt to be superficial and likely amenable to transanal excision. Sigmoid colon was felt severely tortuous however no definite stricture was identified. Recommendation was to consider follow-up colonoscopy after treatment of rectal cancer to ensure no additional lesions in the proximal colon. Patient has had CTAP with contrast 6/23/2020 showing chronic sigmoid diverticulitis, and a tiny 4 mm nodule in RML. A 2 cm cyst in left liver noted. Significantly no evidence of metastatic disease. Patient was subsequently seen by Dr. BARLOW [facility] and underwent transanal excision of rectal mass 7/10/2020. Final pathology reveals moderately differentiated 0.5 cm adenocarcinoma with submucosal invasion however no involvement of muscularis propria. Margins were negative. No lymphovascular invasion seen. Tumor was felt to arise in a tubular adenoma. In summary, elderly pleasant lady with good performance status, now with stage 1 rectal adenocarcinoma s/p endorectal resection with negative margins. Her only high risk feature at this point would include SM3 invasion (lower 1/3 of submucosal invasion). She remains open to idea of further therapy if needed. Will discuss at GI tumor conference in terms of role for transabdominal resection (preferred) versus chemo-RT w capecitabine. 8/26/2020: Labs from previous visit reviewed: CEA normal.  Iron stores low and she is status post IV iron x2. CT chest negative for metastasis. MMR without loss of expression. Final pathology has returned confirming submucosal (SM 3) invasion. Her preference is to consider LAR if she has a reasonable chance of not requiring an ostomy bag. Another option is chemo-XRT vs observation. Will discuss case with surgery. We will see her back in 1 month's time. RTC in 4 weeks with above, repeat labs. 9/15/2020: Patient has since seen surgery Dr. Dami Pineda. Transabdominal surgery will possibly require permanent colostomy which patient is not in favor of. Case also d/w surgery and rad-onc. 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 She is already scheduled to see radiation oncology tomorrow. We will also get paperwork started for capecitabine (825 mg per metered square twice daily concurrently with XRT). We will see her back in a few weeks for follow-up. 10/6/2020: Patient seen in follow-up. She has seen radiation oncology Dr. Viktoria Brooks, with agreement to proceed with concurrent chemo-XRT with capecitabine. She is scheduled to start this tomorrow. Patient has undergone chemo education in this regard. We again discussed various side effects and risks of therapy including GI symptoms, neutropenic fever, hand-foot syndrome, and end-organ damage. Patient willing to proceed. She will need weekly visits while undergoing therapy. 11/23/20:  Completed capecitabine-XRT 11/18/2020. Tolerated reasonably.  However continuing to have fair bit of therapy related proctitis with associated which helps. Denies any fevers or chills, reflux symptoms improved with Carafate. Denies any blood per rectum, denies any significant neuropathy. Labs today reasonable, okay to proceed with dose #6 of 8.      5/11/2021: Continues to tolerate therapy reasonably. Denies significant neuropathy. Hand-foot syndrome minimal.  Did have sensation of bubbles in her throat after last cycle however denied any breathing issues, lip or tongue swelling. We will monitor her for additional time in infusion center post therapy this go around. Denies any recent fevers or chills, reasonable p.o. intake. Labs today unremarkable, okay to proceed with last dose of FOLFOX. Will restage disease in 2 months time thereafter. 7/8/21: Completed FOLFOX x4 months with last cycle on 5/12/2021. Reports her hand-foot syndrome has mostly resolved. More recently seen in ED with new onset back discomfort. No clear evidence of trauma though does report an episode of misstep while sitting down around the time. Lumbar spine x-ray with L4 acute/subacute anterior compression fracture. She does have history of osteopenia on last DEXA scan. Now scheduled to see Dr. Kelechi Licea neurosurgery. Also reports off-and-on red blood per rectum x10 days or so. Advised to see GI for endoscopy needs. Labs today with mild anemia, otherwise unremarkable, CEA staying low. Iron stores adequate. CT CAP with continued CR, RML subcentimeter pulmonary nodule stable. Patient reassured. Clinical picture most consistent with compression fracture related to bone loss rather than pathologic malignancy related. We will follow up work-up with Dr. Kelechi Licea. Otherwise simply repeat CT CAP in 3 months time. 8/9/2021: Reports continued off-and-on blood per rectum. Feels tired all the time, will check ferritin levels today. Also notes neuropathy that has worsened over time however reports worsening occurred couple of months after her last cycle of chemotherapy. Will start with gabapentin 300 mg 3 times daily, may also introduce Cymbalta. Recently seen Dr. Kezia Story, L-spine MRI with compression fracture L4 and L5 without convincing evidence of pathologic fracture. She will also discuss osteoporosis management with primary care. We will see her back in 2 months with repeat scans and colonoscopy results. RTC in 2 months with labs, CEA, iron panel, CT CAP, colonoscopy results. 10/11/2021: Since last visit, her iron stores were checked and were low, and she is status post IV iron x2. L4 bone biopsy without malignancy, status post kyphoplasty with improvement in her discomfort. More recently also seen by GI, and underwent colonoscopy showing diverticulosis, external hemorrhoids and radiation colitis for which she is now on mesalamine with improvement in her bleeding. Labs today with ongoing iron deficiency. Will repeat IV iron x2, and recheck labs in 2 months. Recent CT CAP with CR. She is scheduled for DEXA scan with likely osteoporosis directed therapy through primary care. Patient in remission, continue simple monitoring. 2/14/2022: Reports doing reasonably. Some ongoing neuropathy for which refills provided for her Cymbalta and gabapentin. Reports recently having started Forteo for osteoporosis. Reports frequent bowel movements, at times with minimal bleeding. She is known to have radiation colitis, and was advised to follow-up with her GI pertaining to possible need for mesalamine refills. Routine blood work unremarkable, iron stores adequate, CEA staying low. Recent CT CAP with probable therapy related changes in the presacral region, along with slightly increased posterior rectal wall thickening. Right minor fissure lymph node stable in the chest.  No evidence of disease recurrence (CR). Continue simple monitoring.    6/20/2022: Reports doing reasonably. Recently saw GI Dr. Autumn Ramirez, now on colestipol which is helping with bowel urgency. Occasional blood per rectum, likely related to her known radiation colitis. Neuropathy improved over time but per patient suboptimally controlled, will increase gabapentin to 600 3 times daily. Blood work unremarkable, iron stores adequate, CEA staying low, CT CAP with CR, stable RML subcentimeter pulmonary nodule, stable left-sided hepatic cyst, unchanged treatment related changes and presacral fat and posterior rectum. Patient reassured, continue monitoring. 1. Rectal adenocarcinoma, pT1 disease s/p trans-anal resection 7/20 (SM3 disease)  2. RML 4 mm nodule, non-specific. 3. L4 compression fracture, likely osteoporotic s/p kyphoplasty. Biopsy was benign  4. BRAYAN  5. Radiation colitis    PLAN:  - As above. Simple monitoring  - S/p chemo-RT w capecitabine 11/18/20. S/p FOLFOX x 4 months completed 5/21  - Serial CEA, iron panel  - Continue following with  [facility], as well as GI Dr Liseth Stanton for serial endoscopic needs. Last colo 9/21  - MMR without loss of expression.  - LGSIL: Follows with Dr. Maxi Cross  - L4 and L5 compression fracture: seeing Dr Angel Santana. Has h/o osteopenia: advised f/u w PCP also for bone directed therapy. - Neuropathy: Gabapentin 600 3 times daily. Also on paxil   - BRAYAN: IV iron prn    RTC in 4 months with MD with labs, CEA, iron panel, CT CAP    Thank you  [facility] for allowing us to paticipate in care of this pleasant patient.  Please call w/ any quesliana Funk MD  [facility]  Hematology Oncology  [address] [place] [address]  Office : [phone]  Fax : [phone]

## 2022-06-21 DIAGNOSIS — E78.2 MIXED HYPERLIPIDEMIA: ICD-10-CM

## 2022-06-21 DIAGNOSIS — I10 HTN (HYPERTENSION), BENIGN: ICD-10-CM

## 2022-06-21 DIAGNOSIS — R80.9 CONTROLLED TYPE 2 DIABETES MELLITUS WITH MICROALBUMINURIA, WITHOUT LONG-TERM CURRENT USE OF INSULIN (HCC): Primary | ICD-10-CM

## 2022-06-21 DIAGNOSIS — E83.52 HYPERCALCEMIA: ICD-10-CM

## 2022-06-21 DIAGNOSIS — E11.29 CONTROLLED TYPE 2 DIABETES MELLITUS WITH MICROALBUMINURIA, WITHOUT LONG-TERM CURRENT USE OF INSULIN (HCC): Primary | ICD-10-CM

## 2022-06-27 ENCOUNTER — NURSE ONLY (OUTPATIENT)
Dept: INTERNAL MEDICINE CLINIC | Facility: CLINIC | Age: 72
End: 2022-06-27

## 2022-06-27 DIAGNOSIS — E83.52 HYPERCALCEMIA: ICD-10-CM

## 2022-06-27 DIAGNOSIS — I10 HTN (HYPERTENSION), BENIGN: ICD-10-CM

## 2022-06-27 DIAGNOSIS — E11.29 CONTROLLED TYPE 2 DIABETES MELLITUS WITH MICROALBUMINURIA, WITHOUT LONG-TERM CURRENT USE OF INSULIN (HCC): ICD-10-CM

## 2022-06-27 DIAGNOSIS — R80.9 CONTROLLED TYPE 2 DIABETES MELLITUS WITH MICROALBUMINURIA, WITHOUT LONG-TERM CURRENT USE OF INSULIN (HCC): ICD-10-CM

## 2022-06-27 DIAGNOSIS — E78.2 MIXED HYPERLIPIDEMIA: ICD-10-CM

## 2022-06-27 LAB
ALBUMIN SERPL-MCNC: 4.1 G/DL (ref 3.2–4.6)
ALBUMIN/GLOB SERPL: 1.2 {RATIO} (ref 1.2–3.5)
ALP SERPL-CCNC: 114 U/L (ref 50–136)
ALT SERPL-CCNC: 37 U/L (ref 12–65)
ANION GAP SERPL CALC-SCNC: 9 MMOL/L (ref 7–16)
AST SERPL-CCNC: 29 U/L (ref 15–37)
BILIRUB SERPL-MCNC: 0.5 MG/DL (ref 0.2–1.1)
BUN SERPL-MCNC: 18 MG/DL (ref 8–23)
CALCIUM SERPL-MCNC: 10 MG/DL (ref 8.3–10.4)
CHLORIDE SERPL-SCNC: 102 MMOL/L (ref 98–107)
CHOLEST SERPL-MCNC: 165 MG/DL
CO2 SERPL-SCNC: 29 MMOL/L (ref 21–32)
CREAT SERPL-MCNC: 1.1 MG/DL (ref 0.6–1)
GLOBULIN SER CALC-MCNC: 3.4 G/DL (ref 2.3–3.5)
GLUCOSE SERPL-MCNC: 164 MG/DL (ref 65–100)
HDLC SERPL-MCNC: 44 MG/DL (ref 40–60)
HDLC SERPL: 3.8 {RATIO}
LDLC SERPL CALC-MCNC: 64.2 MG/DL
POTASSIUM SERPL-SCNC: 4.4 MMOL/L (ref 3.5–5.1)
PROT SERPL-MCNC: 7.5 G/DL (ref 6.3–8.2)
SODIUM SERPL-SCNC: 140 MMOL/L (ref 136–145)
TRIGL SERPL-MCNC: 284 MG/DL (ref 35–150)
VLDLC SERPL CALC-MCNC: 56.8 MG/DL (ref 6–23)

## 2022-06-28 ENCOUNTER — OFFICE VISIT (OUTPATIENT)
Dept: INTERNAL MEDICINE CLINIC | Facility: CLINIC | Age: 72
End: 2022-06-28
Payer: MEDICARE

## 2022-06-28 VITALS
HEART RATE: 101 BPM | DIASTOLIC BLOOD PRESSURE: 60 MMHG | HEIGHT: 61 IN | OXYGEN SATURATION: 97 % | TEMPERATURE: 98.1 F | WEIGHT: 154 LBS | BODY MASS INDEX: 29.07 KG/M2 | SYSTOLIC BLOOD PRESSURE: 118 MMHG

## 2022-06-28 DIAGNOSIS — E78.2 MIXED HYPERLIPIDEMIA: ICD-10-CM

## 2022-06-28 DIAGNOSIS — M81.8 OTHER OSTEOPOROSIS WITHOUT CURRENT PATHOLOGICAL FRACTURE: ICD-10-CM

## 2022-06-28 DIAGNOSIS — R80.9 TYPE 2 DIABETES MELLITUS WITH MICROALBUMINURIA, WITHOUT LONG-TERM CURRENT USE OF INSULIN (HCC): Primary | ICD-10-CM

## 2022-06-28 DIAGNOSIS — K08.9 DENTAL DISEASE: ICD-10-CM

## 2022-06-28 DIAGNOSIS — N28.9 RENAL INSUFFICIENCY: ICD-10-CM

## 2022-06-28 DIAGNOSIS — I10 HTN (HYPERTENSION), BENIGN: ICD-10-CM

## 2022-06-28 DIAGNOSIS — E11.29 TYPE 2 DIABETES MELLITUS WITH MICROALBUMINURIA, WITHOUT LONG-TERM CURRENT USE OF INSULIN (HCC): Primary | ICD-10-CM

## 2022-06-28 LAB
EST. AVERAGE GLUCOSE BLD GHB EST-MCNC: 148 MG/DL
HBA1C MFR BLD: 6.8 % (ref 4.2–6.3)

## 2022-06-28 PROCEDURE — 3017F COLORECTAL CA SCREEN DOC REV: CPT | Performed by: PHYSICIAN ASSISTANT

## 2022-06-28 PROCEDURE — 1123F ACP DISCUSS/DSCN MKR DOCD: CPT | Performed by: PHYSICIAN ASSISTANT

## 2022-06-28 PROCEDURE — 2022F DILAT RTA XM EVC RTNOPTHY: CPT | Performed by: PHYSICIAN ASSISTANT

## 2022-06-28 PROCEDURE — G8427 DOCREV CUR MEDS BY ELIG CLIN: HCPCS | Performed by: PHYSICIAN ASSISTANT

## 2022-06-28 PROCEDURE — 1090F PRES/ABSN URINE INCON ASSESS: CPT | Performed by: PHYSICIAN ASSISTANT

## 2022-06-28 PROCEDURE — 3044F HG A1C LEVEL LT 7.0%: CPT | Performed by: PHYSICIAN ASSISTANT

## 2022-06-28 PROCEDURE — 1036F TOBACCO NON-USER: CPT | Performed by: PHYSICIAN ASSISTANT

## 2022-06-28 PROCEDURE — 99214 OFFICE O/P EST MOD 30 MIN: CPT | Performed by: PHYSICIAN ASSISTANT

## 2022-06-28 PROCEDURE — G8399 PT W/DXA RESULTS DOCUMENT: HCPCS | Performed by: PHYSICIAN ASSISTANT

## 2022-06-28 PROCEDURE — G8417 CALC BMI ABV UP PARAM F/U: HCPCS | Performed by: PHYSICIAN ASSISTANT

## 2022-06-28 RX ORDER — OMEGA-3/DHA/EPA/FISH OIL 500-1000MG
CAPSULE ORAL
COMMUNITY

## 2022-06-28 RX ORDER — GREEN TEA/HOODIA GORDONII 315-12.5MG
1 CAPSULE ORAL DAILY
COMMUNITY

## 2022-06-28 ASSESSMENT — PATIENT HEALTH QUESTIONNAIRE - PHQ9
SUM OF ALL RESPONSES TO PHQ QUESTIONS 1-9: 0
2. FEELING DOWN, DEPRESSED OR HOPELESS: 0
SUM OF ALL RESPONSES TO PHQ QUESTIONS 1-9: 0
1. LITTLE INTEREST OR PLEASURE IN DOING THINGS: 0
SUM OF ALL RESPONSES TO PHQ9 QUESTIONS 1 & 2: 0

## 2022-06-28 ASSESSMENT — ENCOUNTER SYMPTOMS: SHORTNESS OF BREATH: 0

## 2022-06-28 NOTE — PROGRESS NOTES
[facility] (:  1950) is a 81 y.o. female,Established patient, here for evaluation of the following chief complaint(s):  Follow-up (Diabetes, Hypertension, Hyperlipidemia) and Discuss Medications (Forteo)         ASSESSMENT/PLAN:  1. Type 2 diabetes mellitus with microalbuminuria, without long-term current use of insulin (HCC)  -     Comprehensive Metabolic Panel; Future  -     Hemoglobin A1C; Future  2. Renal insufficiency  -     Comprehensive Metabolic Panel; Future  3. Mixed hyperlipidemia  -     Comprehensive Metabolic Panel; Future  -     Lipid Panel; Future  4. HTN (hypertension), benign  -     Comprehensive Metabolic Panel; Future  5. Other osteoporosis without current pathological fracture  6. Dental disease      Patient Instructions   Discussed the importance of starting to re-evaluate diet now that she's gained her weight and strength back - she will need to resume caution with sweets & possibly carbohydrates to keep diabetes under good control  Encouraged to find an exercise that she can still do with neuropathy as this will make a significant impact on her glucose levels as well  Advised to increase hydration with water to help correct kidney function abnormalities  Monitor blood sugar daily and keep record to bring to next appointment  Continue chronic medications as prescribed  Monitor blood pressure 2 times/week and keep record to bring to next appointment  Will research whether or not she needs to come off Forteo prior to upcoming dental surgery  Discussed that the soonest she might be able to repeat DEXA to evaluate how well it's working would be after 1 year of treatment      Return in about 3 months (around 2022) for diabetes f/u. Subjective   SUBJECTIVE/OBJECTIVE:  HPI  The patient is a 81 y.o. female who is seen for follow up of diabetes. Current monitoring regimen: BGs consistently in an acceptable range.   Glucose monitoring frequency: 1 times daily  Home blood sugar records: fasting range: 130-150  Any episodes of hypoglycemia? no  Known diabetic complications: microalbuminuria  Current diabetic medications include: oral agent (monotherapy): Glucophage  mg daily. Current Eye Exam (within one year): Yes - Dec 2021  Current Urine Microalbumin: Yes, abnormal - Dec 2021  Is She on ACE inhibitor or angiotensin II receptor blocker?  Yes - lisinopril (generic)  Current Foot Exam (within one year): Yes - Mar 2022    Weight trend: increasing gradually  Prior visit with dietician: no  Current diet: meals per day on average: 2-3; restricting intake of the following: ice cream & fried food  Current exercise: walking sporadically        Last HbA1C:    Lab Results   Component Value Date    LABA1C 6.8 (H) 06/27/2022     Lab Results   Component Value Date     06/27/2022       The patient is a 81 y.o. female who is seen for evaluation of hyperlipidemia. She was tested because of mixed hyperlipidemia + diabetes. The current state of this condition is no significant medication side effects noted and mixed control - triglycerides increased but LDL decreased on Crestor 10 mg q hs + Fenofibrate 160 mg daily + OTC MegaRed supplement daily - taking as prescribed.        Last Lipid Panel:   Lab Results   Component Value Date    CHOL 165 06/27/2022    CHOL 184 03/28/2022    CHOL 175 12/23/2021     Lab Results   Component Value Date    TRIG 284 (H) 06/27/2022    TRIG 223 (H) 03/28/2022    TRIG 207 (H) 12/23/2021     Lab Results   Component Value Date    HDL 44 06/27/2022    HDL 41 03/28/2022    HDL 45 12/23/2021     Lab Results   Component Value Date    LDLCALC 64.2 06/27/2022    LDLCALC 105 (H) 03/28/2022    LDLCALC 95 12/23/2021     Lab Results   Component Value Date    LABVLDL 56.8 (H) 06/27/2022    LABVLDL 109 (H) 03/02/2021    LABVLDL 40 08/19/2020    VLDL 38 03/28/2022    VLDL 35 12/23/2021    VLDL 44 (H) 09/07/2021     Lab Results   Component Value Date    CHOLHDLRATIO 3.8 06/27/2022    CHOLHDLRATIO 4.0 03/02/2021       The patient is a 81 y.o. female who is seen for follow-up of hypertension. She is not exercising and is not adherent to low salt diet. Daily caffiene intake: a known amount (4 servings/day). Current medication regimen consists of: Lisinopril 10 mg daily. Blood pressure is well controlled at home, ranging 120-130's/60-80's. BP Readings from Last 3 Encounters:   06/28/22 118/60   06/20/22 (!) 164/79   02/14/22 128/70         Patient is here for follow-up of osteoporosis. The current state of this condition is control uncertain and no significant medication side effects noted on Forteo 20 mcg daily via injection - taking as prescribed. Previously noted lab abnormalities including mild hypercalcemia & mildly elevated alkaline phosphatase appear to have corrected. She has been told that she needs to have some oral surgery done and was advised that she would need a letter of medical clearance from us since she is on Forteo. Review of Systems   Constitutional: Positive for fatigue. Negative for unexpected weight change. Eyes: Negative for visual disturbance. Respiratory: Negative for shortness of breath. Cardiovascular: Negative for chest pain, palpitations and leg swelling. Endocrine: Negative for polydipsia. Genitourinary: Negative for frequency. Musculoskeletal: Negative for myalgias. Neurological: Positive for numbness (bilateral legs up to knees & bilateral hands). Negative for dizziness and headaches. /60 (Site: Left Upper Arm, Position: Sitting, Cuff Size: Large Adult)   Pulse (!) 101   Temp 98.1 °F (36.7 °C) (Temporal)   Ht 5' 1\" (1.549 m)   Wt 154 lb (69.9 kg)   SpO2 97%   BMI 29.10 kg/m²       Objective   Physical Exam  Constitutional:       Appearance: Normal appearance. HENT:      Head: Normocephalic and atraumatic.    Eyes:      Conjunctiva/sclera: Conjunctivae normal.      Pupils: Pupils are equal, round, and reactive to light. Neck:      Vascular: No carotid bruit. Cardiovascular:      Rate and Rhythm: Normal rate and regular rhythm. Heart sounds: Normal heart sounds. Pulmonary:      Effort: Pulmonary effort is normal.      Breath sounds: Normal breath sounds. Musculoskeletal:         General: Normal range of motion. Cervical back: Normal range of motion. Right lower leg: No edema. Left lower leg: No edema. Skin:     General: Skin is warm and dry. Neurological:      Mental Status: She is alert and oriented to person, place, and time. Psychiatric:         Mood and Affect: Mood normal.         Behavior: Behavior normal.         Thought Content: Thought content normal.         Judgment: Judgment normal.            On this date 6/28/2022 I have spent 30 minutes reviewing previous notes, test results and face to face with the patient discussing the diagnosis and importance of compliance with the treatment plan as well as documenting on the day of the visit. An electronic signature was used to authenticate this note.     --Héctor Bazan PA-C

## 2022-06-28 NOTE — Clinical Note
Please advise patient that our records show she should have Fenofibrate refills through December 2022. It was filled 12/30/21 for #90 with 3 refills and sent to [facility]. Her bottle says 0 refills so maybe we should call [employer] to check into this. Araceli Dong ?

## 2022-06-28 NOTE — PATIENT INSTRUCTIONS
Discussed the importance of starting to re-evaluate diet now that she's gained her weight and strength back - she will need to resume caution with sweets & possibly carbohydrates to keep diabetes under good control  Encouraged to find an exercise that she can still do with neuropathy as this will make a significant impact on her glucose levels as well  Advised to increase hydration with water to help correct kidney function abnormalities  Monitor blood sugar daily and keep record to bring to next appointment  Continue chronic medications as prescribed  Monitor blood pressure 2 times/week and keep record to bring to next appointment  Will research whether or not she needs to come off Forteo prior to upcoming dental surgery  Discussed that the soonest she might be able to repeat DEXA to evaluate how well it's working would be after 1 year of treatment

## 2022-08-16 DIAGNOSIS — R87.612 LGSIL ON PAP SMEAR OF CERVIX: Primary | ICD-10-CM

## 2022-08-18 ENCOUNTER — OFFICE VISIT (OUTPATIENT)
Dept: ONCOLOGY | Age: 72
End: 2022-08-18
Payer: COMMERCIAL

## 2022-08-18 ENCOUNTER — HOSPITAL ENCOUNTER (OUTPATIENT)
Dept: LAB | Age: 72
Discharge: HOME | End: 2022-08-21
Payer: COMMERCIAL

## 2022-08-18 VITALS
HEIGHT: 61 IN | TEMPERATURE: 98.3 F | WEIGHT: 154.3 LBS | SYSTOLIC BLOOD PRESSURE: 145 MMHG | HEART RATE: 89 BPM | BODY MASS INDEX: 29.13 KG/M2 | OXYGEN SATURATION: 96 % | RESPIRATION RATE: 16 BRPM | DIASTOLIC BLOOD PRESSURE: 67 MMHG

## 2022-08-18 DIAGNOSIS — R87.612 LGSIL ON PAP SMEAR OF CERVIX: Primary | ICD-10-CM

## 2022-08-18 DIAGNOSIS — R87.612 LGSIL ON PAP SMEAR OF CERVIX: ICD-10-CM

## 2022-08-18 PROCEDURE — 88142 CYTOPATH C/V THIN LAYER: CPT

## 2022-08-18 PROCEDURE — 1123F ACP DISCUSS/DSCN MKR DOCD: CPT | Performed by: NURSE PRACTITIONER

## 2022-08-18 PROCEDURE — 99212 OFFICE O/P EST SF 10 MIN: CPT | Performed by: NURSE PRACTITIONER

## 2022-08-18 ASSESSMENT — PATIENT HEALTH QUESTIONNAIRE - PHQ9
9. THOUGHTS THAT YOU WOULD BE BETTER OFF DEAD, OR OF HURTING YOURSELF: 0
SUM OF ALL RESPONSES TO PHQ9 QUESTIONS 1 & 2: 0
8. MOVING OR SPEAKING SO SLOWLY THAT OTHER PEOPLE COULD HAVE NOTICED. OR THE OPPOSITE, BEING SO FIGETY OR RESTLESS THAT YOU HAVE BEEN MOVING AROUND A LOT MORE THAN USUAL: 0
2. FEELING DOWN, DEPRESSED OR HOPELESS: 0
7. TROUBLE CONCENTRATING ON THINGS, SUCH AS READING THE NEWSPAPER OR WATCHING TELEVISION: 0
SUM OF ALL RESPONSES TO PHQ QUESTIONS 1-9: 0
SUM OF ALL RESPONSES TO PHQ QUESTIONS 1-9: 0
5. POOR APPETITE OR OVEREATING: 0
SUM OF ALL RESPONSES TO PHQ QUESTIONS 1-9: 0
SUM OF ALL RESPONSES TO PHQ QUESTIONS 1-9: 0
6. FEELING BAD ABOUT YOURSELF - OR THAT YOU ARE A FAILURE OR HAVE LET YOURSELF OR YOUR FAMILY DOWN: 0
3. TROUBLE FALLING OR STAYING ASLEEP: 0
4. FEELING TIRED OR HAVING LITTLE ENERGY: 0
1. LITTLE INTEREST OR PLEASURE IN DOING THINGS: 0

## 2022-08-18 ASSESSMENT — ANXIETY QUESTIONNAIRES
2. NOT BEING ABLE TO STOP OR CONTROL WORRYING: 0
7. FEELING AFRAID AS IF SOMETHING AWFUL MIGHT HAPPEN: 0
3. WORRYING TOO MUCH ABOUT DIFFERENT THINGS: 0
GAD7 TOTAL SCORE: 0
6. BECOMING EASILY ANNOYED OR IRRITABLE: 0
1. FEELING NERVOUS, ANXIOUS, OR ON EDGE: 0
4. TROUBLE RELAXING: 0
5. BEING SO RESTLESS THAT IT IS HARD TO SIT STILL: 0
IF YOU CHECKED OFF ANY PROBLEMS ON THIS QUESTIONNAIRE, HOW DIFFICULT HAVE THESE PROBLEMS MADE IT FOR YOU TO DO YOUR WORK, TAKE CARE OF THINGS AT HOME, OR GET ALONG WITH OTHER PEOPLE: NOT DIFFICULT AT ALL

## 2022-08-19 ASSESSMENT — ENCOUNTER SYMPTOMS
GASTROINTESTINAL NEGATIVE: 1
ALLERGIC/IMMUNOLOGIC NEGATIVE: 1
RESPIRATORY NEGATIVE: 1
EYES NEGATIVE: 1

## 2022-08-19 NOTE — PROGRESS NOTES
Date: 8/19/2022        Patient Name: Elisha Sarmiento     YOB: 1950          Chief Complaint      LSIL pap  Hx of rectal cancer - therapy complete  HTN  DM    History of Present Illness   Elisha Sarmiento is a 81 y.o. who presents today for scheduled follow up for pap smear. She had LSIL pap 3/2021. She underwent colpo exam which that was normal 8/2021, atrophic changes noted and pap NIL. She also has hx of rectal cancer. Treated and monitored by Dr. Briant Galeazzi. Therapy complete by 5/2021. Today she denies any changes in her medical hx since last year. She denies any  complaints.      She is established with PCP    Past Medical History     Past Medical History:   Diagnosis Date    Compression fracture of fifth lumbar vertebra ([address] 07/30/2021    Compression fracture of fourth lumbar vertebra ([facility] [address] 07/03/2021    COVID-19 vaccine series completed 05/12/2021    Moderna    Depression with anxiety     Diverticulosis of colon 04/26/2017    Widespread per Colonoscopy    Elevated C-reactive protein (CRP)     Fibromyalgia     GERD (gastroesophageal reflux disease)     treat with OTC    Hemorrhoids 04/26/2017    Internal & External Per Colonoscopy    Hiatal hernia     HTN (hypertension), benign     managed with med    Hypokalemia 3/30/2021    IBS (irritable bowel syndrome)     Iron deficiency anemia     Iron Infusions 10/18 & 10/27    LGSIL of cervix of undetermined significance 03/09/2021    Repeat in August = Negative    Microalbuminuria due to type 2 diabetes mellitus ([address]     Mixed hyperlipidemia     Osteopenia 07/10/2017    Per DEXA    Perennial allergic rhinitis with seasonal variation     Peripheral neuropathy     Chemo Induced    Radiation colitis     Rectal cancer ([address] 06/26/2020    Adenocarcinoma, removed with colonoscopy    Sleep apnea     Not on CPAP     Type 2 diabetes mellitus ([address]     type 2- oral meds; sqbs am avg 120-150's--- no hypo; HA1C 6.3, 6/3/2021    Vitamin B12 deficiency     Vitamin D deficiency         Past Surgical History     Past Surgical History:   Procedure Laterality Date    BREAST LUMPECTOMY Bilateral     CARPAL TUNNEL RELEASE Right     CHOLECYSTECTOMY      COLONOSCOPY  01/11/2021    Diverticulosis, External Hemorrhoids, Rectal Ulcer - Pathology = Hyperplastic Mucosa, Due 2023    COLONOSCOPY N/A 6/19/2020    COLONOSCOPY/BMI 28 performed by Arsen Rasheed MD at [facility] ENDOSCOPY    COLONOSCOPY N/A 4/26/2017    COLONOSCOPY / BMI=30 performed by Karen Chavez MD at [facility] ENDOSCOPY    COLONOSCOPY  09/08/2021    Diverticulosis, External Hemorrhoids, Radiation Colitis, Hyperplastic Polyp    FLEXIBLE SIGMOIDOSCOPY N/A 6/30/2020    SIGMOIDOSCOPY FLEXIBLE performed by Kacie Robles MD at [facility] ENDOSCOPY    IR KYPHOPLASTY LUMBAR FIRST LEVEL  09/01/2021    L4 & L5    IR PORT PLACEMENT EQUAL OR GREATER THAN 5 YEARS  1/28/2021    IR PORT PLACEMENT EQUAL OR GREATER THAN 5 YEARS  1/28/2021    IR PORT PLACEMENT EQUAL OR GREATER THAN 5 YEARS 1/28/2021 [facility] RADIOLOGY SPECIALS    PRE-MALIGNANT / BENIGN SKIN LESION EXCISION      SINUS SURGERY PROC UNLISTED      TUBAL LIGATION      UPPER GASTROINTESTINAL ENDOSCOPY  08/27/2021    Erosive Esophagitis    UPPER GASTROINTESTINAL ENDOSCOPY  08/24/2021    VASCULAR SURGERY          Medications      Current Outpatient Medications:     CINNAMON PO, Take 1,200 mg by mouth, Disp: , Rfl:     Omega-3 Fatty Acids (OMEGA 3 500) 500 MG CAPS, Take by mouth, Disp: , Rfl:     Probiotic Acidophilus (FLORANEX) TABS, Take 1 tablet by mouth daily, Disp: , Rfl:     colestipol (COLESTID) 1 g tablet, TAKE TWO TABLETS BY MOUTH ONE TIME DAILY, Disp: , Rfl:     gabapentin (NEURONTIN) 300 MG capsule, Take 2 capsules by mouth 3 times daily for 360 days. , Disp: 540 capsule, Rfl: 3    ascorbic acid (VITAMIN C) 500 MG tablet, Take 250 mg by mouth, Disp: , Rfl:     vitamin D3 (CHOLECALCIFEROL) 125 MCG (5000 UT) TABS tablet, Take 5,000 Units by mouth daily, Disp: , Rfl:     cyanocobalamin 1000 MCG/ML injection, Inject 1,000 mcg into the muscle every 30 days, Disp: , Rfl:     cyanocobalamin 500 MCG tablet, Take 500 mcg by mouth daily, Disp: , Rfl:     fenofibrate (TRIGLIDE) 160 MG tablet, Take 160 mg by mouth daily, Disp: , Rfl:     lisinopril (PRINIVIL;ZESTRIL) 10 MG tablet, Take 10 mg by mouth daily, Disp: , Rfl:     Melatonin 10 MG TABS, Take 1 tablet by mouth, Disp: , Rfl:     mometasone (NASONEX) 50 MCG/ACT nasal spray, 2 sprays daily as needed, Disp: , Rfl:     omeprazole (PRILOSEC) 40 MG delayed release capsule, Take 1 capsule by mouth daily, Disp: , Rfl:     PARoxetine (PAXIL) 40 MG tablet, Take 40 mg by mouth daily, Disp: , Rfl:     rosuvastatin (CRESTOR) 10 MG tablet, Take 10 mg by mouth, Disp: , Rfl:     vitamin E 400 UNIT capsule, Take by mouth every evening, Disp: , Rfl:     Multiple Vitamin (MULTIVITAMIN ADULT PO), Take 5,000 mcg by mouth daily (Patient not taking: Reported on 8/18/2022), Disp: , Rfl:     blood glucose test strips (ASCENSIA AUTODISC VI;ONE TOUCH ULTRA TEST VI) strip, Use to check glucose once daily.  Dx: E11.29, Disp: , Rfl:     DULoxetine (CYMBALTA) 20 MG extended release capsule, Take 20 mg by mouth daily (Patient not taking: Reported on 8/18/2022), Disp: , Rfl:     metFORMIN (GLUCOPHAGE-XR) 750 MG extended release tablet, Take 750 mg by mouth Daily with supper (Patient not taking: Reported on 8/18/2022), Disp: , Rfl:     teriparatide (FORTEO) 620 MCG/2.48ML SOPN injection, Inject 20 mcg into the skin daily, Disp: , Rfl:     triamcinolone (KENALOG) 0.1 % cream, Apply thin layer to affected areas bid, Disp: , Rfl:      Allergies   Codeine, Lidocaine, and Sulfa antibiotics    Social History     Social History       Tobacco History       Smoking Status  Former Quit Date  9/15/2016 Smoking Frequency  1.50 packs/day Smoking Tobacco Type  Cigarettes quit in 9/15/2016      Smokeless Tobacco Use  Never              Alcohol History       Alcohol flat.      Palpations: Abdomen is soft. Genitourinary:     General: Normal vulva. Vagina: No vaginal discharge. Comments: Speculum exam normal, atrophic changes noted  Cervical pap obtained  BME normal   Musculoskeletal:         General: Normal range of motion. Cervical back: Normal range of motion and neck supple. Skin:     General: Skin is warm and dry. Neurological:      General: No focal deficit present. Mental Status: She is alert and oriented to person, place, and time. Psychiatric:         Mood and Affect: Mood normal.         Behavior: Behavior normal.         Thought Content: Thought content normal.         Judgment: Judgment normal.       Labs        No results found for this or any previous visit (from the past 48 hour(s)). No results found for:      Pathology        Imaging/Diagnostics Last 24 Hours   No results found. Radiology:    CT Result (most recent):  CT CHEST ABDOMEN PELVIS W CONTRAST 06/16/2022    Narrative  CT CHEST ABDOMEN AND PELVIS WITH CONTRAST 6/16/2022    HISTORY: Rectal adenocarcinoma. TECHNIQUE: The patient received oral contrast and 100 mL Isovue-370 nonionic IV  contrast. Axial images were obtained through the chest, abdomen and pelvis. Coronal reformatted images were generated. All CT scans at this facility used  dose modulation, interactive reconstruction and/or weight based dosing when  appropriate to reduce radiation dose to as low as reasonably achievable. Comparison: 2/4/2022    Findings:    CHEST: A right-sided chest port is present with catheter tip at the cavoatrial  junction. There is no thoracic adenopathy. There is an apparent retroesophageal  right subclavian artery. A subpleural nodule right lower lobe measuring 4 mm in  diameter is unchanged (image 28). There are no new pulmonary nodules. ABDOMEN:    Gallbladder: Cholecystectomy. Liver: Stable 2 cm low-attenuation left hepatic lobe lesion (image 43).  There  are no new hepatic lesions. There are stable renal cortical cysts. Steatosis. Pancreas: Normal.    Spleen: Normal.    Adrenal glands: Normal.    Kidneys: The kidneys enhance symmetrically with contrast and there is no  hydronephrosis. Bowel: The appendix is not visible. Enteric contrast is present throughout  nondilated small bowel loops. There are multiple sigmoid diverticula and there  is stable collapse of the sigmoid colon. Retroperitoneum:No adenopathy. Abdominal aorta is normal in caliber. PELVIS:The uterus is present. The bladder is normal in appearance. Methyl  methacrylate cement is present in lower lumbar vertebrae. There are thickening  of the posterior rectum up to 13 mm is similar in appearance to the previous  study and there is stable stranding in the presacral fat that is likely change  associated with radiation therapy. Bones: There are no aggressive osseous lesions. Impression  1. Stable positioning treatment related changes in the presacral fat and lower  pelvis. 2. Stable 4 mm right middle lobe nodule. 3. Stable left hepatic lobe cyst.     PET Results (most recent):  No results found for this or any previous visit from the past 365 days. Mammo Results (most recent):  [facility] DIGITAL SCREEN BILATERAL 10/27/2021    Narrative  This is a summary report. The complete report is available in the patient's medical record. If you cannot access the medical record, please contact the sending organization for a detailed fax or copy. STUDY:  Bilateral digital screening mammogram with CAD and Tomosynthesis    INDICATION:  Screening;  bilateral breast surgeries for benign etiologies. COMPARISON:  09/24/2020, 09/23/2019    FINDINGS: Bilateral digital mammography was performed, and is interpreted in  conjunction with a computer assisted detection (CAD) system. The breast  parenchyma is heterogeneously dense, which limits the sensitivity of  mammography.  No suspicious masses, calcifications, or nonsurgical architectural  distortion are identified. There is no skin thickening or nipple retraction. Bilateral breast tomosynthesis was performed, and is interpreted in conjunction  with a computer assisted detection (CAD) system. No suspicious masses,  calcifications, or nonsurgical architectural distortion are identified  otherwise. Impression  No mammographic evidence of malignancy. Recommend annual mammogram in one year. A reminder letter will be scheduled. BI-RADS Assessment Category 2: Benign finding. BD3  We are mailing breast density information to the patient along with the  mammogram report. US Result (most recent):  No results found for this or any previous visit from the past 3650 days. Assessment        Specialty Problems    LSIL pap       Plan   Pap pending - she knows to call for results in 2 weeks. If normal RTC in 1 year or PRN  Rec con't follow up with PCP and other specialty providers.                Yana Donald, [facility]  [address] [place] [address]  Office : [phone]  Fax : [phone]

## 2022-09-22 ENCOUNTER — NURSE ONLY (OUTPATIENT)
Dept: INTERNAL MEDICINE CLINIC | Facility: CLINIC | Age: 72
End: 2022-09-22

## 2022-09-22 DIAGNOSIS — R80.9 TYPE 2 DIABETES MELLITUS WITH MICROALBUMINURIA, WITHOUT LONG-TERM CURRENT USE OF INSULIN (HCC): ICD-10-CM

## 2022-09-22 DIAGNOSIS — E11.29 TYPE 2 DIABETES MELLITUS WITH MICROALBUMINURIA, WITHOUT LONG-TERM CURRENT USE OF INSULIN (HCC): ICD-10-CM

## 2022-09-22 DIAGNOSIS — E78.2 MIXED HYPERLIPIDEMIA: ICD-10-CM

## 2022-09-22 DIAGNOSIS — N28.9 RENAL INSUFFICIENCY: ICD-10-CM

## 2022-09-22 DIAGNOSIS — I10 HTN (HYPERTENSION), BENIGN: ICD-10-CM

## 2022-09-23 LAB
ALBUMIN SERPL-MCNC: 4 G/DL (ref 3.2–4.6)
ALBUMIN/GLOB SERPL: 1.2 {RATIO} (ref 1.2–3.5)
ALP SERPL-CCNC: 116 U/L (ref 50–136)
ALT SERPL-CCNC: 27 U/L (ref 12–65)
ANION GAP SERPL CALC-SCNC: 2 MMOL/L (ref 4–13)
AST SERPL-CCNC: 17 U/L (ref 15–37)
BILIRUB SERPL-MCNC: 0.5 MG/DL (ref 0.2–1.1)
BUN SERPL-MCNC: 17 MG/DL (ref 8–23)
CALCIUM SERPL-MCNC: 10.5 MG/DL (ref 8.3–10.4)
CHLORIDE SERPL-SCNC: 105 MMOL/L (ref 101–110)
CHOLEST SERPL-MCNC: 188 MG/DL
CO2 SERPL-SCNC: 30 MMOL/L (ref 21–32)
CREAT SERPL-MCNC: 1 MG/DL (ref 0.6–1)
EST. AVERAGE GLUCOSE BLD GHB EST-MCNC: 160 MG/DL
GLOBULIN SER CALC-MCNC: 3.4 G/DL (ref 2.3–3.5)
GLUCOSE SERPL-MCNC: 157 MG/DL (ref 65–100)
HBA1C MFR BLD: 7.2 % (ref 4.8–5.6)
HDLC SERPL-MCNC: 40 MG/DL (ref 40–60)
HDLC SERPL: 4.7 {RATIO}
LDLC SERPL CALC-MCNC: 90.4 MG/DL
POTASSIUM SERPL-SCNC: 4.3 MMOL/L (ref 3.5–5.1)
PROT SERPL-MCNC: 7.4 G/DL (ref 6.3–8.2)
SODIUM SERPL-SCNC: 137 MMOL/L (ref 136–145)
TRIGL SERPL-MCNC: 288 MG/DL (ref 35–150)
VLDLC SERPL CALC-MCNC: 57.6 MG/DL (ref 6–23)

## 2022-09-29 ENCOUNTER — OFFICE VISIT (OUTPATIENT)
Dept: INTERNAL MEDICINE CLINIC | Facility: CLINIC | Age: 72
End: 2022-09-29
Payer: COMMERCIAL

## 2022-09-29 ENCOUNTER — NURSE ONLY (OUTPATIENT)
Dept: INTERNAL MEDICINE CLINIC | Facility: CLINIC | Age: 72
End: 2022-09-29

## 2022-09-29 VITALS
HEART RATE: 87 BPM | BODY MASS INDEX: 29.07 KG/M2 | SYSTOLIC BLOOD PRESSURE: 134 MMHG | WEIGHT: 154 LBS | TEMPERATURE: 99.3 F | DIASTOLIC BLOOD PRESSURE: 80 MMHG | HEIGHT: 61 IN | OXYGEN SATURATION: 97 %

## 2022-09-29 DIAGNOSIS — E83.52 HYPERCALCEMIA: ICD-10-CM

## 2022-09-29 DIAGNOSIS — E53.8 VITAMIN B12 DEFICIENCY: ICD-10-CM

## 2022-09-29 DIAGNOSIS — E78.2 MIXED HYPERLIPIDEMIA: ICD-10-CM

## 2022-09-29 DIAGNOSIS — I10 HTN (HYPERTENSION), BENIGN: ICD-10-CM

## 2022-09-29 DIAGNOSIS — E11.29 TYPE 2 DIABETES MELLITUS WITH MICROALBUMINURIA, WITHOUT LONG-TERM CURRENT USE OF INSULIN (HCC): Primary | ICD-10-CM

## 2022-09-29 DIAGNOSIS — E55.9 VITAMIN D DEFICIENCY: ICD-10-CM

## 2022-09-29 DIAGNOSIS — N28.9 RENAL INSUFFICIENCY: ICD-10-CM

## 2022-09-29 DIAGNOSIS — G62.0 DRUG-INDUCED POLYNEUROPATHY (HCC): ICD-10-CM

## 2022-09-29 DIAGNOSIS — R80.9 TYPE 2 DIABETES MELLITUS WITH MICROALBUMINURIA, WITHOUT LONG-TERM CURRENT USE OF INSULIN (HCC): Primary | ICD-10-CM

## 2022-09-29 DIAGNOSIS — Z23 ENCOUNTER FOR IMMUNIZATION: ICD-10-CM

## 2022-09-29 PROCEDURE — 90694 VACC AIIV4 NO PRSRV 0.5ML IM: CPT | Performed by: PHYSICIAN ASSISTANT

## 2022-09-29 PROCEDURE — 99215 OFFICE O/P EST HI 40 MIN: CPT | Performed by: PHYSICIAN ASSISTANT

## 2022-09-29 PROCEDURE — 3051F HG A1C>EQUAL 7.0%<8.0%: CPT | Performed by: PHYSICIAN ASSISTANT

## 2022-09-29 PROCEDURE — 1123F ACP DISCUSS/DSCN MKR DOCD: CPT | Performed by: PHYSICIAN ASSISTANT

## 2022-09-29 PROCEDURE — G0008 ADMIN INFLUENZA VIRUS VAC: HCPCS | Performed by: PHYSICIAN ASSISTANT

## 2022-09-29 RX ORDER — FENOFIBRATE 160 MG/1
160 TABLET ORAL DAILY
Qty: 90 TABLET | Refills: 3 | Status: SHIPPED | OUTPATIENT
Start: 2022-09-29

## 2022-09-29 RX ORDER — ROSUVASTATIN CALCIUM 10 MG/1
10 TABLET, COATED ORAL NIGHTLY
Qty: 90 TABLET | Refills: 3 | Status: SHIPPED | OUTPATIENT
Start: 2022-09-29

## 2022-09-29 ASSESSMENT — PATIENT HEALTH QUESTIONNAIRE - PHQ9
SUM OF ALL RESPONSES TO PHQ QUESTIONS 1-9: 0
2. FEELING DOWN, DEPRESSED OR HOPELESS: 0
1. LITTLE INTEREST OR PLEASURE IN DOING THINGS: 0
SUM OF ALL RESPONSES TO PHQ QUESTIONS 1-9: 0
SUM OF ALL RESPONSES TO PHQ QUESTIONS 1-9: 0
SUM OF ALL RESPONSES TO PHQ9 QUESTIONS 1 & 2: 0
SUM OF ALL RESPONSES TO PHQ QUESTIONS 1-9: 0

## 2022-09-29 ASSESSMENT — ENCOUNTER SYMPTOMS
SHORTNESS OF BREATH: 0
DIARRHEA: 1

## 2022-09-29 NOTE — PROGRESS NOTES
Darius Pink (:  1950) is a 81 y.o. female,Established patient, here for evaluation of the following chief complaint(s):  Follow-up (Diabetes, Hypertension, Hyperlipidemia)         ASSESSMENT/PLAN:  1. Type 2 diabetes mellitus with microalbuminuria, without long-term current use of insulin (HCC)  -     Microalbumin / Creatinine Urine Ratio; Future  -     Comprehensive Metabolic Panel; Future  -     Hemoglobin A1C; Future  2. Encounter for immunization  -     Influenza, FLUAD, (age 72 y+), IM, PF, 0.5 mL  3. Drug-induced polyneuropathy (HCC)  -     Vitamin B12; Future  -     TSH; Future  4. Mixed hyperlipidemia  -     fenofibrate (TRIGLIDE) 160 MG tablet; Take 1 tablet by mouth daily, Disp-90 tablet, R-3Normal  -     rosuvastatin (CRESTOR) 10 MG tablet; Take 1 tablet by mouth at bedtime, Disp-90 tablet, R-3Normal  -     Lipid Panel; Future  5. Renal insufficiency  -     Comprehensive Metabolic Panel; Future  6. Hypercalcemia  -     Comprehensive Metabolic Panel; Future  7. HTN (hypertension), benign  -     Comprehensive Metabolic Panel; Future  8. Vitamin B12 deficiency  -     Vitamin B12; Future  9. Vitamin D deficiency  -     Vitamin D 25 Hydroxy;  Future      Patient Instructions   Reminded that she should not be having any liquids with sugar - everything needs to be sugar free or diet in order to not elevate her blood sugar  Discussed that she is drinking too much caffeine - needs to reduce by at least 50%  Encouraged to stay well hydrated with water  Reminded that diabetic eye exam is due in December and should get scheduled if not already  Recommend using stationary bicycle at least 3 days/week x 15 minutes with goals to increase to 4-5 days/week x 30 minutes which will offer a bit more flexibility on diet that we currently have with diet alone is all we have to control the glucose   Counseled that if we can't get blood sugar down with lifestyle efforts we may have to restart Tradjenta  Monitor blood sugar daily and keep record to bring to next appointment  Continue chronic medications as prescribed  Monitor blood pressure 2-3 times/week and keep record to bring to next appointment  Recommend getting newest COVID vaccine given formulation that should better protect against the most recently circulating Omicron variant      Patient Education        influenza virus vaccine (injection)  Pronunciation:  IN floo EN kendal CAMERON cecille VAK seen  Brand:  Afluria PF Pediatric Quadrivalent [id number], Afluria PF Quadrivalent [id number], Afluria Quadrivalent [id number], Fluarix PF Quadrivalent [id number], Flublok Quadrivalent [id number], Flucelvax PF Quadrivalent [id number], Flucelvax Quadrivalent 8840-8040, FluLaval PF Quadrivalent [id number], Fluzone High-Dose Quadrivalent PF [id number], Fluzone PF Pediatric Quadrivalent [id number], Fluzone PF Quadrivalent [id number], Fluzone Quadrivalent [id number]  What is the most important information I should know about this vaccine? Influenza virus vaccine is made from \"killed viruses\" and will not cause you to become ill with the flu virus. What is influenza virus vaccine? Influenza virus (\"the flu\") is a contagious disease caused by a virus that can spread from one person to another through the air or on surfaces. Flu symptoms include fever, chills, tiredness, aches, sore throat, cough, vomiting, and diarrhea. The flu can also cause sinus infections, ear infections, bronchitis, or serious complications such as pneumonia. Influenza causes thousands of deaths each year, and hundreds of thousands of hospitalizations. Influenza is most dangerous in children, pregnant women, older adults, and people with weak immune systems or health problems such as diabetes, heart disease, or cancer. Influenza virus vaccine is for use in adults and children at least 6 months old, to prevent infection caused by influenza virus.  This vaccine helps your body develop immunity to the disease, but will not treat an active infection you already have. Influenza virus vaccine is redeveloped each year to contain specific strains of inactivated (killed) flu virus that are recommended by public health officials for that year. The injectable influenza virus vaccine (flu shot) is made from \"killed viruses. \" Influenza virus vaccine is also available in a nasal spray form, which is a \"live virus\" vaccine. This medication guide addresses only the injectable form of this vaccine. Like any vaccine, influenza virus vaccine may not provide protection from disease in every person. What should I discuss with my healthcare provider before receiving this vaccine? You may not be able to receive this vaccine if you are allergic to eggs, or if you have ever had a severe allergic reaction to a flu vaccine. Tell your vaccination provider if you have:  a weak immune system (caused by disease or by using certain medicine); or  a history of Guillain-Decherd syndrome (within 6 weeks after receiving a flu vaccine). You can still receive a vaccine if you have a minor cold. In the case of a more severe illness with a fever or any type of infection, wait until you get better before receiving this vaccine. Tell your vaccination provider if you are pregnant or breastfeeding. The Centers for Disease Control and Prevention recommends that pregnant women get a flu shot during any trimester of pregnancy to protect themselves and their  babies from flu. The nasal spray form of influenza vaccine is not recommended for use in pregnant women. How is this vaccine given? Some brands of this vaccine are made for use in adults and not in children. Your child's vaccination provider can recommend the best influenza virus vaccine for your child. This vaccine is given as an injection (shot) into a muscle. Children 6 months to 6years old may need a second flu shot 4 weeks after receiving the first vaccine.   The influenza virus vaccine is usually given in October or November. Follow your doctor's instructions or the schedule recommended by your local health department. Since the influenza virus vaccine is redeveloped each year for specific strains of influenza, you should receive a flu vaccine every year. What happens if I miss a dose? Call your doctor if you forget to receive your yearly flu shot in October or November, or if your child misses a booster dose. What happens if I overdose? An overdose of this vaccine is unlikely to occur. What should I avoid before or after receiving this vaccine? Follow your vaccination provider's instructions about any restrictions on food, beverages, or activity. What are the possible side effects of influenza virus vaccine? Get emergency medical help if you have signs of an allergic reaction: hives; difficulty breathing; swelling of your face, lips, tongue, or throat. You should not receive a booster vaccine if you had a life threatening allergic reaction after the first shot. Keep track of any and all side effects you have. If you receive an influenza virus vaccine in the future, you will need to tell the vaccination provider if the previous shot caused any side effects. Influenza virus vaccine is made from \"killed viruses\" and will not cause you to become ill with the flu virus. You may have flu-like symptoms at any time during flu season that may be caused by other strains of influenza virus. Call your doctor at once if you have:  a light-headed feeling, like you might pass out;  severe weakness or unusual feeling in your arms and legs;  numbness, pain, tingling, burning or prickly feeling;  vision or hearing problems; or  a fever higher than 101 degrees F. Common side effects may include:  pain, redness, tenderness, swelling, bruising, or a hard lump where the shot was given;  diarrhea, loss of appetite;  muscle pain;  headache, tiredness; or  fussiness, crying, or drowsiness in a child.   This is not a complete list of side effects and others may occur. Call your doctor for medical advice about side effects. You may report vaccine side effects to the [address] [place] [address] and Human Services at [phone]. What other drugs will affect influenza virus vaccine? If you are using any of these medications, you may not be able to receive the vaccine, or may need to wait until the other treatments are finished:  steroid medicine;  medications to treat psoriasis, rheumatoid arthritis, or other autoimmune disorders; or  medicines to treat or prevent organ transplant rejection. This list is not complete. Other drugs may affect influenza virus vaccine, including prescription and over-the-counter medicines, vitamins, and herbal products. Not all possible drug interactions are listed here. Where can I get more information? Your vaccination provider, pharmacist, or doctor can provide more information about this vaccine. Additional information is available from your local health department or the Centers for Disease Control and Prevention. Remember, keep this and all other medicines out of the reach of children, never share your medicines with others, and use this medication only for the indication prescribed. Every effort has been made to ensure that the information provided by [address] is accurate, up-to-date, and complete, but no guarantee is made to that effect. Drug information contained herein may be time sensitive. [facility] information has been compiled for use by healthcare practitioners and consumers in the [place] and therefore [facility] does not warrant that uses outside of the [place] are appropriate, unless specifically indicated otherwise. [facility] drug information does not endorse drugs, diagnose patients or recommend therapy.  [facility]'s drug information is an informational resource designed to assist licensed healthcare practitioners in caring for their patients and/or to serve consumers viewing this service as a supplement to, and not a substitute for, the expertise, skill, knowledge and judgment of healthcare practitioners. The absence of a warning for a given drug or drug combination in no way should be construed to indicate that the drug or drug combination is safe, effective or appropriate for any given patient. [facility] does not assume any responsibility for any aspect of healthcare administered with the aid of information [facility] provides. The information contained herein is not intended to cover all possible uses, directions, precautions, warnings, drug interactions, allergic reactions, or adverse effects. If you have questions about the drugs you are taking, check with your doctor, nurse or pharmacist.  Copyright 3409-6728 [address]. Version: 9.01. Revision date: 10/7/2021. Care instructions adapted under license by [facility] ([facility]). If you have questions about a medical condition or this instruction, always ask your healthcare professional. [place] [address] any warranty or liability for your use of this information. Return in about 3 months (around 12/29/2022) for MWE + diabetes f/u. Subjective   SUBJECTIVE/OBJECTIVE:  The patient is a 81 y.o. female who is seen for follow up of diabetes. Current monitoring regimen: BGs are high in the morning. Glucose monitoring frequency: 1 times daily  Home blood sugar records: fasting range: 120-170  Any episodes of hypoglycemia? no  Known diabetic complications:  microalbuminuria  Current diabetic medications include: cinnamon supplement 1200 mg daily - came off Metformin  mg daily due to explosive diarrhea with very frequent stools. Current Eye Exam (within one year): Yes - Dec 2021  Current Urine Microalbumin: Yes, abnormal - Dec 2021  Is She on ACE inhibitor or angiotensin II receptor blocker?   Yes - lisinopril (generic)  Current Foot Exam (within one year): Yes - Mar treatment, and prevention of vitamin D     deficiency: an Endocrine Society clinical practice     guideline. JCEM. 2011 Jul; 96(7):1911-30.     09/23/2019 51.5 30.0 - 100.0 ng/mL Final     Comment:     Vitamin D deficiency has been defined by the [address] practice guideline as a  level of serum 25-OH vitamin D less than 20 ng/mL (1,2). The Endocrine Society went on to further define vitamin D  insufficiency as a level between 21 and 29 ng/mL (2). 1. IOM ([place] of Medicine). 2010. Dietary reference     intakes for calcium and D. [facility]: The     hoohbe. 2. Owen MF, Tyler NC, Izabella WOOD, et al.     Evaluation, treatment, and prevention of vitamin D     deficiency: an Endocrine Society clinical practice     guideline. JCEM. 2011 Jul; 96(7):1911-30. Review of Systems   Constitutional:  Positive for fatigue. Negative for unexpected weight change. Eyes:  Negative for visual disturbance. Respiratory:  Negative for shortness of breath. Cardiovascular:  Negative for chest pain, palpitations and leg swelling. Gastrointestinal:  Positive for diarrhea (improved since stopping Metformin). Endocrine: Negative for polydipsia. Genitourinary:  Positive for frequency. Musculoskeletal:  Positive for myalgias. Neurological:  Positive for numbness (bilateral feet) and headaches. Negative for dizziness. BP (!) 140/80 (Site: Left Upper Arm, Position: Sitting, Cuff Size: Small Adult)   Pulse 87   Temp 99.3 °F (37.4 °C) (Temporal)   Ht 5' 1\" (1.549 m)   Wt 154 lb (69.9 kg)   SpO2 97%   BMI 29.10 kg/m²       /80   Pulse 87   Temp 99.3 °F (37.4 °C) (Temporal)   Ht 5' 1\" (1.549 m)   Wt 154 lb (69.9 kg)   SpO2 97%   BMI 29.10 kg/m²       Objective   Physical Exam  Constitutional:       Appearance: Normal appearance. HENT:      Head: Normocephalic and atraumatic.    Eyes:      Conjunctiva/sclera: Conjunctivae normal.      Pupils: Pupils are equal, round, and reactive to light. Neck:      Vascular: No carotid bruit. Cardiovascular:      Rate and Rhythm: Normal rate and regular rhythm. Heart sounds: Murmur heard. Systolic murmur is present with a grade of 2/6. Pulmonary:      Effort: Pulmonary effort is normal.      Breath sounds: Normal breath sounds. Musculoskeletal:         General: Normal range of motion. Cervical back: Normal range of motion. Skin:     General: Skin is warm and dry. Neurological:      Mental Status: She is alert and oriented to person, place, and time. Psychiatric:         Mood and Affect: Mood normal.         Behavior: Behavior normal.         Thought Content: Thought content normal.         Judgment: Judgment normal.          On this date 9/29/2022 I have spent 40 minutes reviewing previous notes, test results and face to face with the patient discussing the diagnosis and importance of compliance with the treatment plan as well as documenting on the day of the visit. An electronic signature was used to authenticate this note.     --Katheryn Adame PA-C

## 2022-09-29 NOTE — PATIENT INSTRUCTIONS
Reminded that she should not be having any liquids with sugar - everything needs to be sugar free or diet in order to not elevate her blood sugar  Discussed that she is drinking too much caffeine - needs to reduce by at least 50%  Encouraged to stay well hydrated with water  Reminded that diabetic eye exam is due in December and should get scheduled if not already  Recommend using stationary bicycle at least 3 days/week x 15 minutes with goals to increase to 4-5 days/week x 30 minutes which will offer a bit more flexibility on diet that we currently have with diet alone is all we have to control the glucose   Counseled that if we can't get blood sugar down with lifestyle efforts we may have to restart Tradjenta  Monitor blood sugar daily and keep record to bring to next appointment  Continue chronic medications as prescribed  Monitor blood pressure 2-3 times/week and keep record to bring to next appointment  Recommend getting newest COVID vaccine given formulation that should better protect against the most recently circulating Omicron variant      Patient Education        influenza virus vaccine (injection)  Pronunciation:  IN floo EN kendal BRUNO seen  Brand:  Afluria PF Pediatric Quadrivalent [id number], Afluria PF Quadrivalent [id number], Afluria Quadrivalent [id number], Fluarix PF Quadrivalent [id number], Flublok Quadrivalent [id number], Flucelvax PF Quadrivalent [id number], Flucelvax Quadrivalent [id number], FluLaval PF Quadrivalent 9214-2356, Fluzone High-Dose Quadrivalent PF [id number], Fluzone PF Pediatric Quadrivalent [id number], Fluzone PF Quadrivalent [id number], Fluzone Quadrivalent [id number]  What is the most important information I should know about this vaccine? Influenza virus vaccine is made from \"killed viruses\" and will not cause you to become ill with the flu virus. What is influenza virus vaccine?   Influenza virus (\"the flu\") is a contagious disease caused by a virus that can spread from one person to another through the air or on surfaces. Flu symptoms include fever, chills, tiredness, aches, sore throat, cough, vomiting, and diarrhea. The flu can also cause sinus infections, ear infections, bronchitis, or serious complications such as pneumonia. Influenza causes thousands of deaths each year, and hundreds of thousands of hospitalizations. Influenza is most dangerous in children, pregnant women, older adults, and people with weak immune systems or health problems such as diabetes, heart disease, or cancer. Influenza virus vaccine is for use in adults and children at least 6 months old, to prevent infection caused by influenza virus. This vaccine helps your body develop immunity to the disease, but will not treat an active infection you already have. Influenza virus vaccine is redeveloped each year to contain specific strains of inactivated (killed) flu virus that are recommended by public health officials for that year. The injectable influenza virus vaccine (flu shot) is made from \"killed viruses. \" Influenza virus vaccine is also available in a nasal spray form, which is a \"live virus\" vaccine. This medication guide addresses only the injectable form of this vaccine. Like any vaccine, influenza virus vaccine may not provide protection from disease in every person. What should I discuss with my healthcare provider before receiving this vaccine? You may not be able to receive this vaccine if you are allergic to eggs, or if you have ever had a severe allergic reaction to a flu vaccine. Tell your vaccination provider if you have:  a weak immune system (caused by disease or by using certain medicine); or  a history of Guillain-Compton syndrome (within 6 weeks after receiving a flu vaccine). You can still receive a vaccine if you have a minor cold. In the case of a more severe illness with a fever or any type of infection, wait until you get better before receiving this vaccine.   Tell your vaccination provider if you are pregnant or breastfeeding. The Centers for Disease Control and Prevention recommends that pregnant women get a flu shot during any trimester of pregnancy to protect themselves and their  babies from flu. The nasal spray form of influenza vaccine is not recommended for use in pregnant women. How is this vaccine given? Some brands of this vaccine are made for use in adults and not in children. Your child's vaccination provider can recommend the best influenza virus vaccine for your child. This vaccine is given as an injection (shot) into a muscle. Children 6 months to 6years old may need a second flu shot 4 weeks after receiving the first vaccine. The influenza virus vaccine is usually given in October or November. Follow your doctor's instructions or the schedule recommended by your local health department. Since the influenza virus vaccine is redeveloped each year for specific strains of influenza, you should receive a flu vaccine every year. What happens if I miss a dose? Call your doctor if you forget to receive your yearly flu shot in October or November, or if your child misses a booster dose. What happens if I overdose? An overdose of this vaccine is unlikely to occur. What should I avoid before or after receiving this vaccine? Follow your vaccination provider's instructions about any restrictions on food, beverages, or activity. What are the possible side effects of influenza virus vaccine? Get emergency medical help if you have signs of an allergic reaction: hives; difficulty breathing; swelling of your face, lips, tongue, or throat. You should not receive a booster vaccine if you had a life threatening allergic reaction after the first shot. Keep track of any and all side effects you have. If you receive an influenza virus vaccine in the future, you will need to tell the vaccination provider if the previous shot caused any side effects.   Influenza virus vaccine is made from \"killed viruses\" and will not cause you to become ill with the flu virus. You may have flu-like symptoms at any time during flu season that may be caused by other strains of influenza virus. Call your doctor at once if you have:  a light-headed feeling, like you might pass out;  severe weakness or unusual feeling in your arms and legs;  numbness, pain, tingling, burning or prickly feeling;  vision or hearing problems; or  a fever higher than 101 degrees F. Common side effects may include:  pain, redness, tenderness, swelling, bruising, or a hard lump where the shot was given;  diarrhea, loss of appetite;  muscle pain;  headache, tiredness; or  fussiness, crying, or drowsiness in a child. This is not a complete list of side effects and others may occur. Call your doctor for medical advice about side effects. You may report vaccine side effects to the [place] [address] and Human Services at [phone]. What other drugs will affect influenza virus vaccine? If you are using any of these medications, you may not be able to receive the vaccine, or may need to wait until the other treatments are finished:  steroid medicine;  medications to treat psoriasis, rheumatoid arthritis, or other autoimmune disorders; or  medicines to treat or prevent organ transplant rejection. This list is not complete. Other drugs may affect influenza virus vaccine, including prescription and over-the-counter medicines, vitamins, and herbal products. Not all possible drug interactions are listed here. Where can I get more information? Your vaccination provider, pharmacist, or doctor can provide more information about this vaccine. Additional information is available from your local health department or the Centers for Disease Control and Prevention.   Remember, keep this and all other medicines out of the reach of children, never share your medicines with others, and use this medication only for the indication prescribed. Every effort has been made to ensure that the information provided by [address] is accurate, up-to-date, and complete, but no guarantee is made to that effect. Drug information contained herein may be time sensitive. [facility] information has been compiled for use by healthcare practitioners and consumers in the [place] and therefore [facility] does not warrant that uses outside of the [place] are appropriate, unless specifically indicated otherwise. [facility]'s drug information does not endorse drugs, diagnose patients or recommend therapy. [facility]'s drug information is an informational resource designed to assist licensed healthcare practitioners in caring for their patients and/or to serve consumers viewing this service as a supplement to, and not a substitute for, the expertise, skill, knowledge and judgment of healthcare practitioners. The absence of a warning for a given drug or drug combination in no way should be construed to indicate that the drug or drug combination is safe, effective or appropriate for any given patient. [facility] does not assume any responsibility for any aspect of healthcare administered with the aid of information [facility] provides. The information contained herein is not intended to cover all possible uses, directions, precautions, warnings, drug interactions, allergic reactions, or adverse effects. If you have questions about the drugs you are taking, check with your doctor, nurse or pharmacist.  Copyright 8638-8999 [address]. Version: 9.01. Revision date: 10/7/2021. Care instructions adapted under license by [facility] ([facility]). If you have questions about a medical condition or this instruction, always ask your healthcare professional. [place] [address] any warranty or liability for your use of this information.

## 2022-09-30 LAB
TSH, 3RD GENERATION: 0.68 UIU/ML (ref 0.36–3.74)
VIT B12 SERPL-MCNC: 2523 PG/ML (ref 193–986)

## 2022-09-30 NOTE — RESULT ENCOUNTER NOTE
Vitamin B12 levels look fine on just the oral supplement - injections not needed. Thyroid function is also normal so not contributing to her neuropathy. 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

## 2022-10-13 ENCOUNTER — HOSPITAL ENCOUNTER (OUTPATIENT)
Dept: CT IMAGING | Age: 72
Discharge: HOME | End: 2022-10-16
Payer: COMMERCIAL

## 2022-10-13 ENCOUNTER — APPOINTMENT (OUTPATIENT)
Dept: GENERAL RADIOLOGY | Age: 72
End: 2022-10-13
Payer: COMMERCIAL

## 2022-10-13 DIAGNOSIS — C20 RECTAL ADENOCARCINOMA (HCC): ICD-10-CM

## 2022-10-13 LAB — CREAT BLD-MCNC: 0.88 MG/DL (ref 0.8–1.5)

## 2022-10-13 PROCEDURE — 82565 ASSAY OF CREATININE: CPT

## 2022-10-13 PROCEDURE — 6360000004 HC RX CONTRAST MEDICATION: Performed by: INTERNAL MEDICINE

## 2022-10-13 PROCEDURE — 2580000003 HC RX 258: Performed by: INTERNAL MEDICINE

## 2022-10-13 PROCEDURE — 74177 CT ABD & PELVIS W/CONTRAST: CPT

## 2022-10-13 RX ORDER — SODIUM CHLORIDE 0.9 % (FLUSH) 0.9 %
10 SYRINGE (ML) INJECTION
Status: COMPLETED | OUTPATIENT
Start: 2022-10-13 | End: 2022-10-13

## 2022-10-13 RX ORDER — 0.9 % SODIUM CHLORIDE 0.9 %
100 INTRAVENOUS SOLUTION INTRAVENOUS
Status: COMPLETED | OUTPATIENT
Start: 2022-10-13 | End: 2022-10-13

## 2022-10-13 RX ADMIN — IOPAMIDOL 100 ML: 755 INJECTION, SOLUTION INTRAVENOUS at 11:42

## 2022-10-13 RX ADMIN — SODIUM CHLORIDE 100 ML: 9 INJECTION, SOLUTION INTRAVENOUS at 11:42

## 2022-10-13 RX ADMIN — SODIUM CHLORIDE, PRESERVATIVE FREE 10 ML: 5 INJECTION INTRAVENOUS at 11:42

## 2022-10-13 RX ADMIN — DIATRIZOATE MEGLUMINE AND DIATRIZOATE SODIUM 15 ML: 660; 100 LIQUID ORAL; RECTAL at 11:42

## 2022-10-17 DIAGNOSIS — E78.2 MIXED HYPERLIPIDEMIA: ICD-10-CM

## 2022-10-17 RX ORDER — ROSUVASTATIN CALCIUM 10 MG/1
TABLET, COATED ORAL
Qty: 90 TABLET | Refills: 3 | OUTPATIENT
Start: 2022-10-17

## 2022-10-17 RX ORDER — BLOOD SUGAR DIAGNOSTIC
STRIP MISCELLANEOUS
Qty: 100 STRIP | OUTPATIENT
Start: 2022-10-17

## 2022-10-18 DIAGNOSIS — D50.9 IRON DEFICIENCY ANEMIA, UNSPECIFIED IRON DEFICIENCY ANEMIA TYPE: Primary | ICD-10-CM

## 2022-10-19 ENCOUNTER — HOSPITAL ENCOUNTER (OUTPATIENT)
Dept: LAB | Age: 72
Discharge: HOME | End: 2022-10-22
Payer: COMMERCIAL

## 2022-10-19 ENCOUNTER — OFFICE VISIT (OUTPATIENT)
Dept: ONCOLOGY | Age: 72
End: 2022-10-19
Payer: COMMERCIAL

## 2022-10-19 VITALS
TEMPERATURE: 98.4 F | SYSTOLIC BLOOD PRESSURE: 143 MMHG | OXYGEN SATURATION: 96 % | WEIGHT: 157 LBS | RESPIRATION RATE: 16 BRPM | BODY MASS INDEX: 29.64 KG/M2 | DIASTOLIC BLOOD PRESSURE: 80 MMHG | HEIGHT: 61 IN | HEART RATE: 80 BPM

## 2022-10-19 DIAGNOSIS — D50.9 IRON DEFICIENCY ANEMIA, UNSPECIFIED IRON DEFICIENCY ANEMIA TYPE: ICD-10-CM

## 2022-10-19 DIAGNOSIS — Z85.048 PERSONAL HISTORY OF OTHER MALIGNANT NEOPLASM OF RECTUM, RECTOSIGMOID JUNCTION, AND ANUS: Primary | ICD-10-CM

## 2022-10-19 DIAGNOSIS — C20 RECTAL ADENOCARCINOMA (HCC): ICD-10-CM

## 2022-10-19 DIAGNOSIS — G62.9 NEUROPATHY: ICD-10-CM

## 2022-10-19 LAB
ALBUMIN SERPL-MCNC: 4 G/DL (ref 3.2–4.6)
ALBUMIN/GLOB SERPL: 1.1 {RATIO} (ref 0.4–1.6)
ALP SERPL-CCNC: 116 U/L (ref 50–136)
ALT SERPL-CCNC: 32 U/L (ref 12–65)
ANION GAP SERPL CALC-SCNC: 7 MMOL/L (ref 2–11)
AST SERPL-CCNC: 18 U/L (ref 15–37)
BASOPHILS # BLD: 0.1 K/UL (ref 0–0.2)
BASOPHILS NFR BLD: 1 % (ref 0–2)
BILIRUB SERPL-MCNC: 0.5 MG/DL (ref 0.2–1.1)
BUN SERPL-MCNC: 13 MG/DL (ref 8–23)
CALCIUM SERPL-MCNC: 9.6 MG/DL (ref 8.3–10.4)
CEA SERPL-MCNC: 2.9 NG/ML (ref 0–3)
CHLORIDE SERPL-SCNC: 102 MMOL/L (ref 101–110)
CO2 SERPL-SCNC: 28 MMOL/L (ref 21–32)
CREAT SERPL-MCNC: 1 MG/DL (ref 0.6–1)
DIFFERENTIAL METHOD BLD: NORMAL
EOSINOPHIL # BLD: 0.3 K/UL (ref 0–0.8)
EOSINOPHIL NFR BLD: 6 % (ref 0.5–7.8)
ERYTHROCYTE [DISTWIDTH] IN BLOOD BY AUTOMATED COUNT: 13.4 % (ref 11.9–14.6)
FERRITIN SERPL-MCNC: 57 NG/ML (ref 8–388)
GLOBULIN SER CALC-MCNC: 3.7 G/DL (ref 2.8–4.5)
GLUCOSE SERPL-MCNC: 195 MG/DL (ref 65–100)
HCT VFR BLD AUTO: 40.9 % (ref 35.8–46.3)
HGB BLD-MCNC: 13.4 G/DL (ref 11.7–15.4)
HGB RETIC QN AUTO: 34 PG (ref 29–35)
IMM GRANULOCYTES # BLD AUTO: 0 K/UL (ref 0–0.5)
IMM GRANULOCYTES NFR BLD AUTO: 0 % (ref 0–5)
IMM RETICS NFR: 12.5 % (ref 3–15.9)
IRON SATN MFR SERPL: 28 %
IRON SERPL-MCNC: 93 UG/DL (ref 35–150)
LYMPHOCYTES # BLD: 0.9 K/UL (ref 0.5–4.6)
LYMPHOCYTES NFR BLD: 20 % (ref 13–44)
MCH RBC QN AUTO: 28.8 PG (ref 26.1–32.9)
MCHC RBC AUTO-ENTMCNC: 32.8 G/DL (ref 31.4–35)
MCV RBC AUTO: 88 FL (ref 82–102)
MONOCYTES # BLD: 0.4 K/UL (ref 0.1–1.3)
MONOCYTES NFR BLD: 9 % (ref 4–12)
NEUTS SEG # BLD: 2.9 K/UL (ref 1.7–8.2)
NEUTS SEG NFR BLD: 64 % (ref 43–78)
NRBC # BLD: 0 K/UL (ref 0–0.2)
PLATELET # BLD AUTO: 265 K/UL (ref 150–450)
PMV BLD AUTO: 10.3 FL (ref 9.4–12.3)
POTASSIUM SERPL-SCNC: 4.2 MMOL/L (ref 3.5–5.1)
PROT SERPL-MCNC: 7.7 G/DL (ref 6.3–8.2)
RBC # BLD AUTO: 4.65 M/UL (ref 4.05–5.2)
RETICS # AUTO: 0.1 M/UL (ref 0.03–0.1)
RETICS/RBC NFR AUTO: 2.2 % (ref 0.3–2)
SODIUM SERPL-SCNC: 137 MMOL/L (ref 133–143)
TIBC SERPL-MCNC: 337 UG/DL (ref 250–450)
WBC # BLD AUTO: 4.5 K/UL (ref 4.3–11.1)

## 2022-10-19 PROCEDURE — 99214 OFFICE O/P EST MOD 30 MIN: CPT | Performed by: INTERNAL MEDICINE

## 2022-10-19 PROCEDURE — 83540 ASSAY OF IRON: CPT

## 2022-10-19 PROCEDURE — 82728 ASSAY OF FERRITIN: CPT

## 2022-10-19 PROCEDURE — 36415 COLL VENOUS BLD VENIPUNCTURE: CPT

## 2022-10-19 PROCEDURE — 80053 COMPREHEN METABOLIC PANEL: CPT

## 2022-10-19 PROCEDURE — 82378 CARCINOEMBRYONIC ANTIGEN: CPT

## 2022-10-19 PROCEDURE — 85025 COMPLETE CBC W/AUTO DIFF WBC: CPT

## 2022-10-19 PROCEDURE — 1123F ACP DISCUSS/DSCN MKR DOCD: CPT | Performed by: INTERNAL MEDICINE

## 2022-10-19 PROCEDURE — 85046 RETICYTE/HGB CONCENTRATE: CPT

## 2022-10-19 NOTE — PATIENT INSTRUCTIONS
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 You should call Dr. Keny Jaimes office to discuss your diarrhea- [phone]    Follow Up: Follow up in     Recent Lab Results:  Hospital Outpatient Visit on 10/19/2022   Component Date Value Ref Range Status    WBC 10/19/2022 4.5  4.3 - 11.1 K/uL Final    RBC 10/19/2022 4.65  4.05 - 5.2 M/uL Final    Hemoglobin 10/19/2022 13.4  11.7 - 15.4 g/dL Final    Hematocrit 10/19/2022 40.9  35.8 - 46.3 % Final    MCV 10/19/2022 88.0  82.0 - 102.0 FL Final    MCH 10/19/2022 28.8  26.1 - 32.9 PG Final    MCHC 10/19/2022 32.8  31.4 - 35.0 g/dL Final    RDW 10/19/2022 13.4  11.9 - 14.6 % Final    Platelets 44/98/5836 265  150 - 450 K/uL Final    MPV 10/19/2022 10.3  9.4 - 12.3 FL Final    nRBC 10/19/2022 0.00  0.0 - 0.2 K/uL Final    **Note: Absolute NRBC parameter is now reported with Hemogram**    Seg Neutrophils 10/19/2022 64  43 - 78 % Final    Lymphocytes 10/19/2022 20  13 - 44 % Final    Monocytes 10/19/2022 9  4.0 - 12.0 % Final    Eosinophils % 10/19/2022 6  0.5 - 7.8 % Final    Basophils 10/19/2022 1  0.0 - 2.0 % Final    Immature Granulocytes 10/19/2022 0  0.0 - 5.0 % Final    Segs Absolute 10/19/2022 2.9  1.7 - 8.2 K/UL Final    Absolute Lymph # 10/19/2022 0.9  0.5 - 4.6 K/UL Final    Absolute Mono # 10/19/2022 0.4  0.1 - 1.3 K/UL Final    Absolute Eos # 10/19/2022 0.3  0.0 - 0.8 K/UL Final    Basophils Absolute 10/19/2022 0.1  0.0 - 0.2 K/UL Final    Absolute Immature Granulocyte 10/19/2022 0.0  0.0 - 0.5 K/UL Final    Differential Type 10/19/2022 AUTOMATED    Final    Reticulocyte Count,Automated 10/19/2022 2.2 (A)  0.3 - 2.0 % Final    Absolute Retic # 10/19/2022 0.1018 (A)  0.026 - 0.095 M/ul Final    Immature Retic Fraction 10/19/2022 12.5  3.0 - 15.9 % Final    Retic Hemoglobin conc.  10/19/2022 34  29 - 35 pg Final        Treatment Summary has been discussed and given to patient: n/a        -------------------------------------------------------------------------------------------------------------------  Please call our office at [phone] if you have any  of the following symptoms:   Fever of 100.5 or greater  Chills  Shortness of breath  Swelling or pain in one leg    After office hours an answering service is available and will contact a provider for emergencies or if you are experiencing any of the above symptoms. Patient did express an interest in My Chart. My Chart log in information explained on the after visit summary printout at the [facility] desk.     Edward Carmona RN

## 2022-10-19 NOTE — PROGRESS NOTES
Data Source: Patient, [facility] record. 10/19/2022    11:42 AM    Marvin Quan [id number]    81 y.o. Patient Encounter: [address] Visit    Cancer Diagnosis:  Rectal adenocarcinoma  Stage:  1  Performance Status:  1  Code Status:  Not discussed  Pain Score (0-10):  1  Pain Medication related Constipation:  na  Onc History (Copied from prior):   78 female, works in [employer] for [employer], ex-smoker (quit 2016), h/o vitamin D deficiency, DM, MATTY, obese habitus, dyslipidemia, hypertension, fibromyalgia, diverticulosis of colon, depression, COPD, cholecystectomy, GERD, hiatal hernia, IBS, more recently presented to Dr. Jennie Marquez 6/20 for colonoscopy needs after reporting blood per rectum times multiple months as well as a positive Cologuard test in 5/20. Colonoscopy 6/19/2020 showed a friable rectal mass at 8 to 10 cm. She also had a stricture at 40 cm which was biopsied but scope could not be traversed beyond the stricture even with pediatric scope (essentially rendering patient with a flex sigmoidoscopy with biopsies rather than a colonoscopy). Rectal mass biopsies showed adenocarcinoma. Sigmoid colon biopsies with only reactive/reparative changes. Patient was subsequently seen by Dr. Zaki Gomez with EUS 6/30/2020 showing rectal lesion to be felt fairly superficial.  There was felt to be definite invasion into the submucosa but no clear involvement of muscularis propria. No involvement of adjacent structures. Disease was felt to be T1N0. Given the lesion was felt to be superficial and likely amenable to transanal excision. Sigmoid colon was felt severely tortuous however no definite stricture was identified. Recommendation was to consider follow-up colonoscopy after treatment of rectal cancer to ensure no additional lesions in the proximal colon.   Patient has had CTAP with contrast 6/23/2020 showing chronic sigmoid diverticulitis, and a tiny 4 mm nodule in RML. A 2 cm cyst in left liver noted. Significantly no evidence of metastatic disease. Patient was subsequently seen by Dr. Marguerite Venegas and underwent transanal excision of rectal mass 7/10/2020. Final pathology reveals moderately differentiated 0.5 cm adenocarcinoma with submucosal invasion however no involvement of muscularis propria. Margins were negative. No lymphovascular invasion seen. Tumor was felt to arise in a tubular adenoma. Hospice Referral:  na  Interval History:  10/19/2022: Pt reports ongoing bowel urgency and leakage since last visit, somewhat worse. Neuropathy stable on gabapentin. Ongoing fatigue. Blood work unremarkable, CEA low. Iron stores low: will plan for iv iron x 2. Recent CT CAP w CR, improved post surgical changes and stable sigmoid wall thickening w numerous diverticula noted. Pt reassured. Advised f/u with GI given ongoing tenesmus/urgency. REVIEW OF SYSTEMS:  As mentioned above, all other systems were reviewed in full and are negative.     Past Medical History:   Diagnosis Date    Compression fracture of fifth lumbar vertebra ([address] 07/30/2021    Compression fracture of fourth lumbar vertebra ([address] 07/03/2021    COVID-19 vaccine series completed 05/12/2021    Moderna    Depression with anxiety     Diverticulosis of colon 04/26/2017    Widespread per Colonoscopy    Elevated C-reactive protein (CRP)     Fibromyalgia     GERD (gastroesophageal reflux disease)     treat with OTC    Hemorrhoids 04/26/2017    Internal & External Per Colonoscopy    Hiatal hernia     HTN (hypertension), benign     managed with med    Hypokalemia 3/30/2021    IBS (irritable bowel syndrome)     Iron deficiency anemia     Iron Infusions 10/18 & 10/27    LGSIL of cervix of undetermined significance 03/09/2021    Repeat in August = Negative    Microalbuminuria due to type 2 diabetes mellitus ([address]     Mixed hyperlipidemia     Osteopenia 07/10/2017    Per DEXA    Perennial allergic rhinitis with seasonal variation     Peripheral neuropathy     Chemo Induced    Radiation colitis     Rectal cancer ([facility] 06/26/2020    Adenocarcinoma, removed with colonoscopy    Sleep apnea     Not on CPAP     Type 2 diabetes mellitus ([facility]     type 2- oral meds; sqbs am avg 120-150's--- no hypo; HA1C 6.3, 6/3/2021    Vitamin B12 deficiency     Vitamin D deficiency        Past Surgical History:   Procedure Laterality Date    BREAST LUMPECTOMY Bilateral     CARPAL TUNNEL RELEASE Right     CHOLECYSTECTOMY      COLONOSCOPY  01/11/2021    Diverticulosis, External Hemorrhoids, Rectal Ulcer - Pathology = Hyperplastic Mucosa, Due 2023    COLONOSCOPY N/A 6/19/2020    COLONOSCOPY/BMI 28 performed by Izaiah Jiang MD at [facility] ENDOSCOPY    COLONOSCOPY N/A 4/26/2017    COLONOSCOPY / BMI=30 performed by Davina Goodson MD at [facility] ENDOSCOPY    COLONOSCOPY  09/08/2021    Diverticulosis, External Hemorrhoids, Radiation Colitis, Hyperplastic Polyp    FLEXIBLE SIGMOIDOSCOPY N/A 6/30/2020    SIGMOIDOSCOPY FLEXIBLE performed by Shaista Payton MD at [facility] ENDOSCOPY    IR KYPHOPLASTY LUMBAR FIRST LEVEL  09/01/2021    L4 & L5    IR PORT PLACEMENT EQUAL OR GREATER THAN 5 YEARS  1/28/2021    IR PORT PLACEMENT EQUAL OR GREATER THAN 5 YEARS  1/28/2021    IR PORT PLACEMENT EQUAL OR GREATER THAN 5 YEARS 1/28/2021 [facility] RADIOLOGY SPECIALS    PRE-MALIGNANT / BENIGN SKIN LESION EXCISION      SINUS SURGERY PROC UNLISTED      TUBAL LIGATION      UPPER GASTROINTESTINAL ENDOSCOPY  08/27/2021    Erosive Esophagitis    UPPER GASTROINTESTINAL ENDOSCOPY  08/24/2021    VASCULAR SURGERY         Current Outpatient Medications   Medication Sig Dispense Refill    fenofibrate (TRIGLIDE) 160 MG tablet Take 1 tablet by mouth daily 90 tablet 3    rosuvastatin (CRESTOR) 10 MG tablet Take 1 tablet by mouth at bedtime 90 tablet 3    CINNAMON PO Take 1,200 mg by mouth      Omega-3 Fatty Acids (OMEGA 3 500) 500 MG CAPS Take by mouth      Multiple Vitamin (MULTIVITAMIN ADULT PO) Take 5,000 mcg by mouth daily      Probiotic Acidophilus (FLORANEX) TABS Take 1 tablet by mouth daily      blood glucose test strips (ASCENSIA AUTODISC VI;ONE TOUCH ULTRA TEST VI) strip Use to check glucose once daily. Dx: E11.29      colestipol (COLESTID) 1 g tablet TAKE TWO TABLETS BY MOUTH ONE TIME DAILY (Patient not taking: Reported on 9/29/2022)      gabapentin (NEURONTIN) 300 MG capsule Take 2 capsules by mouth 3 times daily for 360 days. 540 capsule 3    ascorbic acid (VITAMIN C) 500 MG tablet Take 250 mg by mouth (Patient not taking: Reported on 9/29/2022)      vitamin D3 (CHOLECALCIFEROL) 125 MCG (5000 UT) TABS tablet Take 5,000 Units by mouth daily      cyanocobalamin 1000 MCG/ML injection Inject 1,000 mcg into the muscle every 30 days (Patient not taking: Reported on 9/29/2022)      cyanocobalamin 500 MCG tablet Take 500 mcg by mouth daily      DULoxetine (CYMBALTA) 20 MG extended release capsule Take 20 mg by mouth daily (Patient not taking: Reported on 9/29/2022)      lisinopril (PRINIVIL;ZESTRIL) 10 MG tablet Take 10 mg by mouth daily      Melatonin 10 MG TABS Take 1 tablet by mouth      metFORMIN (GLUCOPHAGE-XR) 750 MG extended release tablet Take 750 mg by mouth Daily with supper (Patient not taking: Reported on 9/29/2022)      mometasone (NASONEX) 50 MCG/ACT nasal spray 2 sprays daily as needed      omeprazole (PRILOSEC) 40 MG delayed release capsule Take 1 capsule by mouth daily      PARoxetine (PAXIL) 40 MG tablet Take 40 mg by mouth daily      teriparatide (FORTEO) 620 MCG/2.48ML SOPN injection Inject 20 mcg into the skin daily      triamcinolone (KENALOG) 0.1 % cream Apply thin layer to affected areas bid      vitamin E 400 UNIT capsule Take by mouth every evening       No current facility-administered medications for this visit.        Social History     Socioeconomic History    Marital status:    Tobacco Use    Smoking status: Former     Packs/day: 1.50     Types: Cigarettes Quit date: 9/15/2016     Years since quittin.0    Smokeless tobacco: Never   Vaping Use    Vaping Use: Never used   Substance and Sexual Activity    Alcohol use: Not Currently    Drug use: No       Family History   Problem Relation Age of Onset    Diabetes Sister     Arthritis Sister     Colon Cancer Maternal Uncle     Colon Cancer Paternal Uncle     Emphysema Paternal Aunt     Colon Cancer Cousin     Breast Cancer Neg Hx     Cancer Mother         Cervical    Diabetes Mother     Heart Disease Father     Hypertension Father     Elevated Lipids Father     Other Father         ITP    Emphysema Father     Lung Cancer Cousin     Other Sister         Skin Cancer    Asthma Sister     Diabetes Sister     Kidney Disease Maternal Grandmother     Kidney Disease Maternal Grandfather     Asthma Sister        Allergies   Allergen Reactions    Codeine Rash    Lidocaine Rash    Sulfa Antibiotics Nausea And Vomiting       PHYSICAL EXAMINATION:  General Appearance: Healthy appearing patient in no acute distress  Vitals reviewed. Ht 5' 1\" (1.549 m)   Wt 157 lb (71.2 kg)   BMI 29.66 kg/m²   HEENT: No oral or pharyngeal masses, ulceration or thrush noted, no sinus tenderness. Neck is supple with no thyromegaly or JVD noted. Lymph Nodes: No lymphadenopathy noted in the occipital, pre and post auricular, cervical, supra and infraclavicular, axillary, epitrochlear, inguinal, and popliteal region. Breasts: No palpable masses, nipple discharge or skin retraction  Lungs/Thorax: Clear to auscultation, no accessory muscles of respiration being used. Heart: Regular rate and rhythm, normal S1, S2, no appreciable murmurs, rubs, gallops  Abdomen: Soft, nontender, bowel sounds present, no appreciable hepatosplenomegaly, no palpable masses  Extremeties: Good pulses bilaterally, no peripheral edema. Skin: Normal skin tone with no rash, petechiae, ecchymosis noted.   Musculoskeletal: No pain on palpation over bony prominence, no edema, no evidence of gout, no joint or bony deformity  Neurologic: Grossly intact    LABS/IMAGING:    Lab Results   Component Value Date/Time    WBC 3.7 06/20/2022 11:04 AM    HGB 12.9 06/20/2022 11:04 AM    HCT 39.8 06/20/2022 11:04 AM     06/20/2022 11:04 AM    MCV 89.8 06/20/2022 11:04 AM       Lab Results   Component Value Date/Time     09/22/2022 10:50 AM    K 4.3 09/22/2022 10:50 AM     09/22/2022 10:50 AM    CO2 30 09/22/2022 10:50 AM    BUN 17 09/22/2022 10:50 AM    GFRAA >60 09/22/2022 10:50 AM    GLOB 3.4 09/22/2022 10:50 AM    ALT 27 09/22/2022 10:50 AM       Above results reviewed with patient. ASSESSMENT:  78 female, works in billing for [employer], ex-smoker (quit 2016), h/o vitamin D deficiency, DM, MATTY, obese habitus, dyslipidemia, hypertension, fibromyalgia, diverticulosis of colon, depression, COPD, cholecystectomy, GERD, hiatal hernia, IBS, more recently presented to Dr. Princess Christian 6/20 for colonoscopy needs after reporting blood per rectum times multiple months as well as a positive Cologuard test in 5/20. Colonoscopy 6/19/2020 showed a friable rectal mass at 8 to 10 cm. She also had a stricture at 40 cm which was biopsied but scope could not be traversed beyond the stricture even with pediatric scope (essentially rendering patient with a flex sigmoidoscopy with biopsies rather than a colonoscopy). Rectal mass biopsies showed adenocarcinoma. Sigmoid colon biopsies with only reactive/reparative changes. Patient was subsequently seen by Dr. Meg Fraire with EUS 6/30/2020 showing rectal lesion to be felt fairly superficial.  There was felt to be definite invasion into the submucosa but no clear involvement of muscularis propria. No involvement of adjacent structures. Disease was felt to be T1N0. Given the lesion was felt to be superficial and likely amenable to transanal excision.   Sigmoid colon was felt severely tortuous however no definite stricture was identified. Recommendation was to consider follow-up colonoscopy after treatment of rectal cancer to ensure no additional lesions in the proximal colon. Patient has had CTAP with contrast 6/23/2020 showing chronic sigmoid diverticulitis, and a tiny 4 mm nodule in RML. A 2 cm cyst in left liver noted. Significantly no evidence of metastatic disease. Patient was subsequently seen by  [facility] and underwent transanal excision of rectal mass 7/10/2020. Final pathology reveals moderately differentiated 0.5 cm adenocarcinoma with submucosal invasion however no involvement of muscularis propria. Margins were negative. No lymphovascular invasion seen. Tumor was felt to arise in a tubular adenoma. In summary, elderly pleasant lady with good performance status, now with stage 1 rectal adenocarcinoma s/p endorectal resection with negative margins. Her only high risk feature at this point would include SM3 invasion (lower 1/3 of submucosal invasion). She remains open to idea of further therapy if needed. Will discuss at GI tumor conference in terms of role for transabdominal resection (preferred) versus chemo-RT w capecitabine. 8/26/2020: Labs from previous visit reviewed: CEA normal.  Iron stores low and she is status post IV iron x2. CT chest negative for metastasis. MMR without loss of expression. Final pathology has returned confirming submucosal (SM 3) invasion. Her preference is to consider LAR if she has a reasonable chance of not requiring an ostomy bag. Another option is chemo-XRT vs observation. Will discuss case with surgery. We will see her back in 1 month's time. RTC in 4 weeks with above, repeat labs. 9/15/2020: Patient has since seen surgery  [facility]. Transabdominal surgery will possibly require permanent colostomy which patient is not in favor of. Case also d/w surgery and rad-onc. 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   She is already scheduled to see radiation oncology tomorrow. We will also get paperwork started for capecitabine (825 mg per metered square twice daily concurrently with XRT). We will see her back in a few weeks for follow-up. 10/6/2020: Patient seen in follow-up. She has seen radiation oncology DrTravon Goldsmith, with agreement to proceed with concurrent chemo-XRT with capecitabine. She is scheduled to start this tomorrow. Patient has undergone chemo education in this regard. We again discussed various side effects and risks of therapy including GI symptoms, neutropenic fever, hand-foot syndrome, and end-organ damage. Patient willing to proceed. She will need weekly visits while undergoing therapy. 11/23/20:  Completed capecitabine-XRT 11/18/2020. Tolerated reasonably. 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 Also has baseline hemorrhoids which reportedly have exacerbated. Denies any fevers or chills. Supportive care including procto gel, and oxycodone as needed helping. Will give sitz bath's try as well. Should improve over time. Briefly discussed consideration for FOLFOX in approximately 2 months time. RTC in 2 weeks with NP and in 4 weeks with MD with labs, CEA. Will get CT CAP as well as likely FOLFOX in 2 months time. 1/18/21: Recovering well. Rectal discomfort has much improved. CT CAP w CR, RML sub-centimeter nodule stable. Notes recent colonoscopy with Dr. Jos Aguilar 1/11/2021 which was reportedly unremarkable, will get procedure report, pathology reviewed with rectal biopsies benign. Discussed option of observation versus adjuvant systemic therapy x4 months. Patient would like to proceed with systemic therapy which is reasonable. We will plan for port placement followed by FOLFOX x4 months. Navigation following, RTC within 2 weeks to start.     3/2/2021: Anoscopy report from 1/11/2021 reviewed showing diverticulosis, external hemorrhoids and rectal ulcer (biopsies benign). Since last visit patient is status post port placement, and has since started adjuvant FOLFOX therapy. Here for dose #3. Tolerated first dose well however with last cycle noted significant fatigue as well as abdominal discomfort, which she describes as similar to her acid reflux. Will add Carafate as needed, refill oxycodone as needed prescription. Will decrease dose number 3 x 20%, and add IV NS on day 3 with IV Zofran. She will also get IV magnesium and potassium replacement tomorrow. If tolerates reasonably with better supportive care, then hopefully will be able to go back to standard dosing with next cycle. Labs reasonable today, CEA staying low. Provider visits every 2 weeks. 4/13/2021: Reports doing reasonably. Mild hand-foot syndrome with some skin cracking of fingertips however using moisturizing cream which helps. Denies any fevers or chills, reflux symptoms improved with Carafate. Denies any blood per rectum, denies any significant neuropathy. Labs today reasonable, okay to proceed with dose #6 of 8.      5/11/2021: Continues to tolerate therapy reasonably. Denies significant neuropathy. Hand-foot syndrome minimal.  Did have sensation of bubbles in her throat after last cycle however denied any breathing issues, lip or tongue swelling. We will monitor her for additional time in infusion center post therapy this go around. Denies any recent fevers or chills, reasonable p.o. intake. Labs today unremarkable, okay to proceed with last dose of FOLFOX. Will restage disease in 2 months time thereafter. 7/8/21: Completed FOLFOX x4 months with last cycle on 5/12/2021. Reports her hand-foot syndrome has mostly resolved. More recently seen in ED with new onset back discomfort. No clear evidence of trauma though does report an episode of misstep while sitting down around the time. Lumbar spine x-ray with L4 acute/subacute anterior compression fracture.   She does have history of osteopenia on last DEXA scan. Now scheduled to see Dr. Marylen Diones neurosurgery. Also reports off-and-on red blood per rectum x10 days or so. Advised to see GI for endoscopy needs. Labs today with mild anemia, otherwise unremarkable, CEA staying low. Iron stores adequate. CT CAP with continued CR, RML subcentimeter pulmonary nodule stable. Patient reassured. Clinical picture most consistent with compression fracture related to bone loss rather than pathologic malignancy related. We will follow up work-up with Dr. Marylen Diones. Otherwise simply repeat CT CAP in 3 months time. 8/9/2021: Reports continued off-and-on blood per rectum. Feels tired all the time, will check ferritin levels today. Also notes neuropathy that has worsened over time however reports worsening occurred couple of months after her last cycle of chemotherapy. Will start with gabapentin 300 mg 3 times daily, may also introduce Cymbalta. Recently seen Dr. Marylen Diones, L-spine MRI with compression fracture L4 and L5 without convincing evidence of pathologic fracture. She will also discuss osteoporosis management with primary care. We will see her back in 2 months with repeat scans and colonoscopy results. RTC in 2 months with labs, CEA, iron panel, CT CAP, colonoscopy results. 10/11/2021: Since last visit, her iron stores were checked and were low, and she is status post IV iron x2. L4 bone biopsy without malignancy, status post kyphoplasty with improvement in her discomfort. More recently also seen by GI, and underwent colonoscopy showing diverticulosis, external hemorrhoids and radiation colitis for which she is now on mesalamine with improvement in her bleeding. Labs today with ongoing iron deficiency. Will repeat IV iron x2, and recheck labs in 2 months. Recent CT CAP with CR. She is scheduled for DEXA scan with likely osteoporosis directed therapy through primary care.   Patient in remission, continue simple monitoring. 2/14/2022: Reports doing reasonably. Some ongoing neuropathy for which refills provided for her Cymbalta and gabapentin. Reports recently having started Forteo for osteoporosis. Reports frequent bowel movements, at times with minimal bleeding. She is known to have radiation colitis, and was advised to follow-up with her GI pertaining to possible need for mesalamine refills. Routine blood work unremarkable, iron stores adequate, CEA staying low. Recent CT CAP with probable therapy related changes in the presacral region, along with slightly increased posterior rectal wall thickening. Right minor fissure lymph node stable in the chest.  No evidence of disease recurrence (CR). Continue simple monitoring.    6/20/2022: Reports doing reasonably. Recently saw GI Dr. Maryjane Marie, now on colestipol which is helping with bowel urgency. Occasional blood per rectum, likely related to her known radiation colitis. Neuropathy improved over time but per patient suboptimally controlled, will increase gabapentin to 600 3 times daily. Blood work unremarkable, iron stores adequate, CEA staying low, CT CAP with CR, stable RML subcentimeter pulmonary nodule, stable left-sided hepatic cyst, unchanged treatment related changes and presacral fat and posterior rectum. Patient reassured, continue monitoring. 10/19/2022: Pt reports ongoing bowel urgency and leakage since last visit, somewhat worse. Neuropathy stable on gabapentin. Ongoing fatigue. Blood work unremarkable, CEA low. Iron stores low: will plan for iv iron x 2. Recent CT CAP w CR, improved post surgical changes and stable sigmoid wall thickening w numerous diverticula noted. Pt reassured. Advised f/u with GI given ongoing tenesmus/urgency. We will see her back in 4 months. 1. Rectal adenocarcinoma, pT1 disease s/p trans-anal resection 7/20 (SM3 disease)  2. RML 4 mm nodule, non-specific.    3. L4 compression fracture, likely osteoporotic s/p kyphoplasty. Biopsy was benign  4. BRAYAN  5. Radiation colitis    PLAN:  - As above. Simple monitoring  - S/p chemo-RT w capecitabine 11/18/20. S/p FOLFOX x 4 months completed 5/21  - Serial CEA, iron panel  - Continue following with Dr. BARLOW [facility], as well as GI Dr Carlos Ceja for serial endoscopic needs. Last colo 9/21. Next planned 9/23  - MMR without loss of expression.  - LGSIL: Follows with Dr. Caroline Mora  - L4 and L5 compression fracture: seeing Dr Celi Swan. Has h/o osteopenia: advised f/u w PCP also for bone directed therapy. - Neuropathy: Gabapentin 600 3 times daily. Also on paxil   - BRAYAN: IV iron prn    RTC in 4 months with MD with labs, CEA, iron panel, CT CAP    Thank you  [facility] for allowing us to paticipate in care of this pleasant patient.  Please call w/ any quesions    Willa Phoenix MD  [address] [place] [address],[address]  Hematology Oncology  [address] [place] [address]  Office : [phone]  Fax : [phone]

## 2022-10-21 ENCOUNTER — TELEPHONE (OUTPATIENT)
Dept: ONCOLOGY | Age: 72
End: 2022-10-21

## 2022-10-21 RX ORDER — FAMOTIDINE 10 MG/ML
20 INJECTION, SOLUTION INTRAVENOUS
OUTPATIENT
Start: 2022-10-28

## 2022-10-21 RX ORDER — HEPARIN SODIUM (PORCINE) LOCK FLUSH IV SOLN 100 UNIT/ML 100 UNIT/ML
500 SOLUTION INTRAVENOUS PRN
OUTPATIENT
Start: 2022-10-28

## 2022-10-21 RX ORDER — SODIUM CHLORIDE 0.9 % (FLUSH) 0.9 %
5-40 SYRINGE (ML) INJECTION PRN
OUTPATIENT
Start: 2022-10-28

## 2022-10-21 RX ORDER — ALBUTEROL SULFATE 90 UG/1
4 AEROSOL, METERED RESPIRATORY (INHALATION) PRN
OUTPATIENT
Start: 2022-10-28

## 2022-10-21 RX ORDER — SODIUM CHLORIDE 9 MG/ML
5-250 INJECTION, SOLUTION INTRAVENOUS PRN
OUTPATIENT
Start: 2022-10-28

## 2022-10-21 RX ORDER — ACETAMINOPHEN 325 MG/1
650 TABLET ORAL
OUTPATIENT
Start: 2022-10-28

## 2022-10-21 RX ORDER — DIPHENHYDRAMINE HYDROCHLORIDE 50 MG/ML
50 INJECTION INTRAMUSCULAR; INTRAVENOUS
OUTPATIENT
Start: 2022-10-28

## 2022-10-21 RX ORDER — EPINEPHRINE 1 MG/ML
0.3 INJECTION, SOLUTION, CONCENTRATE INTRAVENOUS PRN
OUTPATIENT
Start: 2022-10-28

## 2022-10-21 RX ORDER — ONDANSETRON 2 MG/ML
8 INJECTION INTRAMUSCULAR; INTRAVENOUS
OUTPATIENT
Start: 2022-10-28

## 2022-10-21 RX ORDER — SODIUM CHLORIDE 9 MG/ML
INJECTION, SOLUTION INTRAVENOUS CONTINUOUS
OUTPATIENT
Start: 2022-10-28

## 2022-10-21 NOTE — TELEPHONE ENCOUNTER
Called patient to let her know that her iron levels are low and Dr. Brice Thorpe recommends feraheme x 2. No answer and no VM. Will send [employer] message.

## 2022-11-21 ENCOUNTER — TELEPHONE (OUTPATIENT)
Dept: ONCOLOGY | Age: 72
End: 2022-11-21

## 2022-11-21 NOTE — TELEPHONE ENCOUNTER
I reviewed the chart and med list marked as not taking. I will call the patient and verify this before sending it to the pharmacy. Call to the patient and she was asleep. She will call back later per her family.

## 2022-11-21 NOTE — TELEPHONE ENCOUNTER
[address]  calling on the behalf of the PT due to a refill on her prescription DULoxetine 20mg.       Pharmacy:[facility]

## 2022-11-22 NOTE — TELEPHONE ENCOUNTER
Patient called and verified she is taking the Cymbalta and needs a refill. She also asked about another med she was to take colestid. I explained to her she was called about Feraheme and she states she will take it. Msg to [facility] the .

## 2022-12-12 ENCOUNTER — HOSPITAL ENCOUNTER (OUTPATIENT)
Dept: INFUSION THERAPY | Age: 72
Discharge: HOME | End: 2022-12-12
Payer: COMMERCIAL

## 2022-12-12 VITALS
DIASTOLIC BLOOD PRESSURE: 73 MMHG | SYSTOLIC BLOOD PRESSURE: 119 MMHG | RESPIRATION RATE: 16 BRPM | TEMPERATURE: 98.4 F | HEART RATE: 76 BPM | OXYGEN SATURATION: 95 %

## 2022-12-12 DIAGNOSIS — D50.9 IRON DEFICIENCY ANEMIA, UNSPECIFIED IRON DEFICIENCY ANEMIA TYPE: Primary | ICD-10-CM

## 2022-12-12 PROCEDURE — 6360000002 HC RX W HCPCS: Performed by: INTERNAL MEDICINE

## 2022-12-12 PROCEDURE — 2580000003 HC RX 258: Performed by: INTERNAL MEDICINE

## 2022-12-12 PROCEDURE — 96365 THER/PROPH/DIAG IV INF INIT: CPT

## 2022-12-12 RX ORDER — DIPHENHYDRAMINE HYDROCHLORIDE 50 MG/ML
50 INJECTION INTRAMUSCULAR; INTRAVENOUS
Status: CANCELLED | OUTPATIENT
Start: 2022-12-19

## 2022-12-12 RX ORDER — HEPARIN SODIUM (PORCINE) LOCK FLUSH IV SOLN 100 UNIT/ML 100 UNIT/ML
500 SOLUTION INTRAVENOUS PRN
Status: CANCELLED | OUTPATIENT
Start: 2022-12-19

## 2022-12-12 RX ORDER — SODIUM CHLORIDE 9 MG/ML
5-250 INJECTION, SOLUTION INTRAVENOUS PRN
Status: CANCELLED | OUTPATIENT
Start: 2022-12-19

## 2022-12-12 RX ORDER — SODIUM CHLORIDE 0.9 % (FLUSH) 0.9 %
5-40 SYRINGE (ML) INJECTION PRN
Status: CANCELLED | OUTPATIENT
Start: 2022-12-19

## 2022-12-12 RX ORDER — SODIUM CHLORIDE 9 MG/ML
INJECTION, SOLUTION INTRAVENOUS CONTINUOUS
Status: CANCELLED | OUTPATIENT
Start: 2022-12-19

## 2022-12-12 RX ORDER — ONDANSETRON 2 MG/ML
8 INJECTION INTRAMUSCULAR; INTRAVENOUS
Status: CANCELLED | OUTPATIENT
Start: 2022-12-19

## 2022-12-12 RX ORDER — ACETAMINOPHEN 325 MG/1
650 TABLET ORAL
Status: CANCELLED | OUTPATIENT
Start: 2022-12-19

## 2022-12-12 RX ORDER — SODIUM CHLORIDE 9 MG/ML
5-250 INJECTION, SOLUTION INTRAVENOUS PRN
Status: DISCONTINUED | OUTPATIENT
Start: 2022-12-12 | End: 2022-12-13 | Stop reason: HOSPADM

## 2022-12-12 RX ORDER — ALBUTEROL SULFATE 90 UG/1
4 AEROSOL, METERED RESPIRATORY (INHALATION) PRN
Status: CANCELLED | OUTPATIENT
Start: 2022-12-19

## 2022-12-12 RX ORDER — EPINEPHRINE 1 MG/ML
0.3 INJECTION, SOLUTION, CONCENTRATE INTRAVENOUS PRN
Status: CANCELLED | OUTPATIENT
Start: 2022-12-19

## 2022-12-12 RX ORDER — SODIUM CHLORIDE 0.9 % (FLUSH) 0.9 %
5-40 SYRINGE (ML) INJECTION PRN
Status: DISCONTINUED | OUTPATIENT
Start: 2022-12-12 | End: 2022-12-13 | Stop reason: HOSPADM

## 2022-12-12 RX ADMIN — SODIUM CHLORIDE 150 ML/HR: 9 INJECTION, SOLUTION INTRAVENOUS at 16:41

## 2022-12-12 RX ADMIN — SODIUM CHLORIDE, PRESERVATIVE FREE 10 ML: 5 INJECTION INTRAVENOUS at 16:15

## 2022-12-12 RX ADMIN — SODIUM CHLORIDE, PRESERVATIVE FREE 10 ML: 5 INJECTION INTRAVENOUS at 17:06

## 2022-12-12 RX ADMIN — FERUMOXYTOL 510 MG: 510 INJECTION INTRAVENOUS at 16:20

## 2022-12-12 NOTE — PROGRESS NOTES
Arrived to the [facility]. Sarah completed. Patient tolerated well. Any issues or concerns during appointment: none. VSS. No reaction during post infusion monitoring time. Patient aware of next infusion appointment on 12/19/22 (date) at  (time). Patient aware of next lab and [facility] office visit on 2/22/22 (date) at 36 (time). Patient instructed to call provider with temperature of 100.4 or greater or nausea/vomiting/ diarrhea or pain not controlled by medications  Discharged amb.

## 2022-12-19 ENCOUNTER — HOSPITAL ENCOUNTER (OUTPATIENT)
Dept: INFUSION THERAPY | Age: 72
Discharge: HOME | End: 2022-12-19
Payer: COMMERCIAL

## 2022-12-19 VITALS
HEART RATE: 86 BPM | DIASTOLIC BLOOD PRESSURE: 75 MMHG | SYSTOLIC BLOOD PRESSURE: 134 MMHG | OXYGEN SATURATION: 95 % | TEMPERATURE: 98.4 F | RESPIRATION RATE: 18 BRPM

## 2022-12-19 DIAGNOSIS — E78.2 MIXED HYPERLIPIDEMIA: ICD-10-CM

## 2022-12-19 DIAGNOSIS — D50.9 IRON DEFICIENCY ANEMIA, UNSPECIFIED IRON DEFICIENCY ANEMIA TYPE: Primary | ICD-10-CM

## 2022-12-19 PROCEDURE — 2580000003 HC RX 258: Performed by: INTERNAL MEDICINE

## 2022-12-19 PROCEDURE — 6360000002 HC RX W HCPCS: Performed by: INTERNAL MEDICINE

## 2022-12-19 PROCEDURE — 96365 THER/PROPH/DIAG IV INF INIT: CPT

## 2022-12-19 RX ORDER — SODIUM CHLORIDE 9 MG/ML
5-250 INJECTION, SOLUTION INTRAVENOUS PRN
Status: CANCELLED | OUTPATIENT
Start: 2022-12-19

## 2022-12-19 RX ORDER — SODIUM CHLORIDE 9 MG/ML
5-250 INJECTION, SOLUTION INTRAVENOUS PRN
OUTPATIENT
Start: 2022-12-19

## 2022-12-19 RX ORDER — EPINEPHRINE 1 MG/ML
0.3 INJECTION, SOLUTION, CONCENTRATE INTRAVENOUS PRN
OUTPATIENT
Start: 2022-12-19

## 2022-12-19 RX ORDER — ALBUTEROL SULFATE 90 UG/1
4 AEROSOL, METERED RESPIRATORY (INHALATION) PRN
OUTPATIENT
Start: 2022-12-19

## 2022-12-19 RX ORDER — ACETAMINOPHEN 325 MG/1
650 TABLET ORAL
OUTPATIENT
Start: 2022-12-19

## 2022-12-19 RX ORDER — HEPARIN SODIUM (PORCINE) LOCK FLUSH IV SOLN 100 UNIT/ML 100 UNIT/ML
500 SOLUTION INTRAVENOUS PRN
OUTPATIENT
Start: 2022-12-19

## 2022-12-19 RX ORDER — DIPHENHYDRAMINE HYDROCHLORIDE 50 MG/ML
50 INJECTION INTRAMUSCULAR; INTRAVENOUS
OUTPATIENT
Start: 2022-12-19

## 2022-12-19 RX ORDER — SODIUM CHLORIDE 0.9 % (FLUSH) 0.9 %
5-40 SYRINGE (ML) INJECTION PRN
Status: DISCONTINUED | OUTPATIENT
Start: 2022-12-19 | End: 2022-12-20 | Stop reason: HOSPADM

## 2022-12-19 RX ORDER — ONDANSETRON 2 MG/ML
8 INJECTION INTRAMUSCULAR; INTRAVENOUS
OUTPATIENT
Start: 2022-12-19

## 2022-12-19 RX ORDER — SODIUM CHLORIDE 0.9 % (FLUSH) 0.9 %
5-40 SYRINGE (ML) INJECTION PRN
OUTPATIENT
Start: 2022-12-19

## 2022-12-19 RX ORDER — SODIUM CHLORIDE 9 MG/ML
INJECTION, SOLUTION INTRAVENOUS CONTINUOUS
OUTPATIENT
Start: 2022-12-19

## 2022-12-19 RX ORDER — SODIUM CHLORIDE 9 MG/ML
5-250 INJECTION, SOLUTION INTRAVENOUS PRN
Status: DISCONTINUED | OUTPATIENT
Start: 2022-12-19 | End: 2022-12-20 | Stop reason: HOSPADM

## 2022-12-19 RX ORDER — ROSUVASTATIN CALCIUM 10 MG/1
TABLET, COATED ORAL
Qty: 90 TABLET | Refills: 3 | OUTPATIENT
Start: 2022-12-19

## 2022-12-19 RX ADMIN — FERUMOXYTOL 510 MG: 510 INJECTION INTRAVENOUS at 16:25

## 2022-12-19 RX ADMIN — SODIUM CHLORIDE, PRESERVATIVE FREE 10 ML: 5 INJECTION INTRAVENOUS at 16:15

## 2022-12-19 RX ADMIN — SODIUM CHLORIDE, PRESERVATIVE FREE 20 ML: 5 INJECTION INTRAVENOUS at 17:03

## 2022-12-19 RX ADMIN — SODIUM CHLORIDE 25 ML/HR: 9 INJECTION, SOLUTION INTRAVENOUS at 16:20

## 2022-12-19 NOTE — PROGRESS NOTES
Arrived to the [facility]. IV iron completed. Patient tolerated well. Any issues or concerns during appointment: none. Patient aware of next lab and [facility] office visit on 2/22/23 (date) at 36 (time). Patient instructed to call provider with temperature of 100.4 or greater or nausea/vomiting/ diarrhea or pain not controlled by medications  Discharged ambulatory with .

## 2022-12-20 DIAGNOSIS — N28.9 RENAL INSUFFICIENCY: ICD-10-CM

## 2022-12-20 DIAGNOSIS — E83.52 HYPERCALCEMIA: ICD-10-CM

## 2022-12-20 DIAGNOSIS — E11.29 TYPE 2 DIABETES MELLITUS WITH MICROALBUMINURIA, WITHOUT LONG-TERM CURRENT USE OF INSULIN (HCC): ICD-10-CM

## 2022-12-20 DIAGNOSIS — R80.9 TYPE 2 DIABETES MELLITUS WITH MICROALBUMINURIA, WITHOUT LONG-TERM CURRENT USE OF INSULIN (HCC): ICD-10-CM

## 2022-12-20 DIAGNOSIS — E55.9 VITAMIN D DEFICIENCY: ICD-10-CM

## 2022-12-20 DIAGNOSIS — I10 HTN (HYPERTENSION), BENIGN: ICD-10-CM

## 2022-12-20 DIAGNOSIS — E78.2 MIXED HYPERLIPIDEMIA: ICD-10-CM

## 2022-12-20 LAB
ALBUMIN SERPL-MCNC: 4.3 G/DL (ref 3.2–4.6)
ALBUMIN/GLOB SERPL: 1.2 (ref 0.4–1.6)
ALP SERPL-CCNC: 134 U/L (ref 50–136)
ALT SERPL-CCNC: 42 U/L (ref 12–65)
ANION GAP SERPL CALC-SCNC: 6 MMOL/L (ref 2–11)
AST SERPL-CCNC: 28 U/L (ref 15–37)
BILIRUB SERPL-MCNC: 0.4 MG/DL (ref 0.2–1.1)
BUN SERPL-MCNC: 18 MG/DL (ref 8–23)
CALCIUM SERPL-MCNC: 10.5 MG/DL (ref 8.3–10.4)
CHLORIDE SERPL-SCNC: 102 MMOL/L (ref 101–110)
CHOLEST SERPL-MCNC: 221 MG/DL
CO2 SERPL-SCNC: 28 MMOL/L (ref 21–32)
CREAT SERPL-MCNC: 0.9 MG/DL (ref 0.6–1)
EST. AVERAGE GLUCOSE BLD GHB EST-MCNC: 180 MG/DL
GLOBULIN SER CALC-MCNC: 3.7 G/DL (ref 2.8–4.5)
GLUCOSE SERPL-MCNC: 227 MG/DL (ref 65–100)
HBA1C MFR BLD: 7.9 % (ref 4.8–5.6)
HDLC SERPL-MCNC: 40 MG/DL (ref 40–60)
HDLC SERPL: 5.5
LDLC SERPL CALC-MCNC: 102.4 MG/DL
POTASSIUM SERPL-SCNC: 4.4 MMOL/L (ref 3.5–5.1)
PROT SERPL-MCNC: 8 G/DL (ref 6.3–8.2)
SODIUM SERPL-SCNC: 136 MMOL/L (ref 133–143)
TRIGL SERPL-MCNC: 393 MG/DL (ref 35–150)
VLDLC SERPL CALC-MCNC: 78.6 MG/DL (ref 6–23)

## 2022-12-21 LAB — 25(OH)D3 SERPL-MCNC: 39 NG/ML (ref 30–100)

## 2022-12-23 RX ORDER — BLOOD SUGAR DIAGNOSTIC
STRIP MISCELLANEOUS
Qty: 100 STRIP | OUTPATIENT
Start: 2022-12-23

## 2023-01-04 ENCOUNTER — TELEPHONE (OUTPATIENT)
Dept: INTERNAL MEDICINE CLINIC | Facility: CLINIC | Age: 73
End: 2023-01-04

## 2023-01-04 NOTE — TELEPHONE ENCOUNTER
Call pt to inform them that they missed their appt. Was able to reschedule for a different time and date.

## 2023-01-05 ENCOUNTER — TELEPHONE (OUTPATIENT)
Dept: INTERNAL MEDICINE CLINIC | Facility: CLINIC | Age: 73
End: 2023-01-05

## 2023-01-05 NOTE — TELEPHONE ENCOUNTER
Called pt to get them rescheduled. Called [phone] and was [facility]. Then called [phone] and was told the caller did not have a voicemail box set up. Finally called the third number, [phone] and received a dial tone instead. Was unable to leave voicemail for pt to reschedule.

## 2023-01-06 ENCOUNTER — APPOINTMENT (OUTPATIENT)
Dept: CT IMAGING | Age: 73
End: 2023-01-06
Payer: MEDICARE

## 2023-01-06 ENCOUNTER — HOSPITAL ENCOUNTER (EMERGENCY)
Age: 73
Discharge: HOME | End: 2023-01-06
Attending: EMERGENCY MEDICINE
Payer: MEDICARE

## 2023-01-06 VITALS
DIASTOLIC BLOOD PRESSURE: 88 MMHG | HEART RATE: 84 BPM | RESPIRATION RATE: 18 BRPM | HEIGHT: 61 IN | TEMPERATURE: 97.6 F | SYSTOLIC BLOOD PRESSURE: 135 MMHG | WEIGHT: 150 LBS | BODY MASS INDEX: 28.32 KG/M2 | OXYGEN SATURATION: 97 %

## 2023-01-06 DIAGNOSIS — N39.0 URINARY TRACT INFECTION WITHOUT HEMATURIA, SITE UNSPECIFIED: ICD-10-CM

## 2023-01-06 DIAGNOSIS — R11.2 NAUSEA AND VOMITING, UNSPECIFIED VOMITING TYPE: Primary | ICD-10-CM

## 2023-01-06 DIAGNOSIS — R91.8 PULMONARY NODULES: ICD-10-CM

## 2023-01-06 DIAGNOSIS — U07.1 COVID: ICD-10-CM

## 2023-01-06 DIAGNOSIS — E86.0 DEHYDRATION: ICD-10-CM

## 2023-01-06 LAB
ALBUMIN SERPL-MCNC: 4.9 G/DL (ref 3.2–4.6)
ALBUMIN/GLOB SERPL: 1.3 {RATIO} (ref 0.4–1.6)
ALP SERPL-CCNC: 153 U/L (ref 45–117)
ALT SERPL-CCNC: 64 U/L (ref 13–61)
AMORPH CRY URNS QL MICRO: ABNORMAL
ANION GAP SERPL CALC-SCNC: 21 MMOL/L (ref 2–11)
APPEARANCE UR: CLEAR
AST SERPL-CCNC: 51 U/L (ref 15–37)
BACTERIA URNS QL MICRO: ABNORMAL /HPF
BASOPHILS # BLD: 0 K/UL (ref 0–0.2)
BASOPHILS NFR BLD: 1 % (ref 0–2)
BILIRUB SERPL-MCNC: 0.6 MG/DL (ref 0.2–1.1)
BILIRUB UR QL: ABNORMAL
BUN SERPL-MCNC: 13 MG/DL (ref 7–18)
CALCIUM SERPL-MCNC: 11 MG/DL (ref 8.3–10.4)
CASTS URNS QL MICRO: 0 /LPF
CHLORIDE SERPL-SCNC: 98 MMOL/L (ref 98–107)
CO2 SERPL-SCNC: 22 MMOL/L (ref 21–32)
COLOR UR: ABNORMAL
CREAT SERPL-MCNC: 0.75 MG/DL (ref 0.6–1)
CRYSTALS URNS QL MICRO: 0 /LPF
DIFFERENTIAL METHOD BLD: NORMAL
EOSINOPHIL # BLD: 0.1 K/UL (ref 0–0.8)
EOSINOPHIL NFR BLD: 1 % (ref 0.5–7.8)
EPI CELLS #/AREA URNS HPF: ABNORMAL /HPF
ERYTHROCYTE [DISTWIDTH] IN BLOOD BY AUTOMATED COUNT: 13.7 % (ref 11.9–14.6)
GLOBULIN SER CALC-MCNC: 3.9 G/DL (ref 2.8–4.5)
GLUCOSE SERPL-MCNC: 170 MG/DL (ref 65–100)
GLUCOSE UR STRIP.AUTO-MCNC: NEGATIVE MG/DL
HCT VFR BLD AUTO: 43.9 % (ref 35.8–46.3)
HGB BLD-MCNC: 15.3 G/DL (ref 11.7–15.4)
HGB UR QL STRIP: ABNORMAL
IMM GRANULOCYTES # BLD AUTO: 0 K/UL (ref 0–0.5)
IMM GRANULOCYTES NFR BLD AUTO: 1 % (ref 0–5)
KETONES UR QL STRIP.AUTO: 15 MG/DL
LEUKOCYTE ESTERASE UR QL STRIP.AUTO: ABNORMAL
LIPASE SERPL-CCNC: 26 U/L (ref 13–60)
LYMPHOCYTES # BLD: 0.9 K/UL (ref 0.5–4.6)
LYMPHOCYTES NFR BLD: 15 % (ref 13–44)
MAGNESIUM SERPL-MCNC: 1.7 MG/DL (ref 1.2–2.6)
MCH RBC QN AUTO: 29.9 PG (ref 26.1–32.9)
MCHC RBC AUTO-ENTMCNC: 34.9 G/DL (ref 31.4–35)
MCV RBC AUTO: 85.9 FL (ref 82–102)
MONOCYTES # BLD: 0.5 K/UL (ref 0.1–1.3)
MONOCYTES NFR BLD: 9 % (ref 4–12)
MUCOUS THREADS URNS QL MICRO: 0 /LPF
NEUTS SEG # BLD: 4.5 K/UL (ref 1.7–8.2)
NEUTS SEG NFR BLD: 74 % (ref 43–78)
NITRITE UR QL STRIP.AUTO: NEGATIVE
NRBC # BLD: 0 K/UL (ref 0–0.2)
OTHER OBSERVATIONS: ABNORMAL
PH UR STRIP: 6 [PH] (ref 5–9)
PLATELET # BLD AUTO: 446 K/UL (ref 150–450)
PMV BLD AUTO: 10.1 FL (ref 9.4–12.3)
POTASSIUM SERPL-SCNC: 3.5 MMOL/L (ref 3.5–5.1)
PROT SERPL-MCNC: 8.8 G/DL (ref 6.4–8.2)
PROT UR STRIP-MCNC: >300 MG/DL
RBC # BLD AUTO: 5.11 M/UL (ref 4.05–5.2)
RBC #/AREA URNS HPF: ABNORMAL /HPF
SODIUM SERPL-SCNC: 141 MMOL/L (ref 133–143)
SP GR UR REFRACTOMETRY: >=1.03 (ref 1–1.02)
TROPONIN T SERPL HS-MCNC: 8.2 NG/L (ref 0–14)
UROBILINOGEN UR QL STRIP.AUTO: 0.2 EU/DL (ref 0.2–1)
WBC # BLD AUTO: 6 K/UL (ref 4.3–11.1)
WBC URNS QL MICRO: 0 /HPF

## 2023-01-06 PROCEDURE — 80053 COMPREHEN METABOLIC PANEL: CPT

## 2023-01-06 PROCEDURE — C9113 INJ PANTOPRAZOLE SODIUM, VIA: HCPCS | Performed by: PHYSICIAN ASSISTANT

## 2023-01-06 PROCEDURE — 94761 N-INVAS EAR/PLS OXIMETRY MLT: CPT

## 2023-01-06 PROCEDURE — 83735 ASSAY OF MAGNESIUM: CPT

## 2023-01-06 PROCEDURE — 84484 ASSAY OF TROPONIN QUANT: CPT

## 2023-01-06 PROCEDURE — 70450 CT HEAD/BRAIN W/O DYE: CPT

## 2023-01-06 PROCEDURE — 74177 CT ABD & PELVIS W/CONTRAST: CPT

## 2023-01-06 PROCEDURE — 2580000003 HC RX 258: Performed by: PHYSICIAN ASSISTANT

## 2023-01-06 PROCEDURE — 81001 URINALYSIS AUTO W/SCOPE: CPT

## 2023-01-06 PROCEDURE — A4216 STERILE WATER/SALINE, 10 ML: HCPCS | Performed by: PHYSICIAN ASSISTANT

## 2023-01-06 PROCEDURE — 96365 THER/PROPH/DIAG IV INF INIT: CPT

## 2023-01-06 PROCEDURE — 99285 EMERGENCY DEPT VISIT HI MDM: CPT

## 2023-01-06 PROCEDURE — 96361 HYDRATE IV INFUSION ADD-ON: CPT

## 2023-01-06 PROCEDURE — 83690 ASSAY OF LIPASE: CPT

## 2023-01-06 PROCEDURE — 6360000002 HC RX W HCPCS: Performed by: PHYSICIAN ASSISTANT

## 2023-01-06 PROCEDURE — 6360000004 HC RX CONTRAST MEDICATION: Performed by: PHYSICIAN ASSISTANT

## 2023-01-06 PROCEDURE — 85025 COMPLETE CBC W/AUTO DIFF WBC: CPT

## 2023-01-06 PROCEDURE — 87086 URINE CULTURE/COLONY COUNT: CPT

## 2023-01-06 PROCEDURE — 96375 TX/PRO/DX INJ NEW DRUG ADDON: CPT

## 2023-01-06 RX ORDER — 0.9 % SODIUM CHLORIDE 0.9 %
100 INTRAVENOUS SOLUTION INTRAVENOUS ONCE
Status: COMPLETED | OUTPATIENT
Start: 2023-01-06 | End: 2023-01-06

## 2023-01-06 RX ORDER — DOXYCYCLINE HYCLATE 100 MG
100 TABLET ORAL 2 TIMES DAILY
Qty: 14 TABLET | Refills: 0 | Status: SHIPPED | OUTPATIENT
Start: 2023-01-06 | End: 2023-01-13

## 2023-01-06 RX ORDER — 0.9 % SODIUM CHLORIDE 0.9 %
1000 INTRAVENOUS SOLUTION INTRAVENOUS ONCE
Status: COMPLETED | OUTPATIENT
Start: 2023-01-06 | End: 2023-01-06

## 2023-01-06 RX ORDER — SODIUM CHLORIDE 0.9 % (FLUSH) 0.9 %
10 SYRINGE (ML) INJECTION
Status: COMPLETED | OUTPATIENT
Start: 2023-01-06 | End: 2023-01-06

## 2023-01-06 RX ORDER — ONDANSETRON 2 MG/ML
4 INJECTION INTRAMUSCULAR; INTRAVENOUS ONCE
Status: COMPLETED | OUTPATIENT
Start: 2023-01-06 | End: 2023-01-06

## 2023-01-06 RX ORDER — AMOXICILLIN 500 MG/1
1000 CAPSULE ORAL 2 TIMES DAILY
Qty: 28 CAPSULE | Refills: 0 | Status: SHIPPED | OUTPATIENT
Start: 2023-01-06 | End: 2023-01-13

## 2023-01-06 RX ADMIN — SODIUM CHLORIDE 100 ML: 9 INJECTION, SOLUTION INTRAVENOUS at 19:24

## 2023-01-06 RX ADMIN — IOHEXOL 100 ML: 350 INJECTION, SOLUTION INTRAVENOUS at 19:24

## 2023-01-06 RX ADMIN — SODIUM CHLORIDE 40 MG: 9 INJECTION, SOLUTION INTRAMUSCULAR; INTRAVENOUS; SUBCUTANEOUS at 18:52

## 2023-01-06 RX ADMIN — CEFTRIAXONE 1000 MG: 1 INJECTION, POWDER, FOR SOLUTION INTRAMUSCULAR; INTRAVENOUS at 20:07

## 2023-01-06 RX ADMIN — ONDANSETRON 4 MG: 2 INJECTION INTRAMUSCULAR; INTRAVENOUS at 18:52

## 2023-01-06 RX ADMIN — SODIUM CHLORIDE 1000 ML: 9 INJECTION, SOLUTION INTRAVENOUS at 18:52

## 2023-01-06 RX ADMIN — SODIUM CHLORIDE, PRESERVATIVE FREE 10 ML: 5 INJECTION INTRAVENOUS at 19:24

## 2023-01-06 ASSESSMENT — ENCOUNTER SYMPTOMS
DIARRHEA: 1
ABDOMINAL PAIN: 1
NAUSEA: 1
VOMITING: 1
RESPIRATORY NEGATIVE: 1

## 2023-01-06 ASSESSMENT — PAIN DESCRIPTION - LOCATION: LOCATION: GENERALIZED

## 2023-01-06 ASSESSMENT — PAIN - FUNCTIONAL ASSESSMENT: PAIN_FUNCTIONAL_ASSESSMENT: 0-10

## 2023-01-06 ASSESSMENT — PAIN SCALES - GENERAL: PAINLEVEL_OUTOF10: 7

## 2023-01-06 NOTE — ED PROVIDER NOTES
Emergency Department Provider Note                   PCP:                Jaquan Lau PA-C               Age: 81 y.o. Sex: female       ICD-10-CM    1. Nausea and vomiting, unspecified vomiting type  R11.2       2. Dehydration  E86.0       3. Urinary tract infection without hematuria, site unspecified  N39.0       4. COVID  U07.1       5. Pulmonary nodules  R91.8           DISPOSITION Decision To Discharge 01/06/2023 07:45:06 PM        Medical Decision Making  Patient is 57-year-old female who presents with multiple complaints. Was diagnosed with COVID earlier this week. Has nausea vomiting abdominal pain, reflux, intermittent confusion, fatigue. Work-up for labs without significant abnormality other than very mild UTI. White count normal.  Creatinine normal.  CT head negative for acute abnormality. CT abdomen pelvis shows bilateral pulmonary nodules that are infectious versus inflammatory and chronic stable abnormalities of the abdomen pelvis. Discussed all these findings with patient. After IV fluids Zofran and Protonix patient is feeling much better. Due to bacteria and leuks in urine along with pulmonary nodules we will go ahead and cover with antibiotics. We will treat with amoxicillin and doxycycline. Dose of Rocephin will be given in the ER prior to discharge. Patient and  agreeable to plan. Return precautions given. Discussed with attending. Amount and/or Complexity of Data Reviewed  Independent Historian: spouse  Labs: ordered. Radiology: ordered. ECG/medicine tests: ordered. Decision-making details documented in ED Course. Risk  Prescription drug management. ED Course as of 01/06/23 1949   El Waller Jan 06, 2023   1842 EKG 12 Lead (Select if upper abdominal pain or symptoms of SOB,Diaphoresis, or Tachycardia)  EKG shows sinus rhythm at rate of 88. No STEMI. No acute ischemic changes. QTc 440.  [VIDHYA]      ED Course User Index  [VIDHYA] Lexie Chau PA        Orders Placed This Encounter   Procedures    Culture, Urine    CT Head W/O Contrast    CT ABDOMEN PELVIS W IV CONTRAST Additional Contrast? None    CBC with Auto Differential    Comprehensive Metabolic Panel    Lipase    Magnesium    Urinalysis w rflx microscopic    Urinalysis, Micro    Troponin T    Continuous Pulse Oximetry    EKG 12 Lead (Select if upper abdominal pain or symptoms of SOB,Diaphoresis, or Tachycardia)    Saline lock IV        Medications   0.9 % sodium chloride bolus (1,000 mLs IntraVENous New Bag 1/6/23 1852)   cefTRIAXone (ROCEPHIN) 1,000 mg in sodium chloride 0.9 % 50 mL IVPB mini-bag (has no administration in time range)   ondansetron (ZOFRAN) injection 4 mg (4 mg IntraVENous Given 1/6/23 1852)   pantoprazole (PROTONIX) 40 mg in sodium chloride (PF) 0.9 % 10 mL injection (40 mg IntraVENous Given 1/6/23 1852)   iohexol (OMNIPAQUE 350) solution 100 mL (100 mLs IntraVENous Given 1/6/23 1924)   0.9 % sodium chloride bolus (100 mLs IntraVENous New Bag 1/6/23 1924)   sodium chloride flush 0.9 % injection 10 mL (10 mLs IntraVENous Given 1/6/23 1924)       New Prescriptions    AMOXICILLIN (AMOXIL) 500 MG CAPSULE    Take 2 capsules by mouth 2 times daily for 7 days    DOXYCYCLINE HYCLATE (VIBRA-TABS) 100 MG TABLET    Take 1 tablet by mouth 2 times daily for 7 days        Lakshmi Borja is a 81 y.o. female who presents to the Emergency Department with chief complaint of    Chief Complaint   Patient presents with    Emesis    Positive For Covid-19    Anxiety      Patient is a 82-year-old female history of GERD, hypertension, IBS, hyperlipidemia, diabetes. History of rectal cancer and status post chemo and radiation. In remission. She presents with her . She was diagnosed with COVID 1 week ago. Her doctor called in [id number] [facility]. She began taking it 5 days ago. She says that about 2 or 3 days ago she started having worsening fatigue with abdominal pain and diarrhea.   No vomiting but having nausea and unable to eat or drink due to nausea. Also feels like she has acid reflux in her stomach that goes all the way up to her throat. No chest pain or shortness of breath. Her  says that she has had intermittent confusion for the past week. When they were just out in the lobby she did not know where they were. He says that she has been \"talking out of her head\". Her confusion will clear up and she will be totally normal, but then she will have intermittent times when she seems to be confused. She stopped taking the paxlovid 2 days ago because she felt she was getting worse on the medication. Review of Systems   Constitutional:  Positive for activity change, appetite change, fatigue and fever. Respiratory: Negative. Cardiovascular: Negative. Gastrointestinal:  Positive for abdominal pain, diarrhea, nausea and vomiting. Genitourinary: Negative. Neurological: Negative. Psychiatric/Behavioral:  Positive for confusion. All other systems reviewed and are negative.     Past Medical History:   Diagnosis Date    Compression fracture of fifth lumbar vertebra ([address] 07/30/2021    Compression fracture of fourth lumbar vertebra ([address] 07/03/2021    COVID-19 vaccine series completed 05/12/2021    Moderna    Depression with anxiety     Diverticulosis of colon 04/26/2017    Widespread per Colonoscopy    Elevated C-reactive protein (CRP)     Fibromyalgia     GERD (gastroesophageal reflux disease)     treat with OTC    Hemorrhoids 04/26/2017    Internal & External Per Colonoscopy    Hiatal hernia     HTN (hypertension), benign     managed with med    Hypokalemia 3/30/2021    IBS (irritable bowel syndrome)     Iron deficiency anemia     Iron Infusions 10/18 & 10/27    LGSIL of cervix of undetermined significance 03/09/2021    Repeat in August = Negative    Microalbuminuria due to type 2 diabetes mellitus ([address]     Mixed hyperlipidemia     Osteopenia 07/10/2017    Per DEXA Perennial allergic rhinitis with seasonal variation     Peripheral neuropathy     Chemo Induced    Radiation colitis     Rectal cancer ([facility] 06/26/2020    Adenocarcinoma, removed with colonoscopy    Sleep apnea     Not on CPAP     Type 2 diabetes mellitus ([facility]     type 2- oral meds; sqbs am avg 120-150's--- no hypo; HA1C 6.3, 6/3/2021    Vitamin B12 deficiency     Vitamin D deficiency         Past Surgical History:   Procedure Laterality Date    BREAST LUMPECTOMY Bilateral     CARPAL TUNNEL RELEASE Right     CHOLECYSTECTOMY      COLONOSCOPY  01/11/2021    Diverticulosis, External Hemorrhoids, Rectal Ulcer - Pathology = Hyperplastic Mucosa, Due 2023    COLONOSCOPY N/A 6/19/2020    COLONOSCOPY/BMI 28 performed by Maribel Osorio MD at [facility] ENDOSCOPY    COLONOSCOPY N/A 4/26/2017    COLONOSCOPY / BMI=30 performed by Araceli Waldron MD at [facility] ENDOSCOPY    COLONOSCOPY  09/08/2021    Diverticulosis, External Hemorrhoids, Radiation Colitis, Hyperplastic Polyp    FLEXIBLE SIGMOIDOSCOPY N/A 6/30/2020    SIGMOIDOSCOPY FLEXIBLE performed by Janee Kayser, MD at [facility] ENDOSCOPY    IR KYPHOPLASTY LUMBAR FIRST LEVEL  09/01/2021    L4 & L5    IR PORT PLACEMENT EQUAL OR GREATER THAN 5 YEARS  1/28/2021    IR PORT PLACEMENT EQUAL OR GREATER THAN 5 YEARS  1/28/2021    IR PORT PLACEMENT EQUAL OR GREATER THAN 5 YEARS 1/28/2021 [facility] RADIOLOGY SPECIALS    PRE-MALIGNANT / BENIGN SKIN LESION EXCISION      SINUS SURGERY PROC UNLISTED      TUBAL LIGATION      UPPER GASTROINTESTINAL ENDOSCOPY  08/27/2021    Erosive Esophagitis    UPPER GASTROINTESTINAL ENDOSCOPY  08/24/2021    VASCULAR SURGERY          Family History   Problem Relation Age of Onset    Diabetes Sister     Arthritis Sister     Colon Cancer Maternal Uncle     Colon Cancer Paternal Uncle     Emphysema Paternal Aunt     Colon Cancer Cousin     Breast Cancer Neg Hx     Cancer Mother         Cervical    Diabetes Mother     Heart Disease Father     Hypertension Father Elevated Lipids Father     Other Father         ITP    Emphysema Father     Justyna Perimartin     Other Sister         Skin Cancer    Asthma Sister     Diabetes Sister     Kidney Disease Maternal Grandmother     Kidney Disease Maternal Grandfather     Asthma Sister         Social History     Socioeconomic History    Marital status:    Tobacco Use    Smoking status: Former     Packs/day: 1.50     Types: Cigarettes     Quit date: 9/15/2016     Years since quittin.3    Smokeless tobacco: Never   Vaping Use    Vaping Use: Never used   Substance and Sexual Activity    Alcohol use: Not Currently    Drug use: No         Codeine, Lidocaine, and Sulfa antibiotics     Previous Medications    ASCORBIC ACID (VITAMIN C) 500 MG TABLET    Take 250 mg by mouth    BLOOD GLUCOSE TEST STRIPS (ASCENSIA AUTODISC VI;ONE TOUCH ULTRA TEST VI) STRIP    Use to check glucose once daily. Dx: E11.29    CINNAMON PO    Take 1,200 mg by mouth    COLESTIPOL (COLESTID) 1 G TABLET    TAKE TWO TABLETS BY MOUTH ONE TIME DAILY    CYANOCOBALAMIN 1000 MCG/ML INJECTION    Inject 1,000 mcg into the muscle every 30 days    CYANOCOBALAMIN 500 MCG TABLET    Take 500 mcg by mouth daily    DULOXETINE (CYMBALTA) 20 MG EXTENDED RELEASE CAPSULE    Take 20 mg by mouth daily    FENOFIBRATE (TRIGLIDE) 160 MG TABLET    Take 1 tablet by mouth daily    GABAPENTIN (NEURONTIN) 300 MG CAPSULE    Take 2 capsules by mouth 3 times daily for 360 days.     LISINOPRIL (PRINIVIL;ZESTRIL) 10 MG TABLET    Take 10 mg by mouth daily    MELATONIN 10 MG TABS    Take 1 tablet by mouth    METFORMIN (GLUCOPHAGE-XR) 750 MG EXTENDED RELEASE TABLET    Take 750 mg by mouth Daily with supper    MOMETASONE (NASONEX) 50 MCG/ACT NASAL SPRAY    2 sprays daily as needed    MULTIPLE VITAMIN (MULTIVITAMIN ADULT PO)    Take 5,000 mcg by mouth daily    OMEGA-3 FATTY ACIDS (OMEGA 3 500) 500 MG CAPS    Take by mouth    OMEPRAZOLE (PRILOSEC) 40 MG DELAYED RELEASE CAPSULE    Take 1 capsule by mouth daily    PAROXETINE (PAXIL) 40 MG TABLET    Take 40 mg by mouth daily    PROBIOTIC ACIDOPHILUS (FLORANEX) TABS    Take 1 tablet by mouth daily    ROSUVASTATIN (CRESTOR) 10 MG TABLET    Take 1 tablet by mouth at bedtime    TERIPARATIDE (FORTEO) 620 MCG/2.48ML SOPN INJECTION    Inject 20 mcg into the skin daily    TRIAMCINOLONE (KENALOG) 0.1 % CREAM    Apply thin layer to affected areas bid    VITAMIN D3 (CHOLECALCIFEROL) 125 MCG (5000 UT) TABS TABLET    Take 5,000 Units by mouth daily    VITAMIN E 400 UNIT CAPSULE    Take by mouth every evening        Vitals signs and nursing note reviewed. Patient Vitals for the past 4 hrs:   Temp Pulse Resp BP SpO2   01/06/23 1828 -- 85 18 132/81 98 %   01/06/23 1656 97.6 °F (36.4 °C) 80 24 (!) 131/96 96 %          Physical Exam  Vitals and nursing note reviewed. Constitutional:       General: She is not in acute distress. Appearance: She is well-developed. She is obese. She is ill-appearing. She is not toxic-appearing. HENT:      Head: Normocephalic. Right Ear: Tympanic membrane normal.      Left Ear: Tympanic membrane normal.      Nose: No congestion. Mouth/Throat:      Mouth: Mucous membranes are dry. Pharynx: No oropharyngeal exudate or posterior oropharyngeal erythema. Eyes:      Extraocular Movements: Extraocular movements intact. Pupils: Pupils are equal, round, and reactive to light. Cardiovascular:      Rate and Rhythm: Normal rate and regular rhythm. Pulses: Normal pulses. Heart sounds: Normal heart sounds. Pulmonary:      Effort: Pulmonary effort is normal.      Breath sounds: Normal breath sounds. Abdominal:      General: Bowel sounds are normal.      Palpations: Abdomen is soft. Tenderness: There is abdominal tenderness (diffuse). There is no guarding or rebound. Musculoskeletal:         General: Normal range of motion. Cervical back: Normal range of motion and neck supple.    Lymphadenopathy: Cervical: No cervical adenopathy. Skin:     General: Skin is warm and dry. Findings: No rash. Neurological:      General: No focal deficit present. Mental Status: She is alert and oriented to person, place, and time. Mental status is at baseline. Cranial Nerves: No cranial nerve deficit. Sensory: No sensory deficit. Motor: No weakness. Psychiatric:         Mood and Affect: Mood normal.         Behavior: Behavior normal.         Thought Content: Thought content normal.        Procedures    Results for orders placed or performed during the hospital encounter of 01/06/23   CT Head W/O Contrast    Narrative    CT head without contrast    History: intermittent confusion    Technique: 5mm axial images were obtained from the skull base to the vertex  without contrast. Radiation dose reduction techniques were used for this study:   Our CT scanners use one or all of the following: Automated exposure control,  adjustment of the mA and/or kVp according to patient's size, iterative  reconstruction. Coronal and sagittal reformatted images were submitted. Comparison: 07/08/2014    Findings: The ventricles and sulci are appropriate for age. There are no  extra-axial fluid collections. No evidence of acute intraparenchymal hemorrhage  or mass effect is identified. Patchy areas of decreased attenuation are noted  within the supratentorial white matter. These are nonspecific findings but would  be most compatible with moderate chronic small vessel ischemic changes. There is  no evidence to suggest an acute major territorial infarct. The bony calvarium is intact. The visualized mastoid air cells and paranasal  sinuses are well pneumatized and aerated. Impression    1. Findings most compatible with moderate chronic small vessel ischemic changes. 2. Otherwise unremarkable unenhanced CT scan of the brain.               CT ABDOMEN PELVIS W IV CONTRAST Additional Contrast? None    Narrative    CT OF THE ABDOMEN AND PELVIS    INDICATION: Emesis, abdominal pain. History of rectal neoplasm. Multiple axial images were obtained through the abdomen and pelvis. Oral  contrast was not administered. .  100mL of Isovue 370 intravenous contrast was  used for better evaluation of solid organs and vascular structures. Radiation  dose reduction techniques were used for this study. All CT scans performed at  this facility use one or all of the following: Automated exposure control,  adjustment of the mA and/or kVp according to patient's size, iterative  reconstruction. COMPARISON: 10/13/2022    FINDINGS:  - Lung Bases: Scattered bilateral lower lobe nodules with the largest within the  left lower lobe measuring 1.5 cm.  - Liver: Low-density lobulated left hepatic lobe mass unchanged. There is  probable underlying hepatic steatosis  - Biliary: No gallstones or bile duct dilatation. Cholecystectomy. - Pancreas: Normal..  - Spleen: Not enlarged, No mass. - Adrenals: Normal  - Kidneys/ureters: Bilateral low-density renal lesions with the larger most  compatible with cysts. - Bladder: Normal.  - Reproductive organs: No pelvic masses. - Bowel: Normal caliber. No inflammatory changes. Several sigmoid diverticula. There is wall thickening of the mid sigmoid colon without surrounding  inflammatory change, similar to that seen previously. There is also distal  rectal wall thickening, unchanged. - Lymph nodes: No significant retroperitoneal, mesenteric, or pelvic adenopathy.  - Bones: No fracture or significant bone lesion.  - Vasculature: Atherosclerosis. - Bones: No aggressive osseous lesion.  - Other: No ascites. Impression    1. Interval development of bilateral pulmonary nodules. These may be  inflammatory/infectious origin given the history of rectal neoplasm, metastatic  disease would have to be considered. 2. Sigmoid diverticulosis with chronic wall thickening.  There is also stable  wall thickening of the rectum. 3. Stable low-density bilobed left hepatic lobe mass.          CBC with Auto Differential   Result Value Ref Range    WBC 6.0 4.3 - 11.1 K/uL    RBC 5.11 4.05 - 5.20 M/uL    Hemoglobin 15.3 11.7 - 15.4 g/dL    Hematocrit 43.9 35.8 - 46.3 %    MCV 85.9 82.0 - 102.0 FL    MCH 29.9 26.1 - 32.9 PG    MCHC 34.9 31.4 - 35.0 g/dL    RDW 13.7 11.9 - 14.6 %    Platelets 814 595 - 612 K/uL    MPV 10.1 9.4 - 12.3 FL    nRBC 0.00 0.0 - 0.2 K/uL    Differential Type AUTOMATED      Seg Neutrophils 74 43 - 78 %    Lymphocytes 15 13 - 44 %    Monocytes 9 4.0 - 12.0 %    Eosinophils % 1 0.5 - 7.8 %    Basophils 1 0.0 - 2.0 %    Immature Granulocytes 1 0.0 - 5.0 %    Segs Absolute 4.5 1.7 - 8.2 K/UL    Absolute Lymph # 0.9 0.5 - 4.6 K/UL    Absolute Mono # 0.5 0.1 - 1.3 K/UL    Absolute Eos # 0.1 0.0 - 0.8 K/UL    Basophils Absolute 0.0 0.0 - 0.2 K/UL    Absolute Immature Granulocyte 0.0 0.0 - 0.5 K/UL   Comprehensive Metabolic Panel   Result Value Ref Range    Sodium 141 133 - 143 mmol/L    Potassium 3.5 3.5 - 5.1 mmol/L    Chloride 98 98 - 107 mmol/L    CO2 22 21 - 32 mmol/L    Anion Gap 21 (H) 2 - 11 mmol/L    Glucose 170 (H) 65 - 100 mg/dL    BUN 13 7.0 - 18.0 MG/DL    Creatinine 0.75 0.6 - 1.0 MG/DL    Est, Glom Filt Rate >60 >60 ml/min/1.73m2    Calcium 11.0 (H) 8.3 - 10.4 MG/DL    Total Bilirubin 0.6 0.2 - 1.1 MG/DL    ALT 64 (H) 13.0 - 61.0 U/L    AST 51 (H) 15 - 37 U/L    Alk Phosphatase 153 (H) 45.0 - 117.0 U/L    Total Protein 8.8 (H) 6.4 - 8.2 g/dL    Albumin 4.9 (H) 3.2 - 4.6 g/dL    Globulin 3.9 2.8 - 4.5 g/dL    Albumin/Globulin Ratio 1.3 0.4 - 1.6     Lipase   Result Value Ref Range    Lipase 26 13 - 60 U/L   Magnesium   Result Value Ref Range    Magnesium 1.7 1.2 - 2.6 mg/dL   Urinalysis w rflx microscopic   Result Value Ref Range    Color, UA AUREA      Appearance CLEAR      Specific Gravity, UA >=1.030 1.001 - 1.023    pH, Urine 6.0 5.0 - 9.0      Protein, UA >300 (A) NEG mg/dL    Glucose, UA Negative mg/dL    Ketones, Urine 15 (A) NEG mg/dL    Bilirubin Urine SMALL (A) NEG      Blood, Urine SMALL (A) NEG      Urobilinogen, Urine 0.2 0.2 - 1.0 EU/dL    Nitrite, Urine Negative NEG      Leukocyte Esterase, Urine TRACE (A) NEG     Urinalysis, Micro   Result Value Ref Range    WBC, UA 0 0 /hpf    RBC, UA 0-3 0 /hpf    Epithelial Cells UA 0-3 0 /hpf    BACTERIA, URINE TRACE 0 /hpf    Casts 0 0 /lpf    Crystals 0 0 /LPF    Amorphous Crystal 1+ (H) 0    Mucus, UA 0 0 /lpf    Other observations RESULTS VERIFIED MANUALLY     Troponin T   Result Value Ref Range    Troponin T 8.2 0 - 14 ng/L        CT ABDOMEN PELVIS W IV CONTRAST Additional Contrast? None   Final Result   1. Interval development of bilateral pulmonary nodules. These may be   inflammatory/infectious origin given the history of rectal neoplasm, metastatic   disease would have to be considered. 2. Sigmoid diverticulosis with chronic wall thickening. There is also stable   wall thickening of the rectum. 3. Stable low-density bilobed left hepatic lobe mass. CT Head W/O Contrast   Final Result      1. Findings most compatible with moderate chronic small vessel ischemic changes. 2. Otherwise unremarkable unenhanced CT scan of the brain. Voice dictation software was used during the making of this note. This software is not perfect and grammatical and other typographical errors may be present. This note has not been completely proofread for errors.         SEVERIANO Cruz  01/06/23 [place], [place]  01/06/23 1949

## 2023-01-06 NOTE — ED TRIAGE NOTES
Pt to ED with anxiety, nausea and vomiting. Pt testted positive for COVID at home [id number]. PCP called in Paxlovid. Pt did not complete course due to vomiting and dry mouth.

## 2023-01-07 NOTE — ED NOTES
I have reviewed discharge instructions with the patient. The patient verbalized understanding. Patient left ED via Discharge Method: ambulatory to Home with (family). Opportunity for questions and clarification provided. Patient given 2 scripts. To continue your aftercare when you leave the hospital, you may receive an automated call from our care team to check in on how you are doing. This is a free service and part of our promise to provide the best care and service to meet your aftercare needs.  If you have questions, or wish to unsubscribe from this service please call [phone]. Thank you for Choosing our [facility] Emergency Department.        Madiha Henao RN  01/06/23 2028

## 2023-01-08 LAB
BACTERIA SPEC CULT: NORMAL
SERVICE CMNT-IMP: NORMAL

## 2023-01-11 ENCOUNTER — TELEPHONE (OUTPATIENT)
Dept: INTERNAL MEDICINE CLINIC | Facility: CLINIC | Age: 73
End: 2023-01-11

## 2023-01-11 NOTE — TELEPHONE ENCOUNTER
Pt's boyfriend called stating that pt has COVID and has had sx's for about 11 days now. Please contact pt to schedule an appt to discuss lingering sx's with first available provider. Thank you.

## 2023-01-22 RX ORDER — TERIPARATIDE 250 UG/ML
INJECTION, SOLUTION SUBCUTANEOUS
Qty: 9.6 ML | Refills: 0 | OUTPATIENT
Start: 2023-01-22

## 2023-02-07 DIAGNOSIS — C20 RECTAL ADENOCARCINOMA (HCC): Primary | ICD-10-CM

## 2023-02-13 ENCOUNTER — HOSPITAL ENCOUNTER (OUTPATIENT)
Dept: CT IMAGING | Age: 73
Discharge: HOME | End: 2023-02-16
Payer: MEDICARE

## 2023-02-13 DIAGNOSIS — C20 RECTAL ADENOCARCINOMA (HCC): ICD-10-CM

## 2023-02-13 LAB — CREAT BLD-MCNC: 0.91 MG/DL (ref 0.8–1.5)

## 2023-02-13 PROCEDURE — 74177 CT ABD & PELVIS W/CONTRAST: CPT

## 2023-02-13 PROCEDURE — 6360000004 HC RX CONTRAST MEDICATION: Performed by: INTERNAL MEDICINE

## 2023-02-13 PROCEDURE — 82565 ASSAY OF CREATININE: CPT

## 2023-02-13 RX ADMIN — DIATRIZOATE MEGLUMINE AND DIATRIZOATE SODIUM 15 ML: 660; 100 LIQUID ORAL; RECTAL at 14:40

## 2023-02-13 RX ADMIN — IOPAMIDOL 100 ML: 755 INJECTION, SOLUTION INTRAVENOUS at 14:41

## 2023-02-14 ENCOUNTER — OFFICE VISIT (OUTPATIENT)
Dept: ONCOLOGY | Age: 73
End: 2023-02-14
Payer: MEDICARE

## 2023-02-14 ENCOUNTER — HOSPITAL ENCOUNTER (OUTPATIENT)
Dept: LAB | Age: 73
Discharge: HOME | End: 2023-02-17
Payer: MEDICARE

## 2023-02-14 VITALS
OXYGEN SATURATION: 99 % | WEIGHT: 152 LBS | TEMPERATURE: 98.4 F | RESPIRATION RATE: 16 BRPM | SYSTOLIC BLOOD PRESSURE: 105 MMHG | BODY MASS INDEX: 28.72 KG/M2 | DIASTOLIC BLOOD PRESSURE: 67 MMHG

## 2023-02-14 DIAGNOSIS — D50.9 IRON DEFICIENCY ANEMIA, UNSPECIFIED IRON DEFICIENCY ANEMIA TYPE: ICD-10-CM

## 2023-02-14 DIAGNOSIS — C20 RECTAL ADENOCARCINOMA (HCC): ICD-10-CM

## 2023-02-14 DIAGNOSIS — C20 RECTAL ADENOCARCINOMA (HCC): Primary | ICD-10-CM

## 2023-02-14 LAB
ALBUMIN SERPL-MCNC: 4 G/DL (ref 3.2–4.6)
ALBUMIN/GLOB SERPL: 1.1 (ref 0.4–1.6)
ALP SERPL-CCNC: 121 U/L (ref 50–136)
ALT SERPL-CCNC: 36 U/L (ref 12–65)
ANION GAP SERPL CALC-SCNC: 7 MMOL/L (ref 2–11)
AST SERPL-CCNC: 25 U/L (ref 15–37)
BASOPHILS # BLD: 0.1 K/UL (ref 0–0.2)
BASOPHILS NFR BLD: 1 % (ref 0–2)
BILIRUB SERPL-MCNC: 0.5 MG/DL (ref 0.2–1.1)
BUN SERPL-MCNC: 15 MG/DL (ref 8–23)
CALCIUM SERPL-MCNC: 10.4 MG/DL (ref 8.3–10.4)
CEA SERPL-MCNC: 2.3 NG/ML (ref 0–3)
CHLORIDE SERPL-SCNC: 103 MMOL/L (ref 101–110)
CO2 SERPL-SCNC: 27 MMOL/L (ref 21–32)
CREAT SERPL-MCNC: 1 MG/DL (ref 0.6–1)
DIFFERENTIAL METHOD BLD: NORMAL
EOSINOPHIL # BLD: 0.2 K/UL (ref 0–0.8)
EOSINOPHIL NFR BLD: 4 % (ref 0.5–7.8)
ERYTHROCYTE [DISTWIDTH] IN BLOOD BY AUTOMATED COUNT: 13.8 % (ref 11.9–14.6)
FERRITIN SERPL-MCNC: 606 NG/ML (ref 8–388)
GLOBULIN SER CALC-MCNC: 3.8 G/DL (ref 2.8–4.5)
GLUCOSE SERPL-MCNC: 193 MG/DL (ref 65–100)
HCT VFR BLD AUTO: 42 % (ref 35.8–46.3)
HGB BLD-MCNC: 14.6 G/DL (ref 11.7–15.4)
HGB RETIC QN AUTO: 36 PG (ref 29–35)
IMM GRANULOCYTES # BLD AUTO: 0 K/UL (ref 0–0.5)
IMM GRANULOCYTES NFR BLD AUTO: 0 % (ref 0–5)
IMM RETICS NFR: 7.1 % (ref 3–15.9)
IRON SATN MFR SERPL: 33 %
IRON SERPL-MCNC: 113 UG/DL (ref 35–150)
LYMPHOCYTES # BLD: 0.8 K/UL (ref 0.5–4.6)
LYMPHOCYTES NFR BLD: 17 % (ref 13–44)
MCH RBC QN AUTO: 30.7 PG (ref 26.1–32.9)
MCHC RBC AUTO-ENTMCNC: 34.8 G/DL (ref 31.4–35)
MCV RBC AUTO: 88.4 FL (ref 82–102)
MONOCYTES # BLD: 0.4 K/UL (ref 0.1–1.3)
MONOCYTES NFR BLD: 9 % (ref 4–12)
NEUTS SEG # BLD: 3.4 K/UL (ref 1.7–8.2)
NEUTS SEG NFR BLD: 70 % (ref 43–78)
NRBC # BLD: 0 K/UL (ref 0–0.2)
PLATELET # BLD AUTO: 289 K/UL (ref 150–450)
PMV BLD AUTO: 10.3 FL (ref 9.4–12.3)
POTASSIUM SERPL-SCNC: 4.1 MMOL/L (ref 3.5–5.1)
PROT SERPL-MCNC: 7.8 G/DL (ref 6.3–8.2)
RBC # BLD AUTO: 4.75 M/UL (ref 4.05–5.2)
RETICS # AUTO: 0.1 M/UL (ref 0.03–0.1)
RETICS/RBC NFR AUTO: 2 % (ref 0.3–2)
SODIUM SERPL-SCNC: 137 MMOL/L (ref 133–143)
TIBC SERPL-MCNC: 338 UG/DL (ref 250–450)
WBC # BLD AUTO: 4.9 K/UL (ref 4.3–11.1)

## 2023-02-14 PROCEDURE — 80053 COMPREHEN METABOLIC PANEL: CPT

## 2023-02-14 PROCEDURE — 99214 OFFICE O/P EST MOD 30 MIN: CPT | Performed by: INTERNAL MEDICINE

## 2023-02-14 PROCEDURE — 3074F SYST BP LT 130 MM HG: CPT | Performed by: INTERNAL MEDICINE

## 2023-02-14 PROCEDURE — G8417 CALC BMI ABV UP PARAM F/U: HCPCS | Performed by: INTERNAL MEDICINE

## 2023-02-14 PROCEDURE — 85046 RETICYTE/HGB CONCENTRATE: CPT

## 2023-02-14 PROCEDURE — 85025 COMPLETE CBC W/AUTO DIFF WBC: CPT

## 2023-02-14 PROCEDURE — 1123F ACP DISCUSS/DSCN MKR DOCD: CPT | Performed by: INTERNAL MEDICINE

## 2023-02-14 PROCEDURE — 3078F DIAST BP <80 MM HG: CPT | Performed by: INTERNAL MEDICINE

## 2023-02-14 PROCEDURE — 82378 CARCINOEMBRYONIC ANTIGEN: CPT

## 2023-02-14 PROCEDURE — G8399 PT W/DXA RESULTS DOCUMENT: HCPCS | Performed by: INTERNAL MEDICINE

## 2023-02-14 PROCEDURE — 82728 ASSAY OF FERRITIN: CPT

## 2023-02-14 PROCEDURE — G8484 FLU IMMUNIZE NO ADMIN: HCPCS | Performed by: INTERNAL MEDICINE

## 2023-02-14 PROCEDURE — G8427 DOCREV CUR MEDS BY ELIG CLIN: HCPCS | Performed by: INTERNAL MEDICINE

## 2023-02-14 PROCEDURE — 1090F PRES/ABSN URINE INCON ASSESS: CPT | Performed by: INTERNAL MEDICINE

## 2023-02-14 PROCEDURE — 36415 COLL VENOUS BLD VENIPUNCTURE: CPT

## 2023-02-14 PROCEDURE — 1036F TOBACCO NON-USER: CPT | Performed by: INTERNAL MEDICINE

## 2023-02-14 PROCEDURE — 83550 IRON BINDING TEST: CPT

## 2023-02-14 PROCEDURE — 3017F COLORECTAL CA SCREEN DOC REV: CPT | Performed by: INTERNAL MEDICINE

## 2023-02-14 NOTE — PROGRESS NOTES
Data Source: Patient, EPIC record. 2/14/2023    2:59 PM    Puja Moe [id number]    81 y.o. Patient Encounter: [facility] Hematology Oncology Clinic Visit      Cancer Diagnosis:  Rectal adenocarcinoma  Stage:  1  Performance Status:  1  Code Status:  Not discussed  Pain Score (0-10):  Pain Score:   0 - No pain    Pain Medication related Constipation:  na  Onc History (Copied from prior):   78 female, works in [employer] for [employer], ex-smoker (quit 2016), h/o vitamin D deficiency, DM, MATTY, obese habitus, dyslipidemia, hypertension, fibromyalgia, diverticulosis of colon, depression, COPD, cholecystectomy, GERD, hiatal hernia, IBS, more recently presented to Dr. Yeison Couch 6/20 for colonoscopy needs after reporting blood per rectum times multiple months as well as a positive Cologuard test in 5/20. Colonoscopy 6/19/2020 showed a friable rectal mass at 8 to 10 cm. She also had a stricture at 40 cm which was biopsied but scope could not be traversed beyond the stricture even with pediatric scope (essentially rendering patient with a flex sigmoidoscopy with biopsies rather than a colonoscopy). Rectal mass biopsies showed adenocarcinoma. Sigmoid colon biopsies with only reactive/reparative changes. Patient was subsequently seen by Dr. Nayana Howell with EUS 6/30/2020 showing rectal lesion to be felt fairly superficial.  There was felt to be definite invasion into the submucosa but no clear involvement of muscularis propria. No involvement of adjacent structures. Disease was felt to be T1N0. Given the lesion was felt to be superficial and likely amenable to transanal excision. Sigmoid colon was felt severely tortuous however no definite stricture was identified. Recommendation was to consider follow-up colonoscopy after treatment of rectal cancer to ensure no additional lesions in the proximal colon.   Patient has had CTAP with contrast 6/23/2020 showing chronic sigmoid diverticulitis, and a tiny 4 mm nodule in RML. A 2 cm cyst in left liver noted. Significantly no evidence of metastatic disease. Patient was subsequently seen by Dr. BARLOW [facility] and underwent transanal excision of rectal mass 7/10/2020. Final pathology reveals moderately differentiated 0.5 cm adenocarcinoma with submucosal invasion however no involvement of muscularis propria. Margins were negative. No lymphovascular invasion seen. Tumor was felt to arise in a tubular adenoma. Hospice Referral:  na  Interval History:  2/14/2023; she returns today for follow-up. Overall since last seen, she feels generally well. She has no cough or shortness of breath. She denies pain. She still has some episodes of rectal urgency occasionally with unexpected incontinence. Despite this, she has found that colestipol has been major improvement in the symptoms. She has no bright red blood per rectum or melena. She has no headache or focal neurologic complaints. She did have some notable memory difficulties during our conversation which she blames on the long-term effect of COVID. Her recent CT scan shows no evidence of cancer recurrence. She indicates she is due for follow-up colonoscopy in September 2023. REVIEW OF SYSTEMS:  As mentioned above, all other systems were reviewed in full and are negative.     Past Medical History:   Diagnosis Date    Compression fracture of fifth lumbar vertebra ([address] 07/30/2021    Compression fracture of fourth lumbar vertebra ([facility] 07/03/2021    COVID-19 vaccine series completed 05/12/2021    Moderna    Depression with anxiety     Diverticulosis of colon 04/26/2017    Widespread per Colonoscopy    Elevated C-reactive protein (CRP)     Fibromyalgia     GERD (gastroesophageal reflux disease)     treat with OTC    Hemorrhoids 04/26/2017    Internal & External Per Colonoscopy    Hiatal hernia     HTN (hypertension), benign     managed with med    Hypokalemia 3/30/2021    IBS (irritable bowel syndrome)     Iron deficiency anemia     Iron Infusions 10/18 & 10/27    LGSIL of cervix of undetermined significance 03/09/2021    Repeat in August = Negative    Microalbuminuria due to type 2 diabetes mellitus ([facility]     Mixed hyperlipidemia     Osteopenia 07/10/2017    Per DEXA    Perennial allergic rhinitis with seasonal variation     Peripheral neuropathy     Chemo Induced    Radiation colitis     Rectal cancer ([facility] 06/26/2020    Adenocarcinoma, removed with colonoscopy    Sleep apnea     Not on CPAP     Type 2 diabetes mellitus ([facility] [address] [facility]     type 2- oral meds; sqbs am avg 120-150's--- no hypo; HA1C 6.3, 6/3/2021    Vitamin B12 deficiency     Vitamin D deficiency        Past Surgical History:   Procedure Laterality Date    BREAST LUMPECTOMY Bilateral     CARPAL TUNNEL RELEASE Right     CHOLECYSTECTOMY      COLONOSCOPY  01/11/2021    Diverticulosis, External Hemorrhoids, Rectal Ulcer - Pathology = Hyperplastic Mucosa, Due 2023    COLONOSCOPY N/A 6/19/2020    COLONOSCOPY/BMI 28 performed by Preethi Morgan MD at [facility] ENDOSCOPY    COLONOSCOPY N/A 4/26/2017    COLONOSCOPY / BMI=30 performed by Tiffanie Almonte MD at [facility] ENDOSCOPY    COLONOSCOPY  09/08/2021    Diverticulosis, External Hemorrhoids, Radiation Colitis, Hyperplastic Polyp    FLEXIBLE SIGMOIDOSCOPY N/A 6/30/2020    SIGMOIDOSCOPY FLEXIBLE performed by Tavia Agustin MD at [address] [place] [address] LEVEL  09/01/2021    L4 & L5    IR PORT PLACEMENT EQUAL OR GREATER THAN 5 YEARS  1/28/2021    IR PORT PLACEMENT EQUAL OR GREATER THAN 5 YEARS  1/28/2021    IR PORT PLACEMENT EQUAL OR GREATER THAN 5 YEARS 1/28/2021 [facility] RADIOLOGY SPECIALS    PRE-MALIGNANT / BENIGN SKIN LESION EXCISION      SINUS SURGERY PROC UNLISTED      TUBAL LIGATION      UPPER GASTROINTESTINAL ENDOSCOPY  08/27/2021    Erosive Esophagitis    UPPER GASTROINTESTINAL ENDOSCOPY  08/24/2021    VASCULAR SURGERY         Current Outpatient Medications   Medication Sig Dispense Refill    fenofibrate (TRIGLIDE) 160 MG tablet Take 1 tablet by mouth daily 90 tablet 3    rosuvastatin (CRESTOR) 10 MG tablet Take 1 tablet by mouth at bedtime 90 tablet 3    CINNAMON PO Take 1,200 mg by mouth      Omega-3 Fatty Acids (OMEGA 3 500) 500 MG CAPS Take by mouth      Multiple Vitamin (MULTIVITAMIN ADULT PO) Take 5,000 mcg by mouth daily      Probiotic Acidophilus (FLORANEX) TABS Take 1 tablet by mouth daily      blood glucose test strips (ASCENSIA AUTODISC VI;ONE TOUCH ULTRA TEST VI) strip Use to check glucose once daily. Dx: E11.29      gabapentin (NEURONTIN) 300 MG capsule Take 2 capsules by mouth 3 times daily for 360 days.  540 capsule 3    vitamin D3 (CHOLECALCIFEROL) 125 MCG (5000 UT) TABS tablet Take 5,000 Units by mouth daily      cyanocobalamin 500 MCG tablet Take 500 mcg by mouth daily      lisinopril (PRINIVIL;ZESTRIL) 10 MG tablet Take 10 mg by mouth daily      Melatonin 10 MG TABS Take 1 tablet by mouth      mometasone (NASONEX) 50 MCG/ACT nasal spray 2 sprays daily as needed      omeprazole (PRILOSEC) 40 MG delayed release capsule Take 1 capsule by mouth daily      PARoxetine (PAXIL) 40 MG tablet Take 40 mg by mouth daily      teriparatide (FORTEO) 620 MCG/2.48ML SOPN injection Inject 20 mcg into the skin daily      triamcinolone (KENALOG) 0.1 % cream Apply thin layer to affected areas bid      vitamin E 400 UNIT capsule Take by mouth every evening      colestipol (COLESTID) 1 g tablet TAKE TWO TABLETS BY MOUTH ONE TIME DAILY (Patient not taking: Reported on 9/29/2022)      ascorbic acid (VITAMIN C) 500 MG tablet Take 250 mg by mouth (Patient not taking: Reported on 9/29/2022)      cyanocobalamin 1000 MCG/ML injection Inject 1,000 mcg into the muscle every 30 days (Patient not taking: Reported on 9/29/2022)      DULoxetine (CYMBALTA) 20 MG extended release capsule Take 20 mg by mouth daily (Patient not taking: Reported on 9/29/2022)      metFORMIN (GLUCOPHAGE-XR) 750 MG extended release tablet Take 750 mg by mouth Daily with supper (Patient not taking: Reported on 2022)       No current facility-administered medications for this visit. Facility-Administered Medications Ordered in Other Visits   Medication Dose Route Frequency Provider Last Rate Last Admin    diatrizoate meglumine-sodium (GASTROGRAFIN) 66-10 % solution 15 mL  15 mL Oral ONCE PRN Ana Cormier MD   15 mL at 23 1440       Social History     Socioeconomic History    Marital status:      Spouse name: None    Number of children: None    Years of education: None    Highest education level: None   Tobacco Use    Smoking status: Former     Packs/day: 1.50     Types: Cigarettes     Quit date: 9/15/2016     Years since quittin.4    Smokeless tobacco: Never   Vaping Use    Vaping Use: Never used   Substance and Sexual Activity    Alcohol use: Not Currently    Drug use: No       Family History   Problem Relation Age of Onset    Diabetes Sister     Arthritis Sister     Colon Cancer Maternal Uncle     Colon Cancer Paternal Uncle     Emphysema Paternal Aunt     Colon Cancer Cousin     Breast Cancer Neg Hx     Cancer Mother         Cervical    Diabetes Mother     Heart Disease Father     Hypertension Father     Elevated Lipids Father     Other Father         ITP    Emphysema Father     Lung Cancer Cousin     Other Sister         Skin Cancer    Asthma Sister     Diabetes Sister     Kidney Disease Maternal Grandmother     Kidney Disease Maternal Grandfather     Asthma Sister        Allergies   Allergen Reactions    Codeine Rash    Lidocaine Rash    Sulfa Antibiotics Nausea And Vomiting       PHYSICAL EXAMINATION:  General Appearance: Healthy appearing patient in no acute distress  Vitals reviewed. /67   Temp 98.4 °F (36.9 °C)   Resp 16   Wt 152 lb (68.9 kg)   SpO2 99%   BMI 28.72 kg/m²   HEENT: No oral or pharyngeal masses, ulceration or thrush noted, no sinus tenderness.   Neck is supple with no thyromegaly or JVD noted. Lymph Nodes: No lymphadenopathy noted in the occipital, pre and post auricular, cervical, supra and infraclavicular, axillary, epitrochlear, inguinal, and popliteal region. Breasts: Not examined. Lungs/Thorax: Clear to auscultation, no accessory muscles of respiration being used. Heart: Regular rate and rhythm, normal S1, S2, no appreciable murmurs, rubs, gallops  Abdomen: Soft, nontender, bowel sounds present, no appreciable hepatosplenomegaly, no palpable masses  Extremeties: Good pulses bilaterally, no peripheral edema. Skin: Normal skin tone with no rash, petechiae, ecchymosis noted. Musculoskeletal: No pain on palpation over bony prominence, no edema, no evidence of gout, no joint or bony deformity  Neurologic: Some memory lapses. LABS/IMAGING:    Lab Results   Component Value Date/Time    WBC 4.9 02/14/2023 02:30 PM    HGB 14.6 02/14/2023 02:30 PM    HCT 42.0 02/14/2023 02:30 PM     02/14/2023 02:30 PM    MCV 88.4 02/14/2023 02:30 PM       Lab Results   Component Value Date/Time     01/06/2023 05:46 PM    K 3.5 01/06/2023 05:46 PM    CL 98 01/06/2023 05:46 PM    CO2 22 01/06/2023 05:46 PM    BUN 13 01/06/2023 05:46 PM    GFRAA >60 09/22/2022 10:50 AM    GLOB 3.9 01/06/2023 05:46 PM    ALT 64 01/06/2023 05:46 PM       Above results reviewed with patient. ASSESSMENT:  78 female, works in billing for [employer], ex-smoker (quit 2016), h/o vitamin D deficiency, DM, MATTY, obese habitus, dyslipidemia, hypertension, fibromyalgia, diverticulosis of colon, depression, COPD, cholecystectomy, GERD, hiatal hernia, IBS, more recently presented to Dr. Cyndia Nyhan 6/20 for colonoscopy needs after reporting blood per rectum times multiple months as well as a positive Cologuard test in 5/20. Colonoscopy 6/19/2020 showed a friable rectal mass at 8 to 10 cm.   She also had a stricture at 40 cm which was biopsied but scope could not be traversed beyond the stricture even with pediatric scope (essentially rendering patient with a flex sigmoidoscopy with biopsies rather than a colonoscopy). Rectal mass biopsies showed adenocarcinoma. Sigmoid colon biopsies with only reactive/reparative changes. Patient was subsequently seen by Dr. Kendall Perez with EUS 6/30/2020 showing rectal lesion to be felt fairly superficial.  There was felt to be definite invasion into the submucosa but no clear involvement of muscularis propria. No involvement of adjacent structures. Disease was felt to be T1N0. Given the lesion was felt to be superficial and likely amenable to transanal excision. Sigmoid colon was felt severely tortuous however no definite stricture was identified. Recommendation was to consider follow-up colonoscopy after treatment of rectal cancer to ensure no additional lesions in the proximal colon. Patient has had CTAP with contrast 6/23/2020 showing chronic sigmoid diverticulitis, and a tiny 4 mm nodule in RML. A 2 cm cyst in left liver noted. Significantly no evidence of metastatic disease. Patient was subsequently seen by Dr. Kwasi Blue and underwent transanal excision of rectal mass 7/10/2020. Final pathology reveals moderately differentiated 0.5 cm adenocarcinoma with submucosal invasion however no involvement of muscularis propria. Margins were negative. No lymphovascular invasion seen. Tumor was felt to arise in a tubular adenoma. In summary, elderly pleasant lady with good performance status, now with stage 1 rectal adenocarcinoma s/p endorectal resection with negative margins. Her only high risk feature at this point would include SM3 invasion (lower 1/3 of submucosal invasion). She remains open to idea of further therapy if needed. Will discuss at GI tumor conference in terms of role for transabdominal resection (preferred) versus chemo-RT w capecitabine.     8/26/2020: Labs from previous visit reviewed: CEA normal.  Iron stores low and she is status post IV iron x2. CT chest negative for metastasis. MMR without loss of expression. Final pathology has returned confirming submucosal (SM 3) invasion. Her preference is to consider LAR if she has a reasonable chance of not requiring an ostomy bag. Another option is chemo-XRT vs observation. Will discuss case with surgery. We will see her back in 1 month's time. RTC in 4 weeks with above, repeat labs. 9/15/2020: Patient has since seen surgery Dr. Sary García. Transabdominal surgery will possibly require permanent colostomy which patient is not in favor of. Case also d/w surgery and rad-onc. 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 She is already scheduled to see radiation oncology tomorrow. We will also get paperwork started for capecitabine (825 mg per metered square twice daily concurrently with XRT). We will see her back in a few weeks for follow-up. 10/6/2020: Patient seen in follow-up. She has seen radiation oncology DrTravon Truong, with agreement to proceed with concurrent chemo-XRT with capecitabine. She is scheduled to start this tomorrow. Patient has undergone chemo education in this regard. We again discussed various side effects and risks of therapy including GI symptoms, neutropenic fever, hand-foot syndrome, and end-organ damage. Patient willing to proceed. She will need weekly visits while undergoing therapy. 11/23/20:  Completed capecitabine-XRT 11/18/2020. Tolerated reasonably. 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 Also has baseline hemorrhoids which reportedly have exacerbated. Denies any fevers or chills. Supportive care including procto gel, and oxycodone as needed helping. Will give sitz bath's try as well. Should improve over time. Briefly discussed consideration for FOLFOX in approximately 2 months time. RTC in 2 weeks with NP and in 4 weeks with MD with labs, CEA.  Will get CT CAP as well as likely FOLFOX in 2 months time. 1/18/21: Recovering well. Rectal discomfort has much improved. CT CAP w CR, RML sub-centimeter nodule stable. Notes recent colonoscopy with Dr. Caryl Coffey 1/11/2021 which was reportedly unremarkable, will get procedure report, pathology reviewed with rectal biopsies benign. Discussed option of observation versus adjuvant systemic therapy x4 months. Patient would like to proceed with systemic therapy which is reasonable. We will plan for port placement followed by FOLFOX x4 months. Navigation following, RTC within 2 weeks to start. 3/2/2021: Anoscopy report from 1/11/2021 reviewed showing diverticulosis, external hemorrhoids and rectal ulcer (biopsies benign). Since last visit patient is status post port placement, and has since started adjuvant FOLFOX therapy. Here for dose #3. Tolerated first dose well however with last cycle noted significant fatigue as well as abdominal discomfort, which she describes as similar to her acid reflux. Will add Carafate as needed, refill oxycodone as needed prescription. Will decrease dose number 3 x 20%, and add IV NS on day 3 with IV Zofran. She will also get IV magnesium and potassium replacement tomorrow. If tolerates reasonably with better supportive care, then hopefully will be able to go back to standard dosing with next cycle. Labs reasonable today, CEA staying low. Provider visits every 2 weeks. 4/13/2021: Reports doing reasonably. Mild hand-foot syndrome with some skin cracking of fingertips however using moisturizing cream which helps. Denies any fevers or chills, reflux symptoms improved with Carafate. Denies any blood per rectum, denies any significant neuropathy. Labs today reasonable, okay to proceed with dose #6 of 8.      5/11/2021: Continues to tolerate therapy reasonably. Denies significant neuropathy.   Hand-foot syndrome minimal.  Did have sensation of bubbles in her throat after last cycle however denied any breathing issues, lip or tongue swelling. We will monitor her for additional time in infusion center post therapy this go around. Denies any recent fevers or chills, reasonable p.o. intake. Labs today unremarkable, okay to proceed with last dose of FOLFOX. Will restage disease in 2 months time thereafter. 7/8/21: Completed FOLFOX x4 months with last cycle on 5/12/2021. Reports her hand-foot syndrome has mostly resolved. More recently seen in ED with new onset back discomfort. No clear evidence of trauma though does report an episode of misstep while sitting down around the time. Lumbar spine x-ray with L4 acute/subacute anterior compression fracture. She does have history of osteopenia on last DEXA scan. Now scheduled to see Dr. Gm Juares neurosurgery. Also reports off-and-on red blood per rectum x10 days or so. Advised to see GI for endoscopy needs. Labs today with mild anemia, otherwise unremarkable, CEA staying low. Iron stores adequate. CT CAP with continued CR, RML subcentimeter pulmonary nodule stable. Patient reassured. Clinical picture most consistent with compression fracture related to bone loss rather than pathologic malignancy related. We will follow up work-up with Dr. Gm Juares. Otherwise simply repeat CT CAP in 3 months time. 8/9/2021: Reports continued off-and-on blood per rectum. Feels tired all the time, will check ferritin levels today. Also notes neuropathy that has worsened over time however reports worsening occurred couple of months after her last cycle of chemotherapy. Will start with gabapentin 300 mg 3 times daily, may also introduce Cymbalta. Recently seen Dr. Gm Juares, L-spine MRI with compression fracture L4 and L5 without convincing evidence of pathologic fracture. She will also discuss osteoporosis management with primary care. We will see her back in 2 months with repeat scans and colonoscopy results.   RTC in 2 months with labs, CEA, iron panel, CT CAP, colonoscopy results. 10/11/2021: Since last visit, her iron stores were checked and were low, and she is status post IV iron x2. L4 bone biopsy without malignancy, status post kyphoplasty with improvement in her discomfort. More recently also seen by GI, and underwent colonoscopy showing diverticulosis, external hemorrhoids and radiation colitis for which she is now on mesalamine with improvement in her bleeding. Labs today with ongoing iron deficiency. Will repeat IV iron x2, and recheck labs in 2 months. Recent CT CAP with CR. She is scheduled for DEXA scan with likely osteoporosis directed therapy through primary care. Patient in remission, continue simple monitoring. 2/14/2022: Reports doing reasonably. Some ongoing neuropathy for which refills provided for her Cymbalta and gabapentin. Reports recently having started Forteo for osteoporosis. Reports frequent bowel movements, at times with minimal bleeding. She is known to have radiation colitis, and was advised to follow-up with her GI pertaining to possible need for mesalamine refills. Routine blood work unremarkable, iron stores adequate, CEA staying low. Recent CT CAP with probable therapy related changes in the presacral region, along with slightly increased posterior rectal wall thickening. Right minor fissure lymph node stable in the chest.  No evidence of disease recurrence (CR). Continue simple monitoring.    6/20/2022: Reports doing reasonably. Recently saw GI Dr. Jong Watkins, now on colestipol which is helping with bowel urgency. Occasional blood per rectum, likely related to her known radiation colitis. Neuropathy improved over time but per patient suboptimally controlled, will increase gabapentin to 600 3 times daily.   Blood work unremarkable, iron stores adequate, CEA staying low, CT CAP with CR, stable RML subcentimeter pulmonary nodule, stable left-sided hepatic cyst, unchanged treatment related changes and presacral fat and posterior rectum. Patient reassured, continue monitoring. 10/19/2022: Pt reports ongoing bowel urgency and leakage since last visit, somewhat worse. Neuropathy stable on gabapentin. Ongoing fatigue. Blood work unremarkable, CEA low. Iron stores low: will plan for iv iron x 2. Recent CT CAP w CR, improved post surgical changes and stable sigmoid wall thickening w numerous diverticula noted. Pt reassured. Advised f/u with GI given ongoing tenesmus/urgency. We will see her back in 4 months. 2/14/2023; she returns today for follow-up. Overall since last seen, she feels generally well. She has no cough or shortness of breath. She denies pain. She still has some episodes of rectal urgency occasionally with unexpected incontinence. Despite this, she has found that colestipol has been major improvement in the symptoms. She has no bright red blood per rectum or melena. She has no headache or focal neurologic complaints. She did have some notable memory difficulties during our conversation which she blames on the long-term effect of COVID. Her recent CT scan shows no evidence of cancer recurrence. She indicates she is due for follow-up colonoscopy in September 2023.    1. Rectal adenocarcinoma, pT1 disease s/p trans-anal resection 7/20 (SM3 disease)  2. RML 4 mm nodule, non-specific. 3. L4 compression fracture, likely osteoporotic s/p kyphoplasty. Biopsy was benign  4. BRAYAN  5. Radiation colitis  6. ? Early dementia    PLAN:  - As above. Simple monitoring  - S/p chemo-RT w capecitabine 11/18/20. S/p FOLFOX x 4 months completed 5/21  - Serial CEA, iron panel  - Continue following with Dr. Terri Drew, as well as GI Dr Mendez Vu for serial endoscopic needs. Last colo 9/21. Next planned 9/23  - MMR without loss of expression.  - LGSIL: Follows with Dr. Brendan Oliveira  - L4 and L5 compression fracture: seeing Dr Checo Philip. Has h/o osteopenia: advised f/u w PCP also for bone directed therapy.    - Neuropathy: Gabapentin 600 3 times daily. Also on paxil   - BRAYAN: IV iron prn    RTC in 6 months with MD with labs, CEA, iron panel. CT CAP at 1 year. Follow-up with gastroenterology for colonoscopy in September 2023. Thank you Dr Kareem Weiss for allowing us to paticipate in care of this pleasant patient.  Please call w/ any sandra Khan MD, MD  [address] [place] [address],[address]  Hematology Oncology  [address] [place] [address]  Office : [phone]  Fax : [phone]

## 2023-02-14 NOTE — PATIENT INSTRUCTIONS
Patient Instructions from Today's Visit    Reason for Visit:  Follow up    Diagnosis Information:  [email or web address] net/about-us/asco-answers-patient-education-materials/oche-mgtvsgq-whcy-sheets    Plan:  Reviewed CAT scan findings. Follow up with your GI about your annual colonoscopy. Follow Up:  6 months    Recent Lab Results:  Hospital Outpatient Visit on 02/13/2023   Component Date Value Ref Range Status    POC Creatinine 02/13/2023 0.91  0.8 - 1.5 mg/dL Final    eGFR, POC 02/13/2023 >60  >60 ml/min/1.73m2 Final    Comment:      Pediatric calculator link: [employer] org/professionals/kdoqi/gfr_calculatorped       These results are not intended for use in patients <25years of age. eGFR results are calculated without a race factor using  the 2021 CKD-EPI equation. Careful clinical correlation is recommended, particularly when comparing to results calculated using previous equations. The CKD-EPI equation is less accurate in patients with extremes of muscle mass, extra-renal metabolism of creatinine, excessive creatine ingestion, or following therapy that affects renal tubular secretion. Treatment Summary has been discussed and given to patient: n/a        -------------------------------------------------------------------------------------------------------------------  Please call our office at [phone] if you have any  of the following symptoms:   Fever of 100.5 or greater  Chills  Shortness of breath  Swelling or pain in one leg    After office hours an answering service is available and will contact a provider for emergencies or if you are experiencing any of the above symptoms. Patient did express an interest in My Chart. My Chart log in information explained on the after visit summary printout at the [facility] desk.     Rudy Norwood RN

## 2023-03-02 ENCOUNTER — HOSPITAL ENCOUNTER (OUTPATIENT)
Dept: MAMMOGRAPHY | Age: 73
Discharge: HOME | End: 2023-03-02
Payer: MEDICARE

## 2023-03-02 DIAGNOSIS — Z12.31 VISIT FOR SCREENING MAMMOGRAM: ICD-10-CM

## 2023-03-02 PROCEDURE — 77063 BREAST TOMOSYNTHESIS BI: CPT

## 2023-03-07 NOTE — RESULT ENCOUNTER NOTE
Mammogram shows no suspicious areas of concern. Remind to do monthly self breast exams. Plan to repeat imaging in 1 year. Also please move her fasting labs to be 3-7 days prior to her upcoming appt on 3/13/23 rather than in April as currently scheduled.

## 2023-03-08 DIAGNOSIS — E78.2 MIXED HYPERLIPIDEMIA: ICD-10-CM

## 2023-03-08 RX ORDER — FENOFIBRATE 160 MG/1
TABLET ORAL
Qty: 90 TABLET | Refills: 3 | OUTPATIENT
Start: 2023-03-08

## 2023-03-13 ENCOUNTER — OFFICE VISIT (OUTPATIENT)
Dept: INTERNAL MEDICINE CLINIC | Facility: CLINIC | Age: 73
End: 2023-03-13
Payer: MEDICARE

## 2023-03-13 ENCOUNTER — HOSPITAL ENCOUNTER (OUTPATIENT)
Dept: CT IMAGING | Age: 73
Discharge: HOME | End: 2023-03-16
Payer: MEDICARE

## 2023-03-13 VITALS
WEIGHT: 152 LBS | TEMPERATURE: 98.4 F | HEIGHT: 61 IN | OXYGEN SATURATION: 99 % | SYSTOLIC BLOOD PRESSURE: 136 MMHG | HEART RATE: 85 BPM | DIASTOLIC BLOOD PRESSURE: 78 MMHG | BODY MASS INDEX: 28.7 KG/M2

## 2023-03-13 DIAGNOSIS — M95.2 ACQUIRED FACIAL ASYMMETRY: ICD-10-CM

## 2023-03-13 DIAGNOSIS — Z85.048 PERSONAL HISTORY OF RECTAL CANCER: ICD-10-CM

## 2023-03-13 DIAGNOSIS — R46.89 COGNITIVE AND BEHAVIORAL CHANGES: ICD-10-CM

## 2023-03-13 DIAGNOSIS — E78.2 MIXED HYPERLIPIDEMIA: ICD-10-CM

## 2023-03-13 DIAGNOSIS — F41.8 DEPRESSION WITH ANXIETY: ICD-10-CM

## 2023-03-13 DIAGNOSIS — R41.89 COGNITIVE AND BEHAVIORAL CHANGES: ICD-10-CM

## 2023-03-13 DIAGNOSIS — R80.9 TYPE 2 DIABETES MELLITUS WITH MICROALBUMINURIA, WITHOUT LONG-TERM CURRENT USE OF INSULIN (HCC): Primary | ICD-10-CM

## 2023-03-13 DIAGNOSIS — R53.1 WEAKNESS OF LEFT SIDE OF BODY: ICD-10-CM

## 2023-03-13 DIAGNOSIS — I10 HTN (HYPERTENSION), BENIGN: ICD-10-CM

## 2023-03-13 DIAGNOSIS — Z86.16 HISTORY OF COVID-19: ICD-10-CM

## 2023-03-13 DIAGNOSIS — E55.9 VITAMIN D DEFICIENCY: ICD-10-CM

## 2023-03-13 DIAGNOSIS — E11.29 TYPE 2 DIABETES MELLITUS WITH MICROALBUMINURIA, WITHOUT LONG-TERM CURRENT USE OF INSULIN (HCC): Primary | ICD-10-CM

## 2023-03-13 LAB — HBA1C MFR BLD: 7.7 %

## 2023-03-13 PROCEDURE — 3046F HEMOGLOBIN A1C LEVEL >9.0%: CPT | Performed by: PHYSICIAN ASSISTANT

## 2023-03-13 PROCEDURE — 3078F DIAST BP <80 MM HG: CPT | Performed by: PHYSICIAN ASSISTANT

## 2023-03-13 PROCEDURE — 3017F COLORECTAL CA SCREEN DOC REV: CPT | Performed by: PHYSICIAN ASSISTANT

## 2023-03-13 PROCEDURE — 1090F PRES/ABSN URINE INCON ASSESS: CPT | Performed by: PHYSICIAN ASSISTANT

## 2023-03-13 PROCEDURE — 1123F ACP DISCUSS/DSCN MKR DOCD: CPT | Performed by: PHYSICIAN ASSISTANT

## 2023-03-13 PROCEDURE — 3075F SYST BP GE 130 - 139MM HG: CPT | Performed by: PHYSICIAN ASSISTANT

## 2023-03-13 PROCEDURE — G8417 CALC BMI ABV UP PARAM F/U: HCPCS | Performed by: PHYSICIAN ASSISTANT

## 2023-03-13 PROCEDURE — 1036F TOBACCO NON-USER: CPT | Performed by: PHYSICIAN ASSISTANT

## 2023-03-13 PROCEDURE — 99215 OFFICE O/P EST HI 40 MIN: CPT | Performed by: PHYSICIAN ASSISTANT

## 2023-03-13 PROCEDURE — 83036 HEMOGLOBIN GLYCOSYLATED A1C: CPT | Performed by: PHYSICIAN ASSISTANT

## 2023-03-13 PROCEDURE — G8427 DOCREV CUR MEDS BY ELIG CLIN: HCPCS | Performed by: PHYSICIAN ASSISTANT

## 2023-03-13 PROCEDURE — 2022F DILAT RTA XM EVC RTNOPTHY: CPT | Performed by: PHYSICIAN ASSISTANT

## 2023-03-13 PROCEDURE — G8399 PT W/DXA RESULTS DOCUMENT: HCPCS | Performed by: PHYSICIAN ASSISTANT

## 2023-03-13 PROCEDURE — G8484 FLU IMMUNIZE NO ADMIN: HCPCS | Performed by: PHYSICIAN ASSISTANT

## 2023-03-13 PROCEDURE — 70450 CT HEAD/BRAIN W/O DYE: CPT

## 2023-03-13 RX ORDER — PAROXETINE HYDROCHLORIDE 40 MG/1
40 TABLET, FILM COATED ORAL DAILY
Qty: 90 TABLET | Refills: 3 | Status: SHIPPED | OUTPATIENT
Start: 2023-03-13

## 2023-03-13 RX ORDER — LISINOPRIL 10 MG/1
10 TABLET ORAL DAILY
Qty: 90 TABLET | Refills: 3 | Status: CANCELLED | OUTPATIENT
Start: 2023-03-13

## 2023-03-13 ASSESSMENT — ENCOUNTER SYMPTOMS: SHORTNESS OF BREATH: 0

## 2023-03-13 NOTE — PATIENT INSTRUCTIONS
Start Tradjenta 5 mg daily - samples given  Emphasized the importance of avoiding sugared soda - MUST be diet or zero sugar versions  Advised to flavor tea with Stevia or Splenda  Encouraged  to take over medication management to ensure she's taking everything correctly and reliably  Contacted [facility] pharmacy to initiate refill of Lisinopril for patient  Advised to contact oncologist for refills of Cymbalta which she takes for neuropathy  Asked patient to ensure that she is taking Crestor nightly  Monitor blood pressure 2 times/week and keep record to bring to next appointment  Continue chronic medications as prescribed  Monitor blood sugar daily and keep record to bring to next appointment  Reminded to stay well hydrated with plenty of water

## 2023-03-13 NOTE — RESULT ENCOUNTER NOTE
Results reviewed with patient and her  over the phone. Will proceed with MRI to investigate further.

## 2023-03-16 DIAGNOSIS — R80.9 TYPE 2 DIABETES MELLITUS WITH MICROALBUMINURIA, WITHOUT LONG-TERM CURRENT USE OF INSULIN (HCC): Primary | ICD-10-CM

## 2023-03-16 DIAGNOSIS — E11.29 TYPE 2 DIABETES MELLITUS WITH MICROALBUMINURIA, WITHOUT LONG-TERM CURRENT USE OF INSULIN (HCC): Primary | ICD-10-CM

## 2023-03-16 NOTE — TELEPHONE ENCOUNTER
Patient called and requesting a refill for her Accu Check Nasra and test strips. She states hers stopped working.

## 2023-03-17 RX ORDER — BLOOD-GLUCOSE METER
EACH MISCELLANEOUS
Qty: 1 KIT | Refills: 0 | Status: SHIPPED | OUTPATIENT
Start: 2023-03-17

## 2023-03-17 RX ORDER — BLOOD SUGAR DIAGNOSTIC
STRIP MISCELLANEOUS
Qty: 100 EACH | Refills: 3 | Status: SHIPPED | OUTPATIENT
Start: 2023-03-17

## 2023-03-29 ENCOUNTER — HOSPITAL ENCOUNTER (OUTPATIENT)
Dept: MRI IMAGING | Age: 73
Discharge: HOME | End: 2023-04-01
Payer: MEDICARE

## 2023-03-29 DIAGNOSIS — R41.89 COGNITIVE AND BEHAVIORAL CHANGES: ICD-10-CM

## 2023-03-29 DIAGNOSIS — R46.89 COGNITIVE AND BEHAVIORAL CHANGES: ICD-10-CM

## 2023-03-29 DIAGNOSIS — Z86.16 HISTORY OF COVID-19: ICD-10-CM

## 2023-03-29 DIAGNOSIS — M95.2 ACQUIRED FACIAL ASYMMETRY: ICD-10-CM

## 2023-03-29 DIAGNOSIS — Z85.048 PERSONAL HISTORY OF RECTAL CANCER: ICD-10-CM

## 2023-03-29 DIAGNOSIS — R53.1 WEAKNESS OF LEFT SIDE OF BODY: ICD-10-CM

## 2023-03-29 PROCEDURE — 6360000004 HC RX CONTRAST MEDICATION: Performed by: PHYSICIAN ASSISTANT

## 2023-03-29 PROCEDURE — 70553 MRI BRAIN STEM W/O & W/DYE: CPT

## 2023-03-29 PROCEDURE — 6360000002 HC RX W HCPCS: Performed by: PHYSICIAN ASSISTANT

## 2023-03-29 PROCEDURE — A9579 GAD-BASE MR CONTRAST NOS,1ML: HCPCS | Performed by: PHYSICIAN ASSISTANT

## 2023-03-29 RX ORDER — HEPARIN SODIUM (PORCINE) LOCK FLUSH IV SOLN 100 UNIT/ML 100 UNIT/ML
SOLUTION INTRAVENOUS
Status: COMPLETED | OUTPATIENT
Start: 2023-03-29 | End: 2023-03-29

## 2023-03-29 RX ORDER — HEPARIN SODIUM (PORCINE) LOCK FLUSH IV SOLN 100 UNIT/ML 100 UNIT/ML
500 SOLUTION INTRAVENOUS ONCE
Status: DISCONTINUED | OUTPATIENT
Start: 2023-03-29 | End: 2023-04-02 | Stop reason: HOSPADM

## 2023-03-29 RX ADMIN — HEPARIN 500 UNITS: 100 SYRINGE at 15:31

## 2023-03-29 RX ADMIN — GADOTERIDOL 14 ML: 279.3 INJECTION, SOLUTION INTRAVENOUS at 15:42

## 2023-04-04 DIAGNOSIS — R46.89 COGNITIVE AND BEHAVIORAL CHANGES: ICD-10-CM

## 2023-04-04 DIAGNOSIS — I63.81 CEREBROVASCULAR ACCIDENT (CVA) OF THALAMUS (HCC): Primary | ICD-10-CM

## 2023-04-04 DIAGNOSIS — M95.2 ACQUIRED FACIAL ASYMMETRY: ICD-10-CM

## 2023-04-04 DIAGNOSIS — R53.1 WEAKNESS OF LEFT SIDE OF BODY: ICD-10-CM

## 2023-04-04 DIAGNOSIS — Z86.16 HISTORY OF COVID-19: ICD-10-CM

## 2023-04-04 DIAGNOSIS — R41.89 COGNITIVE AND BEHAVIORAL CHANGES: ICD-10-CM

## 2023-04-07 DIAGNOSIS — E55.9 VITAMIN D DEFICIENCY: ICD-10-CM

## 2023-04-07 DIAGNOSIS — R80.9 TYPE 2 DIABETES MELLITUS WITH MICROALBUMINURIA, WITHOUT LONG-TERM CURRENT USE OF INSULIN (HCC): ICD-10-CM

## 2023-04-07 DIAGNOSIS — M95.2 ACQUIRED FACIAL ASYMMETRY: ICD-10-CM

## 2023-04-07 DIAGNOSIS — E78.2 MIXED HYPERLIPIDEMIA: ICD-10-CM

## 2023-04-07 DIAGNOSIS — E11.29 TYPE 2 DIABETES MELLITUS WITH MICROALBUMINURIA, WITHOUT LONG-TERM CURRENT USE OF INSULIN (HCC): ICD-10-CM

## 2023-04-07 DIAGNOSIS — R53.1 WEAKNESS OF LEFT SIDE OF BODY: ICD-10-CM

## 2023-04-07 DIAGNOSIS — Z86.16 HISTORY OF COVID-19: ICD-10-CM

## 2023-04-07 DIAGNOSIS — I10 HTN (HYPERTENSION), BENIGN: ICD-10-CM

## 2023-04-07 DIAGNOSIS — R46.89 COGNITIVE AND BEHAVIORAL CHANGES: ICD-10-CM

## 2023-04-07 DIAGNOSIS — R41.89 COGNITIVE AND BEHAVIORAL CHANGES: ICD-10-CM

## 2023-04-07 DIAGNOSIS — I63.81 CEREBROVASCULAR ACCIDENT (CVA) OF THALAMUS (HCC): ICD-10-CM

## 2023-04-07 LAB
CREAT UR-MCNC: 135 MG/DL
MICROALBUMIN UR-MCNC: 8.84 MG/DL (ref 0–3)
MICROALBUMIN/CREAT UR-RTO: 65 MG/G (ref 0–30)

## 2023-04-08 LAB
ALBUMIN SERPL-MCNC: 4.1 G/DL (ref 3.2–4.6)
ALBUMIN/GLOB SERPL: 1.3 (ref 0.4–1.6)
ALP SERPL-CCNC: 116 U/L (ref 50–136)
ALT SERPL-CCNC: 43 U/L (ref 12–65)
ANION GAP SERPL CALC-SCNC: 6 MMOL/L (ref 2–11)
AST SERPL-CCNC: 32 U/L (ref 15–37)
BILIRUB SERPL-MCNC: 0.5 MG/DL (ref 0.2–1.1)
BUN SERPL-MCNC: 22 MG/DL (ref 8–23)
CALCIUM SERPL-MCNC: 9.8 MG/DL (ref 8.3–10.4)
CHLORIDE SERPL-SCNC: 104 MMOL/L (ref 101–110)
CHOLEST SERPL-MCNC: 203 MG/DL
CO2 SERPL-SCNC: 24 MMOL/L (ref 21–32)
CREAT SERPL-MCNC: 1.3 MG/DL (ref 0.6–1)
CRP SERPL-MCNC: <0.3 MG/DL (ref 0–0.9)
ERYTHROCYTE [SEDIMENTATION RATE] IN BLOOD: 20 MM/HR (ref 0–30)
GLOBULIN SER CALC-MCNC: 3.2 G/DL (ref 2.8–4.5)
GLUCOSE SERPL-MCNC: 150 MG/DL (ref 65–100)
HDLC SERPL-MCNC: 46 MG/DL (ref 40–60)
HDLC SERPL: 4.4
LDLC SERPL CALC-MCNC: ABNORMAL MG/DL
LDLC SERPL DIRECT ASSAY-MCNC: 122 MG/DL
POTASSIUM SERPL-SCNC: 4.2 MMOL/L (ref 3.5–5.1)
PROT SERPL-MCNC: 7.3 G/DL (ref 6.3–8.2)
SODIUM SERPL-SCNC: 134 MMOL/L (ref 133–143)
TRIGL SERPL-MCNC: 450 MG/DL (ref 35–150)
VLDLC SERPL CALC-MCNC: 90 MG/DL (ref 6–23)

## 2023-04-09 LAB — 25(OH)D3 SERPL-MCNC: 30.8 NG/ML (ref 30–100)

## 2023-04-10 LAB
EST. AVERAGE GLUCOSE BLD GHB EST-MCNC: 180 MG/DL
HBA1C MFR BLD: 7.9 % (ref 4.8–5.6)

## 2023-04-17 DIAGNOSIS — M81.8 OTHER OSTEOPOROSIS WITHOUT CURRENT PATHOLOGICAL FRACTURE: Primary | ICD-10-CM

## 2023-04-17 RX ORDER — TERIPARATIDE 250 UG/ML
20 INJECTION, SOLUTION SUBCUTANEOUS DAILY
Qty: 2.48 ML | Refills: 5 | Status: SHIPPED | OUTPATIENT
Start: 2023-04-17

## 2023-04-17 NOTE — TELEPHONE ENCOUNTER
[employer] sent a fax requesting a printed RX for pt's Forteo be faxed to them to complete her application, please.

## 2023-04-18 ENCOUNTER — HOSPITAL ENCOUNTER (OUTPATIENT)
Dept: PHYSICAL THERAPY | Age: 73
Setting detail: RECURRING SERIES
Discharge: HOME | End: 2023-04-21
Payer: MEDICARE

## 2023-04-18 PROCEDURE — 92523 SPEECH SOUND LANG COMPREHEN: CPT

## 2023-04-18 PROCEDURE — 97161 PT EVAL LOW COMPLEX 20 MIN: CPT

## 2023-04-18 NOTE — PROGRESS NOTES
Kateryna Macedo  : 1950  Primary: [address] Medicare (Medicare Managed)  Secondary:  [facility] [address] [address]  Phone: [phone]  Fax: [phone] Plan Frequency: up to 2 x's per week until goals met, reassessment or discharge    Plan of Care/Certification Expiration Date: 23      >PT Visit Info:  Plan Frequency: up to 2 x's per week until goals met, reassessment or discharge  Plan of Care/Certification Expiration Date: 23  Total # of Visits Approved: 1  Total # of Visits to Date: 1  PT Insurance Information: [employer] PPO (Erin Jamesa FA)  Progress Note Counter: 1  Progress Note Due Date:  (visit 10)      Visit Count:  1    OUTPATIENT PHYSICAL THERAPY:OP NOTE TYPE: Treatment Note 2023       Episode  }Appt Desk             Treatment Diagnosis:  Generalized Muscle Weakness (M62.81)  Other lack of cordination (R27.8)  Difficulty in walking, Not elsewhere classified (R26.2)  Other abnormalities of gait and mobility (R26.89)  Unspecified Lack of Coordination (R27.9)  Medical/Referring Diagnosis:  Cerebrovascular accident (CVA) of thalamus ([facility] [address] [I63.81]  Weakness of left side of body [R53.1]  Referring Physician:  Cole Herndon MD Orders:  PT Eval and Treat   Date of Onset:  Onset Date: 23     Allergies:   Codeine, Lidocaine, and Sulfa antibiotics  Restrictions/Precautions:  Restrictions/Precautions: Other (comment) (balance difficulties)  No data recorded     Interventions Planned (Treatment may consist of any combination of the following):    Current Treatment Recommendations: Strengthening; ROM; Functional mobility training; Transfer training; Balance training; Cognitive/Perceptual training; Endurance training; Neuromuscular re-education; Return to work related activity; Home exercise program; Safety education & training;  Therapeutic activities     >Subjective Comments:  I think I had a

## 2023-04-18 NOTE — THERAPY EVALUATION
KYPHOPLASTY LUMBAR 1 VERTEBRAL BODY (09/01/2021); and IR PORT PLACEMENT > 5 YEARS (01/28/2021). Social History/Living Environment:   Lives With: Spouse  Type of Home: Trailer (double wide on 9 acres of land)  Home Layout: One level  Home Access: Stairs to enter with rails (4 steps to end)  Entrance Stairs - Number of Steps: 4  Bathroom Shower/Tub: Walk-in shower     Prior Level of Function/Work/Activity:   Occupation: Retired  Receives Help From: Family  ADL Assistance: Independent    Ambulation Assistance: Independent  Transfer Assistance: Independent          Learning:   Does the patient/guardian have any barriers to learning?: No barriers  Will there be a co-learner?: No  What is the preferred language of the patient/guardian?: English  Is an  required?: No  How does the patient/guardian prefer to learn new concepts?: Listening; Reading; Demonstration       Fall Risk Scale: Corona Total Score: 15  Corona Fall Risk: Low (0-24)       Dominant Side:  right handed  Personal Factors:        Sex:  female        Age:  81 y.o. OBJECTIVE   Observations:  forward head and poor posture. Independent ambulation with side to side sway. Prior Level of Function: Independent . Current Level of Function:  Independent and guarded for balance deficits. Quality of Movement: short shuffling steps side to side sway and increased time. Sensation: intact , distal neuropathy loss noted LE's. Dermatomes:intact see above      Myotomes:LE strength at 4/5 and gluteal weakness at 3+ /5     Transfers:Indpendent      Balance Screen:  Balance  Sitting - Static: Good  Sitting - Dynamic: Good  Standing - Static: Fair  Standing - Dynamic: Fair  Safety Awareness  Safety awareness: Good  Balance and Gait:  See objective test below. EO intact ,  EC less than 3 seconds , increased sway denoted.         ASSESSMENT   Initial Assessment:  Nba Ortiz presents with imbalance, poor posture, and

## 2023-04-27 DIAGNOSIS — E11.29 TYPE 2 DIABETES MELLITUS WITH MICROALBUMINURIA, WITHOUT LONG-TERM CURRENT USE OF INSULIN (HCC): ICD-10-CM

## 2023-04-27 DIAGNOSIS — R80.9 TYPE 2 DIABETES MELLITUS WITH MICROALBUMINURIA, WITHOUT LONG-TERM CURRENT USE OF INSULIN (HCC): ICD-10-CM

## 2023-04-28 ENCOUNTER — HOSPITAL ENCOUNTER (OUTPATIENT)
Dept: PHYSICAL THERAPY | Age: 73
Setting detail: RECURRING SERIES
Discharge: HOME | End: 2023-05-01
Payer: MEDICARE

## 2023-04-28 PROCEDURE — 92507 TX SP LANG VOICE COMM INDIV: CPT

## 2023-05-01 NOTE — PROGRESS NOTES
Heather Pulido  : 1950  Primary: [employer]  Secondary:  [address] [address]  Phone: [phone]  Fax: [phone] No data recorded  No data recorded    SLP VISIT INFO  Effective Dates:    2023 TO 2023   Frequency/Duration   1-2 time(s) a week for 90 days     SLP Visit Info:  No Show: 0  Canceled Appointment: 0  Total # of Visits to Date: 2     OUTPATIENT SPEECH PATHOLOGY NOTE:Daily Note 2023  Appt Desk   Episode      Treatment Diagnosis: F80.1 Expressive Language Disorder  R41.841 Cognitive-Communication Deficit  Medical/Referring Diagnosis:  No admission diagnoses are documented for this encounter. Referring Physician:  Chadwick Quinones MD Orders:  Eval and Treat   Allergies:  Codeine, Lidocaine, and Sulfa antibiotics  Medications Last Reviewed:  2023  Subjective Comments:  Pt states she didn't sleep well last night and feels more difficulty with dizziness and balance. Pain:  Patient does not c/o pain. Interventions Planned: (Treatment may consist of any combination of the following)  Cognitive linguistic based treatment, Training in compensatory strategies, and Education  GOALS: (Goals have been discussed and agreed upon with patient.)  Short-Term Functional Goals: Time Frame: 12 weeks  Patient will use external memory aid with 80% accuracy with verbal and tactile cues to maximize memory skills. Patient will follow simple one-step directions utilizing compensatory strategies with 80% accuracy. Patient will complete a mental manipulation task with 60% accuracy and mod cueing in order to increase functional integration into environment. Patient will demonstrate recall of functional information following a/an (immediate, short term, long-term) delay with min cues in order to increase functional integration into environment at 80%.   Patient will complete

## 2023-05-02 ENCOUNTER — HOSPITAL ENCOUNTER (OUTPATIENT)
Dept: PHYSICAL THERAPY | Age: 73
Setting detail: RECURRING SERIES
Discharge: HOME | End: 2023-05-05
Payer: MEDICARE

## 2023-05-02 PROCEDURE — 92507 TX SP LANG VOICE COMM INDIV: CPT

## 2023-05-12 ENCOUNTER — HOSPITAL ENCOUNTER (OUTPATIENT)
Dept: PHYSICAL THERAPY | Age: 73
Setting detail: RECURRING SERIES
Discharge: HOME | End: 2023-05-15
Payer: MEDICARE

## 2023-05-12 PROCEDURE — 97112 NEUROMUSCULAR REEDUCATION: CPT

## 2023-05-12 PROCEDURE — 97110 THERAPEUTIC EXERCISES: CPT

## 2023-05-12 ASSESSMENT — PAIN SCALES - GENERAL: PAINLEVEL_OUTOF10: 0

## 2023-05-18 ENCOUNTER — HOSPITAL ENCOUNTER (OUTPATIENT)
Dept: PHYSICAL THERAPY | Age: 73
Setting detail: RECURRING SERIES
Discharge: HOME | End: 2023-05-21
Payer: MEDICARE

## 2023-05-18 PROCEDURE — 97112 NEUROMUSCULAR REEDUCATION: CPT

## 2023-05-18 PROCEDURE — 97110 THERAPEUTIC EXERCISES: CPT

## 2023-05-18 ASSESSMENT — PAIN SCALES - GENERAL: PAINLEVEL_OUTOF10: 0

## 2023-05-22 NOTE — PROGRESS NOTES
Boom Leggett  : 1950  Primary: [employer]  Secondary:  [address] [address]  Phone: [phone]  Fax: [phone] No data recorded  No data recorded    SLP VISIT INFO  Effective Dates:    2023 TO 2023   Frequency/Duration   1-2 time(s) a week for 90 days     SLP Visit Info:  No Show: 0  Canceled Appointment: 0  Total # of Visits to Date: 3     OUTPATIENT SPEECH PATHOLOGY NOTE:Daily Note 2023  Appt Desk   Episode      Treatment Diagnosis: F80.1 Expressive Language Disorder  R41.841 Cognitive-Communication Deficit  Medical/Referring Diagnosis:  No admission diagnoses are documented for this encounter. Referring Physician:  Jill Alfaro MD Orders:  Eval and Treat   Allergies:  Codeine, Lidocaine, and Sulfa antibiotics  Medications Last Reviewed:  2023  Subjective Comments:  Pt states she hasn't been sleeping well and feels tired. Pt has not been seen in 3 weeks d/t scheduling availability. Pain:  Patient does not c/o pain. Interventions Planned: (Treatment may consist of any combination of the following)  Cognitive linguistic based treatment, Training in compensatory strategies, and Education  GOALS: (Goals have been discussed and agreed upon with patient.)  Short-Term Functional Goals: Time Frame: 12 weeks  Patient will use external memory aid with 80% accuracy with verbal and tactile cues to maximize memory skills. Patient will follow simple one-step directions utilizing compensatory strategies with 80% accuracy. Patient will complete a mental manipulation task with 60% accuracy and mod cueing in order to increase functional integration into environment. Patient will demonstrate recall of functional information following a/an (immediate, short term, long-term) delay with min cues in order to increase functional integration into environment at 80%.   Patient will

## 2023-05-23 ENCOUNTER — HOSPITAL ENCOUNTER (OUTPATIENT)
Dept: PHYSICAL THERAPY | Age: 73
Setting detail: RECURRING SERIES
Discharge: HOME | End: 2023-05-26
Payer: MEDICARE

## 2023-05-23 PROCEDURE — 97110 THERAPEUTIC EXERCISES: CPT

## 2023-05-23 PROCEDURE — 97112 NEUROMUSCULAR REEDUCATION: CPT

## 2023-05-23 PROCEDURE — 92507 TX SP LANG VOICE COMM INDIV: CPT

## 2023-05-23 ASSESSMENT — PAIN SCALES - GENERAL: PAINLEVEL_OUTOF10: 0

## 2023-05-23 NOTE — PROGRESS NOTES
leg fwd and laterally with no UE assist Over 1/2 foam roll x 10 reps each leg fwd and laterally with no UE assist      Step taps     6 inch step x 20 reps fwd and cross with no UE assist 6 inch step x 20 reps fwd and cross with no UE assist 6 inch step x 20 reps fwd and cross with no UE assist      Marching    4 laps in hallway 4 laps in hallway      Sidestepping    4 laps in hallway 4 laps in hallway      Crossovers           [place]           Walking  backwards             Tandem walking           Weaving in/out of cones      4 laps in hallway 4 laps in hallway      Picking up cones             Sports cord             [employer]           Kick ball           Figure 8s            Circles right/left           Walking with 360 degree turns           Spirals           Weight shifting:    Left & Right             Weight shifting: Forward & Backward              Static Standing Balance             Standing with feet apart     Blue foams eyes open with head turns and up/down, and blue foams eyes closed Blue foams eyes open with head turns and up/down, and blue foams eyes closed Blue foams eyes open with head turns and up/down, and blue foams eyes closed      Standing with feet together             Standing with feet semitandem   Blue foams eyes open and closed Blue foams eyes open and closed Blue foams eyes open and closed      Standing with feet tandem           Single leg stance           X1/X2 Viewing exercises             Hallpike-Hank testing for BPPV (Benign Paroxysmal Positional Vertigo)             Reed-Daroff exercises           Canalith Repositioning treatment/Epley Maneuver  for BPPV (Benign Paroxysmal Positional Vertigo)           Smart Equitest Training: See scanned report. Treatment/Session Summary:    >Treatment Assessment:   Patient tolerated treatment well with several sitting rest breaks due to fatigue.   Communication/Consultation:  None today  Equipment provided today:

## 2023-05-30 DIAGNOSIS — I10 ESSENTIAL (PRIMARY) HYPERTENSION: ICD-10-CM

## 2023-05-30 RX ORDER — LISINOPRIL 10 MG/1
TABLET ORAL
Qty: 90 TABLET | Refills: 3 | OUTPATIENT
Start: 2023-05-30

## 2023-06-01 ENCOUNTER — HOSPITAL ENCOUNTER (OUTPATIENT)
Dept: PHYSICAL THERAPY | Age: 73
Setting detail: RECURRING SERIES
Discharge: HOME | End: 2023-06-04
Payer: MEDICARE

## 2023-06-01 PROCEDURE — 92507 TX SP LANG VOICE COMM INDIV: CPT

## 2023-06-01 PROCEDURE — 97112 NEUROMUSCULAR REEDUCATION: CPT

## 2023-06-01 PROCEDURE — 97110 THERAPEUTIC EXERCISES: CPT

## 2023-06-01 ASSESSMENT — PAIN SCALES - GENERAL: PAINLEVEL_OUTOF10: 0

## 2023-06-08 ENCOUNTER — HOSPITAL ENCOUNTER (OUTPATIENT)
Dept: PHYSICAL THERAPY | Age: 73
Setting detail: RECURRING SERIES
End: 2023-06-08
Payer: MEDICARE

## 2023-06-15 ENCOUNTER — HOSPITAL ENCOUNTER (OUTPATIENT)
Dept: PHYSICAL THERAPY | Age: 73
Setting detail: RECURRING SERIES
Discharge: HOME | End: 2023-06-18
Payer: MEDICARE

## 2023-06-15 PROCEDURE — 97112 NEUROMUSCULAR REEDUCATION: CPT

## 2023-06-15 PROCEDURE — 92507 TX SP LANG VOICE COMM INDIV: CPT

## 2023-06-15 PROCEDURE — 97110 THERAPEUTIC EXERCISES: CPT

## 2023-06-15 ASSESSMENT — PAIN SCALES - GENERAL: PAINLEVEL_OUTOF10: 0

## 2023-06-22 ENCOUNTER — HOSPITAL ENCOUNTER (OUTPATIENT)
Dept: PHYSICAL THERAPY | Age: 73
Setting detail: RECURRING SERIES
Discharge: HOME | End: 2023-06-25
Payer: MEDICARE

## 2023-06-22 PROCEDURE — 92507 TX SP LANG VOICE COMM INDIV: CPT

## 2023-06-22 PROCEDURE — 97112 NEUROMUSCULAR REEDUCATION: CPT

## 2023-06-22 PROCEDURE — 97110 THERAPEUTIC EXERCISES: CPT

## 2023-06-22 ASSESSMENT — PAIN SCALES - GENERAL: PAINLEVEL_OUTOF10: 0

## 2023-06-29 ENCOUNTER — HOSPITAL ENCOUNTER (OUTPATIENT)
Dept: PHYSICAL THERAPY | Age: 73
Setting detail: RECURRING SERIES
End: 2023-06-29
Payer: MEDICARE

## 2023-06-29 PROCEDURE — 97112 NEUROMUSCULAR REEDUCATION: CPT

## 2023-06-29 PROCEDURE — 92507 TX SP LANG VOICE COMM INDIV: CPT

## 2023-06-29 PROCEDURE — 97110 THERAPEUTIC EXERCISES: CPT

## 2023-06-29 ASSESSMENT — PAIN SCALES - GENERAL: PAINLEVEL_OUTOF10: 0

## 2023-07-05 ENCOUNTER — HOSPITAL ENCOUNTER (OUTPATIENT)
Dept: LAB | Age: 73
Discharge: HOME | End: 2023-07-08

## 2023-07-05 DIAGNOSIS — E78.2 MIXED HYPERLIPIDEMIA: ICD-10-CM

## 2023-07-05 DIAGNOSIS — E55.9 VITAMIN D DEFICIENCY: ICD-10-CM

## 2023-07-05 DIAGNOSIS — N28.9 RENAL INSUFFICIENCY: ICD-10-CM

## 2023-07-05 DIAGNOSIS — I10 ESSENTIAL (PRIMARY) HYPERTENSION: ICD-10-CM

## 2023-07-05 DIAGNOSIS — R80.9 TYPE 2 DIABETES MELLITUS WITH MICROALBUMINURIA, WITHOUT LONG-TERM CURRENT USE OF INSULIN (HCC): ICD-10-CM

## 2023-07-05 DIAGNOSIS — E11.29 TYPE 2 DIABETES MELLITUS WITH MICROALBUMINURIA, WITHOUT LONG-TERM CURRENT USE OF INSULIN (HCC): ICD-10-CM

## 2023-07-05 LAB
25(OH)D3 SERPL-MCNC: 15.1 NG/ML (ref 30–100)
ALBUMIN SERPL-MCNC: 3.8 G/DL (ref 3.2–4.6)
ALBUMIN/GLOB SERPL: 1 (ref 0.4–1.6)
ALP SERPL-CCNC: 117 U/L (ref 50–136)
ALT SERPL-CCNC: 32 U/L (ref 12–65)
ANION GAP SERPL CALC-SCNC: 8 MMOL/L (ref 2–11)
AST SERPL-CCNC: 24 U/L (ref 15–37)
BILIRUB SERPL-MCNC: 0.7 MG/DL (ref 0.2–1.1)
BUN SERPL-MCNC: 14 MG/DL (ref 8–23)
CALCIUM SERPL-MCNC: 10.6 MG/DL (ref 8.3–10.4)
CHLORIDE SERPL-SCNC: 104 MMOL/L (ref 101–110)
CHOLEST SERPL-MCNC: 199 MG/DL
CO2 SERPL-SCNC: 24 MMOL/L (ref 21–32)
CREAT SERPL-MCNC: 1 MG/DL (ref 0.6–1)
GLOBULIN SER CALC-MCNC: 3.8 G/DL (ref 2.8–4.5)
GLUCOSE SERPL-MCNC: 162 MG/DL (ref 65–100)
HDLC SERPL-MCNC: 49 MG/DL (ref 40–60)
HDLC SERPL: 4.1
LDLC SERPL CALC-MCNC: ABNORMAL MG/DL
POTASSIUM SERPL-SCNC: 3.9 MMOL/L (ref 3.5–5.1)
PROT SERPL-MCNC: 7.6 G/DL (ref 6.3–8.2)
SODIUM SERPL-SCNC: 136 MMOL/L (ref 133–143)
TRIGL SERPL-MCNC: 422 MG/DL (ref 35–150)
VLDLC SERPL CALC-MCNC: 84.4 MG/DL (ref 6–23)

## 2023-07-06 LAB
EST. AVERAGE GLUCOSE BLD GHB EST-MCNC: 174 MG/DL
HBA1C MFR BLD: 7.7 % (ref 4.8–5.6)
LDLC SERPL DIRECT ASSAY-MCNC: 111 MG/DL

## 2023-07-11 ENCOUNTER — OFFICE VISIT (OUTPATIENT)
Dept: INTERNAL MEDICINE CLINIC | Facility: CLINIC | Age: 73
End: 2023-07-11
Payer: MEDICARE

## 2023-07-11 VITALS
DIASTOLIC BLOOD PRESSURE: 80 MMHG | WEIGHT: 151 LBS | TEMPERATURE: 97.9 F | OXYGEN SATURATION: 98 % | BODY MASS INDEX: 28.51 KG/M2 | SYSTOLIC BLOOD PRESSURE: 130 MMHG | HEIGHT: 61 IN | HEART RATE: 91 BPM

## 2023-07-11 DIAGNOSIS — R80.9 TYPE 2 DIABETES MELLITUS WITH MICROALBUMINURIA, WITHOUT LONG-TERM CURRENT USE OF INSULIN (HCC): Primary | ICD-10-CM

## 2023-07-11 DIAGNOSIS — D50.9 IRON DEFICIENCY ANEMIA, UNSPECIFIED IRON DEFICIENCY ANEMIA TYPE: ICD-10-CM

## 2023-07-11 DIAGNOSIS — F41.8 DEPRESSION WITH ANXIETY: ICD-10-CM

## 2023-07-11 DIAGNOSIS — R53.81 CHRONIC FATIGUE AND MALAISE: ICD-10-CM

## 2023-07-11 DIAGNOSIS — E11.29 TYPE 2 DIABETES MELLITUS WITH MICROALBUMINURIA, WITHOUT LONG-TERM CURRENT USE OF INSULIN (HCC): Primary | ICD-10-CM

## 2023-07-11 DIAGNOSIS — I10 ESSENTIAL (PRIMARY) HYPERTENSION: ICD-10-CM

## 2023-07-11 DIAGNOSIS — R53.82 CHRONIC FATIGUE AND MALAISE: ICD-10-CM

## 2023-07-11 DIAGNOSIS — E55.9 VITAMIN D DEFICIENCY: ICD-10-CM

## 2023-07-11 DIAGNOSIS — E78.2 MIXED HYPERLIPIDEMIA: ICD-10-CM

## 2023-07-11 LAB
BASOPHILS # BLD: 0.1 K/UL (ref 0–0.2)
BASOPHILS NFR BLD: 1 % (ref 0–2)
DIFFERENTIAL METHOD BLD: NORMAL
EOSINOPHIL # BLD: 0.2 K/UL (ref 0–0.8)
EOSINOPHIL NFR BLD: 3 % (ref 0.5–7.8)
ERYTHROCYTE [DISTWIDTH] IN BLOOD BY AUTOMATED COUNT: 13 % (ref 11.9–14.6)
HCT VFR BLD AUTO: 43.3 % (ref 35.8–46.3)
HGB BLD-MCNC: 14.8 G/DL (ref 11.7–15.4)
IMM GRANULOCYTES # BLD AUTO: 0 K/UL (ref 0–0.5)
IMM GRANULOCYTES NFR BLD AUTO: 0 % (ref 0–5)
LYMPHOCYTES # BLD: 0.8 K/UL (ref 0.5–4.6)
LYMPHOCYTES NFR BLD: 16 % (ref 13–44)
MCH RBC QN AUTO: 29.9 PG (ref 26.1–32.9)
MCHC RBC AUTO-ENTMCNC: 34.2 G/DL (ref 31.4–35)
MCV RBC AUTO: 87.5 FL (ref 82–102)
MONOCYTES # BLD: 0.4 K/UL (ref 0.1–1.3)
MONOCYTES NFR BLD: 7 % (ref 4–12)
NEUTS SEG # BLD: 3.7 K/UL (ref 1.7–8.2)
NEUTS SEG NFR BLD: 73 % (ref 43–78)
NRBC # BLD: 0 K/UL (ref 0–0.2)
PLATELET # BLD AUTO: 308 K/UL (ref 150–450)
PMV BLD AUTO: 10.7 FL (ref 9.4–12.3)
RBC # BLD AUTO: 4.95 M/UL (ref 4.05–5.2)
WBC # BLD AUTO: 5.1 K/UL (ref 4.3–11.1)

## 2023-07-11 PROCEDURE — 3079F DIAST BP 80-89 MM HG: CPT | Performed by: PHYSICIAN ASSISTANT

## 2023-07-11 PROCEDURE — G8427 DOCREV CUR MEDS BY ELIG CLIN: HCPCS | Performed by: PHYSICIAN ASSISTANT

## 2023-07-11 PROCEDURE — 1090F PRES/ABSN URINE INCON ASSESS: CPT | Performed by: PHYSICIAN ASSISTANT

## 2023-07-11 PROCEDURE — 1036F TOBACCO NON-USER: CPT | Performed by: PHYSICIAN ASSISTANT

## 2023-07-11 PROCEDURE — 3075F SYST BP GE 130 - 139MM HG: CPT | Performed by: PHYSICIAN ASSISTANT

## 2023-07-11 PROCEDURE — 3051F HG A1C>EQUAL 7.0%<8.0%: CPT | Performed by: PHYSICIAN ASSISTANT

## 2023-07-11 PROCEDURE — 1123F ACP DISCUSS/DSCN MKR DOCD: CPT | Performed by: PHYSICIAN ASSISTANT

## 2023-07-11 PROCEDURE — G8417 CALC BMI ABV UP PARAM F/U: HCPCS | Performed by: PHYSICIAN ASSISTANT

## 2023-07-11 PROCEDURE — 99215 OFFICE O/P EST HI 40 MIN: CPT | Performed by: PHYSICIAN ASSISTANT

## 2023-07-11 PROCEDURE — G8399 PT W/DXA RESULTS DOCUMENT: HCPCS | Performed by: PHYSICIAN ASSISTANT

## 2023-07-11 PROCEDURE — 2022F DILAT RTA XM EVC RTNOPTHY: CPT | Performed by: PHYSICIAN ASSISTANT

## 2023-07-11 PROCEDURE — 3017F COLORECTAL CA SCREEN DOC REV: CPT | Performed by: PHYSICIAN ASSISTANT

## 2023-07-11 RX ORDER — LISINOPRIL 10 MG/1
10 TABLET ORAL DAILY
Qty: 90 TABLET | Refills: 3 | Status: SHIPPED | OUTPATIENT
Start: 2023-07-11

## 2023-07-11 RX ORDER — ICOSAPENT ETHYL 1000 MG/1
2 CAPSULE ORAL 2 TIMES DAILY
Qty: 360 CAPSULE | Refills: 3 | Status: SHIPPED | OUTPATIENT
Start: 2023-07-11

## 2023-07-11 RX ORDER — BUPROPION HYDROCHLORIDE 150 MG/1
150 TABLET ORAL EVERY MORNING
Qty: 90 TABLET | Refills: 3 | Status: SHIPPED | OUTPATIENT
Start: 2023-07-11

## 2023-07-11 RX ORDER — ROSUVASTATIN CALCIUM 10 MG/1
10 TABLET, COATED ORAL NIGHTLY
Qty: 90 TABLET | Refills: 3 | Status: SHIPPED | OUTPATIENT
Start: 2023-07-11

## 2023-07-11 RX ORDER — FENOFIBRATE 160 MG/1
160 TABLET ORAL DAILY
Qty: 90 TABLET | Refills: 3 | Status: SHIPPED | OUTPATIENT
Start: 2023-07-11

## 2023-07-11 ASSESSMENT — ENCOUNTER SYMPTOMS
SHORTNESS OF BREATH: 0
DIARRHEA: 0
CONSTIPATION: 0

## 2023-07-11 ASSESSMENT — PATIENT HEALTH QUESTIONNAIRE - PHQ9
4. FEELING TIRED OR HAVING LITTLE ENERGY: 3
2. FEELING DOWN, DEPRESSED OR HOPELESS: 1
6. FEELING BAD ABOUT YOURSELF - OR THAT YOU ARE A FAILURE OR HAVE LET YOURSELF OR YOUR FAMILY DOWN: 0
10. IF YOU CHECKED OFF ANY PROBLEMS, HOW DIFFICULT HAVE THESE PROBLEMS MADE IT FOR YOU TO DO YOUR WORK, TAKE CARE OF THINGS AT HOME, OR GET ALONG WITH OTHER PEOPLE: 2
8. MOVING OR SPEAKING SO SLOWLY THAT OTHER PEOPLE COULD HAVE NOTICED. OR THE OPPOSITE, BEING SO FIGETY OR RESTLESS THAT YOU HAVE BEEN MOVING AROUND A LOT MORE THAN USUAL: 1
7. TROUBLE CONCENTRATING ON THINGS, SUCH AS READING THE NEWSPAPER OR WATCHING TELEVISION: 2
3. TROUBLE FALLING OR STAYING ASLEEP: 3
SUM OF ALL RESPONSES TO PHQ QUESTIONS 1-9: 16
SUM OF ALL RESPONSES TO PHQ9 QUESTIONS 1 & 2: 4
SUM OF ALL RESPONSES TO PHQ QUESTIONS 1-9: 16
9. THOUGHTS THAT YOU WOULD BE BETTER OFF DEAD, OR OF HURTING YOURSELF: 0
1. LITTLE INTEREST OR PLEASURE IN DOING THINGS: 3
5. POOR APPETITE OR OVEREATING: 3

## 2023-07-11 NOTE — PROGRESS NOTES
Jan Salazar (:  1950) is a 72 y.o. female,Established patient, here for evaluation of the following chief complaint(s):  Follow-up (Diabetes, Hypertension, Hyperlipidemia) and Discuss Labs         ASSESSMENT/PLAN:  1. Type 2 diabetes mellitus with microalbuminuria, without long-term current use of insulin (HCC)  -     Comprehensive Metabolic Panel; Future  -     Hemoglobin A1C; Future  2. Mixed hyperlipidemia  -     rosuvastatin (CRESTOR) 10 MG tablet; Take 1 tablet by mouth at bedtime, Disp-90 tablet, R-3Normal  -     fenofibrate (TRIGLIDE) 160 MG tablet; Take 1 tablet by mouth daily, Disp-90 tablet, R-3Normal  -     Icosapent Ethyl (VASCEPA) 1 g CAPS capsule; Take 2 capsules by mouth 2 times daily, Disp-360 capsule, R-3Normal  -     Comprehensive Metabolic Panel; Future  -     Lipid Panel; Future  3. Essential (primary) hypertension  -     lisinopril (PRINIVIL;ZESTRIL) 10 MG tablet; Take 1 tablet by mouth daily, Disp-90 tablet, R-3Normal  -     Comprehensive Metabolic Panel; Future  4. Depression with anxiety  -     buPROPion (WELLBUTRIN XL) 150 MG extended release tablet; Take 1 tablet by mouth every morning, Disp-90 tablet, R-3Normal  5. Chronic fatigue and malaise  -     CBC with Auto Differential; Future  -     TSH; Future  6. Iron deficiency anemia, unspecified iron deficiency anemia type  -     CBC with Auto Differential; Future  7. Vitamin D deficiency  -     Vitamin D 25 Hydroxy;  Future      Patient Instructions   Add Wellbutrin  mg daily  Increase OTC vitamin d3 to 89614 iu (2 x 5000 iu) daily  Resume Crestor 10 mg nightly  Ensure compliance with Fenofibrate daily  Add Vascepa 2 grams twice daily in place of MegaRed supplement  Explained how critical it is to avoid sugary drinks with her current suboptimal glucose control and extremely elevated triglycerides  Monitor blood sugar daily (alternate between fasting & 2 hours after dinner) and keep record to bring to next

## 2023-07-11 NOTE — PATIENT INSTRUCTIONS
Add Wellbutrin  mg daily  Increase OTC vitamin d3 to 07564 iu (2 x 5000 iu) daily  Resume Crestor 10 mg nightly  Ensure compliance with Fenofibrate daily  Add Vascepa 2 grams twice daily in place of MegaRed supplement  Explained how critical it is to avoid sugary drinks with her current suboptimal glucose control and extremely elevated triglycerides  Monitor blood sugar daily (alternate between fasting & 2 hours after dinner) and keep record to bring to next appointment  Continue chronic medications as prescribed  Monitor blood pressure 2 times/week and keep record to bring to next appointment

## 2023-07-12 ENCOUNTER — HOSPITAL ENCOUNTER (OUTPATIENT)
Dept: PHYSICAL THERAPY | Age: 73
Setting detail: RECURRING SERIES
End: 2023-07-12
Payer: MEDICARE

## 2023-07-12 NOTE — PROGRESS NOTES
Paradise Stephens  : 1950  Primary: [facility] Choice-ppo Medicare  Secondary:  [facility] [address] [address]  Phone: [phone]  Fax: [phone]    PT Visit Info: Total # of Visits Approved: 10 (not including eval; -)  Total # of Visits to Date: 8  PT Insurance Information: [employer] P[employer] (Willetta Hussein FA)  Progress Note Counter: 5  Progress Note Due Date: 23  Canceled Appointment: 2 (2023)     OT Visit Info:  No data recorded    OUTPATIENT THERAPY: 2023  Episode  Appt Desk        Paradise Stephens cancelled her appointment for today due to reasons not given. Will plan to follow up next during next appointment.   Thank you,  Gulshan Balbuena, PT    Future Appointments   Date Time Provider [address]   2023  1:45 PM Gulshan Balbuena, PT [facility] SFE   2023  1:45 PM Gulshan Balbuena, PT [facility]   2023  2:00 PM [address] [place] [address] [address] [place] [address]   2023  2:30 PM TANG Grullon - LARS [facility] AMB   2023  5:30 AM [address] [place] [address]   2023  9:00 AM TANG Schuler NP [facility]   10/5/2023 11:00 AM SFE LAB DS [facility]   10/12/2023  1:00 PM CONOR Qureshi [facility] AM[facility]

## 2023-07-13 ENCOUNTER — TELEPHONE (OUTPATIENT)
Dept: ONCOLOGY | Age: 73
End: 2023-07-13

## 2023-07-13 ENCOUNTER — TELEPHONE (OUTPATIENT)
Dept: INTERNAL MEDICINE CLINIC | Facility: CLINIC | Age: 73
End: 2023-07-13

## 2023-07-13 RX ORDER — GABAPENTIN 300 MG/1
600 CAPSULE ORAL 3 TIMES DAILY
Qty: 540 CAPSULE | Refills: 3 | Status: SHIPPED | OUTPATIENT
Start: 2023-07-13 | End: 2024-07-07

## 2023-07-13 NOTE — TELEPHONE ENCOUNTER
Medication Requested: Gabapentin and Duloxetine    Is this medication a narcotic: no    Is the patient's pain controlled? yes -     Date of Last OV: 2/14/23    Date of Next OV: 8/14/23    Date last prescribed: Gabapentin 6/20/22 (1 yr supply), Duloxetine 2/14/22 (6 mo supply)    Last Rx fill in per [facility] site: N/A    Out Early: NO    Took Extra: NO    Not out early but not enough to make it until next appointment: Yes    [facility]ed [facility]acy[facility]    Action Taken: Gabapentin sent to pharm. We have not filled Duloxetine in a while so will not be refilling this rx at this time. Pt aware.

## 2023-07-13 NOTE — TELEPHONE ENCOUNTER
PA for Vascepa 1 gram capsules through [facility] denied. Pt must first try and fail atorvastatin and simvastatin. Appeal forms are on my desk if you want me to proceed with an appeal instead. Please advise.

## 2023-07-13 NOTE — RESULT ENCOUNTER NOTE
Blood counts look great, she is not anemic - not the cause of her fatigue. Let's focus on increasing vitamin d, eliminating liquid sugar, and adding Wellbutrin to help with the depression since all of these issues are contributing factors to her fatigue.

## 2023-07-13 NOTE — TELEPHONE ENCOUNTER
Pt called asked for refill of: Gabapentin 300 MG, and Duloxetine 20 MG to be sent to: [facility].

## 2023-07-19 ENCOUNTER — HOSPITAL ENCOUNTER (OUTPATIENT)
Dept: PHYSICAL THERAPY | Age: 73
Setting detail: RECURRING SERIES
Discharge: HOME | End: 2023-07-22
Payer: MEDICARE

## 2023-07-19 PROCEDURE — 97112 NEUROMUSCULAR REEDUCATION: CPT

## 2023-07-19 PROCEDURE — 97110 THERAPEUTIC EXERCISES: CPT

## 2023-07-19 ASSESSMENT — PAIN SCALES - GENERAL: PAINLEVEL_OUTOF10: 0

## 2023-07-19 NOTE — THERAPY RECERTIFICATION
referral,  Tessa Luevano, PT     Referring Physician Signature: Coco Speaker, CONOR _______________________________ Date _____________        Post Session Pain  Charge Capture  PT Visit Info MD Guidelines  Kiran

## 2023-07-19 NOTE — PROGRESS NOTES
Pako Nagel  : 1950  Primary:  [facility] Medicare (Medicare Managed)  Secondary:   [place] [address] [place] [address]  [address] [facility] [address]  Phone: [phone]  Fax: [phone] Plan Frequency: up to 2 x's per week until goals met, reassessment or discharge    Plan of Care/Certification Expiration Date: 10/17/23        >PT Visit Info:   Plan Frequency: up to 2 x's per week until goals met, reassessment or discharge  Plan of Care/Certification Expiration Date: 10/17/23  Total # of Visits Approved: 10 (not including eval; -)  Total # of Visits to Date: 5  PT Insurance Information: [employer] PPO ( [facility] FA)  Progress Note Counter: 5  Progress Note Due Date: 23  Canceled Appointment: 2 (2023)       OUTPATIENT PHYSICAL THERAPY:OP NOTE TYPE: Treatment Note 2023       Episode  }Appt Desk             Treatment Diagnosis:    Generalized Muscle Weakness (M62.81)  Other lack of cordination (R27.8)  Difficulty in walking, Not elsewhere classified (R26.2)  Other abnormalities of gait and mobility (R26.89)  Unspecified Lack of Coordination (R27.9)  Medical/Referring Diagnosis:   Cerebrovascular accident (CVA) of thalamus ([address]) [I63.81]  Weakness of left side of body [R53.1]  Referring Physician:  Monica Bose MD Orders:  PT Eval and Treat   Date of Onset:  Onset Date: 23     Allergies:   Codeine, Lidocaine, and Sulfa antibiotics  Restrictions/Precautions:  Restrictions/Precautions: Other (comment) (balance difficulties)     Interventions Planned (Treatment may consist of any combination of the following):    Current Treatment Recommendations: Strengthening; ROM; Functional mobility training; Transfer training; Balance training; Cognitive/Perceptual training; Endurance training; Neuromuscular re-education; Return to work related activity; Home exercise program; Safety education & training;  Therapeutic activities

## 2023-07-20 RX ORDER — TERIPARATIDE 250 UG/ML
INJECTION, SOLUTION SUBCUTANEOUS
Qty: 2.4 ML | Refills: 0 | OUTPATIENT
Start: 2023-07-20

## 2023-07-26 ENCOUNTER — HOSPITAL ENCOUNTER (OUTPATIENT)
Dept: PHYSICAL THERAPY | Age: 73
Setting detail: RECURRING SERIES
End: 2023-07-26
Payer: MEDICARE

## 2023-07-26 NOTE — PROGRESS NOTES
Hoang Brain  : 1950  Primary: [employer]  Secondary:  [facility] [address] [address]  Phone: [phone]  Fax: [phone]    PT Visit Info: Total # of Visits Approved: 10 (not including eval; -)  Total # of Visits to Date: 5  [address] [facility] [address]: [employer] (Havery Aleks FA)  Progress Note Counter: 5  Progress Note Due Date: 23  Canceled Appointment: 2 (2023)     OT Visit Info:  No data recorded    OUTPATIENT THERAPY: 2023  Episode  Appt Desk        Hoang Trevizo cancelled her appointment for today due to  not feeling well/no energy . Will plan to follow up next during next appointment.   Thank you,  Tegan Alaniz, PT    Future Appointments   Date Time Provider [address]   2023  1:45 PM Tegan Alaniz, PT [facility]   2023  2:00 PM [address] [place] [address]   2023  2:30 PM TANG Fajardo NP [facility]   2023  5:30 AM [address] [place] [address]   2023  9:00 AM TANG Solano NP [facility]   10/5/2023 11:00 AM [facility] LAB DS [facility]   10/12/2023  1:00 PM CONOR Qureshi [facility] AMB

## 2023-08-14 ENCOUNTER — OFFICE VISIT (OUTPATIENT)
Dept: ONCOLOGY | Age: 73
End: 2023-08-14
Payer: MEDICARE

## 2023-08-14 ENCOUNTER — HOSPITAL ENCOUNTER (OUTPATIENT)
Dept: LAB | Age: 73
Discharge: HOME | End: 2023-08-17
Payer: MEDICARE

## 2023-08-14 VITALS
DIASTOLIC BLOOD PRESSURE: 85 MMHG | SYSTOLIC BLOOD PRESSURE: 141 MMHG | HEIGHT: 61 IN | HEART RATE: 94 BPM | TEMPERATURE: 98.2 F | OXYGEN SATURATION: 96 % | WEIGHT: 150.4 LBS | BODY MASS INDEX: 28.4 KG/M2 | RESPIRATION RATE: 16 BRPM

## 2023-08-14 DIAGNOSIS — D50.9 IRON DEFICIENCY ANEMIA, UNSPECIFIED IRON DEFICIENCY ANEMIA TYPE: ICD-10-CM

## 2023-08-14 DIAGNOSIS — C20 RECTAL ADENOCARCINOMA (HCC): ICD-10-CM

## 2023-08-14 DIAGNOSIS — R87.612 LGSIL ON PAP SMEAR OF CERVIX: ICD-10-CM

## 2023-08-14 DIAGNOSIS — C20 RECTAL ADENOCARCINOMA (HCC): Primary | ICD-10-CM

## 2023-08-14 LAB
ALBUMIN SERPL-MCNC: 4.4 G/DL (ref 3.2–4.6)
ALBUMIN/GLOB SERPL: 1 (ref 0.4–1.6)
ALP SERPL-CCNC: 120 U/L (ref 50–136)
ALT SERPL-CCNC: 41 U/L (ref 12–65)
ANION GAP SERPL CALC-SCNC: 6 MMOL/L (ref 2–11)
AST SERPL-CCNC: 33 U/L (ref 15–37)
BASOPHILS # BLD: 0.1 K/UL (ref 0–0.2)
BASOPHILS NFR BLD: 2 % (ref 0–2)
BILIRUB SERPL-MCNC: 0.9 MG/DL (ref 0.2–1.1)
BUN SERPL-MCNC: 16 MG/DL (ref 8–23)
CALCIUM SERPL-MCNC: 9.9 MG/DL (ref 8.3–10.4)
CEA SERPL-MCNC: 2 NG/ML (ref 0–3)
CHLORIDE SERPL-SCNC: 103 MMOL/L (ref 101–110)
CO2 SERPL-SCNC: 28 MMOL/L (ref 21–32)
CREAT SERPL-MCNC: 1.1 MG/DL (ref 0.6–1)
DIFFERENTIAL METHOD BLD: ABNORMAL
EOSINOPHIL # BLD: 0.1 K/UL (ref 0–0.8)
EOSINOPHIL NFR BLD: 4 % (ref 0.5–7.8)
ERYTHROCYTE [DISTWIDTH] IN BLOOD BY AUTOMATED COUNT: 12.8 % (ref 11.9–14.6)
FERRITIN SERPL-MCNC: 365 NG/ML (ref 8–388)
GLOBULIN SER CALC-MCNC: 4.4 G/DL (ref 2.8–4.5)
GLUCOSE SERPL-MCNC: 189 MG/DL (ref 65–100)
HCT VFR BLD AUTO: 47.3 % (ref 35.8–46.3)
HGB BLD-MCNC: 15.8 G/DL (ref 11.7–15.4)
HGB RETIC QN AUTO: 35 PG (ref 29–35)
IMM GRANULOCYTES # BLD AUTO: 0 K/UL (ref 0–0.5)
IMM GRANULOCYTES NFR BLD AUTO: 1 % (ref 0–5)
IMM RETICS NFR: 8.3 % (ref 3–15.9)
IRON SATN MFR SERPL: 28 %
IRON SERPL-MCNC: 104 UG/DL (ref 35–150)
LYMPHOCYTES # BLD: 0.8 K/UL (ref 0.5–4.6)
LYMPHOCYTES NFR BLD: 22 % (ref 13–44)
MCH RBC QN AUTO: 29.3 PG (ref 26.1–32.9)
MCHC RBC AUTO-ENTMCNC: 33.4 G/DL (ref 31.4–35)
MCV RBC AUTO: 87.6 FL (ref 82–102)
MONOCYTES # BLD: 0.3 K/UL (ref 0.1–1.3)
MONOCYTES NFR BLD: 7 % (ref 4–12)
NEUTS SEG # BLD: 2.2 K/UL (ref 1.7–8.2)
NEUTS SEG NFR BLD: 64 % (ref 43–78)
NRBC # BLD: 0 K/UL (ref 0–0.2)
PLATELET # BLD AUTO: 305 K/UL (ref 150–450)
PMV BLD AUTO: 10 FL (ref 9.4–12.3)
POTASSIUM SERPL-SCNC: 3.8 MMOL/L (ref 3.5–5.1)
PROT SERPL-MCNC: 8.8 G/DL (ref 6.3–8.2)
RBC # BLD AUTO: 5.4 M/UL (ref 4.05–5.2)
RETICS # AUTO: 0.13 M/UL (ref 0.03–0.1)
RETICS/RBC NFR AUTO: 2.4 % (ref 0.3–2)
SODIUM SERPL-SCNC: 137 MMOL/L (ref 133–143)
TIBC SERPL-MCNC: 369 UG/DL (ref 250–450)
WBC # BLD AUTO: 3.5 K/UL (ref 4.3–11.1)

## 2023-08-14 PROCEDURE — 3078F DIAST BP <80 MM HG: CPT

## 2023-08-14 PROCEDURE — 83540 ASSAY OF IRON: CPT

## 2023-08-14 PROCEDURE — 3074F SYST BP LT 130 MM HG: CPT

## 2023-08-14 PROCEDURE — 1123F ACP DISCUSS/DSCN MKR DOCD: CPT

## 2023-08-14 PROCEDURE — 82378 CARCINOEMBRYONIC ANTIGEN: CPT

## 2023-08-14 PROCEDURE — 80053 COMPREHEN METABOLIC PANEL: CPT

## 2023-08-14 PROCEDURE — 99214 OFFICE O/P EST MOD 30 MIN: CPT

## 2023-08-14 PROCEDURE — 85046 RETICYTE/HGB CONCENTRATE: CPT

## 2023-08-14 PROCEDURE — 85025 COMPLETE CBC W/AUTO DIFF WBC: CPT

## 2023-08-14 PROCEDURE — 36415 COLL VENOUS BLD VENIPUNCTURE: CPT

## 2023-08-14 PROCEDURE — 82728 ASSAY OF FERRITIN: CPT

## 2023-08-14 PROCEDURE — 83550 IRON BINDING TEST: CPT

## 2023-08-15 NOTE — PROGRESS NOTES
in favor of. Case also d/w surgery and rad-onc. 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 She is already scheduled to see radiation oncology tomorrow. We will also get paperwork started for capecitabine (825 mg per metered square twice daily concurrently with XRT). We will see her back in a few weeks for follow-up. 10/6/2020: Patient seen in follow-up. She has seen radiation oncology Dr. Mona James, with agreement to proceed with concurrent chemo-XRT with capecitabine. She is scheduled to start this tomorrow. Patient has undergone chemo education in this regard. We again discussed various side effects and risks of therapy including GI symptoms, neutropenic fever, hand-foot syndrome, and end-organ damage. Patient willing to proceed. She will need weekly visits while undergoing therapy. 11/23/20:  Completed capecitabine-XRT 11/18/2020. Tolerated reasonably. 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 Also has baseline hemorrhoids which reportedly have exacerbated. Denies any fevers or chills. Supportive care including procto gel, and oxycodone as needed helping. Will give sitz bath's try as well. Should improve over time. Briefly discussed consideration for FOLFOX in approximately 2 months time. RTC in 2 weeks with NP and in 4 weeks with MD with labs, CEA. Will get CT CAP as well as likely FOLFOX in 2 months time. 1/18/21: Recovering well. Rectal discomfort has much improved. CT CAP w CR, RML sub-centimeter nodule stable. Notes recent colonoscopy with Dr. Chidi Beaulieu 1/11/2021 which was reportedly unremarkable, will get procedure report, pathology reviewed with rectal biopsies benign. Discussed option of observation versus adjuvant systemic therapy x4 months. Patient would like to proceed with systemic therapy which is reasonable. We will plan for port placement followed by FOLFOX x4 months.   Navigation
Denies fever, chills, fatigue, and weight loss. Denies HA, Dizziness. ENMT: Denies URI symptoms, difficulty swallowing, sore throat, loss of taste or smell. CARDIO: Denies CP, palpitations, edema. RESP: Denies Cough, SOB, Diff breathing, hemoptysis. GI: Denies N/V, ABD pain, change in bowel movement.. MS: Denies joint pain, back pain, weakness, decreased ROM, swelling. NEURO: Denies change in gait, seizures, loss of sensation, dizziness, confusion LOC. SKIN: denies rash or discoloration

## 2023-08-29 DIAGNOSIS — R87.612 LGSIL ON PAP SMEAR OF CERVIX: Primary | ICD-10-CM

## 2023-08-31 ENCOUNTER — TELEPHONE (OUTPATIENT)
Dept: INTERNAL MEDICINE CLINIC | Facility: CLINIC | Age: 73
End: 2023-08-31

## 2023-08-31 ENCOUNTER — TELEPHONE (OUTPATIENT)
Dept: ONCOLOGY | Age: 73
End: 2023-08-31

## 2023-08-31 NOTE — TELEPHONE ENCOUNTER
----- Message from Karely Tirso sent at 8/31/2023  8:43 AM EDT -----  Subject: Message to Provider    QUESTIONS  Information for Provider? pt needs to r/s her lab appt on 10/5/23. They   need another appt that is later in the day.   ---------------------------------------------------------------------------  --------------  Grady Standing INFO  [phone]; OK to leave message on voicemail  ---------------------------------------------------------------------------  --------------  SCRIPT ANSWERS  Relationship to Patient? Spouse/Partner  Representative Name? Leonardo Herrera  Is the representative on the Communication Release of Information (TOMA)   form in Epic?  Yes

## 2023-09-15 ENCOUNTER — APPOINTMENT (OUTPATIENT)
Dept: GENERAL RADIOLOGY | Age: 73
End: 2023-09-15
Payer: MEDICARE

## 2023-09-15 ENCOUNTER — HOSPITAL ENCOUNTER (EMERGENCY)
Age: 73
Discharge: HOME | End: 2023-09-16
Attending: EMERGENCY MEDICINE
Payer: MEDICARE

## 2023-09-15 DIAGNOSIS — S42.411A CLOSED SUPRACONDYLAR FRACTURE OF RIGHT HUMERUS, INITIAL ENCOUNTER: Primary | ICD-10-CM
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
## 2023-09-15 DIAGNOSIS — S63.502A LEFT WRIST SPRAIN, INITIAL ENCOUNTER: ICD-10-CM

## 2023-09-15 PROCEDURE — 99283 EMERGENCY DEPT VISIT LOW MDM: CPT

## 2023-09-15 PROCEDURE — 6370000000 HC RX 637 (ALT 250 FOR IP): Performed by: EMERGENCY MEDICINE

## 2023-09-15 PROCEDURE — 73080 X-RAY EXAM OF ELBOW: CPT

## 2023-09-15 PROCEDURE — 73110 X-RAY EXAM OF WRIST: CPT

## 2023-09-15 RX ORDER — HYDROCODONE BITARTRATE AND ACETAMINOPHEN 5; 325 MG/1; MG/1
1 TABLET ORAL
Status: COMPLETED | OUTPATIENT
Start: 2023-09-16 | End: 2023-09-15

## 2023-09-15 RX ADMIN — HYDROCODONE BITARTRATE AND ACETAMINOPHEN 1 TABLET: 5; 325 TABLET ORAL at 23:52

## 2023-09-15 ASSESSMENT — PAIN SCALES - GENERAL
PAINLEVEL_OUTOF10: 8
PAINLEVEL_OUTOF10: 8

## 2023-09-15 ASSESSMENT — PAIN - FUNCTIONAL ASSESSMENT: PAIN_FUNCTIONAL_ASSESSMENT: 0-10

## 2023-09-15 ASSESSMENT — PAIN DESCRIPTION - ORIENTATION: ORIENTATION: RIGHT;LEFT

## 2023-09-15 ASSESSMENT — PAIN DESCRIPTION - LOCATION: LOCATION: ELBOW;WRIST

## 2023-09-15 ASSESSMENT — LIFESTYLE VARIABLES
HOW OFTEN DO YOU HAVE A DRINK CONTAINING ALCOHOL: NEVER
HOW MANY STANDARD DRINKS CONTAINING ALCOHOL DO YOU HAVE ON A TYPICAL DAY: PATIENT DOES NOT DRINK

## 2023-09-15 ASSESSMENT — PAIN DESCRIPTION - DESCRIPTORS: DESCRIPTORS: DISCOMFORT

## 2023-09-16 VITALS
RESPIRATION RATE: 17 BRPM | SYSTOLIC BLOOD PRESSURE: 130 MMHG | WEIGHT: 156 LBS | HEIGHT: 61 IN | HEART RATE: 70 BPM | OXYGEN SATURATION: 97 % | BODY MASS INDEX: 29.45 KG/M2 | DIASTOLIC BLOOD PRESSURE: 61 MMHG | TEMPERATURE: 98.2 F

## 2023-09-16 RX ORDER — HYDROCODONE BITARTRATE AND ACETAMINOPHEN 5; 325 MG/1; MG/1
1 TABLET ORAL EVERY 6 HOURS PRN
Qty: 20 TABLET | Refills: 0 | Status: SHIPPED | OUTPATIENT
Start: 2023-09-16 | End: 2023-09-21

## 2023-09-16 ASSESSMENT — ENCOUNTER SYMPTOMS: BACK PAIN: 0

## 2023-09-16 ASSESSMENT — PAIN SCALES - GENERAL: PAINLEVEL_OUTOF10: 8

## 2023-09-16 NOTE — ED TRIAGE NOTES
Pt ambulatory to room 3 with steady gait. Pt c/o R elbow pain and L wrist pain s/p fall down 5 steps around 2030 tonight. Pt denies hitting head, LOC or blood thinners. Pt arrives with make shift sling made at home to R arm. Pt in NAD during triage.

## 2023-09-16 NOTE — ED PROVIDER NOTES
COLONOSCOPY / BMI=30 performed by Dayna Watson MD at [facility] ENDOSCOPY    COLONOSCOPY  2021    Diverticulosis, External Hemorrhoids, Radiation Colitis, Hyperplastic Polyp    FLEXIBLE SIGMOIDOSCOPY N/A 2020    SIGMOIDOSCOPY FLEXIBLE performed by Faustino Bills MD at [facility] ENDOSCOPY    IR KYPHOPLASTY LUMBAR FIRST LEVEL  2021    L4 & L5    IR PORT PLACEMENT EQUAL OR GREATER THAN 5 YEARS  2021    IR PORT PLACEMENT EQUAL OR GREATER THAN 5 YEARS 2021 [facility] SPECIALS    PRE-MALIGNANT / BENIGN SKIN LESION EXCISION      SINUS SURGERY PROC UNLISTED      TUBAL LIGATION      UPPER GASTROINTESTINAL ENDOSCOPY  2021    Erosive Esophagitis    VASCULAR SURGERY          Social History     Socioeconomic History    Marital status:      Spouse name: None    Number of children: None    Years of education: None    Highest education level: None   Tobacco Use    Smoking status: Former     Packs/day: 1.50     Years: 41.00     Additional pack years: 0.00     Total pack years: 61.50     Types: Cigarettes     Quit date: 9/15/2016     Years since quittin.0     Passive exposure: Past    Smokeless tobacco: Never   Vaping Use    Vaping Use: Never used   Substance and Sexual Activity    Alcohol use: Not Currently    Drug use: Never     Social Determinants of Health     Physical Activity: Inactive (2023)    Exercise Vital Sign     Days of Exercise per Week: 0 days     Minutes of Exercise per Session: 0 min        Previous Medications    BLOOD GLUCOSE MONITORING SUPPL (ACCU-CHEK GEMA PLUS) W/DEVICE KIT    Use to check glucose daily. Dx: E11.29    BLOOD GLUCOSE TEST STRIPS (ACCU-CHEK GEMA PLUS) STRIP    Use to check glucose daily.   Dx: E11.29    BUPROPION (WELLBUTRIN XL) 150 MG EXTENDED RELEASE TABLET    Take 1 tablet by mouth every morning    COLESTIPOL (COLESTID) 1 G TABLET    TAKE TWO TABLETS BY MOUTH ONE TIME DAILY    CYANOCOBALAMIN 500 MCG TABLET    Take 1 tablet by mouth daily procedures, radiographs, and treatments with the patient and available family. Treatment plan is agreed upon and the patient is ready for discharge. All voiced understanding of the discharge plan and medication instructions or changes as appropriate. Questions about treatment in the ED were answered. All were encouraged to return should symptoms worsen or new problems develop. Voice dictation software was used during the making of this note. This software is not perfect and grammatical and other typographical errors may be present. This note has not been completely proofread for errors.      Van Amato MD  09/16/23 [id number]

## 2023-09-21 ENCOUNTER — OFFICE VISIT (OUTPATIENT)
Dept: ORTHOPEDIC SURGERY | Age: 73
End: 2023-09-21

## 2023-09-21 DIAGNOSIS — S42.401A CLOSED FRACTURE OF DISTAL END OF RIGHT HUMERUS, UNSPECIFIED FRACTURE MORPHOLOGY, INITIAL ENCOUNTER: Primary | ICD-10-CM

## 2023-09-21 RX ORDER — OXYCODONE HYDROCHLORIDE 5 MG/1
5 TABLET ORAL EVERY 4 HOURS PRN
Qty: 28 TABLET | Refills: 0 | Status: SHIPPED | OUTPATIENT
Start: 2023-09-21 | End: 2023-09-26

## 2023-09-21 NOTE — H&P (VIEW-ONLY)
and different treatment options. We discussed observation, therapy, antiinflammatory medications and other pertinent treatment modalities. After discussing in detail the patient elects to proceed with surgical intervention. Risk and benefits were addressed with the patient and her boyfriend. She understands and wishes to proceed. With surgery set up for Monday. We will place her in a long-arm posterior and stirrup splint with her sling. I will give her oxycodone for pain. Patient understands risks and benefits of OPEN REDUCTION INTERNAL FIXATION RIGHT DISTAL HUMERUS including but not limited to nerve injury, vessel injury, infection, failure to achieve desired results and possible need for additional surgery. Patient understands and wishes to proceed with surgery. On Exam:   The patient is alert and oriented; ;   Lung auscultation is clear bilaterally   Heart has RRR without murmurs    Patient voiced accordance and understanding of the information provided and the formulated plan. All questions were answered to the patient's satisfaction during the encounter.     4 This is an acute complicated injury  Treatment at this time: Prescription medication, Brace, and surgical intervention    TANG Quiroz - CNP  Orthopaedic Surgery  09/21/23  9:58 AM

## 2023-09-21 NOTE — PROGRESS NOTES
Orthopaedic Hand Clinic Note    Name: Madi Estevez  YOB: 1950  Gender: female  MRN: [id number]      CC: Patient referred for evaluation of right upper extremity pain    HPI: Madi Estevez is a 72 y.o. female right hand dominant with a chief complaint of right elbow pain. She is accompanied by her boyfriend, Marcelo Blake. She reports on Friday, September 15, 2023 she fell backwards down some stairs into a cement walkway. She landed on her right elbow. She was seen at the emergency room. She was x-rayed. She was placed in a long-arm splint and sling and referred to orthopedics for follow-up. She is taking Norco 5 mg. She reports that this is not very helpful for her pain. She does have type 2 diabetes. 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 She is taking Forteo for osteoporosis. She also has a diagnosis of colon cancer treated with surgery. .      ROS/Meds/PSH/PMH/FH/SH: I personally reviewed the patients standard intake form. Pertinents are discussed in the HPI    Physical Examination:  General: Awake and alert. HEENT: Normocephalic, atraumatic  CV/Pulm: Breathing even and unlabored  Skin: No obvious rashes noted. Lymphatic: No obvious evidence of lymphedema or lymphadenopathy    Musculoskeletal Exam:  Examination on the right upper extremity demonstrates cap refill < 5 seconds in all fingers  Patient has swelling and ecchymosis to the right upper extremity. She is tender to palpation throughout her right elbow. She has decreased range of motion secondary to pain. All nerves are working properly. She denies paresthesia. She has good capillary refill. Imaging / Electrodiagnostic Tests:     X-rays include a 3 view right elbow are reviewed. Patient has a displaced distal humerus fracture. Assessment:   1.  Closed fracture of distal end of right humerus, unspecified fracture morphology, initial encounter        Plan:   We discussed the diagnosis

## 2023-09-22 DIAGNOSIS — S42.401A CLOSED FRACTURE OF DISTAL END OF RIGHT HUMERUS, UNSPECIFIED FRACTURE MORPHOLOGY, INITIAL ENCOUNTER: Primary | ICD-10-CM

## 2023-09-22 NOTE — PERIOP NOTE
Preop department called to notify patient of arrival time for scheduled procedure. Instructions given to   - Arrive at [address] [facility]. - Remain NPO after midnight, unless otherwise indicated, including gum, mints, and ice chips. - Have a responsible adult to drive patient to the hospital, stay during surgery, and patient will need supervision 24 hours after anesthesia. - Use antibacterial soap in shower the night before surgery and on the morning of surgery.        Was patient contacted: yes, Otf Pollard left:   Numbers contacted: [phone]   Arrival time: 0900

## 2023-09-24 ENCOUNTER — ANESTHESIA EVENT (OUTPATIENT)
Dept: SURGERY | Age: 73
End: 2023-09-24
Payer: MEDICARE

## 2023-09-25 ENCOUNTER — APPOINTMENT (OUTPATIENT)
Dept: GENERAL RADIOLOGY | Age: 73
End: 2023-09-25
Attending: ORTHOPAEDIC SURGERY
Payer: MEDICARE

## 2023-09-25 ENCOUNTER — ANESTHESIA (OUTPATIENT)
Dept: SURGERY | Age: 73
End: 2023-09-25
Payer: MEDICARE

## 2023-09-25 ENCOUNTER — HOSPITAL ENCOUNTER (OUTPATIENT)
Age: 73
Setting detail: OUTPATIENT SURGERY
Discharge: HOME | End: 2023-09-25
Attending: ORTHOPAEDIC SURGERY | Admitting: ORTHOPAEDIC SURGERY
Payer: MEDICARE

## 2023-09-25 VITALS
DIASTOLIC BLOOD PRESSURE: 71 MMHG | RESPIRATION RATE: 25 BRPM | TEMPERATURE: 97.9 F | OXYGEN SATURATION: 95 % | HEIGHT: 60 IN | HEART RATE: 98 BPM | SYSTOLIC BLOOD PRESSURE: 139 MMHG | WEIGHT: 155 LBS | BODY MASS INDEX: 30.43 KG/M2

## 2023-09-25 LAB
GLUCOSE BLD STRIP.AUTO-MCNC: 165 MG/DL (ref 65–100)
SERVICE CMNT-IMP: ABNORMAL

## 2023-09-25 PROCEDURE — 24545 OPTX HUM FX WO NTRCNDYLR XTN: CPT | Performed by: ORTHOPAEDIC SURGERY

## 2023-09-25 PROCEDURE — C1713 ANCHOR/SCREW BN/BN,TIS/BN: HCPCS | Performed by: ORTHOPAEDIC SURGERY

## 2023-09-25 PROCEDURE — 3700000000 HC ANESTHESIA ATTENDED CARE: Performed by: ORTHOPAEDIC SURGERY

## 2023-09-25 PROCEDURE — 2720000010 HC SURG SUPPLY STERILE: Performed by: ORTHOPAEDIC SURGERY

## 2023-09-25 PROCEDURE — 3700000001 HC ADD 15 MINUTES (ANESTHESIA): Performed by: ORTHOPAEDIC SURGERY

## 2023-09-25 PROCEDURE — 6370000000 HC RX 637 (ALT 250 FOR IP): Performed by: ANESTHESIOLOGY

## 2023-09-25 PROCEDURE — 6360000002 HC RX W HCPCS

## 2023-09-25 PROCEDURE — 64415 NJX AA&/STRD BRCH PLXS IMG: CPT | Performed by: ANESTHESIOLOGY

## 2023-09-25 PROCEDURE — C1776 JOINT DEVICE (IMPLANTABLE): HCPCS | Performed by: ORTHOPAEDIC SURGERY

## 2023-09-25 PROCEDURE — 64910 NERVE REPAIR W/ALLOGRAFT: CPT | Performed by: ORTHOPAEDIC SURGERY

## 2023-09-25 PROCEDURE — 82962 GLUCOSE BLOOD TEST: CPT

## 2023-09-25 PROCEDURE — 6360000002 HC RX W HCPCS: Performed by: NURSE PRACTITIONER

## 2023-09-25 PROCEDURE — 3600000004 HC SURGERY LEVEL 4 BASE: Performed by: ORTHOPAEDIC SURGERY

## 2023-09-25 PROCEDURE — 3600000014 HC SURGERY LEVEL 4 ADDTL 15MIN: Performed by: ORTHOPAEDIC SURGERY

## 2023-09-25 PROCEDURE — 7100000010 HC PHASE II RECOVERY - FIRST 15 MIN: Performed by: ORTHOPAEDIC SURGERY

## 2023-09-25 PROCEDURE — 6360000002 HC RX W HCPCS: Performed by: ANESTHESIOLOGY

## 2023-09-25 PROCEDURE — 7100000001 HC PACU RECOVERY - ADDTL 15 MIN: Performed by: ORTHOPAEDIC SURGERY

## 2023-09-25 PROCEDURE — 2500000003 HC RX 250 WO HCPCS

## 2023-09-25 PROCEDURE — 7100000011 HC PHASE II RECOVERY - ADDTL 15 MIN: Performed by: ORTHOPAEDIC SURGERY

## 2023-09-25 PROCEDURE — C1763 CONN TISS, NON-HUMAN: HCPCS | Performed by: ORTHOPAEDIC SURGERY

## 2023-09-25 PROCEDURE — 2580000003 HC RX 258: Performed by: ANESTHESIOLOGY

## 2023-09-25 PROCEDURE — 2720000001 HC MISC SURG SUPPLY STERILE $51-500: Performed by: ORTHOPAEDIC SURGERY

## 2023-09-25 PROCEDURE — 2580000003 HC RX 258

## 2023-09-25 PROCEDURE — 7100000000 HC PACU RECOVERY - FIRST 15 MIN: Performed by: ORTHOPAEDIC SURGERY

## 2023-09-25 PROCEDURE — 2500000003 HC RX 250 WO HCPCS: Performed by: ANESTHESIOLOGY

## 2023-09-25 PROCEDURE — 2709999900 HC NON-CHARGEABLE SUPPLY: Performed by: ORTHOPAEDIC SURGERY

## 2023-09-25 DEVICE — SEALANT TISS 4 CC FIBRIN VISTASEAL: Type: IMPLANTABLE DEVICE | Site: ARM | Status: FUNCTIONAL

## 2023-09-25 DEVICE — PROTECTOR NRV L40MM DIA10MM PORCINE EXTRACELLULAR MTRX WRP: Type: IMPLANTABLE DEVICE | Site: ARM | Status: FUNCTIONAL

## 2023-09-25 DEVICE — GRAFT BNE SUB M 5ML CANC DBM FRMBL CELLULAR VIVIGEN: Type: IMPLANTABLE DEVICE | Site: ARM | Status: FUNCTIONAL

## 2023-09-25 RX ORDER — SODIUM CHLORIDE, SODIUM LACTATE, POTASSIUM CHLORIDE, CALCIUM CHLORIDE 600; 310; 30; 20 MG/100ML; MG/100ML; MG/100ML; MG/100ML
INJECTION, SOLUTION INTRAVENOUS CONTINUOUS
Status: DISCONTINUED | OUTPATIENT
Start: 2023-09-25 | End: 2023-09-25 | Stop reason: HOSPADM

## 2023-09-25 RX ORDER — ONDANSETRON 4 MG/1
4 TABLET, FILM COATED ORAL EVERY 8 HOURS PRN
Qty: 20 TABLET | Refills: 0 | Status: SHIPPED | OUTPATIENT
Start: 2023-09-25

## 2023-09-25 RX ORDER — ONDANSETRON 2 MG/ML
4 INJECTION INTRAMUSCULAR; INTRAVENOUS
Status: DISCONTINUED | OUTPATIENT
Start: 2023-09-25 | End: 2023-09-25 | Stop reason: HOSPADM

## 2023-09-25 RX ORDER — PROPOFOL 10 MG/ML
INJECTION, EMULSION INTRAVENOUS PRN
Status: DISCONTINUED | OUTPATIENT
Start: 2023-09-25 | End: 2023-09-25 | Stop reason: SDUPTHER

## 2023-09-25 RX ORDER — PROCHLORPERAZINE EDISYLATE 5 MG/ML
5 INJECTION INTRAMUSCULAR; INTRAVENOUS
Status: DISCONTINUED | OUTPATIENT
Start: 2023-09-25 | End: 2023-09-25 | Stop reason: HOSPADM

## 2023-09-25 RX ORDER — ROPIVACAINE HYDROCHLORIDE 5 MG/ML
INJECTION, SOLUTION EPIDURAL; INFILTRATION; PERINEURAL
Status: COMPLETED | OUTPATIENT
Start: 2023-09-25 | End: 2023-09-25

## 2023-09-25 RX ORDER — SODIUM CHLORIDE 0.9 % (FLUSH) 0.9 %
5-40 SYRINGE (ML) INJECTION EVERY 12 HOURS SCHEDULED
Status: DISCONTINUED | OUTPATIENT
Start: 2023-09-25 | End: 2023-09-25 | Stop reason: HOSPADM

## 2023-09-25 RX ORDER — LIDOCAINE HCL/EPINEPHRINE/PF 2%-1:200K
VIAL (ML) INJECTION PRN
Status: DISCONTINUED | OUTPATIENT
Start: 2023-09-25 | End: 2023-09-25 | Stop reason: SDUPTHER

## 2023-09-25 RX ORDER — FENTANYL CITRATE 50 UG/ML
INJECTION, SOLUTION INTRAMUSCULAR; INTRAVENOUS PRN
Status: DISCONTINUED | OUTPATIENT
Start: 2023-09-25 | End: 2023-09-25 | Stop reason: SDUPTHER

## 2023-09-25 RX ORDER — OXYCODONE HYDROCHLORIDE 5 MG/1
5 TABLET ORAL EVERY 6 HOURS PRN
Qty: 20 TABLET | Refills: 0 | Status: SHIPPED | OUTPATIENT
Start: 2023-09-25 | End: 2023-09-30

## 2023-09-25 RX ORDER — DEXTROSE MONOHYDRATE 100 MG/ML
INJECTION, SOLUTION INTRAVENOUS CONTINUOUS PRN
Status: DISCONTINUED | OUTPATIENT
Start: 2023-09-25 | End: 2023-09-25 | Stop reason: HOSPADM

## 2023-09-25 RX ORDER — EPHEDRINE SULFATE/0.9% NACL/PF 50 MG/5 ML
SYRINGE (ML) INTRAVENOUS PRN
Status: DISCONTINUED | OUTPATIENT
Start: 2023-09-25 | End: 2023-09-25 | Stop reason: SDUPTHER

## 2023-09-25 RX ORDER — SODIUM CHLORIDE 9 MG/ML
INJECTION, SOLUTION INTRAVENOUS PRN
Status: DISCONTINUED | OUTPATIENT
Start: 2023-09-25 | End: 2023-09-25 | Stop reason: HOSPADM

## 2023-09-25 RX ORDER — DEXAMETHASONE SODIUM PHOSPHATE 10 MG/ML
INJECTION, SOLUTION INTRAMUSCULAR; INTRAVENOUS
Status: COMPLETED | OUTPATIENT
Start: 2023-09-25 | End: 2023-09-25

## 2023-09-25 RX ORDER — FENTANYL CITRATE 50 UG/ML
100 INJECTION, SOLUTION INTRAMUSCULAR; INTRAVENOUS
Status: COMPLETED | OUTPATIENT
Start: 2023-09-25 | End: 2023-09-25

## 2023-09-25 RX ORDER — DIPHENHYDRAMINE HYDROCHLORIDE 50 MG/ML
12.5 INJECTION INTRAMUSCULAR; INTRAVENOUS
Status: DISCONTINUED | OUTPATIENT
Start: 2023-09-25 | End: 2023-09-25 | Stop reason: HOSPADM

## 2023-09-25 RX ORDER — MIDAZOLAM HYDROCHLORIDE 2 MG/2ML
2 INJECTION, SOLUTION INTRAMUSCULAR; INTRAVENOUS
Status: COMPLETED | OUTPATIENT
Start: 2023-09-25 | End: 2023-09-25

## 2023-09-25 RX ORDER — SODIUM CHLORIDE 0.9 % (FLUSH) 0.9 %
5-40 SYRINGE (ML) INJECTION PRN
Status: DISCONTINUED | OUTPATIENT
Start: 2023-09-25 | End: 2023-09-25 | Stop reason: HOSPADM

## 2023-09-25 RX ORDER — HYDROMORPHONE HYDROCHLORIDE 2 MG/ML
0.5 INJECTION, SOLUTION INTRAMUSCULAR; INTRAVENOUS; SUBCUTANEOUS EVERY 5 MIN PRN
Status: DISCONTINUED | OUTPATIENT
Start: 2023-09-25 | End: 2023-09-25 | Stop reason: HOSPADM

## 2023-09-25 RX ORDER — OXYCODONE HYDROCHLORIDE 5 MG/1
5 TABLET ORAL
Status: DISCONTINUED | OUTPATIENT
Start: 2023-09-25 | End: 2023-09-25 | Stop reason: HOSPADM

## 2023-09-25 RX ORDER — ACETAMINOPHEN 500 MG
1000 TABLET ORAL ONCE
Status: COMPLETED | OUTPATIENT
Start: 2023-09-25 | End: 2023-09-25

## 2023-09-25 RX ADMIN — SODIUM CHLORIDE, POTASSIUM CHLORIDE, SODIUM LACTATE AND CALCIUM CHLORIDE: 600; 310; 30; 20 INJECTION, SOLUTION INTRAVENOUS at 09:34

## 2023-09-25 RX ADMIN — FENTANYL CITRATE 25 MCG: 50 INJECTION, SOLUTION INTRAMUSCULAR; INTRAVENOUS at 13:23

## 2023-09-25 RX ADMIN — Medication 2000 MG: at 12:09

## 2023-09-25 RX ADMIN — MIDAZOLAM HYDROCHLORIDE 2 MG: 1 INJECTION, SOLUTION INTRAMUSCULAR; INTRAVENOUS at 10:16

## 2023-09-25 RX ADMIN — PROPOFOL 100 MCG/KG/MIN: 10 INJECTION, EMULSION INTRAVENOUS at 12:06

## 2023-09-25 RX ADMIN — DEXAMETHASONE SODIUM PHOSPHATE 4 MG: 10 INJECTION, SOLUTION INTRAMUSCULAR; INTRAVENOUS at 10:16

## 2023-09-25 RX ADMIN — Medication 5 MG: at 12:58

## 2023-09-25 RX ADMIN — PROPOFOL 40 MG: 10 INJECTION, EMULSION INTRAVENOUS at 12:05

## 2023-09-25 RX ADMIN — PROPOFOL 200 MG: 10 INJECTION, EMULSION INTRAVENOUS at 12:23

## 2023-09-25 RX ADMIN — Medication 10 MG: at 13:08

## 2023-09-25 RX ADMIN — ACETAMINOPHEN 1000 MG: 500 TABLET, FILM COATED ORAL at 09:34

## 2023-09-25 RX ADMIN — LIDOCAINE HYDROCHLORIDE,EPINEPHRINE BITARTRATE 10 ML: 20; .005 INJECTION, SOLUTION EPIDURAL; INFILTRATION; INTRACAUDAL; PERINEURAL at 10:16

## 2023-09-25 RX ADMIN — FENTANYL CITRATE 25 MCG: 50 INJECTION, SOLUTION INTRAMUSCULAR; INTRAVENOUS at 12:50

## 2023-09-25 RX ADMIN — FENTANYL CITRATE 25 MCG: 50 INJECTION, SOLUTION INTRAMUSCULAR; INTRAVENOUS at 13:46

## 2023-09-25 RX ADMIN — FENTANYL CITRATE 25 MCG: 50 INJECTION, SOLUTION INTRAMUSCULAR; INTRAVENOUS at 12:43

## 2023-09-25 RX ADMIN — ROPIVACAINE HYDROCHLORIDE 20 ML: 5 INJECTION, SOLUTION EPIDURAL; INFILTRATION; PERINEURAL at 10:16

## 2023-09-25 RX ADMIN — PROPOFOL 125 MCG/KG/MIN: 10 INJECTION, EMULSION INTRAVENOUS at 12:10

## 2023-09-25 RX ADMIN — PHENYLEPHRINE HYDROCHLORIDE 50 MCG: 10 INJECTION INTRAVENOUS at 12:34

## 2023-09-25 RX ADMIN — PROPOFOL 40 MG: 10 INJECTION, EMULSION INTRAVENOUS at 12:09

## 2023-09-25 RX ADMIN — FENTANYL CITRATE 50 MCG: 50 INJECTION INTRAMUSCULAR; INTRAVENOUS at 10:16

## 2023-09-25 RX ADMIN — PHENYLEPHRINE HYDROCHLORIDE 100 MCG: 10 INJECTION INTRAVENOUS at 12:57

## 2023-09-25 ASSESSMENT — PAIN SCALES - GENERAL: PAINLEVEL_OUTOF10: 8

## 2023-09-25 NOTE — ANESTHESIA PRE PROCEDURE
Department of Anesthesiology  Preprocedure Note       Name:  Keena Triana   Age:  72 y.o.  :  1950                                          MRN:  [id number]         Date:  2023      Surgeon: Camelia Shetty):  Radha Saavedra MD    Procedure: Procedure(s):  RIGHT DISTAL HUMERUS OPEN REDUCTION INTERNAL FIXATION    Medications prior to admission:   Prior to Admission medications    Medication Sig Start Date End Date Taking? Authorizing Provider   ondansetron (ZOFRAN) 4 MG tablet Take 1 tablet by mouth every 8 hours as needed for Nausea or Vomiting 23   [place] Poet Win, APRN - CNP   oxyCODONE (ROXICODONE) 5 MG immediate release tablet Take 1 tablet by mouth every 6 hours as needed for Pain for up to 5 days. Max Daily Amount: 20 mg 23  [place] Poet Win, APRN - CNP   oxyCODONE (ROXICODONE) 5 MG immediate release tablet Take 1 tablet by mouth every 4 hours as needed for Pain for up to 5 days. Max Daily Amount: 30 mg 23  [place] Poet Win, APRN - CNP   gabapentin (NEURONTIN) 300 MG capsule Take 2 capsules by mouth 3 times daily for 360 days.  23  Michelle Perez MD   rosuvastatin (CRESTOR) 10 MG tablet Take 1 tablet by mouth at bedtime 23   Kedar Blum PA-C   fenofibrate (TRIGLIDE) 160 MG tablet Take 1 tablet by mouth daily 23   Vaishali Rasmussen PA-C   Icosapent Ethyl (VASCEPA) 1 g CAPS capsule Take 2 capsules by mouth 2 times daily 23   Kedar Blum PA-C   lisinopril (PRINIVIL;ZESTRIL) 10 MG tablet Take 1 tablet by mouth daily 23   Kedar Blum PA-C   buPROPion (WELLBUTRIN XL) 150 MG extended release tablet Take 1 tablet by mouth every morning 23   Kedar Blum PA-C   teriparatide (FORTEO) 620 MCG/2.48ML SOPN injection Inject 0.08 mLs into the skin daily 23   Kedar Blum PA-C   linagliptin (TRADJENTA) 5 MG tablet Take 1 tablet by mouth daily 23   Kedar Blum PA-C   Blood Glucose Monitoring Suppl (ACCU-CHEK GEMA

## 2023-09-25 NOTE — ANESTHESIA PROCEDURE NOTES
Peripheral Block    Patient location during procedure: pre-op  Reason for block: post-op pain management and at surgeon's request  Start time: 9/25/2023 10:16 AM  End time: 9/25/2023 10:19 AM  Staffing  Performed: anesthesiologist   Anesthesiologist: Rasheed Jose MD  Performed by: Rasheed Jose MD  Authorized by: Rasheed Jose MD    Preanesthetic Checklist  Completed: patient identified, IV checked, site marked, risks and benefits discussed, surgical/procedural consents, equipment checked, pre-op evaluation, timeout performed, anesthesia consent given, oxygen available and monitors applied/VS acknowledged  Peripheral Block   Patient position: sitting  Prep: ChloraPrep  Provider prep: sterile gloves and mask  Patient monitoring: cardiac monitor, continuous pulse ox, frequent blood pressure checks, IV access, oxygen and responsive to questions  Block type: Brachial plexus  Supraclavicular  Laterality: right  Injection technique: single-shot  Guidance: ultrasound guided    Needle   Needle type: insulated echogenic nerve stimulator needle   Needle gauge: 20 G  Needle localization: ultrasound guidance  Needle length: 10 cm  Assessment   Injection assessment: negative aspiration for heme, no paresthesia on injection, local visualized surrounding nerve on ultrasound and no intravascular symptoms  Paresthesia pain: none  Slow fractionated injection: yes  Hemodynamics: stable  Real-time US image taken/store: yes  Outcomes: uncomplicated and patient tolerated procedure well    Additional Notes  Ultrasound image taken and stored in chart     20cc 0.5% Ropi + 4mg Decadron + 10cc 2% Lido with Epi  Medications Administered  dexamethasone (DECADRON) (PF) 10 mg/mL injection - Other   4 mg - 9/25/2023 10:16:00 AM  ropivacaine (NAROPIN) injection 0.5% - Perineural   20 mL - 9/25/2023 10:16:00 AM

## 2023-09-25 NOTE — INTERVAL H&P NOTE
H&P Update:  Frank Darnell was seen and examined. History and physical has been reviewed. The patient has been examined.  There have been no significant clinical changes since the completion of the originally dated History and Physical.    oDry Vickers MD  Orthopaedic Surgery  09/25/23  10:40 AM

## 2023-09-25 NOTE — ANESTHESIA POSTPROCEDURE EVALUATION
Department of Anesthesiology  Postprocedure Note    Patient: Ilsa England  MRN: [id number]  YOB: 1950  Date of evaluation: 9/25/2023      Procedure Summary     Date: 09/25/23 Room / Location: [facility] OP OR 06 / [facility]    Anesthesia Start: 1154 Anesthesia Stop: 1418    Procedure: RIGHT DISTAL HUMERUS OPEN REDUCTION INTERNAL FIXATION (Right: Arm Upper) Diagnosis:       Closed fracture of right distal humerus      (Closed fracture of right distal humerus [S42.401A])    Surgeons: Cristo Peralta MD Responsible Provider: Shepard Alpers, MD    Anesthesia Type: General ASA Status: 3          Anesthesia Type: General    Nandini Phase I: Nandini Score: 7    Nandini Phase II:        Anesthesia Post Evaluation    Patient location during evaluation: PACU  Patient participation: complete - patient participated  Level of consciousness: awake and alert  Airway patency: patent  Nausea: well controlled. Complications: no  Cardiovascular status: acceptable.   Respiratory status: acceptable  Hydration status: stable  Pain management: adequate

## 2023-09-26 NOTE — OP NOTE
ORTHOPAEDIC SURGICAL NOTE        Yaneth Allen female 72 y.o.  [id number]   9/25/2023     PRE-OP DIAGNOSIS: Pre-Op Diagnosis Codes:     * Closed fracture of right distal humerus [S42.401A]  POST-OP DIAGNOSIS: Same  LATERALITY: Right     PROCEDURES PERFORMED:   Right distal humerus open reduction internal fixation, right ulnar nerve repair with synthetic conduit       SURGEON:   Mark Vegas MD, PhD     IMPLANTS:   Implant Name Type Inv.  Item Serial No.  Lot No. LRB No. Used Action   GRAFT BNE SUB M 5ML CANC DBM FRMBL CELLULAR VIVIGEN - [id number]  GRAFT BNE SUB M 5ML CANC DBM FRMBL CELLULAR VIVIGEN [id number] [facility] TISSUE [facility]-  Right 1 Implanted   PROTECTOR NRV L40MM [id number] PORCINE EXTRACELLULAR MTRX WRP - [id number]  PROTECTOR NRV L40MM [id number] PORCINE EXTRACELLULAR MTRX WRP  [employer]-WD [id number] Right 1 Implanted   SEALANT TISS 4 CC FIBRIN VISTASEAL - [id number]  SEALANT TISS 4 CC FIBRIN VISTASEAL  J [employer]-WD [id number] Right 1 Implanted   RIGHT THREE HOLE PLATE 95BN    [employer]_COV [id number] Right 1 Implanted   RIGHT FIVE HOLE PLATE 078EC    [employer]_COV [id number] Right 1 Implanted   CONPERSSION SCREW 3.5MM X18MM    [employer]_COV [id number] Right 1 Implanted   COMPRESSION SCREW 3.5MM X 20MM    [employer]_COV [id number] Right 3 Implanted   COMPRESSION SCREW 3.5MM X 22MM    [employer]_COV [id number] Right 1 Implanted   COMPRESSION SCREW 3.5MM X 24MM    [employer]_COV [id number] Right 1 Implanted   LOCKING SCREW 3.5MM X 22MM    [employer]_COV [id number] Right 1 Implanted   LOCKING SCREW 2.7MM X 18MM    [employer]_COV [id number] Right 1 Implanted   LOCKING SCREW 2.7MM X 20MM    [employer]_COV [id number] Right 3 Implanted   LOCKING SCREW 2.7MM X 40MM    [employer]COV [id number] Right 1 Implanted   LOCKING SCREW 2.7MM X 50 MM    SKELETAL

## 2023-09-29 ENCOUNTER — TELEPHONE (OUTPATIENT)
Dept: ORTHOPEDIC SURGERY | Age: 73
End: 2023-09-29

## 2023-09-29 DIAGNOSIS — S42.401A CLOSED FRACTURE OF DISTAL END OF RIGHT HUMERUS, UNSPECIFIED FRACTURE MORPHOLOGY, INITIAL ENCOUNTER: Primary | ICD-10-CM

## 2023-09-29 RX ORDER — OXYCODONE HYDROCHLORIDE 5 MG/1
5 TABLET ORAL EVERY 8 HOURS PRN
Qty: 15 TABLET | Refills: 0 | Status: SHIPPED | OUTPATIENT
Start: 2023-09-30 | End: 2023-10-05

## 2023-09-29 NOTE — TELEPHONE ENCOUNTER
Patient has been taking 3 a day and needs some more to get her thru , only 3 left   Please call patient

## 2023-10-06 ENCOUNTER — OFFICE VISIT (OUTPATIENT)
Dept: ORTHOPEDIC SURGERY | Age: 73
End: 2023-10-06

## 2023-10-06 ENCOUNTER — EVALUATION (OUTPATIENT)
Age: 73
End: 2023-10-06
Payer: MEDICARE

## 2023-10-06 DIAGNOSIS — S42.401A CLOSED FRACTURE OF DISTAL END OF RIGHT HUMERUS, UNSPECIFIED FRACTURE MORPHOLOGY, INITIAL ENCOUNTER: Primary | ICD-10-CM

## 2023-10-06 DIAGNOSIS — M25.521 RIGHT ELBOW PAIN: ICD-10-CM

## 2023-10-06 DIAGNOSIS — M25.621 STIFFNESS OF RIGHT ELBOW JOINT: ICD-10-CM

## 2023-10-06 PROCEDURE — 99024 POSTOP FOLLOW-UP VISIT: CPT | Performed by: NURSE PRACTITIONER

## 2023-10-06 PROCEDURE — L3763 EWHO RIGID W/O JNTS CF: HCPCS | Performed by: OCCUPATIONAL THERAPIST

## 2023-10-06 RX ORDER — AMITRIPTYLINE HYDROCHLORIDE 25 MG/1
25 TABLET, FILM COATED ORAL NIGHTLY
Qty: 30 TABLET | Refills: 0 | Status: SHIPPED | OUTPATIENT
Start: 2023-10-06

## 2023-10-06 RX ORDER — HYDROCODONE BITARTRATE AND ACETAMINOPHEN 5; 325 MG/1; MG/1
1 TABLET ORAL EVERY 6 HOURS PRN
Qty: 20 TABLET | Refills: 0 | Status: SHIPPED | OUTPATIENT
Start: 2023-10-06 | End: 2023-10-11

## 2023-10-06 NOTE — PROGRESS NOTES
Orthopaedic Hand Surgery Note    Name: Isidoro Rojas  YOB: 1950  Gender: female  MRN: [id number]    Post Operative Visit: Right Distal Humerus Open Reduction Internal Fixation - Right    HPI: Patient is status post Right Distal Humerus Open Reduction Internal Fixation - Right on 9/25/2023. Doing well. Pleased with current progress. Denies fevers or chills. Patient complains of expected postoperative pain. She is accompanied by her . Physical Examination:  Wound healing well. Staples are intact with no erythema or drainage. Sensation is intact in all fingers. Motor exam reveals no deficits. Good finger and wrist range of motion. Imaging:     X-rays include a three-view right elbow reviewed. Fracture is well aligned and hardware in good position. Assessment:   1. Closed fracture of distal end of right humerus, unspecified fracture morphology, initial encounter         Status post Right Distal Humerus Open Reduction Internal Fixation - Right on 9/25/2023    Plan:  We discussed the post operative course and progression. We will remove her staples. We will apply Steri-Strips over her wound. She has an appointment with therapy today. We discussed activity restrictions in detail. She will return in 4 weeks with a new x-ray.     TANG Schmidt CNP  10/06/23  11:19 AM

## 2023-10-09 ENCOUNTER — EVALUATION (OUTPATIENT)
Age: 73
End: 2023-10-09
Payer: MEDICARE

## 2023-10-09 DIAGNOSIS — M25.621 STIFFNESS OF RIGHT ELBOW JOINT: Primary | ICD-10-CM

## 2023-10-09 DIAGNOSIS — M25.521 RIGHT ELBOW PAIN: ICD-10-CM

## 2023-10-09 PROCEDURE — 97165 OT EVAL LOW COMPLEX 30 MIN: CPT | Performed by: OCCUPATIONAL THERAPIST

## 2023-10-09 PROCEDURE — 97110 THERAPEUTIC EXERCISES: CPT | Performed by: OCCUPATIONAL THERAPIST

## 2023-10-09 NOTE — PROGRESS NOTES
program following instruction and performance  Use of heat and ice as needed for pain and inflammation reduction. Precautions reviewed. OT POC and rationale, education for surgical precautions and pain management, edema management  Minor orthosis adjustment to the wrist and added blue foam to the medial elbow    Access Code: [email or web address]  URL: [email or web address]  Date: 10/09/2023  Prepared by: Rordigo Huff    Exercises  - Seated Scapular Retraction  - 5 x daily - 7 x weekly - 1 sets - 15 reps - 3 sec hold  - Supported Elbow Flexion Extension PROM (Mirrored)  - 5 x daily - 7 x weekly - 1 sets - 15 reps - 3 sec hold  - Seated Forearm Pronation Supination AROM (Mirrored)  - 5 x daily - 7 x weekly - 1 sets - 15 reps - 3 sec hold  - Hand AROM Composite Flexion  - 5 x daily - 7 x weekly - 1 sets - 15 reps - 3 sec hold      CLINICAL DECISION MAKING/ASSESSMENT     Performance Deficits  Physical: Mobility and Strength  Cognitive: No deficiencies noted  Psychosocial: No deficiencies noted  Rehab potential: good    The patient presents today s/p  Right distal humerus open reduction internal fixation, right ulnar nerve repair with synthetic conduit . The patient presents with her orthosis donned properly and has elbow stiffness, soreness, swelling . These deficits appear to be secondary to injury and surgery . Based on these subjective and objective findings, the patient is perceived as currently having a primary functional deficit of  66% dysfunction. After a brief chart/PMH and OT profile review, evaluation requiring minimal  assistance/modifications and simple patient and data analysis, I have determined the patient exhibits several (1-3) performance deficits. Comorbidities affect the patient's occupational performance.  The following performance deficits (including but not limited to physical, cognitive and/or psychosocial components) result in activity limitations and participation restrictions:

## 2023-10-11 ENCOUNTER — TELEPHONE (OUTPATIENT)
Dept: ONCOLOGY | Age: 73
End: 2023-10-11

## 2023-10-11 NOTE — PROGRESS NOTES
grocery bags, laundry basket etc) with affected UE pain 2 of 10 or less. Pt will be able to sleep through the night with no pain in affected UE. Pts Quick DASH functional assessment score will be less than 20% functional deficit.      [facility]     OT Protocols

## 2023-10-11 NOTE — TELEPHONE ENCOUNTER
Spouse requested to Cx appt due to being in pain from humerus bone break 2 wks ago. Spouse declined to RS at this time.

## 2023-10-12 ENCOUNTER — TREATMENT (OUTPATIENT)
Age: 73
End: 2023-10-12

## 2023-10-12 DIAGNOSIS — M25.621 STIFFNESS OF RIGHT ELBOW JOINT: Primary | ICD-10-CM

## 2023-10-12 DIAGNOSIS — Z78.9 DECREASED ACTIVITIES OF DAILY LIVING (ADL): ICD-10-CM

## 2023-10-12 DIAGNOSIS — M25.521 RIGHT ELBOW PAIN: ICD-10-CM

## 2023-10-12 DIAGNOSIS — M25.641 STIFFNESS OF FINGER JOINT OF RIGHT HAND: ICD-10-CM

## 2023-10-12 DIAGNOSIS — M25.631 STIFFNESS OF RIGHT WRIST JOINT: ICD-10-CM

## 2023-10-16 ENCOUNTER — TELEPHONE (OUTPATIENT)
Dept: ORTHOPEDIC SURGERY | Age: 73
End: 2023-10-16

## 2023-10-16 DIAGNOSIS — S42.401A CLOSED FRACTURE OF DISTAL END OF RIGHT HUMERUS, UNSPECIFIED FRACTURE MORPHOLOGY, INITIAL ENCOUNTER: Primary | ICD-10-CM

## 2023-10-16 RX ORDER — HYDROCODONE BITARTRATE AND ACETAMINOPHEN 5; 325 MG/1; MG/1
1 TABLET ORAL EVERY 8 HOURS PRN
Qty: 15 TABLET | Refills: 0 | Status: SHIPPED | OUTPATIENT
Start: 2023-10-16 | End: 2023-10-21

## 2023-10-16 NOTE — TELEPHONE ENCOUNTER
She is needing a refill on Lortab to [facility] at [facility] in [address]

## 2023-10-17 ENCOUNTER — TREATMENT (OUTPATIENT)
Age: 73
End: 2023-10-17
Payer: MEDICARE

## 2023-10-17 DIAGNOSIS — M25.621 STIFFNESS OF RIGHT ELBOW JOINT: Primary | ICD-10-CM

## 2023-10-17 DIAGNOSIS — M25.521 RIGHT ELBOW PAIN: ICD-10-CM

## 2023-10-17 DIAGNOSIS — M25.631 STIFFNESS OF RIGHT WRIST JOINT: ICD-10-CM

## 2023-10-17 DIAGNOSIS — M25.641 STIFFNESS OF FINGER JOINT OF RIGHT HAND: ICD-10-CM

## 2023-10-17 PROCEDURE — 97110 THERAPEUTIC EXERCISES: CPT

## 2023-10-17 NOTE — PROGRESS NOTES
Adenocarcinoma, removed with colonoscopy    Sleep apnea     Not on CPAP     TIA (transient ischemic attack)     Type 2 diabetes mellitus ([address])     type 2- oral meds; sqbs am avg 120-150's--- no hypo; HA1C 6.3, 6/3/2021    Vitamin B12 deficiency     Vitamin D deficiency    ,   Past Surgical History:   Procedure Laterality Date    BREAST LUMPECTOMY Bilateral     CARPAL TUNNEL RELEASE Right     CHOLECYSTECTOMY      COLONOSCOPY  01/11/2021    Diverticulosis, External Hemorrhoids, Rectal Ulcer - Pathology = Hyperplastic Mucosa, Due 2023    COLONOSCOPY N/A 6/19/2020    COLONOSCOPY/BMI 28 performed by Sherif Post MD at [facility] ENDOSCOPY    COLONOSCOPY N/A 4/26/2017    COLONOSCOPY / BMI=30 performed by Sherif Post MD at [facility] ENDOSCOPY    COLONOSCOPY  09/08/2021    Diverticulosis, External Hemorrhoids, Radiation Colitis, Hyperplastic Polyp    FLEXIBLE SIGMOIDOSCOPY N/A 6/30/2020    SIGMOIDOSCOPY FLEXIBLE performed by Milagros Phoenix MD at [address] Right 9/25/2023    RIGHT DISTAL HUMERUS OPEN REDUCTION INTERNAL FIXATION performed by Antonette Duron MD at [facility]    IR KYPHOPLASTY LUMBAR FIRST LEVEL  09/01/2021    L4 & L5    IR PORT PLACEMENT EQUAL OR GREATER THAN 5 YEARS  01/28/2021    IR PORT PLACEMENT EQUAL OR GREATER THAN 5 YEARS 1/28/2021 [facility] SPECIALS    ORIF HUMERUS FRACTURE Right 09/25/2023    Distal    PRE-MALIGNANT / BENIGN SKIN LESION EXCISION      SINUS SURGERY PROC UNLISTED      TUBAL LIGATION      UPPER GASTROINTESTINAL ENDOSCOPY  08/27/2021    Erosive Esophagitis    VASCULAR SURGERY        Medications. 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  Has received IV lidocaine since then with no known issues    Sulfa Antibiotics Nausea And Vomiting        SUBJECTIVE       Pt reports she is not removing her splint at home for HEP, reports she is

## 2023-10-19 ENCOUNTER — TREATMENT (OUTPATIENT)
Age: 73
End: 2023-10-19
Payer: MEDICARE

## 2023-10-19 DIAGNOSIS — M25.621 STIFFNESS OF RIGHT ELBOW JOINT: Primary | ICD-10-CM

## 2023-10-19 DIAGNOSIS — M25.521 RIGHT ELBOW PAIN: ICD-10-CM

## 2023-10-19 PROCEDURE — 97140 MANUAL THERAPY 1/> REGIONS: CPT | Performed by: OCCUPATIONAL THERAPIST

## 2023-10-19 PROCEDURE — 97110 THERAPEUTIC EXERCISES: CPT | Performed by: OCCUPATIONAL THERAPIST

## 2023-10-23 ENCOUNTER — TELEPHONE (OUTPATIENT)
Dept: ORTHOPEDIC SURGERY | Age: 73
End: 2023-10-23

## 2023-10-27 ENCOUNTER — TELEPHONE (OUTPATIENT)
Dept: INTERNAL MEDICINE CLINIC | Facility: CLINIC | Age: 73
End: 2023-10-27

## 2023-10-27 NOTE — TELEPHONE ENCOUNTER
Pt was due to be seen for fasting labs and a diabetic f/u with Darlyn Jesus earlier this month and she currently has no appts scheduled. Please contact pt to get these appts scheduled as we also need to have her complete her portion of the patient assistance program forms at her visit to renew her for her Forteo for 2024. Thank you.

## 2023-11-01 ENCOUNTER — TELEPHONE (OUTPATIENT)
Dept: ORTHOPEDIC SURGERY | Age: 73
End: 2023-11-01

## 2023-11-01 DIAGNOSIS — S42.401A CLOSED FRACTURE OF DISTAL END OF RIGHT HUMERUS, UNSPECIFIED FRACTURE MORPHOLOGY, INITIAL ENCOUNTER: Primary | ICD-10-CM

## 2023-11-01 RX ORDER — TRAMADOL HYDROCHLORIDE 50 MG/1
50 TABLET ORAL EVERY 6 HOURS PRN
Qty: 20 TABLET | Refills: 0 | Status: SHIPPED | OUTPATIENT
Start: 2023-11-01 | End: 2023-11-06

## 2023-11-01 NOTE — TELEPHONE ENCOUNTER
Tried calling the pt, unable to leave vm due to vm box is full. Per hina, pt can only have tramdol, she is now 5 weeks out from surgery and has had her last [place] refill.

## 2023-11-03 NOTE — TELEPHONE ENCOUNTER
Attempted to contact pt, unable to leave message due to voicemail not being full. Will try again later.

## 2023-11-08 ENCOUNTER — OFFICE VISIT (OUTPATIENT)
Dept: ORTHOPEDIC SURGERY | Age: 73
End: 2023-11-08

## 2023-11-08 DIAGNOSIS — S42.401A CLOSED FRACTURE OF DISTAL END OF RIGHT HUMERUS, UNSPECIFIED FRACTURE MORPHOLOGY, INITIAL ENCOUNTER: Primary | ICD-10-CM

## 2023-11-08 NOTE — PROGRESS NOTES
Orthopaedic Hand Clinic Note    Name: Wyatt Dowell  YOB: 1950  Gender: female  MRN: [id number]      Follow up visit:   1. Closed fracture of distal end of right humerus, unspecified fracture morphology, initial encounter        HPI: Wyatt Dowell is a 72 y.o. female who is following up for right distal humerus fracture treated with ORIF. She is 6 weeks out from surgery. She says she still having some discomfort. I told her that was completely expected 6 weeks out from surgery. She says she cannot get comfortable to sleep. Her  reports she has had difficulty falling asleep and staying asleep for many years prior to this injury. He also notes that she is not eating much. ROS/Meds/PSH/PMH/FH/SH: I personally reviewed the patients standard intake form. Pertinents are discussed in the HPI    Physical Examination:    Musculoskeletal Examination:  Examination on the right upper extremity demonstrates cap refill < 5 seconds in all fingers  Patient has a well-healed incision to the right elbow. She lacks about 5 degrees of full extension. She has 125 degrees of flexion. She has 90 degrees of pronation and 85 degrees of supination. She denies paresthesia. She has good capillary refill. Imaging / Electrodiagnostic Tests:     X-rays include a three-view right elbow are reviewed. Fracture is maintaining alignment. Hardware is in good position. Assessment:     ICD-10-CM    1. Closed fracture of distal end of right humerus, unspecified fracture morphology, initial encounter  S42.401A XR ELBOW RIGHT (MIN 3 VIEWS)          Plan:   We discussed the diagnosis and different treatment options. We discussed observation, therapy, antiinflammatory medications and other pertinent treatment modalities. After discussing in detail the patient elects to proceed with continuing home exercises. She can start to progress to activities as tolerated.   We will see her back in 6

## 2023-11-10 ENCOUNTER — TELEPHONE (OUTPATIENT)
Dept: ORTHOPEDIC SURGERY | Age: 73
End: 2023-11-10

## 2023-11-10 NOTE — TELEPHONE ENCOUNTER
I returned call and spoke with  Lilo Elizabeth. He is requesting an order for a new bone density scan and would like to know if Ms. Brittany Alcazar should continue taking forteo. I have explained that [facility] [address] and Dr. Faith Chance do not order bone density scans and that they do not treat osteoporosis. I advised that he speak with the provider that ordered the forteo to get their recommendations.

## 2023-11-10 NOTE — TELEPHONE ENCOUNTER
She was in on Wednesday and forgot to ask about a referral to the osteoporosis doctor.  Please call Mr. Rafy Gonzalez at [phone]

## 2023-12-01 NOTE — TELEPHONE ENCOUNTER
Tried to call patient and no answer or no VM. Detail Level: Detailed Depth Of Biopsy: dermis Was A Bandage Applied: Yes Size Of Lesion In Cm: 0 Biopsy Type: H and E Biopsy Method: 15 blade Anesthesia Type: 2% lidocaine without epinephrine Anesthesia Volume In Cc: 0.5 Hemostasis: Drysol Wound Care: Petrolatum Dressing: bandage Destruction After The Procedure: No Type Of Destruction Used: Curettage Curettage Text: The wound bed was treated with curettage after the biopsy was performed. Cryotherapy Text: The wound bed was treated with cryotherapy after the biopsy was performed. Electrodesiccation Text: The wound bed was treated with electrodesiccation after the biopsy was performed. Electrodesiccation And Curettage Text: The wound bed was treated with electrodesiccation and curettage after the biopsy was performed. Silver Nitrate Text: The wound bed was treated with silver nitrate after the biopsy was performed. Lab: [id number] Lab Facility: [id number] Consent: Written consent was obtained and risks were reviewed including but not limited to scarring, infection, bleeding, scabbing, incomplete removal, nerve damage and allergy to anesthesia. Post-Care Instructions: I reviewed with the patient in detail post-care instructions. Patient is to keep the biopsy site dry overnight, and then apply vaseline or aquaphor twice daily until healed. Cover with a bandaid. Clean once daily with soap and water. Notification Instructions: Patient will be notified of biopsy results. However, patient instructed to call the office if not contacted within 2 weeks. Billing Type: Third-Party Bill Information: Selecting Yes will display possible errors in your note based on the variables you have selected. This validation is only offered as a suggestion for you. PLEASE NOTE THAT THE VALIDATION TEXT WILL BE REMOVED WHEN YOU FINALIZE YOUR NOTE. IF YOU WANT TO FAX A PRELIMINARY NOTE YOU WILL NEED TO TOGGLE THIS TO 'NO' IF YOU DO NOT WANT IT IN YOUR FAXED NOTE.

## 2023-12-11 ENCOUNTER — TELEPHONE (OUTPATIENT)
Dept: ORTHOPEDIC SURGERY | Age: 73
End: 2023-12-11

## 2023-12-13 ENCOUNTER — OFFICE VISIT (OUTPATIENT)
Dept: ORTHOPEDIC SURGERY | Age: 73
End: 2023-12-13

## 2023-12-13 DIAGNOSIS — S42.401A CLOSED FRACTURE OF DISTAL END OF RIGHT HUMERUS, UNSPECIFIED FRACTURE MORPHOLOGY, INITIAL ENCOUNTER: Primary | ICD-10-CM

## 2023-12-13 PROCEDURE — 99024 POSTOP FOLLOW-UP VISIT: CPT | Performed by: ORTHOPAEDIC SURGERY

## 2023-12-13 RX ORDER — MELOXICAM 15 MG/1
15 TABLET ORAL DAILY
Qty: 60 TABLET | Refills: 0 | Status: SHIPPED | OUTPATIENT
Start: 2023-12-13 | End: 2024-02-11

## 2023-12-13 RX ORDER — METHYLPREDNISOLONE 4 MG/1
TABLET ORAL
Qty: 1 KIT | Refills: 0 | Status: SHIPPED | OUTPATIENT
Start: 2023-12-13

## 2023-12-13 NOTE — PROGRESS NOTES
Orthopaedic Hand Surgery Note    Name: Angie Blair  Age: 72 y.o. YOB: 1950  Gender: female  MRN: [id number]    Post Operative Visit: Right Distal Humerus Open Reduction Internal Fixation - Right    HPI: Patient is status post Right Distal Humerus Open Reduction Internal Fixation - Right on 9/25/2023. Patient reports pain with motion. Physical Examination:  Well-healed surgical wounds. Sensation is intact in all fingers. Motor exam reveals no deficits. She has no pain with passive motion however, she has elbow motion of 110 to 15 degrees short of full extension, she has pain with terminal flexion extension but there is no hard stop, I believe she has more motion than she allows me to manipulate, the plate is palpable on the radial side and this is somewhat tender. Imaging:     right Elbow  XR: AP, Lateral, Oblique views     Clinical Indication:  1. Closed fracture of distal end of right humerus, unspecified fracture morphology, initial encounter           Report: AP, lateral, oblique elbow XR demonstrates healed distal humerus fracture near-anatomic alignment, no hardware complication    Impression: as above     Epi Hanna MD         Assessment:   1. Closed fracture of distal end of right humerus, unspecified fracture morphology, initial encounter         Status post Right Distal Humerus Open Reduction Internal Fixation - Right on 9/25/2023    Plan:  We discussed the post operative course and progression.   Activity as tolerated, I will prescribe a Medrol Dosepak followed by Mobic 15 daily for 2 months, she will reengage in hand therapy to work on motion, we discussed possible hardware removal surgery of her symptoms persist    Epi Hanna MD  12/13/23  1:51 PM

## 2023-12-22 ENCOUNTER — TREATMENT (OUTPATIENT)
Age: 73
End: 2023-12-22
Payer: MEDICARE

## 2023-12-22 DIAGNOSIS — M25.521 RIGHT ELBOW PAIN: ICD-10-CM

## 2023-12-22 DIAGNOSIS — M25.621 STIFFNESS OF RIGHT ELBOW JOINT: Primary | ICD-10-CM

## 2023-12-22 PROCEDURE — 97110 THERAPEUTIC EXERCISES: CPT | Performed by: OCCUPATIONAL THERAPIST

## 2023-12-22 PROCEDURE — 97140 MANUAL THERAPY 1/> REGIONS: CPT | Performed by: OCCUPATIONAL THERAPIST

## 2024-01-01 NOTE — TELEPHONE ENCOUNTER
Please call patient has developed a knot about the size of a marble where plate is in elbow from surgery Yes

## 2024-01-03 DIAGNOSIS — I10 ESSENTIAL (PRIMARY) HYPERTENSION: ICD-10-CM

## 2024-01-03 NOTE — TELEPHONE ENCOUNTER
Spoke with pt's spouse who states that they are trying to get away from using [facility] pharmacy and would like her refills sent to [facility], please. She has about 5 tabs left. Pt's spouse states that pt fell and broke her right humerus in Sept and had to have surgery and is still having soreness, so she hasn't been leaving the house unless absolutely necessary and that's why she hasn't been in for any routine visits.

## 2024-01-04 RX ORDER — LISINOPRIL 10 MG/1
10 TABLET ORAL DAILY
Qty: 30 TABLET | Refills: 1 | Status: SHIPPED | OUTPATIENT
Start: 2024-01-04

## 2024-01-04 NOTE — TELEPHONE ENCOUNTER
Please contact pt to schedule fasting labs and a f/u with Vaishali sometime in the next 2 months before she runs out of medication. Thank you.

## 2024-01-04 NOTE — TELEPHONE ENCOUNTER
I understand, but she HAS to be seen at least every 6 months and that falls next week.  I will send in a 2 months supply but she'll need to get in before that runs out to see me and will need blood work done 3-7 days prior before any further refills can be sent in.

## 2024-01-10 ENCOUNTER — CLINICAL DOCUMENTATION (OUTPATIENT)
Age: 74
End: 2024-01-10

## 2024-01-10 DIAGNOSIS — M25.621 STIFFNESS OF RIGHT ELBOW JOINT: Primary | ICD-10-CM

## 2024-01-10 DIAGNOSIS — M25.521 RIGHT ELBOW PAIN: ICD-10-CM

## 2024-01-10 NOTE — PROGRESS NOTES
[facility]  [address] [place] [id number]  Dept: [phone]      Occupational Therapy Discharge/Discontinuation Note     Patient has been discharged from therapy based on  failure to return and make more appointments .    Patient was last seen for OT services on 12/26/23.  At that time, the patient appeared to be gradually progressing with therapy treatment. Therapy goals were partially met. Please see previous notes for objective findings.       [x] All POC signed:  Go to Notes / Go to Scanned Items  [x] Send message to Auth team to close referral: Go to InBox   [x] Episode Resolved:  Go to Episode  [x] All remaining visits canceled:  Go to Appt Desk  Future Appointments   Date Time Provider Department Center   1/18/2024  1:10 PM [facility] DEXA BI GE LUNAR DEXA SFERMAM SFE   2/14/2024 11:00 AM PERIPHERAL GCCO[facility] GCC   2/14/2024 11:30 AM Nayely Mistry MD [facility]   3/13/2024  3:45 PM Anthony Hensley MD [facility] AMB

## 2024-01-18 ENCOUNTER — HOSPITAL ENCOUNTER (OUTPATIENT)
Dept: MAMMOGRAPHY | Age: 74
Discharge: HOME | End: 2024-01-18
Payer: MEDICARE

## 2024-01-18 DIAGNOSIS — M81.8 OTHER OSTEOPOROSIS WITHOUT CURRENT PATHOLOGICAL FRACTURE: ICD-10-CM

## 2024-01-18 PROCEDURE — 77080 DXA BONE DENSITY AXIAL: CPT

## 2024-01-19 DIAGNOSIS — D50.9 IRON DEFICIENCY ANEMIA, UNSPECIFIED IRON DEFICIENCY ANEMIA TYPE: ICD-10-CM

## 2024-01-19 DIAGNOSIS — C20 RECTAL ADENOCARCINOMA (HCC): Primary | ICD-10-CM

## 2024-01-22 DIAGNOSIS — Z95.828 PORT-A-CATH IN PLACE: Primary | ICD-10-CM

## 2024-01-30 ENCOUNTER — HOSPITAL ENCOUNTER (OUTPATIENT)
Dept: INTERVENTIONAL RADIOLOGY/VASCULAR | Age: 74
Discharge: HOME | End: 2024-02-02
Attending: INTERNAL MEDICINE
Payer: MEDICARE

## 2024-01-30 VITALS
SYSTOLIC BLOOD PRESSURE: 132 MMHG | HEIGHT: 60 IN | RESPIRATION RATE: 16 BRPM | DIASTOLIC BLOOD PRESSURE: 68 MMHG | HEART RATE: 87 BPM | WEIGHT: 155 LBS | TEMPERATURE: 97.6 F | OXYGEN SATURATION: 97 % | BODY MASS INDEX: 30.43 KG/M2

## 2024-01-30 DIAGNOSIS — Z95.828 PORT-A-CATH IN PLACE: ICD-10-CM

## 2024-01-30 PROCEDURE — 36590 REMOVAL TUNNELED CV CATH: CPT

## 2024-01-30 PROCEDURE — 6360000002 HC RX W HCPCS: Performed by: RADIOLOGY

## 2024-01-30 PROCEDURE — 77001 FLUOROGUIDE FOR VEIN DEVICE: CPT

## 2024-01-30 PROCEDURE — 36590 REMOVAL TUNNELED CV CATH: CPT | Performed by: RADIOLOGY

## 2024-01-30 RX ORDER — BUPIVACAINE HYDROCHLORIDE 5 MG/ML
INJECTION, SOLUTION EPIDURAL; INTRACAUDAL PRN
Status: COMPLETED | OUTPATIENT
Start: 2024-01-30 | End: 2024-01-30

## 2024-01-30 RX ADMIN — BUPIVACAINE HYDROCHLORIDE 12 ML: 5 INJECTION, SOLUTION EPIDURAL; INTRACAUDAL; PERINEURAL at 13:34

## 2024-01-30 ASSESSMENT — PAIN - FUNCTIONAL ASSESSMENT: PAIN_FUNCTIONAL_ASSESSMENT: NONE - DENIES PAIN

## 2024-01-30 NOTE — OR NURSING
Recovery period without difficulty. Pt alert and oriented and denies pain. Dressing is clean, dry, and intact. Reviewed discharge instructions with patient, verbalized understanding.    I have personally provided the amount of critical care time documented below concurrently with the resident/fellow.  This time excludes time spent on separate procedures and time spent teaching. I have reviewed the resident’s / fellow’s documentation and I agree with the history, exam, and assessment and plan of care.

## 2024-01-30 NOTE — DISCHARGE INSTRUCTIONS
[facility]     Department of Interventional Radiology     Formerly [facility]     [phone] Office     [phone] Fax         DRAIN/PORT/CATHETER REMOVAL DISCHARGE INSTRUCTIONS           General Information:        Your doctor has ordered for us to remove your drain, port, or catheter. This could be that you do not need it anymore, it is not doing its job, your physician has decided on another plan for your treatment and/or it may need replacing.              Home Care Instructions:        You can resume your regular diet and medication regimen. Do not drink alcohol, drive, or make any important legal decisions in the next 24 hours. Do not lift anything heavier than a gallon of milk, or do anything strenuous for the next 24 hours. You will notice a dressing over the site of the removal. This dressing should stay in place until the site is healed. The dressing should be changed at least every 48 hours. You should change the dressing sooner if it becomes soiled or wet. The nurse who discharges you to home should review with you any wound care instructions. Resume your normal level of activity slowly. You may shower after 24 hours, but do not take a bath or swim or immerse yourself in water until the site has healed, and keep the dressing dry with plastic wrap while showering. The site may ooze for a couple of days. This drainage should lessen with each passing day.           Call If:        You should call your Physician and/or the Radiology Nurse if you have any bleeding other than a small spot on your bandage. Call if you have any signs of infection, fever, or increased pain at the site of the tube. Call if the oozing increases, if it changes color, or begins to have an odor.            Follow-Up Instructions: Please see your ordering doctor as he/she has requested.            To Reach Us:     If you have any questions about your procedure, please call the

## 2024-01-30 NOTE — BRIEF OP NOTE
[facility]  Department of Interventional Radiology  [phone]        Interventional Radiology Brief Procedure Note    Patient: Netta Diaz MRN: [id number]  SSN: [id number]    YOB: 1950  Age: 73 y.o.  Sex: female      Date of Procedure: 1/30/2024    Pre-Procedure Diagnosis: nonfunctional port    Post-Procedure Diagnosis: SAME    Procedure(s): Venous Chest Port removal         Performed By: Peter Reyes MD     Assistants: None    Anesthesia: marcaine    Estimated Blood Loss: Less than 10ml    Specimens:  None    Implants:  None       Complications: None         Follow Up: with PCP    Signed By: Peter eRyes MD     January 30, 2024

## 2024-02-09 ENCOUNTER — TELEPHONE (OUTPATIENT)
Dept: INTERNAL MEDICINE CLINIC | Facility: CLINIC | Age: 74
End: 2024-02-09

## 2024-02-09 DIAGNOSIS — E11.29 TYPE 2 DIABETES MELLITUS WITH MICROALBUMINURIA, WITHOUT LONG-TERM CURRENT USE OF INSULIN (HCC): ICD-10-CM

## 2024-02-09 DIAGNOSIS — R80.9 TYPE 2 DIABETES MELLITUS WITH MICROALBUMINURIA, WITHOUT LONG-TERM CURRENT USE OF INSULIN (HCC): ICD-10-CM

## 2024-02-09 RX ORDER — LINAGLIPTIN 5 MG/1
5 TABLET, FILM COATED ORAL DAILY
Qty: 30 TABLET | Refills: 5 | OUTPATIENT
Start: 2024-02-09

## 2024-02-09 NOTE — TELEPHONE ENCOUNTER
Tradjenta  5 mg sent to [facility] at [facility]    He wants his blood glucose test strips  and his blood glucose monitoring supply  Sent to [facility] at [facility] [address] in [place].

## 2024-02-09 NOTE — TELEPHONE ENCOUNTER
I have been trying to contact pt to schedule her fasting labs and diabetic f/u with Vaishali as she is way over-due, but I can never get her on the phone. Please call pt back to get these appointments scheduled for further refills. Thank you.

## 2024-02-12 DIAGNOSIS — E11.29 TYPE 2 DIABETES MELLITUS WITH MICROALBUMINURIA, WITHOUT LONG-TERM CURRENT USE OF INSULIN (HCC): ICD-10-CM

## 2024-02-12 DIAGNOSIS — R80.9 TYPE 2 DIABETES MELLITUS WITH MICROALBUMINURIA, WITHOUT LONG-TERM CURRENT USE OF INSULIN (HCC): ICD-10-CM

## 2024-02-12 NOTE — TELEPHONE ENCOUNTER
Sent in enough Tradjenta to get her through until her scheduled appointment at the end of March.  Not sure what else she needs as original message was a bit unclear but if test strips or something else specifically is out before our scheduled visit let me know.

## 2024-02-16 DIAGNOSIS — R80.9 TYPE 2 DIABETES MELLITUS WITH MICROALBUMINURIA, WITHOUT LONG-TERM CURRENT USE OF INSULIN (HCC): ICD-10-CM

## 2024-02-16 DIAGNOSIS — E11.29 TYPE 2 DIABETES MELLITUS WITH MICROALBUMINURIA, WITHOUT LONG-TERM CURRENT USE OF INSULIN (HCC): ICD-10-CM

## 2024-02-16 NOTE — TELEPHONE ENCOUNTER
Pt's spouse requesting new Accu-Chek Nasra Plus glucometer with strips for pt to be sent to her [facility] pharmacy, please.

## 2024-02-21 ENCOUNTER — TELEPHONE (OUTPATIENT)
Dept: INTERNAL MEDICINE CLINIC | Facility: CLINIC | Age: 74
End: 2024-02-21

## 2024-02-21 RX ORDER — BLOOD SUGAR DIAGNOSTIC
STRIP MISCELLANEOUS
Qty: 100 EACH | Refills: 3 | Status: SHIPPED | OUTPATIENT
Start: 2024-02-21

## 2024-02-21 RX ORDER — BLOOD-GLUCOSE METER
EACH MISCELLANEOUS
Qty: 1 KIT | Refills: 0 | Status: SHIPPED | OUTPATIENT
Start: 2024-02-21

## 2024-03-05 ENCOUNTER — OFFICE VISIT (OUTPATIENT)
Dept: ONCOLOGY | Age: 74
End: 2024-03-05
Payer: MEDICARE

## 2024-03-05 ENCOUNTER — HOSPITAL ENCOUNTER (OUTPATIENT)
Dept: LAB | Age: 74
Discharge: HOME | End: 2024-03-08
Payer: MEDICARE

## 2024-03-05 VITALS
DIASTOLIC BLOOD PRESSURE: 84 MMHG | TEMPERATURE: 98.5 F | WEIGHT: 145.8 LBS | SYSTOLIC BLOOD PRESSURE: 144 MMHG | HEART RATE: 78 BPM | HEIGHT: 61 IN | BODY MASS INDEX: 27.53 KG/M2 | OXYGEN SATURATION: 98 % | RESPIRATION RATE: 16 BRPM

## 2024-03-05 DIAGNOSIS — D50.9 IRON DEFICIENCY ANEMIA, UNSPECIFIED IRON DEFICIENCY ANEMIA TYPE: ICD-10-CM

## 2024-03-05 DIAGNOSIS — C20 RECTAL ADENOCARCINOMA (HCC): ICD-10-CM

## 2024-03-05 DIAGNOSIS — C20 RECTAL ADENOCARCINOMA (HCC): Primary | ICD-10-CM

## 2024-03-05 LAB
ALBUMIN SERPL-MCNC: 4.2 G/DL (ref 3.2–4.6)
ALBUMIN/GLOB SERPL: 1.1 (ref 0.4–1.6)
ALP SERPL-CCNC: 103 U/L (ref 50–136)
ALT SERPL-CCNC: 61 U/L (ref 12–65)
ANION GAP SERPL CALC-SCNC: 6 MMOL/L (ref 2–11)
AST SERPL-CCNC: 35 U/L (ref 15–37)
BASOPHILS # BLD: 0.1 K/UL (ref 0–0.2)
BASOPHILS NFR BLD: 2 % (ref 0–2)
BILIRUB SERPL-MCNC: 1.2 MG/DL (ref 0.2–1.1)
BUN SERPL-MCNC: 9 MG/DL (ref 8–23)
CALCIUM SERPL-MCNC: 9.8 MG/DL (ref 8.3–10.4)
CEA SERPL-MCNC: 2.2 NG/ML (ref 0–3)
CHLORIDE SERPL-SCNC: 106 MMOL/L (ref 103–113)
CO2 SERPL-SCNC: 26 MMOL/L (ref 21–32)
CREAT SERPL-MCNC: 0.9 MG/DL (ref 0.6–1)
DIFFERENTIAL METHOD BLD: ABNORMAL
EOSINOPHIL # BLD: 0.2 K/UL (ref 0–0.8)
EOSINOPHIL NFR BLD: 3 % (ref 0.5–7.8)
ERYTHROCYTE [DISTWIDTH] IN BLOOD BY AUTOMATED COUNT: 13.1 % (ref 11.9–14.6)
FERRITIN SERPL-MCNC: 327 NG/ML (ref 8–388)
GLOBULIN SER CALC-MCNC: 3.8 G/DL (ref 2.8–4.5)
GLUCOSE SERPL-MCNC: 152 MG/DL (ref 65–100)
HCT VFR BLD AUTO: 45.5 % (ref 35.8–46.3)
HGB BLD-MCNC: 15.5 G/DL (ref 11.7–15.4)
IMM GRANULOCYTES # BLD AUTO: 0 K/UL (ref 0–0.5)
IMM GRANULOCYTES NFR BLD AUTO: 0 % (ref 0–5)
IRON SATN MFR SERPL: 36 %
IRON SERPL-MCNC: 100 UG/DL (ref 35–150)
LYMPHOCYTES # BLD: 0.9 K/UL (ref 0.5–4.6)
LYMPHOCYTES NFR BLD: 18 % (ref 13–44)
MCH RBC QN AUTO: 29.8 PG (ref 26.1–32.9)
MCHC RBC AUTO-ENTMCNC: 34.1 G/DL (ref 31.4–35)
MCV RBC AUTO: 87.5 FL (ref 82–102)
MONOCYTES # BLD: 0.4 K/UL (ref 0.1–1.3)
MONOCYTES NFR BLD: 9 % (ref 4–12)
NEUTS SEG # BLD: 3.5 K/UL (ref 1.7–8.2)
NEUTS SEG NFR BLD: 68 % (ref 43–78)
NRBC # BLD: 0 K/UL (ref 0–0.2)
PLATELET # BLD AUTO: 255 K/UL (ref 150–450)
PMV BLD AUTO: 10.4 FL (ref 9.4–12.3)
POTASSIUM SERPL-SCNC: 3.4 MMOL/L (ref 3.5–5.1)
PROT SERPL-MCNC: 8 G/DL (ref 6.3–8.2)
RBC # BLD AUTO: 5.2 M/UL (ref 4.05–5.2)
SODIUM SERPL-SCNC: 138 MMOL/L (ref 136–146)
TIBC SERPL-MCNC: 278 UG/DL (ref 250–450)
WBC # BLD AUTO: 5.2 K/UL (ref 4.3–11.1)

## 2024-03-05 PROCEDURE — 82378 CARCINOEMBRYONIC ANTIGEN: CPT

## 2024-03-05 PROCEDURE — 83540 ASSAY OF IRON: CPT

## 2024-03-05 PROCEDURE — 1036F TOBACCO NON-USER: CPT | Performed by: INTERNAL MEDICINE

## 2024-03-05 PROCEDURE — G8399 PT W/DXA RESULTS DOCUMENT: HCPCS | Performed by: INTERNAL MEDICINE

## 2024-03-05 PROCEDURE — 36415 COLL VENOUS BLD VENIPUNCTURE: CPT

## 2024-03-05 PROCEDURE — 3079F DIAST BP 80-89 MM HG: CPT | Performed by: INTERNAL MEDICINE

## 2024-03-05 PROCEDURE — G8428 CUR MEDS NOT DOCUMENT: HCPCS | Performed by: INTERNAL MEDICINE

## 2024-03-05 PROCEDURE — 3017F COLORECTAL CA SCREEN DOC REV: CPT | Performed by: INTERNAL MEDICINE

## 2024-03-05 PROCEDURE — 99214 OFFICE O/P EST MOD 30 MIN: CPT | Performed by: INTERNAL MEDICINE

## 2024-03-05 PROCEDURE — 83550 IRON BINDING TEST: CPT

## 2024-03-05 PROCEDURE — G8417 CALC BMI ABV UP PARAM F/U: HCPCS | Performed by: INTERNAL MEDICINE

## 2024-03-05 PROCEDURE — G8484 FLU IMMUNIZE NO ADMIN: HCPCS | Performed by: INTERNAL MEDICINE

## 2024-03-05 PROCEDURE — 1090F PRES/ABSN URINE INCON ASSESS: CPT | Performed by: INTERNAL MEDICINE

## 2024-03-05 PROCEDURE — 82728 ASSAY OF FERRITIN: CPT

## 2024-03-05 PROCEDURE — 3077F SYST BP >= 140 MM HG: CPT | Performed by: INTERNAL MEDICINE

## 2024-03-05 PROCEDURE — 80053 COMPREHEN METABOLIC PANEL: CPT

## 2024-03-05 PROCEDURE — 85025 COMPLETE CBC W/AUTO DIFF WBC: CPT

## 2024-03-05 PROCEDURE — 1123F ACP DISCUSS/DSCN MKR DOCD: CPT | Performed by: INTERNAL MEDICINE

## 2024-03-05 ASSESSMENT — PATIENT HEALTH QUESTIONNAIRE - PHQ9
SUM OF ALL RESPONSES TO PHQ QUESTIONS 1-9: 2
1. LITTLE INTEREST OR PLEASURE IN DOING THINGS: 1
SUM OF ALL RESPONSES TO PHQ9 QUESTIONS 1 & 2: 2
2. FEELING DOWN, DEPRESSED OR HOPELESS: 1
SUM OF ALL RESPONSES TO PHQ QUESTIONS 1-9: 2

## 2024-03-05 NOTE — PATIENT INSTRUCTIONS
Patient Instructions from Today's Visit    Reason for Visit:  Follow up      Plan:  Lab work looks good!     You will be due for another scan in 6 months. Please call [phone] to get this scheduled. It should be done in the beginning of September    Follow Up:  Follow up in 6 months    Recent Lab Results:  Hospital Outpatient Visit on 03/05/2024   Component Date Value Ref Range Status    CEA 03/05/2024 2.2  0.0 - 3.0 ng/mL Final    Comment: Nonsmoker:  <3.0 ng/mL  Smoker:     <5.0 ng/mL  [employer] LOCI technology. Patient's results of tumor marker testing may not be comparable to labs using different manufacturers/methods.      Iron 03/05/2024 100  35 - 150 ug/dL Final    Comment: Known Interfering Substances section:  \"Iron values may be falsely elevated in  serum samples from patients with  anticoagulants (e.g., hemodialysis patients).\"  Limitations of Procedure section:  \"Turbidity resulting from precipitation of  fibrinogen in the serum of patients treated  with anticoagulants (e.g. hemodialysis  patients) may cause spuriously elevated  iron results.\"      TIBC 03/05/2024 278  250 - 450 ug/dL Final    TRANSFERRIN SATURATION 03/05/2024 36  >20 % Final    Ferritin 03/05/2024 327  8 - 388 NG/ML Final    Sodium 03/05/2024 138  136 - 146 mmol/L Final    Potassium 03/05/2024 3.4 (L)  3.5 - 5.1 mmol/L Final    Chloride 03/05/2024 106  103 - 113 mmol/L Final    CO2 03/05/2024 26  21 - 32 mmol/L Final    Anion Gap 03/05/2024 6  2 - 11 mmol/L Final    Glucose 03/05/2024 152 (H)  65 - 100 mg/dL Final    BUN 03/05/2024 9  8 - 23 MG/DL Final    Creatinine 03/05/2024 0.90  0.6 - 1.0 MG/DL Final    Est, Glom Filt Rate 03/05/2024 >60  >60 ml/min/1.73m2 Final    Comment:    Pediatric calculator link: https://www.kidney.org/professionals/kdoqi/gfr_calculatorped     These results are not intended for use in patients <18 years of age.     eGFR results are calculated without a race factor using  the 2021 CKD-EPI equation.

## 2024-03-05 NOTE — PROGRESS NOTES
Data Source: Patient, [facility] record.    3/5/2024    3:28 PM    Netta Diaz [id number]    73 y.o.      Patient Encounter: [facility] Visit    Cancer Diagnosis:  Rectal adenocarcinoma  Stage:  1  Performance Status:  1  Code Status:  Not discussed  Pain Score (0-10):  1  Pain Medication related Constipation:  na  Onc History (Copied from prior):   70 female, works in billing for [employer], ex-smoker (quit 2016), h/o vitamin D deficiency, DM, MATTY, obese habitus, dyslipidemia, hypertension, fibromyalgia, diverticulosis of colon, depression, COPD, cholecystectomy, GERD, hiatal hernia, IBS, more recently presented to Dr. Bolton GI 6/20 for colonoscopy needs after reporting blood per rectum times multiple months as well as a positive Cologuard test in 5/20.  Colonoscopy 6/19/2020 showed a friable rectal mass at 8 to 10 cm.  She also had a stricture at 40 cm which was biopsied but scope could not be traversed beyond the stricture even with pediatric scope (essentially rendering patient with a flex sigmoidoscopy with biopsies rather than a colonoscopy).  Rectal mass biopsies showed adenocarcinoma.  Sigmoid colon biopsies with only reactive/reparative changes.  Patient was subsequently seen by Dr. Bautista with EUS 6/30/2020 showing rectal lesion to be felt fairly superficial.  There was felt to be definite invasion into the submucosa but no clear involvement of muscularis propria.  No involvement of adjacent structures.  Disease was felt to be T1N0.  Given the lesion was felt to be superficial and likely amenable to transanal excision.  Sigmoid colon was felt severely tortuous however no definite stricture was identified.  Recommendation was to consider follow-up colonoscopy after treatment of rectal cancer to ensure no additional lesions in the proximal colon.  Patient has had CTAP with contrast 6/23/2020 showing chronic sigmoid diverticulitis, and a tiny 4 mm nodule

## 2024-03-12 DIAGNOSIS — E55.9 VITAMIN D DEFICIENCY: ICD-10-CM

## 2024-03-12 DIAGNOSIS — R80.9 TYPE 2 DIABETES MELLITUS WITH MICROALBUMINURIA, WITHOUT LONG-TERM CURRENT USE OF INSULIN (HCC): ICD-10-CM

## 2024-03-12 DIAGNOSIS — E11.29 TYPE 2 DIABETES MELLITUS WITH MICROALBUMINURIA, WITHOUT LONG-TERM CURRENT USE OF INSULIN (HCC): ICD-10-CM

## 2024-03-12 DIAGNOSIS — I10 ESSENTIAL (PRIMARY) HYPERTENSION: ICD-10-CM

## 2024-03-12 DIAGNOSIS — R53.81 CHRONIC FATIGUE AND MALAISE: Primary | ICD-10-CM

## 2024-03-12 DIAGNOSIS — R53.82 CHRONIC FATIGUE AND MALAISE: Primary | ICD-10-CM

## 2024-03-12 DIAGNOSIS — E78.2 MIXED HYPERLIPIDEMIA: ICD-10-CM

## 2024-03-13 ENCOUNTER — OFFICE VISIT (OUTPATIENT)
Dept: ORTHOPEDIC SURGERY | Age: 74
End: 2024-03-13
Payer: MEDICARE

## 2024-03-13 DIAGNOSIS — S42.401A CLOSED FRACTURE OF DISTAL END OF RIGHT HUMERUS, UNSPECIFIED FRACTURE MORPHOLOGY, INITIAL ENCOUNTER: Primary | ICD-10-CM

## 2024-03-13 PROCEDURE — G8399 PT W/DXA RESULTS DOCUMENT: HCPCS | Performed by: ORTHOPAEDIC SURGERY

## 2024-03-13 PROCEDURE — 1090F PRES/ABSN URINE INCON ASSESS: CPT | Performed by: ORTHOPAEDIC SURGERY

## 2024-03-13 PROCEDURE — G8484 FLU IMMUNIZE NO ADMIN: HCPCS | Performed by: ORTHOPAEDIC SURGERY

## 2024-03-13 PROCEDURE — 1036F TOBACCO NON-USER: CPT | Performed by: ORTHOPAEDIC SURGERY

## 2024-03-13 PROCEDURE — 99214 OFFICE O/P EST MOD 30 MIN: CPT | Performed by: ORTHOPAEDIC SURGERY

## 2024-03-13 PROCEDURE — 1123F ACP DISCUSS/DSCN MKR DOCD: CPT | Performed by: ORTHOPAEDIC SURGERY

## 2024-03-13 PROCEDURE — G8417 CALC BMI ABV UP PARAM F/U: HCPCS | Performed by: ORTHOPAEDIC SURGERY

## 2024-03-13 PROCEDURE — G8428 CUR MEDS NOT DOCUMENT: HCPCS | Performed by: ORTHOPAEDIC SURGERY

## 2024-03-13 PROCEDURE — 3017F COLORECTAL CA SCREEN DOC REV: CPT | Performed by: ORTHOPAEDIC SURGERY

## 2024-03-13 NOTE — PROGRESS NOTES
Orthopaedic Hand Surgery Note    Name: Netta Diaz  Age: 73 y.o.  YOB: 1950  Gender: female  MRN: [id number]    Post Operative Visit: Right Distal Humerus Open Reduction Internal Fixation - Right    HPI: Patient is status post Right Distal Humerus Open Reduction Internal Fixation - Right on 9/25/2023. Patient reports improvement in motion, she is able to do her hair including the back, she still has some aches and pains but overall she is better.    Physical Examination:  Well-healed surgical wounds. Sensation is intact in all fingers. Motor exam reveals no deficits.  Elbow motion is 10 degrees short of full extension, 130 degrees of flexion, some discomfort with palpation of the plate which is well palpable on the radial aspect.    Imaging:     none    Assessment:   1. Closed fracture of distal end of right humerus, unspecified fracture morphology, initial encounter         Status post Right Distal Humerus Open Reduction Internal Fixation - Right on 9/25/2023    Plan:  We discussed the post operative course and progression.  Activity as tolerated, follow-up as needed, we discussed hardware removal surgery, at this point patient is content with the situation, she will give it 3-6 more months to see if she gets used to it and return if she has more issues    Anthony Hensley MD  03/13/24  4:21 PM

## 2024-03-19 DIAGNOSIS — E11.29 TYPE 2 DIABETES MELLITUS WITH MICROALBUMINURIA, WITHOUT LONG-TERM CURRENT USE OF INSULIN (HCC): ICD-10-CM

## 2024-03-19 DIAGNOSIS — E78.2 MIXED HYPERLIPIDEMIA: ICD-10-CM

## 2024-03-19 DIAGNOSIS — E55.9 VITAMIN D DEFICIENCY: ICD-10-CM

## 2024-03-19 DIAGNOSIS — R53.82 CHRONIC FATIGUE AND MALAISE: ICD-10-CM

## 2024-03-19 DIAGNOSIS — R53.81 CHRONIC FATIGUE AND MALAISE: ICD-10-CM

## 2024-03-19 DIAGNOSIS — I10 ESSENTIAL (PRIMARY) HYPERTENSION: ICD-10-CM

## 2024-03-19 DIAGNOSIS — R80.9 TYPE 2 DIABETES MELLITUS WITH MICROALBUMINURIA, WITHOUT LONG-TERM CURRENT USE OF INSULIN (HCC): ICD-10-CM

## 2024-03-19 LAB
25(OH)D3 SERPL-MCNC: 59.2 NG/ML (ref 30–100)
ALBUMIN SERPL-MCNC: 4.1 G/DL (ref 3.2–4.6)
ALBUMIN/GLOB SERPL: 1.3 (ref 0.4–1.6)
ALP SERPL-CCNC: 91 U/L (ref 50–136)
ALT SERPL-CCNC: 48 U/L (ref 12–65)
ANION GAP SERPL CALC-SCNC: 5 MMOL/L (ref 2–11)
AST SERPL-CCNC: 35 U/L (ref 15–37)
BILIRUB SERPL-MCNC: 1 MG/DL (ref 0.2–1.1)
BUN SERPL-MCNC: 8 MG/DL (ref 8–23)
CALCIUM SERPL-MCNC: 10.1 MG/DL (ref 8.3–10.4)
CHLORIDE SERPL-SCNC: 108 MMOL/L (ref 103–113)
CO2 SERPL-SCNC: 29 MMOL/L (ref 21–32)
CREAT SERPL-MCNC: 0.8 MG/DL (ref 0.6–1)
CREAT UR-MCNC: 232 MG/DL
EST. AVERAGE GLUCOSE BLD GHB EST-MCNC: 126 MG/DL
GLOBULIN SER CALC-MCNC: 3.2 G/DL (ref 2.8–4.5)
GLUCOSE SERPL-MCNC: 137 MG/DL (ref 65–100)
HBA1C MFR BLD: 6 % (ref 4.8–5.6)
MICROALBUMIN UR-MCNC: 151 MG/DL
MICROALBUMIN/CREAT UR-RTO: 651 MG/G (ref 0–30)
POTASSIUM SERPL-SCNC: 3.7 MMOL/L (ref 3.5–5.1)
PROT SERPL-MCNC: 7.3 G/DL (ref 6.3–8.2)
SODIUM SERPL-SCNC: 142 MMOL/L (ref 136–146)
TSH, 3RD GENERATION: 1.11 UIU/ML (ref 0.36–3.74)

## 2024-03-20 LAB
CHOLEST SERPL-MCNC: 309 MG/DL
HDLC SERPL-MCNC: 44 MG/DL (ref 40–60)
HDLC SERPL: 7
LDLC SERPL CALC-MCNC: ABNORMAL MG/DL
LDLC SERPL DIRECT ASSAY-MCNC: 200 MG/DL
TRIGL SERPL-MCNC: 428 MG/DL (ref 35–150)
VLDLC SERPL CALC-MCNC: 85.6 MG/DL (ref 6–23)

## 2024-04-29 DIAGNOSIS — R80.9 TYPE 2 DIABETES MELLITUS WITH MICROALBUMINURIA, WITHOUT LONG-TERM CURRENT USE OF INSULIN (HCC): ICD-10-CM

## 2024-04-29 DIAGNOSIS — E11.29 TYPE 2 DIABETES MELLITUS WITH MICROALBUMINURIA, WITHOUT LONG-TERM CURRENT USE OF INSULIN (HCC): ICD-10-CM

## 2024-04-29 RX ORDER — LINAGLIPTIN 5 MG/1
5 TABLET, FILM COATED ORAL DAILY
Qty: 30 TABLET | Refills: 1 | OUTPATIENT
Start: 2024-04-29

## 2024-05-03 RX ORDER — BUPROPION HYDROCHLORIDE 150 MG/1
150 TABLET ORAL EVERY MORNING
Qty: 90 TABLET | Refills: 3 | Status: CANCELLED | OUTPATIENT
Start: 2024-05-03

## 2024-05-03 NOTE — PROGRESS NOTES
months of treatment but was not feeling herself and was falling frequently so she quit taking a bunch of her medications and started to feel more normal again.  Recent fracture of R humerus due to fall in Sept 2023 with history of multiple compression fractures.    DEXA Result (most recent):  DEXA BONE DENSITY AXIAL SKELETON 01/18/2024    Narrative  BONE MINERAL DENSITY    INDICATION:  Postmenopausal    COMPARISON: None.    TECHNIQUE: Imaging was performed on a [employer].  World Health  Organization meta-analysis fracture risk calculator (FRAX) analysis was  performed for 10 year fracture risk probability assessment    FINDINGS:    Femoral neck, Total hip, Radius    LUMBAR SPINE:  Bone mineral density (gm/cm2): 1.015  T-score: -1.4  Z-score: 0.4    Impression  Using the World Health Organization criteria, the bone mineral density is  normal.    10 year probability of major osteoporotic fracture: 19.2%  10 year probability of hip fracture: 3.9%    -    The National Osteoporosis Foundation recommends that medical therapy be  considered in postmenopausal women and men age 50 years and older with a:  a.  Hip or vertebral fracture,  b.  T-score <= -2.5 in the spine or hip (osteoporotic), or  c.  T-score between -1.0 and -2.5 (low bone mass, osteopenic), and FRAX =>  3% for hip fracture or => 20% for major osteoporotic fracture.      Patient is here for follow-up of depression with anxiety.  The current state of this condition is control uncertain - not taking Paxil 40 mg daily or Wellbutrin  mg daily as prescribed, medication compliance problems: didn't feel that Wellbutrin improved anything and stopped a bunch of medications in Sept due to pill fatigue.   reports she is typically happy and laughing but does get irritated at circumstances she cannot control and has thrown the remote at the TV.      Review of Systems   Constitutional:  Negative for fatigue and unexpected weight change.   Eyes:

## 2024-05-07 ENCOUNTER — OFFICE VISIT (OUTPATIENT)
Dept: INTERNAL MEDICINE CLINIC | Facility: CLINIC | Age: 74
End: 2024-05-07
Payer: MEDICARE

## 2024-05-07 VITALS
DIASTOLIC BLOOD PRESSURE: 80 MMHG | WEIGHT: 143 LBS | SYSTOLIC BLOOD PRESSURE: 130 MMHG | BODY MASS INDEX: 27 KG/M2 | HEART RATE: 75 BPM | TEMPERATURE: 98.2 F | OXYGEN SATURATION: 97 % | HEIGHT: 61 IN

## 2024-05-07 DIAGNOSIS — E78.2 MIXED HYPERLIPIDEMIA: ICD-10-CM

## 2024-05-07 DIAGNOSIS — F41.8 DEPRESSION WITH ANXIETY: ICD-10-CM

## 2024-05-07 DIAGNOSIS — E55.9 VITAMIN D DEFICIENCY: ICD-10-CM

## 2024-05-07 DIAGNOSIS — R80.9 TYPE 2 DIABETES MELLITUS WITH MICROALBUMINURIA, WITHOUT LONG-TERM CURRENT USE OF INSULIN (HCC): Primary | ICD-10-CM

## 2024-05-07 DIAGNOSIS — I10 ESSENTIAL (PRIMARY) HYPERTENSION: ICD-10-CM

## 2024-05-07 DIAGNOSIS — M81.8 OTHER OSTEOPOROSIS WITHOUT CURRENT PATHOLOGICAL FRACTURE: ICD-10-CM

## 2024-05-07 DIAGNOSIS — E11.29 TYPE 2 DIABETES MELLITUS WITH MICROALBUMINURIA, WITHOUT LONG-TERM CURRENT USE OF INSULIN (HCC): Primary | ICD-10-CM

## 2024-05-07 PROCEDURE — 1123F ACP DISCUSS/DSCN MKR DOCD: CPT | Performed by: PHYSICIAN ASSISTANT

## 2024-05-07 PROCEDURE — 1090F PRES/ABSN URINE INCON ASSESS: CPT | Performed by: PHYSICIAN ASSISTANT

## 2024-05-07 PROCEDURE — 1036F TOBACCO NON-USER: CPT | Performed by: PHYSICIAN ASSISTANT

## 2024-05-07 PROCEDURE — 3075F SYST BP GE 130 - 139MM HG: CPT | Performed by: PHYSICIAN ASSISTANT

## 2024-05-07 PROCEDURE — 3044F HG A1C LEVEL LT 7.0%: CPT | Performed by: PHYSICIAN ASSISTANT

## 2024-05-07 PROCEDURE — G8399 PT W/DXA RESULTS DOCUMENT: HCPCS | Performed by: PHYSICIAN ASSISTANT

## 2024-05-07 PROCEDURE — 3017F COLORECTAL CA SCREEN DOC REV: CPT | Performed by: PHYSICIAN ASSISTANT

## 2024-05-07 PROCEDURE — 3079F DIAST BP 80-89 MM HG: CPT | Performed by: PHYSICIAN ASSISTANT

## 2024-05-07 PROCEDURE — G8427 DOCREV CUR MEDS BY ELIG CLIN: HCPCS | Performed by: PHYSICIAN ASSISTANT

## 2024-05-07 PROCEDURE — 2022F DILAT RTA XM EVC RTNOPTHY: CPT | Performed by: PHYSICIAN ASSISTANT

## 2024-05-07 PROCEDURE — 99215 OFFICE O/P EST HI 40 MIN: CPT | Performed by: PHYSICIAN ASSISTANT

## 2024-05-07 PROCEDURE — G8417 CALC BMI ABV UP PARAM F/U: HCPCS | Performed by: PHYSICIAN ASSISTANT

## 2024-05-07 RX ORDER — DENOSUMAB 60 MG/ML
60 INJECTION SUBCUTANEOUS
Qty: 180 ML | Refills: 1
Start: 2024-05-07

## 2024-05-07 RX ORDER — LISINOPRIL 10 MG/1
10 TABLET ORAL DAILY
Qty: 90 TABLET | Refills: 3 | Status: SHIPPED | OUTPATIENT
Start: 2024-05-07

## 2024-05-07 RX ORDER — PAROXETINE HYDROCHLORIDE 20 MG/1
20 TABLET, FILM COATED ORAL DAILY
Qty: 90 TABLET | Refills: 1 | Status: SHIPPED | OUTPATIENT
Start: 2024-05-07

## 2024-05-07 RX ORDER — ACETAMINOPHEN 325 MG/1
650 TABLET ORAL
OUTPATIENT
Start: 2024-05-07

## 2024-05-07 RX ORDER — ONDANSETRON 2 MG/ML
8 INJECTION INTRAMUSCULAR; INTRAVENOUS
OUTPATIENT
Start: 2024-05-07

## 2024-05-07 RX ORDER — ROSUVASTATIN CALCIUM 10 MG/1
10 TABLET, COATED ORAL NIGHTLY
Qty: 90 TABLET | Refills: 3 | Status: SHIPPED | OUTPATIENT
Start: 2024-05-07

## 2024-05-07 RX ORDER — FENOFIBRATE 160 MG/1
160 TABLET ORAL DAILY
Qty: 90 TABLET | Refills: 3 | Status: SHIPPED | OUTPATIENT
Start: 2024-05-07

## 2024-05-07 RX ORDER — ICOSAPENT ETHYL 1 G/1
2 CAPSULE ORAL 2 TIMES DAILY
Qty: 360 CAPSULE | Refills: 3 | Status: SHIPPED | OUTPATIENT
Start: 2024-05-07

## 2024-05-07 RX ORDER — SODIUM CHLORIDE 9 MG/ML
INJECTION, SOLUTION INTRAVENOUS CONTINUOUS
OUTPATIENT
Start: 2024-05-07

## 2024-05-07 RX ORDER — EPINEPHRINE 1 MG/ML
0.3 INJECTION, SOLUTION, CONCENTRATE INTRAVENOUS PRN
OUTPATIENT
Start: 2024-05-07

## 2024-05-07 RX ORDER — DIPHENHYDRAMINE HYDROCHLORIDE 50 MG/ML
50 INJECTION INTRAMUSCULAR; INTRAVENOUS
OUTPATIENT
Start: 2024-05-07

## 2024-05-07 RX ORDER — FAMOTIDINE 10 MG/ML
20 INJECTION, SOLUTION INTRAVENOUS
OUTPATIENT
Start: 2024-05-07

## 2024-05-07 RX ORDER — ALBUTEROL SULFATE 90 UG/1
4 AEROSOL, METERED RESPIRATORY (INHALATION) PRN
OUTPATIENT
Start: 2024-05-07

## 2024-05-07 SDOH — ECONOMIC STABILITY: FOOD INSECURITY: WITHIN THE PAST 12 MONTHS, YOU WORRIED THAT YOUR FOOD WOULD RUN OUT BEFORE YOU GOT MONEY TO BUY MORE.: NEVER TRUE

## 2024-05-07 SDOH — ECONOMIC STABILITY: FOOD INSECURITY: WITHIN THE PAST 12 MONTHS, THE FOOD YOU BOUGHT JUST DIDN'T LAST AND YOU DIDN'T HAVE MONEY TO GET MORE.: NEVER TRUE

## 2024-05-07 SDOH — ECONOMIC STABILITY: HOUSING INSECURITY
IN THE LAST 12 MONTHS, WAS THERE A TIME WHEN YOU DID NOT HAVE A STEADY PLACE TO SLEEP OR SLEPT IN A SHELTER (INCLUDING NOW)?: NO

## 2024-05-07 SDOH — ECONOMIC STABILITY: INCOME INSECURITY: HOW HARD IS IT FOR YOU TO PAY FOR THE VERY BASICS LIKE FOOD, HOUSING, MEDICAL CARE, AND HEATING?: NOT HARD AT ALL

## 2024-05-07 ASSESSMENT — PATIENT HEALTH QUESTIONNAIRE - PHQ9
SUM OF ALL RESPONSES TO PHQ QUESTIONS 1-9: 0
1. LITTLE INTEREST OR PLEASURE IN DOING THINGS: NOT AT ALL
SUM OF ALL RESPONSES TO PHQ QUESTIONS 1-9: 0
SUM OF ALL RESPONSES TO PHQ QUESTIONS 1-9: 0
2. FEELING DOWN, DEPRESSED OR HOPELESS: NOT AT ALL
SUM OF ALL RESPONSES TO PHQ QUESTIONS 1-9: 0
SUM OF ALL RESPONSES TO PHQ9 QUESTIONS 1 & 2: 0

## 2024-05-07 NOTE — PATIENT INSTRUCTIONS
Resume the following medications: Crestor, Fenofibrate, Vascepa, Paxil (but at lower dose)  Monitor blood sugar daily and keep record to bring to next appointment  Reminded patient that the sweets she eats not only impacts her glucose but also contributes to her triglycerides being so high  Encouraged to increase physical exercise within safe parameters to avoid falls  Reminded that diabetic eye exam is due and needs to get scheduled ASAP  Continue chronic medications as prescribed - current dose of vitamin d is sufficient  Reminded to stay well hydrated with plenty of water  Discussed the importance of transitioning to a medication to help preserve the bone density that Forteo helped to rebuild - will submit order for Prolia with instruction for patient to call us once they have her scheduled and we will arrange for nonfasting CMP lab to be done prior as required

## 2024-05-08 ENCOUNTER — TELEPHONE (OUTPATIENT)
Dept: INTERNAL MEDICINE CLINIC | Facility: CLINIC | Age: 74
End: 2024-05-08

## 2024-05-08 NOTE — TELEPHONE ENCOUNTER
Spoke with pt's [facility] pharmacy who states that for branded Vascepa, pt's cost for a 90 day supply will be $141 and that they will have to order it and it should arrive tomorrow. Tried to contact pt on 5/8/24 @ 2236 to see if she is okay with that price, no answer and v/m not set up. Will try again later.

## 2024-05-08 NOTE — TELEPHONE ENCOUNTER
Pt boyfriend returned call states the $141 for 90 day supply of Vascepa would be ok. However, states that they would prefer a cheaper option if available, even if it's another medication.

## 2024-05-09 NOTE — TELEPHONE ENCOUNTER
Do they have a generic option for Vascepa at that pharmacy?  If so they should be able to process it as generic since I didn't specify brand only for her.  If not, we can send in Lovaza which does have a generic but according to the popup screens Vascepa is preferred and Lovaza is nonpreferred...

## 2024-05-09 NOTE — TELEPHONE ENCOUNTER
Pt's boyfriend notified and verbalized understanding. He will return my call once he finds out which is cheaper.

## 2024-05-09 NOTE — TELEPHONE ENCOUNTER
Ok, let patient know that they could check with her insurance to see how much generic Lovaza would be and if cheaper I can switch it.  From what feedback the system gives up it's nonpreferred so she'd probabaly have to try and fail Vascepa first but I can't be sure of that.  She just needs to call them and sort through which is less expensive for her and let me know.

## 2024-05-14 ENCOUNTER — TELEPHONE (OUTPATIENT)
Dept: INTERNAL MEDICINE CLINIC | Facility: CLINIC | Age: 74
End: 2024-05-14

## 2024-05-14 DIAGNOSIS — M81.8 OTHER OSTEOPOROSIS WITHOUT CURRENT PATHOLOGICAL FRACTURE: Primary | ICD-10-CM

## 2024-05-14 NOTE — TELEPHONE ENCOUNTER
Pt's emergency contact, Sincere Alcantara, is calling the office on behalf of the pt. Mr. Alcantara states the pt is scheduled for a prolia infusion on 5/29. Mr. Alcantara states the are needing labs scheduled for the week prior. Pt is scheduled for labs on 5/23.  Please place lab orders for pt to have done prior to Prolia.

## 2024-05-23 ENCOUNTER — NURSE ONLY (OUTPATIENT)
Dept: INTERNAL MEDICINE CLINIC | Facility: CLINIC | Age: 74
End: 2024-05-23

## 2024-05-23 DIAGNOSIS — M81.8 OTHER OSTEOPOROSIS WITHOUT CURRENT PATHOLOGICAL FRACTURE: ICD-10-CM

## 2024-05-23 LAB
ANION GAP SERPL CALC-SCNC: 13 MMOL/L (ref 9–18)
BUN SERPL-MCNC: 10 MG/DL (ref 8–23)
CALCIUM SERPL-MCNC: 12.5 MG/DL (ref 8.8–10.2)
CHLORIDE SERPL-SCNC: 100 MMOL/L (ref 98–107)
CO2 SERPL-SCNC: 26 MMOL/L (ref 20–28)
CREAT SERPL-MCNC: 0.95 MG/DL (ref 0.6–1.1)
GLUCOSE SERPL-MCNC: 167 MG/DL (ref 70–99)
POTASSIUM SERPL-SCNC: 4.2 MMOL/L (ref 3.5–5.1)
SODIUM SERPL-SCNC: 139 MMOL/L (ref 136–145)

## 2024-05-28 NOTE — RESULT ENCOUNTER NOTE
Calcium levels is too high - they probably won't do Prolia because of this.  Is she taking any calcium supplements or multivitamins?  If so, please discontinue and we'll have to recheck labs in 1 month and reschedule Prolia.

## 2024-05-29 ENCOUNTER — TELEPHONE (OUTPATIENT)
Dept: INTERNAL MEDICINE CLINIC | Facility: CLINIC | Age: 74
End: 2024-05-29

## 2024-05-29 ENCOUNTER — NURSE ONLY (OUTPATIENT)
Age: 74
End: 2024-05-29
Payer: MEDICARE

## 2024-05-29 VITALS
TEMPERATURE: 98.2 F | OXYGEN SATURATION: 98 % | SYSTOLIC BLOOD PRESSURE: 119 MMHG | HEART RATE: 97 BPM | DIASTOLIC BLOOD PRESSURE: 79 MMHG

## 2024-05-29 DIAGNOSIS — M81.8 OTHER OSTEOPOROSIS WITHOUT CURRENT PATHOLOGICAL FRACTURE: Primary | ICD-10-CM

## 2024-05-29 PROCEDURE — 96372 THER/PROPH/DIAG INJ SC/IM: CPT | Performed by: PSYCHIATRY & NEUROLOGY

## 2024-05-29 RX ORDER — ONDANSETRON 2 MG/ML
8 INJECTION INTRAMUSCULAR; INTRAVENOUS
OUTPATIENT
Start: 2024-11-27

## 2024-05-29 RX ORDER — DIPHENHYDRAMINE HYDROCHLORIDE 50 MG/ML
50 INJECTION INTRAMUSCULAR; INTRAVENOUS
OUTPATIENT
Start: 2024-11-27

## 2024-05-29 RX ORDER — SODIUM CHLORIDE 9 MG/ML
INJECTION, SOLUTION INTRAVENOUS CONTINUOUS
OUTPATIENT
Start: 2024-11-27

## 2024-05-29 RX ORDER — EPINEPHRINE 1 MG/ML
0.3 INJECTION, SOLUTION, CONCENTRATE INTRAVENOUS PRN
OUTPATIENT
Start: 2024-11-27

## 2024-05-29 RX ORDER — ALBUTEROL SULFATE 90 UG/1
4 AEROSOL, METERED RESPIRATORY (INHALATION) PRN
OUTPATIENT
Start: 2024-11-27

## 2024-05-29 RX ORDER — ACETAMINOPHEN 325 MG/1
650 TABLET ORAL
OUTPATIENT
Start: 2024-11-27

## 2024-05-29 NOTE — PROGRESS NOTES
[facility]  [address], [address] [place] [address]  Office : [phone], Fax: [phone]        Osteoporosis pre-infusion/injection questionnaire:     1. In the past month, have you had or are you planning to have any dental surgery or major dental procedures?       2. Are you taking a calcium and vitamin D supplement? Yes     3. When was your last osteoporosis treatment?  First one     4. In the last year, have you had any fractures? Yes         Patient arrived ambulatory to infusion suite today for a repeat / initial Prolia subcutaneous injection.  Pertinent labs drawn 5/23/2024 . Labs reviewed and are within medication administration parameters.  Prolia 60mg/ml injected SC into left arm. Patient tolerated injection well.  Pt observed for 30 minutes post-injection without complications.  Advised patient to continue with calcium and vitamin D supplements post injection. Advised patient to call the ordering provider with any problems post injection.       Next Prolia appt scheduled 12/4/2024 prior to departure from infusion suite.     Pt ambulatory with steady gait out of infusion suite.

## 2024-08-18 NOTE — PROGRESS NOTES
[facility] [address] [address]  Dept: [phone]   Occupational Therapy Orthosis Note     Referring MD: TANG Kaiser*  Date: 10/6/2023  Diagnosis:    Diagnosis Orders   1. Closed fracture of distal end of right humerus, unspecified fracture morphology, initial encounter        2. Stiffness of right elbow joint        3. Right elbow pain           Therapy Precautions:  AAROM elbow flexion, normal wrist and hand motion    Total Timed Codes: 0 min, Total Treatment Time: 50 min  Modifier needed: No  Episode visit count:  1     PMH:   Affected Extremity: right    Date of Injury: 9/15/23:     Date of Surgery: 9/25/23:  Right distal humerus open reduction internal fixation, right ulnar nerve repair with synthetic conduit    Mechanism of Injury: Fell down stairs onto concrete    Wound/Pin/Incision: Healing well    ORTHOSIS ISSUED:   : EWHO (elbow wrist hand orthosis), C/F (custom fabricated -  without joints, may include soft interface, straps, includes fitting and adjustment.      [facility] material    TREATMENT PROVIDED:   Patient instructed in purpose, care, and precaution of orthosis wearing, Patient instructed in wearing schedule, and Patient instructed on pin/wound care and signs of infection    WEAR SCHEDULE:   At all times    CARE OF ORTHOSIS AND PRECAUTIONS REVIEWED:   The orthosis can be cleaned with soap and water, rubbing alcohol, or a mild cleanser  Keep away from any direct source of heat, it will melt and/or lose it's shape  Keep away from dogs and/or pets that will chew the orthosis  Should the orthosis result in increased pain, numbness/tingling, increased swelling, or overall worsening of condition, patient is to contact the office immediately during business hours     TREATMENT GOALS:   Patient to demonstrate independence with donning/doffing orthosis in one visit, Patient to verbalize understanding of inspecting skin to
following instruction and performance  Use of heat and ice as needed for pain and inflammation reduction. Precautions reviewed. OT POC and rationale, education for surgical precautions and pain management, edema management      ORTHOSIS ISSUED:   : EWHO (elbow wrist hand orthosis), C/F (custom fabricated -  without joints, may include soft interface, straps, includes fitting and adjustment. R long arm orthosis    TREATMENT PROVIDED:   Patient instructed in purpose, care, and precaution of orthosis wearing, Patient instructed in wearing schedule, and Patient instructed in HEP    WEAR SCHEDULE:   as needed to minimize symptoms, remove for exercise, dressing changes, hygiene    CARE OF ORTHOSIS AND PRECAUTIONS REVIEWED:   The orthosis can be cleaned with soap and water, rubbing alcohol, or a mild cleanser  Keep away from any direct source of heat, it will melt and/or lose it's shape  Keep away from dogs and/or pets that will chew the orthosis  Should the orthosis result in increased pain, numbness/tingling, increased swelling, or overall worsening of condition, patient is to contact the office immediately during business hours       CLINICAL DECISION MAKING/ASSESSMENT     Performance Deficits  Physical: Mobility, Strength, and Sensation  Cognitive: No deficiencies noted  Psychosocial: No deficiencies noted  Rehab potential: good    The patient presents today s/p Right distal humerus open reduction internal fixation, right ulnar nerve repair with synthetic conduit . The patient presents with R elbow stiffness, soreness, impaired ADL's . These deficits appear to be secondary to injury and surgery . Based on these subjective and objective findings, the patient is perceived as currently having a primary functional deficit of  ***% dysfunction.      After a brief chart/PMH and OT profile review, evaluation requiring minimal  assistance/modifications and simple patient and data analysis, I have determined the
- Follow-up outpatient with Dr. Padilla in 1 week, call office for appt at [phone]  - When calling for appointment, let the office know you were seen in ED and need to be fit in to Dr. Padilla's schedule    Fractures in Children    Your child was seen today in the Emergency Department and diagnosed with a fracture.   Your child was put in cast or splint to help it rest and heal.      General tips for taking care of a child who has a splint or cast in place:  -You will likely have some pain for the next 1-2 days; use ibuprofen every 6 hours as needed to help with pain control.    Follow-up with the Pediatric Orthopedist as instructed, call for an appointment at [phone].  Before then, if you notice swelling, numbness, color change, or worsening pain, return to the ED.     Casts and splints are supports that are worn to protect broken bones and other injuries. A cast or splint may hold a bone still and in the correct position while it heals. Casts and splints may also help ease pain, swelling, and muscle spasms. A cast that is a hardened is usually made of fiberglass or plaster. It is custom-fit to the body and it offers more protection than a splint. It cannot be taken off and put back on. A splint is a type of soft support that is usually made from cloth and elastic. It can be adjusted or taken off as needed.    GENERAL INSTRUCTIONS:  -Do not allow your child to put pressure on any part of the cast or splint until it is fully hardened. This may take several hours.  -Ask your child's health care provider what activities are safe for your child.  -Give over-the-counter and prescription medicines only as told by your child's health care provider.  -Keep all follow-up visits.  This is important for the health of your child’s bones.  -Contact the orthopedist if: the splint/cast gets wet or damaged; skin under or around the cast becomes red or raw; under the cast is extremely itchy or painful; the cast or splint feels very uncomfortable; the cast or splint is too tight or too loose; an object gets stuck under the cast.  -Your child will need to limit activity while the injury is healing.  -Use a hair dryer on COLD settings to blow into the cast if there is itchiness; DO NOT stick things under the cast/splint to scratch an itch!    HOW TO CARE FOR A CAST?  -Do not allow your child to stick anything inside the cast to scratch the skin. Sticking something in the cast increases your child's risk of skin infection.  -Check the skin around the cast every day. Tell your child's health care provider about any concerns.  -You may put lotion on dry skin around the edges of the cast. Do not put lotion on the skin underneath the cast.  -Keep the cast clean.  -Do not let it get wet! Cover it with a watertight covering when your child takes a bath or a shower.    HOW TO CARE FOR A SPLINT?  -Have your child wear it as told by your child's health care provider. Remove it only as told by your child's health care provider.  -Loosen the splint if your child's fingers or toes tingle, become numb, or turn cold and blue.  -Keep the splint clean.  -Do not let it get wet! Cover it with a watertight covering when your child takes a bath or a shower.    Follow up with your pediatrician in 1-2 days to make sure that your child is doing better.    Return to the Emergency Department if:  -Your child's pain is getting worse.  -Your child’s injured area tingles, becomes numb, or turns cold and blue.  -Your child cannot feel or move his or her fingers or toes.  -There is fluid leaking through the cast.  -Your child has severe pain or pressure under the cast.

## 2024-08-29 ENCOUNTER — APPOINTMENT (OUTPATIENT)
Dept: CT IMAGING | Age: 74
End: 2024-08-29
Payer: MEDICARE

## 2024-08-29 ENCOUNTER — HOSPITAL ENCOUNTER (EMERGENCY)
Age: 74
Discharge: HOME | End: 2024-08-29
Payer: MEDICARE

## 2024-08-29 VITALS
WEIGHT: 145 LBS | HEART RATE: 79 BPM | SYSTOLIC BLOOD PRESSURE: 135 MMHG | DIASTOLIC BLOOD PRESSURE: 78 MMHG | BODY MASS INDEX: 27.38 KG/M2 | TEMPERATURE: 98.4 F | RESPIRATION RATE: 15 BRPM | OXYGEN SATURATION: 97 % | HEIGHT: 61 IN

## 2024-08-29 DIAGNOSIS — K52.9 COLITIS: Primary | ICD-10-CM

## 2024-08-29 LAB
ALBUMIN SERPL-MCNC: 4.2 G/DL (ref 3.2–4.6)
ALBUMIN/GLOB SERPL: 1.2 (ref 0.4–1.6)
ALP SERPL-CCNC: 88 U/L (ref 45–117)
ALT SERPL-CCNC: 20 U/L (ref 13–61)
ANION GAP SERPL CALC-SCNC: 16 MMOL/L (ref 9–18)
APPEARANCE UR: CLEAR
AST SERPL-CCNC: 23 U/L (ref 15–37)
BACTERIA URNS QL MICRO: 0 /HPF
BASOPHILS # BLD: 0.1 K/UL (ref 0–0.2)
BASOPHILS NFR BLD: 1 % (ref 0–2)
BILIRUB SERPL-MCNC: 0.4 MG/DL (ref 0.2–1.1)
BILIRUB UR QL: NEGATIVE
BUN SERPL-MCNC: 13 MG/DL (ref 8–23)
CALCIUM SERPL-MCNC: 10.8 MG/DL (ref 8.3–10.4)
CHLORIDE SERPL-SCNC: 101 MMOL/L (ref 98–107)
CO2 SERPL-SCNC: 23 MMOL/L (ref 21–32)
COLOR UR: YELLOW
CREAT SERPL-MCNC: 0.88 MG/DL (ref 0.6–1)
DIFFERENTIAL METHOD BLD: NORMAL
EOSINOPHIL # BLD: 0.3 K/UL (ref 0–0.8)
EOSINOPHIL NFR BLD: 3 % (ref 0.5–7.8)
EPI CELLS #/AREA URNS HPF: 0 /HPF
ERYTHROCYTE [DISTWIDTH] IN BLOOD BY AUTOMATED COUNT: 12.4 % (ref 11.9–14.6)
GLOBULIN SER CALC-MCNC: 3.5 G/DL (ref 2.8–4.5)
GLUCOSE SERPL-MCNC: 162 MG/DL (ref 65–100)
GLUCOSE UR STRIP.AUTO-MCNC: NEGATIVE MG/DL
HCT VFR BLD AUTO: 44.5 % (ref 35.8–46.3)
HGB BLD-MCNC: 15 G/DL (ref 11.7–15.4)
HGB UR QL STRIP: ABNORMAL
IMM GRANULOCYTES # BLD AUTO: 0 K/UL (ref 0–0.5)
IMM GRANULOCYTES NFR BLD AUTO: 1 % (ref 0–5)
KETONES UR QL STRIP.AUTO: NEGATIVE MG/DL
LACTATE SERPL-SCNC: 1.1 MMOL/L (ref 0.5–2)
LEUKOCYTE ESTERASE UR QL STRIP.AUTO: NEGATIVE
LIPASE SERPL-CCNC: 34 U/L (ref 13–60)
LYMPHOCYTES # BLD: 1.4 K/UL (ref 0.5–4.6)
LYMPHOCYTES NFR BLD: 19 % (ref 13–44)
MCH RBC QN AUTO: 29 PG (ref 26.1–32.9)
MCHC RBC AUTO-ENTMCNC: 33.7 G/DL (ref 31.4–35)
MCV RBC AUTO: 85.9 FL (ref 82–102)
MONOCYTES # BLD: 0.5 K/UL (ref 0.1–1.3)
MONOCYTES NFR BLD: 6 % (ref 4–12)
MUCOUS THREADS URNS QL MICRO: 0 /LPF
NEUTS SEG # BLD: 5.2 K/UL (ref 1.7–8.2)
NEUTS SEG NFR BLD: 70 % (ref 43–78)
NITRITE UR QL STRIP.AUTO: NEGATIVE
NRBC # BLD: 0 K/UL (ref 0–0.2)
OTHER OBSERVATIONS: NORMAL
PH UR STRIP: 5.5 (ref 5–9)
PLATELET # BLD AUTO: 439 K/UL (ref 150–450)
PMV BLD AUTO: 9.5 FL (ref 9.4–12.3)
POTASSIUM SERPL-SCNC: 4 MMOL/L (ref 3.5–5.1)
PROT SERPL-MCNC: 7.7 G/DL (ref 6.4–8.2)
PROT UR STRIP-MCNC: 100 MG/DL
RBC # BLD AUTO: 5.18 M/UL (ref 4.05–5.2)
RBC #/AREA URNS HPF: NORMAL /HPF
SODIUM SERPL-SCNC: 140 MMOL/L (ref 133–143)
SP GR UR REFRACTOMETRY: >=1.03 (ref 1–1.02)
UROBILINOGEN UR QL STRIP.AUTO: 0.2 EU/DL (ref 0.2–1)
WBC # BLD AUTO: 7.4 K/UL (ref 4.3–11.1)
WBC URNS QL MICRO: NORMAL /HPF

## 2024-08-29 PROCEDURE — 83690 ASSAY OF LIPASE: CPT

## 2024-08-29 PROCEDURE — 80053 COMPREHEN METABOLIC PANEL: CPT

## 2024-08-29 PROCEDURE — 6360000004 HC RX CONTRAST MEDICATION: Performed by: NURSE PRACTITIONER

## 2024-08-29 PROCEDURE — 74177 CT ABD & PELVIS W/CONTRAST: CPT

## 2024-08-29 PROCEDURE — 81001 URINALYSIS AUTO W/SCOPE: CPT

## 2024-08-29 PROCEDURE — 85025 COMPLETE CBC W/AUTO DIFF WBC: CPT

## 2024-08-29 PROCEDURE — 83605 ASSAY OF LACTIC ACID: CPT

## 2024-08-29 PROCEDURE — 6370000000 HC RX 637 (ALT 250 FOR IP): Performed by: NURSE PRACTITIONER

## 2024-08-29 PROCEDURE — 99285 EMERGENCY DEPT VISIT HI MDM: CPT

## 2024-08-29 RX ORDER — IOPAMIDOL 755 MG/ML
100 INJECTION, SOLUTION INTRAVASCULAR
Status: COMPLETED | OUTPATIENT
Start: 2024-08-29 | End: 2024-08-29

## 2024-08-29 RX ORDER — ONDANSETRON 4 MG/1
4 TABLET, ORALLY DISINTEGRATING ORAL 3 TIMES DAILY PRN
Qty: 21 TABLET | Refills: 0 | Status: SHIPPED | OUTPATIENT
Start: 2024-08-29

## 2024-08-29 RX ORDER — ONDANSETRON 4 MG/1
4 TABLET, ORALLY DISINTEGRATING ORAL
Status: COMPLETED | OUTPATIENT
Start: 2024-08-29 | End: 2024-08-29

## 2024-08-29 RX ADMIN — ONDANSETRON 4 MG: 4 TABLET, ORALLY DISINTEGRATING ORAL at 20:36

## 2024-08-29 RX ADMIN — AMOXICILLIN AND CLAVULANATE POTASSIUM 1 TABLET: 875; 125 TABLET, FILM COATED ORAL at 20:36

## 2024-08-29 RX ADMIN — IOPAMIDOL 100 ML: 755 INJECTION, SOLUTION INTRAVENOUS at 18:56

## 2024-08-29 ASSESSMENT — PAIN SCALES - GENERAL: PAINLEVEL_OUTOF10: 3

## 2024-08-29 ASSESSMENT — PAIN - FUNCTIONAL ASSESSMENT: PAIN_FUNCTIONAL_ASSESSMENT: 0-10

## 2024-08-29 ASSESSMENT — PAIN DESCRIPTION - LOCATION: LOCATION: ABDOMEN

## 2024-08-29 ASSESSMENT — PAIN DESCRIPTION - DESCRIPTORS: DESCRIPTORS: DISCOMFORT

## 2024-08-29 ASSESSMENT — ENCOUNTER SYMPTOMS
ANAL BLEEDING: 1
NAUSEA: 1
SHORTNESS OF BREATH: 0
DIARRHEA: 1
VOMITING: 0
ABDOMINAL PAIN: 1
CONSTIPATION: 0

## 2024-08-29 ASSESSMENT — PAIN DESCRIPTION - ORIENTATION: ORIENTATION: LOWER

## 2024-08-29 NOTE — ED TRIAGE NOTES
Pt ambulatory to triage with boyfriend. Pt reports intermittent lower abdominal pain and n/v/d and blood in stool primarily when she wipes that started two weeks ago. Pt boyfriend states she last vomited yesterday.

## 2024-08-29 NOTE — ED PROVIDER NOTES
Bowel sounds are normal.      Palpations: Abdomen is soft.      Tenderness: There is abdominal tenderness in the left lower quadrant.   Neurological:      General: No focal deficit present.      Mental Status: She is alert and oriented to person, place, and time.        Procedures     Procedures    Orders Placed This Encounter   Procedures    CT ABDOMEN PELVIS W IV CONTRAST Additional Contrast? None    CBC with Auto Differential    Comprehensive Metabolic Panel    Lipase    Urinalysis “IF” dysuria, frequency, or urgency.    Urinalysis, Micro    Saline lock IV (If in Treatment Area)        Medications given during this emergency department visit:  Medications   iopamidol (ISOVUE-370) 76 % injection 100 mL (100 mLs IntraVENous Given 8/29/24 1856)   amoxicillin-clavulanate (AUGMENTIN) 875-125 MG per tablet 1 tablet (1 tablet Oral Given 8/29/24 2036)   ondansetron (ZOFRAN-ODT) disintegrating tablet 4 mg (4 mg Oral Given 8/29/24 2036)       New Prescriptions    AMOXICILLIN-CLAVULANATE (AUGMENTIN) 875-125 MG PER TABLET    Take 1 tablet by mouth 2 times daily for 10 days    ONDANSETRON (ZOFRAN-ODT) 4 MG DISINTEGRATING TABLET    Take 1 tablet by mouth 3 times daily as needed for Nausea or Vomiting        Past Medical History:   Diagnosis Date    Compression fracture of fifth lumbar vertebra (HCC) 07/30/2021    Compression fracture of fourth lumbar vertebra (HCC) 07/03/2021    COVID-19 01/2023    Depression with anxiety     Diverticulosis of colon 04/26/2017    Widespread per Colonoscopy    Elevated C-reactive protein (CRP)     Fibromyalgia     GERD (gastroesophageal reflux disease)     Asymptomatic Currently    Hemorrhoids 04/26/2017    Internal & External Per Colonoscopy    Hiatal hernia     HTN (hypertension), benign     managed with med    Hypokalemia 3/30/2021    IBS (irritable bowel syndrome)     Iron deficiency anemia     Iron Infusions 10/18 & 10/27    LGSIL of cervix of undetermined significance 03/09/2021    Repeat  IV CONTRAST Additional Contrast? None   Final Result   Moderate mural thickening and inflammation of the proximal sigmoid colon   consistent with either segmental colitis or diverticulitis. No perforation or   abscess. Associated cystitis but no fistula is identified.         If providers have any questions about this report, I can be reached on   [employer].         Electronically signed by Giancarlo Trevizo                   No results for input(s): \"COVID19\" in the last 72 hours.    Voice dictation software was used during the making of this note.  This software is not perfect and grammatical and other typographical errors may be present.  This note has not been completely proofread for errors.        April Pena, TANG - CNP  08/29/24 8359

## 2024-08-30 NOTE — DISCHARGE INSTRUCTIONS
As we discussed, your lab work today is normal.  Your CT scan is concerning for either colitis or diverticulitis.  Both these are infections in part of your colon.  You have been prescribed an antibiotic for this reason.  Take medication as prescribed.  I have also sent in a nausea medicine if needed.  Please follow-up with your primary care provider and GI doctor.  Return to the emergency department for any new, worsening, or concerning symptoms.

## 2024-09-12 ENCOUNTER — OFFICE VISIT (OUTPATIENT)
Dept: ONCOLOGY | Age: 74
End: 2024-09-12
Payer: MEDICARE

## 2024-09-12 ENCOUNTER — HOSPITAL ENCOUNTER (OUTPATIENT)
Dept: LAB | Age: 74
Discharge: HOME | End: 2024-09-15
Payer: MEDICARE

## 2024-09-12 VITALS
HEART RATE: 88 BPM | TEMPERATURE: 98.4 F | WEIGHT: 143.8 LBS | OXYGEN SATURATION: 97 % | SYSTOLIC BLOOD PRESSURE: 126 MMHG | HEIGHT: 61 IN | BODY MASS INDEX: 27.15 KG/M2 | DIASTOLIC BLOOD PRESSURE: 81 MMHG | RESPIRATION RATE: 16 BRPM

## 2024-09-12 DIAGNOSIS — D50.9 IRON DEFICIENCY ANEMIA, UNSPECIFIED IRON DEFICIENCY ANEMIA TYPE: ICD-10-CM

## 2024-09-12 DIAGNOSIS — C20 RECTAL ADENOCARCINOMA (HCC): Primary | ICD-10-CM

## 2024-09-12 DIAGNOSIS — C20 RECTAL ADENOCARCINOMA (HCC): ICD-10-CM

## 2024-09-12 LAB
ALBUMIN SERPL-MCNC: 4.4 G/DL (ref 3.2–4.6)
ALBUMIN/GLOB SERPL: 1.3 (ref 1–1.9)
ALP SERPL-CCNC: 64 U/L (ref 35–104)
ALT SERPL-CCNC: 25 U/L (ref 12–65)
ANION GAP SERPL CALC-SCNC: 12 MMOL/L (ref 9–18)
AST SERPL-CCNC: 25 U/L (ref 15–37)
BASOPHILS # BLD: 0.1 K/UL (ref 0–0.2)
BASOPHILS NFR BLD: 1 % (ref 0–2)
BILIRUB SERPL-MCNC: 0.6 MG/DL (ref 0–1.2)
BUN SERPL-MCNC: 11 MG/DL (ref 8–23)
CALCIUM SERPL-MCNC: 10 MG/DL (ref 8.8–10.2)
CEA SERPL-MCNC: 3 NG/ML (ref 0–3.8)
CHLORIDE SERPL-SCNC: 101 MMOL/L (ref 98–107)
CO2 SERPL-SCNC: 27 MMOL/L (ref 20–28)
CREAT SERPL-MCNC: 0.86 MG/DL (ref 0.6–1.1)
DIFFERENTIAL METHOD BLD: ABNORMAL
EOSINOPHIL # BLD: 0.2 K/UL (ref 0–0.8)
EOSINOPHIL NFR BLD: 3 % (ref 0.5–7.8)
ERYTHROCYTE [DISTWIDTH] IN BLOOD BY AUTOMATED COUNT: 13.2 % (ref 11.9–14.6)
FERRITIN SERPL-MCNC: 381 NG/ML (ref 8–388)
GLOBULIN SER CALC-MCNC: 3.3 G/DL (ref 2.3–3.5)
GLUCOSE SERPL-MCNC: 155 MG/DL (ref 70–99)
HCT VFR BLD AUTO: 44.8 % (ref 35.8–46.3)
HGB BLD-MCNC: 15 G/DL (ref 11.7–15.4)
IMM GRANULOCYTES # BLD AUTO: 0 K/UL (ref 0–0.5)
IMM GRANULOCYTES NFR BLD AUTO: 1 % (ref 0–5)
IRON SATN MFR SERPL: 29 % (ref 20–50)
IRON SERPL-MCNC: 91 UG/DL (ref 35–100)
LYMPHOCYTES # BLD: 1.5 K/UL (ref 0.5–4.6)
LYMPHOCYTES NFR BLD: 26 % (ref 13–44)
MCH RBC QN AUTO: 28.7 PG (ref 26.1–32.9)
MCHC RBC AUTO-ENTMCNC: 33.5 G/DL (ref 31.4–35)
MCV RBC AUTO: 85.8 FL (ref 82–102)
MONOCYTES # BLD: 0.4 K/UL (ref 0.1–1.3)
MONOCYTES NFR BLD: 7 % (ref 4–12)
NEUTS SEG # BLD: 3.7 K/UL (ref 1.7–8.2)
NEUTS SEG NFR BLD: 62 % (ref 43–78)
NRBC # BLD: 0 K/UL (ref 0–0.2)
PLATELET # BLD AUTO: 260 K/UL (ref 150–450)
PMV BLD AUTO: 10.5 FL (ref 9.4–12.3)
POTASSIUM SERPL-SCNC: 4 MMOL/L (ref 3.5–5.1)
PROT SERPL-MCNC: 7.6 G/DL (ref 6.3–8.2)
RBC # BLD AUTO: 5.22 M/UL (ref 4.05–5.2)
SODIUM SERPL-SCNC: 140 MMOL/L (ref 136–145)
TIBC SERPL-MCNC: 311 UG/DL (ref 240–450)
UIBC SERPL-MCNC: 220 UG/DL (ref 112–347)
WBC # BLD AUTO: 5.8 K/UL (ref 4.3–11.1)

## 2024-09-12 PROCEDURE — 85025 COMPLETE CBC W/AUTO DIFF WBC: CPT

## 2024-09-12 PROCEDURE — 80053 COMPREHEN METABOLIC PANEL: CPT

## 2024-09-12 PROCEDURE — 99214 OFFICE O/P EST MOD 30 MIN: CPT | Performed by: INTERNAL MEDICINE

## 2024-09-12 PROCEDURE — 82728 ASSAY OF FERRITIN: CPT

## 2024-09-12 PROCEDURE — 83540 ASSAY OF IRON: CPT

## 2024-09-12 PROCEDURE — 82378 CARCINOEMBRYONIC ANTIGEN: CPT

## 2024-09-12 PROCEDURE — 1036F TOBACCO NON-USER: CPT | Performed by: INTERNAL MEDICINE

## 2024-09-12 PROCEDURE — 3079F DIAST BP 80-89 MM HG: CPT | Performed by: INTERNAL MEDICINE

## 2024-09-12 PROCEDURE — 36415 COLL VENOUS BLD VENIPUNCTURE: CPT

## 2024-09-12 PROCEDURE — 3074F SYST BP LT 130 MM HG: CPT | Performed by: INTERNAL MEDICINE

## 2024-09-12 PROCEDURE — 1123F ACP DISCUSS/DSCN MKR DOCD: CPT | Performed by: INTERNAL MEDICINE

## 2024-09-12 PROCEDURE — 3017F COLORECTAL CA SCREEN DOC REV: CPT | Performed by: INTERNAL MEDICINE

## 2024-09-12 PROCEDURE — 1090F PRES/ABSN URINE INCON ASSESS: CPT | Performed by: INTERNAL MEDICINE

## 2024-09-12 PROCEDURE — G8399 PT W/DXA RESULTS DOCUMENT: HCPCS | Performed by: INTERNAL MEDICINE

## 2024-09-12 PROCEDURE — 83550 IRON BINDING TEST: CPT

## 2024-09-12 PROCEDURE — G8417 CALC BMI ABV UP PARAM F/U: HCPCS | Performed by: INTERNAL MEDICINE

## 2024-09-12 PROCEDURE — G8428 CUR MEDS NOT DOCUMENT: HCPCS | Performed by: INTERNAL MEDICINE

## 2024-10-17 DIAGNOSIS — E11.29 TYPE 2 DIABETES MELLITUS WITH MICROALBUMINURIA, WITHOUT LONG-TERM CURRENT USE OF INSULIN (HCC): Primary | ICD-10-CM

## 2024-10-17 DIAGNOSIS — E78.2 MIXED HYPERLIPIDEMIA: ICD-10-CM

## 2024-10-17 DIAGNOSIS — R80.9 TYPE 2 DIABETES MELLITUS WITH MICROALBUMINURIA, WITHOUT LONG-TERM CURRENT USE OF INSULIN (HCC): Primary | ICD-10-CM

## 2024-10-17 DIAGNOSIS — I10 ESSENTIAL (PRIMARY) HYPERTENSION: ICD-10-CM

## 2024-10-25 NOTE — PROGRESS NOTES
END OF SHIFT NOTE:    Intake/Output  No intake/output data recorded. Voiding: YES  Catheter: NO  Drain:              Stool:  3 occurrences. Stool Assessment  Stool Appearance: Bloody; Other (comment)(rectal bleeding) (07/11/20 1233)    Emesis:  0 occurrences. VITAL SIGNS  Patient Vitals for the past 12 hrs:   Temp Pulse Resp BP SpO2   07/11/20 1828 98.8 °F (37.1 °C) 87 16 122/61 97 %   07/11/20 1745 98.6 °F (37 °C) 91 17 114/57 97 %   07/11/20 1655 98.5 °F (36.9 °C) 95 18 113/61 95 %   07/11/20 1548 98.7 °F (37.1 °C) 93 19 102/52 96 %   07/11/20 1532 98.5 °F (36.9 °C) 90 17 93/58 96 %   07/11/20 1454 98.3 °F (36.8 °C) 91 16 106/47 94 %   07/11/20 1442 98.6 °F (37 °C) (!) 102 18 108/52 96 %   07/11/20 1313 98.7 °F (37.1 °C) (!) 106 17 106/49 95 %   07/11/20 1219 98.6 °F (37 °C) (!) 105 18 106/47 91 %   07/11/20 1159 98.4 °F (36.9 °C) (!) 107 19 115/68 94 %       Pain Assessment  Pain 1  Pain Scale 1: Numeric (0 - 10) (07/11/20 1920)  Pain Intensity 1: 0 (07/11/20 1920)  Patient Stated Pain Goal: 0 (07/11/20 1920)  Pain Reassessment 1: Patient resting w/respiratory rate greater than 10 (07/11/20 0710)  Pain Onset 1: post op (07/11/20 0445)  Pain Location 1: Rectal (07/11/20 0445)  Pain Orientation 1: Inner (07/11/20 0445)  Pain Description 1: Aching;Burning (07/11/20 0445)  Pain Intervention(s) 1: Medication (see MAR); Repositioned (07/11/20 0445)    Ambulating  Yes    Additional Information: patient had a good day. Tolerated 2 units of RBCs well. Patient will have next hemoglobin drawn soon. Shift report given to oncoming nurse at the bedside.     Loren Araiza RN No

## 2024-10-29 ENCOUNTER — LAB (OUTPATIENT)
Dept: INTERNAL MEDICINE CLINIC | Facility: CLINIC | Age: 74
End: 2024-10-29

## 2024-10-29 DIAGNOSIS — R80.9 TYPE 2 DIABETES MELLITUS WITH MICROALBUMINURIA, WITHOUT LONG-TERM CURRENT USE OF INSULIN (HCC): ICD-10-CM

## 2024-10-29 DIAGNOSIS — E78.2 MIXED HYPERLIPIDEMIA: ICD-10-CM

## 2024-10-29 DIAGNOSIS — I10 ESSENTIAL (PRIMARY) HYPERTENSION: ICD-10-CM

## 2024-10-29 DIAGNOSIS — E11.29 TYPE 2 DIABETES MELLITUS WITH MICROALBUMINURIA, WITHOUT LONG-TERM CURRENT USE OF INSULIN (HCC): ICD-10-CM

## 2024-10-29 LAB
ALBUMIN SERPL-MCNC: 4.1 G/DL (ref 3.2–4.6)
ALBUMIN/GLOB SERPL: 1.2 (ref 1–1.9)
ALP SERPL-CCNC: 57 U/L (ref 35–104)
ALT SERPL-CCNC: 24 U/L (ref 8–45)
ANION GAP SERPL CALC-SCNC: 13 MMOL/L (ref 7–16)
AST SERPL-CCNC: 30 U/L (ref 15–37)
BILIRUB SERPL-MCNC: 0.9 MG/DL (ref 0–1.2)
BUN SERPL-MCNC: 12 MG/DL (ref 8–23)
CALCIUM SERPL-MCNC: 9.8 MG/DL (ref 8.8–10.2)
CHLORIDE SERPL-SCNC: 102 MMOL/L (ref 98–107)
CHOLEST SERPL-MCNC: 230 MG/DL (ref 0–200)
CO2 SERPL-SCNC: 24 MMOL/L (ref 20–29)
CREAT SERPL-MCNC: 0.96 MG/DL (ref 0.6–1.1)
EST. AVERAGE GLUCOSE BLD GHB EST-MCNC: 157 MG/DL
GLOBULIN SER CALC-MCNC: 3.4 G/DL (ref 2.3–3.5)
GLUCOSE SERPL-MCNC: 190 MG/DL (ref 70–99)
HBA1C MFR BLD: 7.1 % (ref 0–5.6)
HDLC SERPL-MCNC: 46 MG/DL (ref 40–60)
HDLC SERPL: 5 (ref 0–5)
LDLC SERPL CALC-MCNC: 125 MG/DL (ref 0–100)
POTASSIUM SERPL-SCNC: 3.9 MMOL/L (ref 3.5–5.1)
PROT SERPL-MCNC: 7.5 G/DL (ref 6.3–8.2)
SODIUM SERPL-SCNC: 140 MMOL/L (ref 136–145)
TRIGL SERPL-MCNC: 297 MG/DL (ref 0–150)
VLDLC SERPL CALC-MCNC: 59 MG/DL (ref 6–23)

## 2024-10-31 RX ORDER — PAROXETINE 20 MG/1
20 TABLET, FILM COATED ORAL DAILY
Qty: 90 TABLET | Refills: 1 | Status: CANCELLED | OUTPATIENT
Start: 2024-10-31

## 2024-10-31 NOTE — PATIENT INSTRUCTIONS
Trial switch from Tradjenta to Rybelsus 3 mg every morning - samples given with instructions to complete remaining Tradjenta before starting Rybelsus  Reviewed specific dosing instructions for Rybelsus including taking it on an empty stomach when first waking up, swallow with a small amount of water - no more than 4 oz, and wait 30 minutes before eating any food, drinking beverages, or taking other medications  Asked patient to call with update on tolerability when sample supply gets low - if tolerating without disruption to bowel frequency we may try increasing to 6 mg (2 x 3 mg tablets) daily  Reminded that annual diabetic eye exam is overdue and needs to be scheduled ASAP  Discussed potential need to restart Vascepa but with Rybelsus being tried and potential for cholesterol & triglyceride improvement from that we will  hold off on Vascepa for now and await next blood work to see if it's still needed  Encouraged her to start small segments of aerobic exercise on a daily basis   Monitor blood sugar daily and keep record to bring to next appointment  Continue chronic medications as prescribed  Monitor blood pressure 2-4 times/month and keep record to bring to next appointment  Reminded to stay well hydrated with plenty of water  Encouraged monthly self breast exams  Advised to consider 2024 COVID vaccine (best to receive 2-4 weeks after flu vaccine) given worsened severity of recent COVID cases and benefits of maintaining up-to-date immunity        Personalized Preventive Plan for Netta Diaz - 11/5/2024  Medicare offers a range of preventive health benefits. Some of the tests and screenings are paid in full while other may be subject to a deductible, co-insurance, and/or copay.    Some of these benefits include a comprehensive review of your medical history including lifestyle, illnesses that may run in your family, and various assessments and screenings as appropriate.    After reviewing your medical

## 2024-11-05 ENCOUNTER — OFFICE VISIT (OUTPATIENT)
Dept: INTERNAL MEDICINE CLINIC | Facility: CLINIC | Age: 74
End: 2024-11-05

## 2024-11-05 VITALS
BODY MASS INDEX: 26.62 KG/M2 | OXYGEN SATURATION: 98 % | SYSTOLIC BLOOD PRESSURE: 116 MMHG | WEIGHT: 141 LBS | HEIGHT: 61 IN | HEART RATE: 100 BPM | DIASTOLIC BLOOD PRESSURE: 78 MMHG | TEMPERATURE: 98.4 F

## 2024-11-05 DIAGNOSIS — F41.8 DEPRESSION WITH ANXIETY: ICD-10-CM

## 2024-11-05 DIAGNOSIS — R87.612 LOW GRADE SQUAMOUS INTRAEPITHELIAL LESION (LGSIL) ON CERVICAL PAP SMEAR: ICD-10-CM

## 2024-11-05 DIAGNOSIS — Z92.3 PERSONAL HISTORY OF RADIATION THERAPY: ICD-10-CM

## 2024-11-05 DIAGNOSIS — R80.9 TYPE 2 DIABETES MELLITUS WITH MICROALBUMINURIA, WITHOUT LONG-TERM CURRENT USE OF INSULIN (HCC): ICD-10-CM

## 2024-11-05 DIAGNOSIS — E55.9 VITAMIN D DEFICIENCY: Primary | ICD-10-CM

## 2024-11-05 DIAGNOSIS — R92.333 HETEROGENEOUSLY DENSE TISSUE OF BOTH BREASTS ON MAMMOGRAPHY: ICD-10-CM

## 2024-11-05 DIAGNOSIS — I10 ESSENTIAL (PRIMARY) HYPERTENSION: ICD-10-CM

## 2024-11-05 DIAGNOSIS — E78.2 MIXED HYPERLIPIDEMIA: ICD-10-CM

## 2024-11-05 DIAGNOSIS — E11.29 TYPE 2 DIABETES MELLITUS WITH MICROALBUMINURIA, WITHOUT LONG-TERM CURRENT USE OF INSULIN (HCC): ICD-10-CM

## 2024-11-05 DIAGNOSIS — Z23 NEED FOR INFLUENZA VACCINATION: ICD-10-CM

## 2024-11-05 DIAGNOSIS — Z12.31 SCREENING MAMMOGRAM FOR BREAST CANCER: ICD-10-CM

## 2024-11-05 DIAGNOSIS — Z00.00 MEDICARE ANNUAL WELLNESS VISIT, SUBSEQUENT: Primary | ICD-10-CM

## 2024-11-05 DIAGNOSIS — C20 RECTAL ADENOCARCINOMA (HCC): ICD-10-CM

## 2024-11-05 RX ORDER — PYRIDOXINE HCL (VITAMIN B6) 100 MG
1 TABLET ORAL DAILY
COMMUNITY

## 2024-11-05 RX ORDER — ORAL SEMAGLUTIDE 3 MG/1
1 TABLET ORAL DAILY
Qty: 30 TABLET | Refills: 0 | Status: SHIPPED | COMMUNITY
Start: 2024-11-05

## 2024-11-05 ASSESSMENT — PATIENT HEALTH QUESTIONNAIRE - PHQ9
6. FEELING BAD ABOUT YOURSELF - OR THAT YOU ARE A FAILURE OR HAVE LET YOURSELF OR YOUR FAMILY DOWN: NOT AT ALL
10. IF YOU CHECKED OFF ANY PROBLEMS, HOW DIFFICULT HAVE THESE PROBLEMS MADE IT FOR YOU TO DO YOUR WORK, TAKE CARE OF THINGS AT HOME, OR GET ALONG WITH OTHER PEOPLE: NOT DIFFICULT AT ALL
SUM OF ALL RESPONSES TO PHQ QUESTIONS 1-9: 5
4. FEELING TIRED OR HAVING LITTLE ENERGY: MORE THAN HALF THE DAYS
SUM OF ALL RESPONSES TO PHQ QUESTIONS 1-9: 5
8. MOVING OR SPEAKING SO SLOWLY THAT OTHER PEOPLE COULD HAVE NOTICED. OR THE OPPOSITE, BEING SO FIGETY OR RESTLESS THAT YOU HAVE BEEN MOVING AROUND A LOT MORE THAN USUAL: NOT AT ALL
9. THOUGHTS THAT YOU WOULD BE BETTER OFF DEAD, OR OF HURTING YOURSELF: NOT AT ALL
1. LITTLE INTEREST OR PLEASURE IN DOING THINGS: NOT AT ALL
2. FEELING DOWN, DEPRESSED OR HOPELESS: NOT AT ALL
SUM OF ALL RESPONSES TO PHQ9 QUESTIONS 1 & 2: 0
5. POOR APPETITE OR OVEREATING: NOT AT ALL
7. TROUBLE CONCENTRATING ON THINGS, SUCH AS READING THE NEWSPAPER OR WATCHING TELEVISION: NOT AT ALL
3. TROUBLE FALLING OR STAYING ASLEEP: NEARLY EVERY DAY

## 2024-11-05 ASSESSMENT — ENCOUNTER SYMPTOMS
CONSTIPATION: 1
SHORTNESS OF BREATH: 0

## 2024-11-05 ASSESSMENT — LIFESTYLE VARIABLES
HOW MANY STANDARD DRINKS CONTAINING ALCOHOL DO YOU HAVE ON A TYPICAL DAY: PATIENT DOES NOT DRINK
HOW OFTEN DO YOU HAVE A DRINK CONTAINING ALCOHOL: NEVER

## 2024-11-05 NOTE — PROGRESS NOTES
Medicare Annual Wellness Visit    Netta Diaz is here for Medicare AWV (Routine annual Medicare wellness visit. ), Diabetes (6 month diabetic f/u with lab review. ), Hypertension, Hyperlipidemia, and Flu Vaccine    Assessment & Plan   Medicare annual wellness visit, subsequent  Type 2 diabetes mellitus with microalbuminuria, without long-term current use of insulin (HCC)  Depression with anxiety  Need for influenza vaccination  -     Influenza, FLUAD Trivalent, (age 65 y+), IM, Preservative Free, 0.5mL    Recommendations for Preventive Services Due: see orders and patient instructions/AVS.  Recommended screening schedule for the next 5-10 years is provided to the patient in written form: see Patient Instructions/AVS.     Return for Medicare Annual Wellness Visit in 1 year.     Subjective   {OPTIONAL - WILL AUTO-DELETE IF NOT USED:[id number]}    Patient's complete Health Risk Assessment and screening values have been reviewed and are found in Flowsheets. The following problems were reviewed today and where indicated follow up appointments were made and/or referrals ordered.    Positive Risk Factor Screenings with Interventions:    Fall Risk:  Do you feel unsteady or are you worried about falling? : (!) yes  2 or more falls in past year?: no  Fall with injury in past year?: no       Cognitive:   Clock Drawing Test (CDT): (!) Abnormal  Words recalled: 1 Word Recalled  Total Score: (!) 1  Total Score Interpretation: Abnormal Mini-Cog    Depression:  PHQ-2 Score: 0  PHQ-9 Total Score: 5  Total Score Interpretation: 5-9 = mild depression           Inactivity:  On average, how many days per week do you engage in moderate to strenuous exercise (like a brisk walk)?: 0 days (!) Abnormal  On average, how many minutes do you engage in exercise at this level?: 0 min  Interventions:  {Inactivity Interventions:911153447}    Poor Eating Habits/Diet:  Do you eat balanced/healthy meals regularly?: (!) No     Dentist 
available from your local health department or the Centers for Disease Control and Prevention.  Remember, keep this and all other medicines out of the reach of children, never share your medicines with others, and use this medication only for the indication prescribed.   Every effort has been made to ensure that the information provided by [employer]. ('[employer]') is accurate, up-to-date, and complete, but no guarantee is made to that effect. Drug information contained herein may be time sensitive. [employer] information has been compiled for use by healthcare practitioners and consumers in the [place] and therefore [employer] does not warrant that uses outside of the [place] are appropriate, unless specifically indicated otherwise. [employer]'s drug information does not endorse drugs, diagnose patients or recommend therapy. [employer] drug information is an informational resource designed to assist licensed healthcare practitioners in caring for their patients and/or to serve consumers viewing this service as a supplement to, and not a substitute for, the expertise, skill, knowledge and judgment of healthcare practitioners. The absence of a warning for a given drug or drug combination in no way should be construed to indicate that the drug or drug combination is safe, effective or appropriate for any given patient. [employer] does not assume any responsibility for any aspect of healthcare administered with the aid of information [employer] provides. The information contained herein is not intended to cover all possible uses, directions, precautions, warnings, drug interactions, allergic reactions, or adverse effects. If you have questions about the drugs you are taking, check with your doctor, nurse or pharmacist.  Copyright 1967-7765 [employer] Version: 9.01. Revision date: 10/7/2021.  Care instructions adapted under license by [employer]. If you have questions about a medical condition or this instruction,

## 2024-11-06 ENCOUNTER — TELEPHONE (OUTPATIENT)
Dept: INTERNAL MEDICINE CLINIC | Facility: CLINIC | Age: 74
End: 2024-11-06

## 2024-11-06 NOTE — TELEPHONE ENCOUNTER
----- Message from Vaishali Rasmussen PA-C sent at 11/5/2024  6:21 PM EST -----  Oops found the ROS so disregard that component of earlier message but just need to know where she wants Prolia labs to be done - here the week before or at infusion center while she is there for Prolia?

## 2024-11-06 NOTE — TELEPHONE ENCOUNTER
Pt's spouse states that pt will do her labs at the infusion center the day of her Prolia injection.

## 2024-11-17 NOTE — PROGRESS NOTES
Bone density showed osteopenia throughout the hips & spine. She needs to be diligent about taking her vitamin d supplements and calcium 600 mg twice daily to maintain the remaining bone density (strength). This test should be repeated in 2 years to evaluate for any worsening of the disease.
Fax machine came on. No option to LM.
Pt notified and verbalized understanding.
No

## 2024-12-02 DIAGNOSIS — M81.8 OTHER OSTEOPOROSIS WITHOUT CURRENT PATHOLOGICAL FRACTURE: Primary | ICD-10-CM

## 2024-12-02 NOTE — PROGRESS NOTES
Speculum & Bimanual   Vagina: -  Normal.   Vaginal Lesions - None.   Cervix: Characteristics - Normal.   Uterus: Characteristics - Normal.   Adnexa: - Normal.   Bladder - Normal.           Medical problems and test results were reviewed with the patient today.       ASSESSMENT and PLAN    1. Encounter for well woman exam with routine gynecological exam  -     [place] OBTAINING SCREEN PAP SMEAR  -     [place] SCREEN;PELVIC/BREAST EXAM  2. Routine cervical smear  -     PAP LB, Reflex HPV ASCUS ([id number])             Chaperone utilized during exam      IVAINA SCRUGGS MD  12/3/2024

## 2024-12-03 ENCOUNTER — OFFICE VISIT (OUTPATIENT)
Dept: OBGYN CLINIC | Age: 74
End: 2024-12-03
Payer: MEDICARE

## 2024-12-03 VITALS
BODY MASS INDEX: 26.62 KG/M2 | SYSTOLIC BLOOD PRESSURE: 122 MMHG | HEIGHT: 61 IN | WEIGHT: 141 LBS | DIASTOLIC BLOOD PRESSURE: 72 MMHG

## 2024-12-03 DIAGNOSIS — Z01.419 ENCOUNTER FOR WELL WOMAN EXAM WITH ROUTINE GYNECOLOGICAL EXAM: Primary | ICD-10-CM

## 2024-12-03 DIAGNOSIS — Z12.4 ROUTINE CERVICAL SMEAR: ICD-10-CM

## 2024-12-03 DIAGNOSIS — M81.8 OTHER OSTEOPOROSIS WITHOUT CURRENT PATHOLOGICAL FRACTURE: ICD-10-CM

## 2024-12-03 LAB
ALBUMIN SERPL-MCNC: 4.2 G/DL (ref 3.2–4.6)
ALBUMIN/GLOB SERPL: 1.2 (ref 1–1.9)
ALP SERPL-CCNC: 76 U/L (ref 35–104)
ALT SERPL-CCNC: 50 U/L (ref 8–45)
ANION GAP SERPL CALC-SCNC: 14 MMOL/L (ref 7–16)
AST SERPL-CCNC: 47 U/L (ref 15–37)
BILIRUB SERPL-MCNC: 0.4 MG/DL (ref 0–1.2)
BUN SERPL-MCNC: 10 MG/DL (ref 8–23)
CALCIUM SERPL-MCNC: 11.1 MG/DL (ref 8.8–10.2)
CHLORIDE SERPL-SCNC: 101 MMOL/L (ref 98–107)
CO2 SERPL-SCNC: 26 MMOL/L (ref 20–29)
CREAT SERPL-MCNC: 0.81 MG/DL (ref 0.6–1.1)
GLOBULIN SER CALC-MCNC: 3.4 G/DL (ref 2.3–3.5)
GLUCOSE SERPL-MCNC: 171 MG/DL (ref 70–99)
PHOSPHATE SERPL-MCNC: 3.3 MG/DL (ref 2.5–4.5)
POTASSIUM SERPL-SCNC: 3.6 MMOL/L (ref 3.5–5.1)
PROT SERPL-MCNC: 7.6 G/DL (ref 6.3–8.2)
SODIUM SERPL-SCNC: 141 MMOL/L (ref 136–145)

## 2024-12-03 PROCEDURE — 1159F MED LIST DOCD IN RCRD: CPT | Performed by: OBSTETRICS & GYNECOLOGY

## 2024-12-03 PROCEDURE — G0101 CA SCREEN;PELVIC/BREAST EXAM: HCPCS | Performed by: OBSTETRICS & GYNECOLOGY

## 2024-12-03 PROCEDURE — 3078F DIAST BP <80 MM HG: CPT | Performed by: OBSTETRICS & GYNECOLOGY

## 2024-12-03 PROCEDURE — 3074F SYST BP LT 130 MM HG: CPT | Performed by: OBSTETRICS & GYNECOLOGY

## 2024-12-03 PROCEDURE — G8482 FLU IMMUNIZE ORDER/ADMIN: HCPCS | Performed by: OBSTETRICS & GYNECOLOGY

## 2024-12-03 PROCEDURE — 1160F RVW MEDS BY RX/DR IN RCRD: CPT | Performed by: OBSTETRICS & GYNECOLOGY

## 2024-12-04 ENCOUNTER — NURSE ONLY (OUTPATIENT)
Age: 74
End: 2024-12-04

## 2024-12-04 VITALS
RESPIRATION RATE: 16 BRPM | TEMPERATURE: 99.1 F | OXYGEN SATURATION: 96 % | DIASTOLIC BLOOD PRESSURE: 76 MMHG | HEART RATE: 67 BPM | SYSTOLIC BLOOD PRESSURE: 131 MMHG

## 2024-12-04 DIAGNOSIS — M81.8 OTHER OSTEOPOROSIS WITHOUT CURRENT PATHOLOGICAL FRACTURE: Primary | ICD-10-CM

## 2024-12-04 RX ORDER — EPINEPHRINE 1 MG/ML
0.3 INJECTION, SOLUTION, CONCENTRATE INTRAVENOUS PRN
OUTPATIENT
Start: 2025-05-28

## 2024-12-04 RX ORDER — SODIUM CHLORIDE 9 MG/ML
INJECTION, SOLUTION INTRAVENOUS CONTINUOUS
OUTPATIENT
Start: 2025-05-28

## 2024-12-04 RX ORDER — ACETAMINOPHEN 325 MG/1
650 TABLET ORAL
OUTPATIENT
Start: 2025-05-28

## 2024-12-04 RX ORDER — ONDANSETRON 2 MG/ML
8 INJECTION INTRAMUSCULAR; INTRAVENOUS
OUTPATIENT
Start: 2025-05-28

## 2024-12-04 RX ORDER — HYDROCORTISONE SODIUM SUCCINATE 100 MG/2ML
100 INJECTION INTRAMUSCULAR; INTRAVENOUS
OUTPATIENT
Start: 2025-05-28

## 2024-12-04 RX ORDER — DIPHENHYDRAMINE HYDROCHLORIDE 50 MG/ML
50 INJECTION INTRAMUSCULAR; INTRAVENOUS
OUTPATIENT
Start: 2025-05-28

## 2024-12-04 RX ORDER — ALBUTEROL SULFATE 90 UG/1
4 INHALANT RESPIRATORY (INHALATION) PRN
OUTPATIENT
Start: 2025-05-28

## 2024-12-04 NOTE — PROGRESS NOTES
[facility]  [address], [address] [place] [address]  Office : [phone], Fax: [phone]        Osteoporosis pre-infusion/injection questionnaire:     1. In the past month, have you had or are you planning to have any dental surgery or major dental procedures? No      2. Are you taking a calcium and vitamin D supplement? Yes (multi-vitamin)     3. When was your last osteoporosis treatment?  05/29/2024     4. In the last year, have you had any fractures? No        Patient arrived ambulatory to infusion suite today for a repeat.  Prolia subcutaneous injection.  Pertinent labs drawn 12/03/2024 . Labs reviewed and are within medication administration parameters.  Prolia 60mg/ml injected SC into  left arm. Patient tolerated injection well.  Pt observed for 30 minutes post-injection without complications.  Advised patient to continue with calcium and vitamin D supplements post injection. Advised patient to call the ordering provider with any problems post injection.       Next Prolia appt scheduled 06/2025 prior to departure from infusion suite.     Pt ambulatory with steady gait out of infusion suite.

## 2024-12-09 LAB
COLLECTION METHOD: NORMAL
CYTOLOGIST CVX/VAG CYTO: NORMAL
CYTOLOGY CVX/VAG DOC THIN PREP: NORMAL
HPV REFLEX: NORMAL
Lab: NORMAL
OTHER PT INFO: NORMAL
PAP SOURCE: NORMAL
PATH REPORT.FINAL DX SPEC: NORMAL
PREV CYTO INFO: NORMAL
PREV TREATMENT RESULTS: NORMAL
PREV TREATMENT: NORMAL
STAT OF ADQ CVX/VAG CYTO-IMP: NORMAL

## 2025-01-30 ENCOUNTER — LAB (OUTPATIENT)
Dept: INTERNAL MEDICINE CLINIC | Facility: CLINIC | Age: 75
End: 2025-01-30

## 2025-01-30 ENCOUNTER — TELEPHONE (OUTPATIENT)
Dept: INTERNAL MEDICINE CLINIC | Facility: CLINIC | Age: 75
End: 2025-01-30

## 2025-01-30 DIAGNOSIS — I10 ESSENTIAL (PRIMARY) HYPERTENSION: ICD-10-CM

## 2025-01-30 DIAGNOSIS — R80.9 TYPE 2 DIABETES MELLITUS WITH MICROALBUMINURIA, WITHOUT LONG-TERM CURRENT USE OF INSULIN (HCC): Primary | ICD-10-CM

## 2025-01-30 DIAGNOSIS — E78.2 MIXED HYPERLIPIDEMIA: ICD-10-CM

## 2025-01-30 DIAGNOSIS — R80.9 TYPE 2 DIABETES MELLITUS WITH MICROALBUMINURIA, WITHOUT LONG-TERM CURRENT USE OF INSULIN (HCC): ICD-10-CM

## 2025-01-30 DIAGNOSIS — E55.9 VITAMIN D DEFICIENCY: ICD-10-CM

## 2025-01-30 DIAGNOSIS — E11.29 TYPE 2 DIABETES MELLITUS WITH MICROALBUMINURIA, WITHOUT LONG-TERM CURRENT USE OF INSULIN (HCC): Primary | ICD-10-CM

## 2025-01-30 DIAGNOSIS — E11.29 TYPE 2 DIABETES MELLITUS WITH MICROALBUMINURIA, WITHOUT LONG-TERM CURRENT USE OF INSULIN (HCC): ICD-10-CM

## 2025-01-30 LAB
25(OH)D3 SERPL-MCNC: 51.3 NG/ML (ref 30–100)
ALBUMIN SERPL-MCNC: 4.1 G/DL (ref 3.2–4.6)
ALBUMIN/GLOB SERPL: 1.4 (ref 1–1.9)
ALP SERPL-CCNC: 53 U/L (ref 35–104)
ALT SERPL-CCNC: 33 U/L (ref 8–45)
ANION GAP SERPL CALC-SCNC: 12 MMOL/L (ref 7–16)
AST SERPL-CCNC: 28 U/L (ref 15–37)
BILIRUB SERPL-MCNC: 0.6 MG/DL (ref 0–1.2)
BUN SERPL-MCNC: 8 MG/DL (ref 8–23)
CALCIUM SERPL-MCNC: 9.5 MG/DL (ref 8.8–10.2)
CHLORIDE SERPL-SCNC: 105 MMOL/L (ref 98–107)
CHOLEST SERPL-MCNC: 154 MG/DL (ref 0–200)
CO2 SERPL-SCNC: 25 MMOL/L (ref 20–29)
CREAT SERPL-MCNC: 0.99 MG/DL (ref 0.6–1.1)
CREAT UR-MCNC: 191 MG/DL (ref 28–217)
EST. AVERAGE GLUCOSE BLD GHB EST-MCNC: 164 MG/DL
GLOBULIN SER CALC-MCNC: 3 G/DL (ref 2.3–3.5)
GLUCOSE SERPL-MCNC: 197 MG/DL (ref 70–99)
HBA1C MFR BLD: 7.4 % (ref 0–5.6)
HDLC SERPL-MCNC: 43 MG/DL (ref 40–60)
HDLC SERPL: 3.6 (ref 0–5)
LDLC SERPL CALC-MCNC: 77 MG/DL (ref 0–100)
MICROALBUMIN UR-MCNC: 115 MG/DL (ref 0–20)
MICROALBUMIN/CREAT UR-RTO: 602 MG/G (ref 0–30)
POTASSIUM SERPL-SCNC: 4.1 MMOL/L (ref 3.5–5.1)
PROT SERPL-MCNC: 7.2 G/DL (ref 6.3–8.2)
SODIUM SERPL-SCNC: 142 MMOL/L (ref 136–145)
TRIGL SERPL-MCNC: 174 MG/DL (ref 0–150)
VLDLC SERPL CALC-MCNC: 35 MG/DL (ref 6–23)

## 2025-01-30 RX ORDER — ORAL SEMAGLUTIDE 3 MG/1
1 TABLET ORAL DAILY
Qty: 30 TABLET | Refills: 0 | Status: SHIPPED | COMMUNITY
Start: 2025-01-30

## 2025-01-30 NOTE — TELEPHONE ENCOUNTER
Pt has questions about the following medication RYBELSUS 3 MG TABS she stated that she was told to stop and speak with someone during her lab visit. Pt is currently waiting in the checkout lobby area.

## 2025-02-04 ASSESSMENT — ENCOUNTER SYMPTOMS: SHORTNESS OF BREATH: 0

## 2025-02-04 NOTE — PROGRESS NOTES
Netta Diaz (:  1950) is a 74 y.o. female,Established patient, here for evaluation of the following chief complaint(s):  Diabetes (3 month diabetes f/u with lab review. ), Hypertension, Hyperlipidemia, and Immunizations (Prevnar 20)          Assessment/Plan  1. Type 2 diabetes mellitus with microalbuminuria, without long-term current use of insulin (HCC)  -     blood glucose test strips (ACCU-CHEK GEMA PLUS) strip; Use to check glucose daily.  Dx: E11.29, Disp-100 each, R-3Normal  -     JARDIANCE 10 MG tablet; Take 1 tablet by mouth daily, Disp-14 tablet, R-0, DAWSample  -     JARDIANCE 25 MG tablet; Take 1 tablet by mouth daily, Disp-21 tablet, R-0, DAWSample  -     empagliflozin (JARDIANCE) 25 MG tablet; Take 1 tablet by mouth daily, Disp-30 tablet, R-3Normal  -     Comprehensive Metabolic Panel; Future  -     Hemoglobin A1C; Future  2. Essential (primary) hypertension  -     Comprehensive Metabolic Panel; Future  3. Mixed hyperlipidemia  -     Comprehensive Metabolic Panel; Future  -     Lipid Panel; Future  4. Need for pneumococcal 20-valent conjugate vaccination  -     Pneumococcal, PCV20, PREVNAR 20, (age 6w+), IM, PF  5. COVID-19 vaccine regimen to maintain immunity declined  6. Abnormal finding on urinalysis  -     AMB POC URINALYSIS DIP STICK AUTO W/O MICRO  7. Vitamin D deficiency  -     Vitamin D 25 Hydroxy; Future         Patient Instructions   Discontinue Rybelsus due to diarrhea & 's concerns about slowed digestion with her history of colon cancer  Trial on Jardiance 12.5 mg daily x 2 weeks, then 25 mg daily for maintenance  Emphasized the importance of staying well hydrated with plenty of water - with the volume of caffeine she drinks daily she would really need 100ish oz/day but if she can at least start with a minimum of 40 oz/day we'll see how that goes  Monitor blood sugar daily and keep record to bring to next appointment  Continue chronic medications as

## 2025-02-06 ENCOUNTER — OFFICE VISIT (OUTPATIENT)
Dept: INTERNAL MEDICINE CLINIC | Facility: CLINIC | Age: 75
End: 2025-02-06
Payer: MEDICARE

## 2025-02-06 VITALS
BODY MASS INDEX: 27.38 KG/M2 | DIASTOLIC BLOOD PRESSURE: 68 MMHG | SYSTOLIC BLOOD PRESSURE: 100 MMHG | TEMPERATURE: 98.2 F | HEIGHT: 61 IN | OXYGEN SATURATION: 96 % | HEART RATE: 95 BPM | WEIGHT: 145 LBS

## 2025-02-06 DIAGNOSIS — Z28.21 COVID-19 VACCINE REGIMEN TO MAINTAIN IMMUNITY DECLINED: ICD-10-CM

## 2025-02-06 DIAGNOSIS — Z23 NEED FOR PNEUMOCOCCAL 20-VALENT CONJUGATE VACCINATION: ICD-10-CM

## 2025-02-06 DIAGNOSIS — E11.29 TYPE 2 DIABETES MELLITUS WITH MICROALBUMINURIA, WITHOUT LONG-TERM CURRENT USE OF INSULIN (HCC): Primary | ICD-10-CM

## 2025-02-06 DIAGNOSIS — R39.15 URINARY URGENCY: ICD-10-CM

## 2025-02-06 DIAGNOSIS — E78.2 MIXED HYPERLIPIDEMIA: ICD-10-CM

## 2025-02-06 DIAGNOSIS — E55.9 VITAMIN D DEFICIENCY: ICD-10-CM

## 2025-02-06 DIAGNOSIS — R80.9 TYPE 2 DIABETES MELLITUS WITH MICROALBUMINURIA, WITHOUT LONG-TERM CURRENT USE OF INSULIN (HCC): Primary | ICD-10-CM

## 2025-02-06 DIAGNOSIS — I10 ESSENTIAL (PRIMARY) HYPERTENSION: ICD-10-CM

## 2025-02-06 LAB
BILIRUBIN, URINE, POC: ABNORMAL
BLOOD URINE, POC: NEGATIVE
GLUCOSE URINE, POC: 250
KETONES, URINE, POC: ABNORMAL
LEUKOCYTE ESTERASE, URINE, POC: NEGATIVE
NITRITE, URINE, POC: NEGATIVE
PH, URINE, POC: 5.5 (ref 4.6–8)
PROTEIN,URINE, POC: 300
SPECIFIC GRAVITY, URINE, POC: 1.03 (ref 1–1.03)
URINALYSIS CLARITY, POC: CLEAR
URINALYSIS COLOR, POC: YELLOW
UROBILINOGEN, POC: ABNORMAL

## 2025-02-06 PROCEDURE — 3074F SYST BP LT 130 MM HG: CPT | Performed by: PHYSICIAN ASSISTANT

## 2025-02-06 PROCEDURE — G8399 PT W/DXA RESULTS DOCUMENT: HCPCS | Performed by: PHYSICIAN ASSISTANT

## 2025-02-06 PROCEDURE — 1123F ACP DISCUSS/DSCN MKR DOCD: CPT | Performed by: PHYSICIAN ASSISTANT

## 2025-02-06 PROCEDURE — G8427 DOCREV CUR MEDS BY ELIG CLIN: HCPCS | Performed by: PHYSICIAN ASSISTANT

## 2025-02-06 PROCEDURE — 90677 PCV20 VACCINE IM: CPT | Performed by: PHYSICIAN ASSISTANT

## 2025-02-06 PROCEDURE — 81003 URINALYSIS AUTO W/O SCOPE: CPT | Performed by: PHYSICIAN ASSISTANT

## 2025-02-06 PROCEDURE — 1090F PRES/ABSN URINE INCON ASSESS: CPT | Performed by: PHYSICIAN ASSISTANT

## 2025-02-06 PROCEDURE — 1159F MED LIST DOCD IN RCRD: CPT | Performed by: PHYSICIAN ASSISTANT

## 2025-02-06 PROCEDURE — 3078F DIAST BP <80 MM HG: CPT | Performed by: PHYSICIAN ASSISTANT

## 2025-02-06 PROCEDURE — G0009 ADMIN PNEUMOCOCCAL VACCINE: HCPCS | Performed by: PHYSICIAN ASSISTANT

## 2025-02-06 PROCEDURE — 1036F TOBACCO NON-USER: CPT | Performed by: PHYSICIAN ASSISTANT

## 2025-02-06 PROCEDURE — 3051F HG A1C>EQUAL 7.0%<8.0%: CPT | Performed by: PHYSICIAN ASSISTANT

## 2025-02-06 PROCEDURE — G8417 CALC BMI ABV UP PARAM F/U: HCPCS | Performed by: PHYSICIAN ASSISTANT

## 2025-02-06 PROCEDURE — 1126F AMNT PAIN NOTED NONE PRSNT: CPT | Performed by: PHYSICIAN ASSISTANT

## 2025-02-06 PROCEDURE — 2022F DILAT RTA XM EVC RTNOPTHY: CPT | Performed by: PHYSICIAN ASSISTANT

## 2025-02-06 PROCEDURE — 3017F COLORECTAL CA SCREEN DOC REV: CPT | Performed by: PHYSICIAN ASSISTANT

## 2025-02-06 RX ORDER — PAROXETINE 20 MG/1
20 TABLET, FILM COATED ORAL DAILY
Qty: 90 TABLET | Refills: 1 | Status: CANCELLED | OUTPATIENT
Start: 2025-02-06

## 2025-02-06 RX ORDER — EMPAGLIFLOZIN 10 MG/1
10 TABLET, FILM COATED ORAL DAILY
Qty: 14 TABLET | Refills: 0 | Status: SHIPPED | COMMUNITY
Start: 2025-02-06

## 2025-02-06 RX ORDER — BLOOD SUGAR DIAGNOSTIC
STRIP MISCELLANEOUS
Qty: 100 EACH | Refills: 3 | Status: SHIPPED | OUTPATIENT
Start: 2025-02-06

## 2025-02-06 RX ORDER — EMPAGLIFLOZIN 25 MG/1
25 TABLET, FILM COATED ORAL DAILY
Qty: 21 TABLET | Refills: 0 | Status: SHIPPED | COMMUNITY
Start: 2025-02-06

## 2025-02-06 RX ORDER — ROSUVASTATIN CALCIUM 10 MG/1
10 TABLET, COATED ORAL NIGHTLY
Qty: 90 TABLET | Refills: 3 | Status: CANCELLED | OUTPATIENT
Start: 2025-02-06

## 2025-02-06 RX ORDER — LISINOPRIL 10 MG/1
10 TABLET ORAL DAILY
Qty: 90 TABLET | Refills: 3 | Status: CANCELLED | OUTPATIENT
Start: 2025-02-06

## 2025-02-06 SDOH — ECONOMIC STABILITY: FOOD INSECURITY: WITHIN THE PAST 12 MONTHS, THE FOOD YOU BOUGHT JUST DIDN'T LAST AND YOU DIDN'T HAVE MONEY TO GET MORE.: NEVER TRUE

## 2025-02-06 SDOH — ECONOMIC STABILITY: FOOD INSECURITY: WITHIN THE PAST 12 MONTHS, YOU WORRIED THAT YOUR FOOD WOULD RUN OUT BEFORE YOU GOT MONEY TO BUY MORE.: NEVER TRUE

## 2025-02-06 ASSESSMENT — ANXIETY QUESTIONNAIRES
3. WORRYING TOO MUCH ABOUT DIFFERENT THINGS: NOT AT ALL
IF YOU CHECKED OFF ANY PROBLEMS ON THIS QUESTIONNAIRE, HOW DIFFICULT HAVE THESE PROBLEMS MADE IT FOR YOU TO DO YOUR WORK, TAKE CARE OF THINGS AT HOME, OR GET ALONG WITH OTHER PEOPLE: NOT DIFFICULT AT ALL
5. BEING SO RESTLESS THAT IT IS HARD TO SIT STILL: NOT AT ALL
GAD7 TOTAL SCORE: 1
6. BECOMING EASILY ANNOYED OR IRRITABLE: NOT AT ALL
7. FEELING AFRAID AS IF SOMETHING AWFUL MIGHT HAPPEN: NOT AT ALL
4. TROUBLE RELAXING: NOT AT ALL
2. NOT BEING ABLE TO STOP OR CONTROL WORRYING: SEVERAL DAYS
1. FEELING NERVOUS, ANXIOUS, OR ON EDGE: NOT AT ALL

## 2025-02-06 ASSESSMENT — PATIENT HEALTH QUESTIONNAIRE - PHQ9
SUM OF ALL RESPONSES TO PHQ9 QUESTIONS 1 & 2: 1
SUM OF ALL RESPONSES TO PHQ QUESTIONS 1-9: 9
5. POOR APPETITE OR OVEREATING: SEVERAL DAYS
SUM OF ALL RESPONSES TO PHQ QUESTIONS 1-9: 9
9. THOUGHTS THAT YOU WOULD BE BETTER OFF DEAD, OR OF HURTING YOURSELF: NOT AT ALL
3. TROUBLE FALLING OR STAYING ASLEEP: NEARLY EVERY DAY
1. LITTLE INTEREST OR PLEASURE IN DOING THINGS: SEVERAL DAYS
2. FEELING DOWN, DEPRESSED OR HOPELESS: NOT AT ALL
10. IF YOU CHECKED OFF ANY PROBLEMS, HOW DIFFICULT HAVE THESE PROBLEMS MADE IT FOR YOU TO DO YOUR WORK, TAKE CARE OF THINGS AT HOME, OR GET ALONG WITH OTHER PEOPLE: NOT DIFFICULT AT ALL
SUM OF ALL RESPONSES TO PHQ QUESTIONS 1-9: 9
4. FEELING TIRED OR HAVING LITTLE ENERGY: NEARLY EVERY DAY
SUM OF ALL RESPONSES TO PHQ QUESTIONS 1-9: 9
7. TROUBLE CONCENTRATING ON THINGS, SUCH AS READING THE NEWSPAPER OR WATCHING TELEVISION: SEVERAL DAYS
6. FEELING BAD ABOUT YOURSELF - OR THAT YOU ARE A FAILURE OR HAVE LET YOURSELF OR YOUR FAMILY DOWN: NOT AT ALL
8. MOVING OR SPEAKING SO SLOWLY THAT OTHER PEOPLE COULD HAVE NOTICED. OR THE OPPOSITE, BEING SO FIGETY OR RESTLESS THAT YOU HAVE BEEN MOVING AROUND A LOT MORE THAN USUAL: NOT AT ALL

## 2025-02-06 ASSESSMENT — ENCOUNTER SYMPTOMS
ABDOMINAL DISTENTION: 0
BLOOD IN STOOL: 0
DIARRHEA: 1
NAUSEA: 0
ABDOMINAL PAIN: 0

## 2025-02-06 NOTE — PATIENT INSTRUCTIONS
Discontinue Rybelsus due to diarrhea & 's concerns about slowed digestion with her history of colon cancer  Trial on Jardiance 12.5 mg daily x 2 weeks, then 25 mg daily for maintenance  Emphasized the importance of staying well hydrated with plenty of water - with the volume of caffeine she drinks daily she would really need 100ish oz/day but if she can at least start with a minimum of 40 oz/day we'll see how that goes  Monitor blood sugar daily and keep record to bring to next appointment  Continue chronic medications as prescribed  Monitor blood pressure 2-4 times/month and keep record to bring to next appointment  Reminded that she is extremely overdue for annual diabetic eye exam and needs to get this scheduled ASAP    pneumococcal 20-valent conjugate vaccine  Pronunciation: RORY pham 20-VAY prosper garcia  Brand: Prevnar 20  What is the most important information I should know about pneumococcal 20-valent conjugate vaccine?  You should not receive this vaccine if you ever had a severe allergic reactionto a pneumococcal or diphtheria toxoid vaccine.  What is pneumococcal 20-valent conjugate vaccine?  Pneumococcal disease is a serious infection caused by a bacteria that can infect the sinuses, inner ear, lungs, blood, and brain. These conditions can befatal.  Pneumococcal 20-valent conjugate vaccine is used in adults to help prevent disease caused by pneumococcal bacteria. This vaccine contains 20 differenttypes of pneumococcal bacteria.  This vaccine helps your body develop immunity to the disease, but will nottreat an active infection you already have.  Like any vaccine, pneumococcal 20-valent conjugate vaccine may not provideprotection from disease in every person.  What should I discuss with my healthcare provider before taking pneumococcal 20-valent conjugate vaccine?  You should not receive this vaccine if you ever had a severe allergic reactionto a pneumococcal or diphtheria toxoid

## 2025-02-06 NOTE — RESULT ENCOUNTER NOTE
Urine shows no infection - just protein & ketones.  She needs to eat on a more regular basis and needs WATER!!!  Her kidneys are starting to show signs of distress and the medicine we are trying for her diabetes is going to pull more water off of her so she MUST drink at least 40 oz of water/day - preferably 64-80 oz.  This is extremely important - please discuss with both patient and her  or just her  because her cognitive state is not reliable if you just discuss with her.

## 2025-03-06 ENCOUNTER — HOSPITAL ENCOUNTER (OUTPATIENT)
Dept: CT IMAGING | Age: 75
Discharge: HOME | End: 2025-03-08
Payer: MEDICARE

## 2025-03-06 DIAGNOSIS — C20 RECTAL ADENOCARCINOMA (HCC): ICD-10-CM

## 2025-03-06 LAB — CREAT BLD-MCNC: 0.93 MG/DL (ref 0.8–1.5)

## 2025-03-06 PROCEDURE — 6360000004 HC RX CONTRAST MEDICATION: Performed by: INTERNAL MEDICINE

## 2025-03-06 PROCEDURE — 74177 CT ABD & PELVIS W/CONTRAST: CPT

## 2025-03-06 PROCEDURE — 82565 ASSAY OF CREATININE: CPT

## 2025-03-06 RX ORDER — DIATRIZOATE MEGLUMINE AND DIATRIZOATE SODIUM 660; 100 MG/ML; MG/ML
15 SOLUTION ORAL; RECTAL
Status: DISCONTINUED | OUTPATIENT
Start: 2025-03-06 | End: 2025-03-09 | Stop reason: HOSPADM

## 2025-03-06 RX ORDER — IOPAMIDOL 755 MG/ML
100 INJECTION, SOLUTION INTRAVASCULAR
Status: COMPLETED | OUTPATIENT
Start: 2025-03-06 | End: 2025-03-06

## 2025-03-06 RX ADMIN — IOPAMIDOL 100 ML: 755 INJECTION, SOLUTION INTRAVENOUS at 15:27

## 2025-03-14 ENCOUNTER — OFFICE VISIT (OUTPATIENT)
Dept: ONCOLOGY | Age: 75
End: 2025-03-14
Payer: MEDICARE

## 2025-03-14 ENCOUNTER — HOSPITAL ENCOUNTER (OUTPATIENT)
Dept: LAB | Age: 75
Discharge: HOME | End: 2025-03-14
Payer: MEDICARE

## 2025-03-14 VITALS
WEIGHT: 145 LBS | SYSTOLIC BLOOD PRESSURE: 134 MMHG | HEART RATE: 85 BPM | HEIGHT: 61 IN | OXYGEN SATURATION: 96 % | DIASTOLIC BLOOD PRESSURE: 86 MMHG | BODY MASS INDEX: 27.38 KG/M2 | RESPIRATION RATE: 18 BRPM | TEMPERATURE: 98.1 F

## 2025-03-14 DIAGNOSIS — C20 RECTAL ADENOCARCINOMA (HCC): ICD-10-CM

## 2025-03-14 DIAGNOSIS — K86.2 PANCREATIC CYST: ICD-10-CM

## 2025-03-14 DIAGNOSIS — C20 RECTAL ADENOCARCINOMA (HCC): Primary | ICD-10-CM

## 2025-03-14 DIAGNOSIS — D50.9 IRON DEFICIENCY ANEMIA, UNSPECIFIED IRON DEFICIENCY ANEMIA TYPE: ICD-10-CM

## 2025-03-14 LAB
ALBUMIN SERPL-MCNC: 4.6 G/DL (ref 3.2–4.6)
ALBUMIN/GLOB SERPL: 1.3 (ref 1–1.9)
ALP SERPL-CCNC: 58 U/L (ref 35–104)
ALT SERPL-CCNC: 31 U/L (ref 8–45)
ANION GAP SERPL CALC-SCNC: 14 MMOL/L (ref 7–16)
AST SERPL-CCNC: 30 U/L (ref 15–37)
BASOPHILS # BLD: 0.1 K/UL (ref 0–0.2)
BASOPHILS NFR BLD: 1.8 % (ref 0–2)
BILIRUB SERPL-MCNC: 0.6 MG/DL (ref 0–1.2)
BUN SERPL-MCNC: 21 MG/DL (ref 8–23)
CALCIUM SERPL-MCNC: 11 MG/DL (ref 8.8–10.2)
CEA SERPL-MCNC: 2.7 NG/ML (ref 0–3.8)
CHLORIDE SERPL-SCNC: 100 MMOL/L (ref 98–107)
CO2 SERPL-SCNC: 22 MMOL/L (ref 20–29)
CREAT SERPL-MCNC: 1.09 MG/DL (ref 0.6–1.1)
DIFFERENTIAL METHOD BLD: ABNORMAL
EOSINOPHIL # BLD: 0.23 K/UL (ref 0–0.8)
EOSINOPHIL NFR BLD: 4 % (ref 0.5–7.8)
ERYTHROCYTE [DISTWIDTH] IN BLOOD BY AUTOMATED COUNT: 12.9 % (ref 11.9–14.6)
FERRITIN SERPL-MCNC: 459 NG/ML (ref 8–388)
GLOBULIN SER CALC-MCNC: 3.6 G/DL (ref 2.3–3.5)
GLUCOSE SERPL-MCNC: 198 MG/DL (ref 70–99)
HCT VFR BLD AUTO: 48.9 % (ref 35.8–46.3)
HGB BLD-MCNC: 16.6 G/DL (ref 11.7–15.4)
IMM GRANULOCYTES # BLD AUTO: 0.02 K/UL (ref 0–0.5)
IMM GRANULOCYTES NFR BLD AUTO: 0.4 % (ref 0–5)
IRON SATN MFR SERPL: 46 % (ref 20–50)
IRON SERPL-MCNC: 162 UG/DL (ref 35–100)
LYMPHOCYTES # BLD: 1.35 K/UL (ref 0.5–4.6)
LYMPHOCYTES NFR BLD: 23.7 % (ref 13–44)
MCH RBC QN AUTO: 29.6 PG (ref 26.1–32.9)
MCHC RBC AUTO-ENTMCNC: 33.9 G/DL (ref 31.4–35)
MCV RBC AUTO: 87.3 FL (ref 82–102)
MONOCYTES # BLD: 0.49 K/UL (ref 0.1–1.3)
MONOCYTES NFR BLD: 8.6 % (ref 4–12)
NEUTS SEG # BLD: 3.5 K/UL (ref 1.7–8.2)
NEUTS SEG NFR BLD: 61.5 % (ref 43–78)
NRBC # BLD: 0 K/UL (ref 0–0.2)
PLATELET # BLD AUTO: 318 K/UL (ref 150–450)
PMV BLD AUTO: 10.3 FL (ref 9.4–12.3)
POTASSIUM SERPL-SCNC: 4.2 MMOL/L (ref 3.5–5.1)
PROT SERPL-MCNC: 8.2 G/DL (ref 6.3–8.2)
RBC # BLD AUTO: 5.6 M/UL (ref 4.05–5.2)
SODIUM SERPL-SCNC: 136 MMOL/L (ref 136–145)
TIBC SERPL-MCNC: 349 UG/DL (ref 240–450)
UIBC SERPL-MCNC: 187 UG/DL (ref 112–347)
WBC # BLD AUTO: 5.7 K/UL (ref 4.3–11.1)

## 2025-03-14 PROCEDURE — 82728 ASSAY OF FERRITIN: CPT

## 2025-03-14 PROCEDURE — 1159F MED LIST DOCD IN RCRD: CPT | Performed by: INTERNAL MEDICINE

## 2025-03-14 PROCEDURE — G8427 DOCREV CUR MEDS BY ELIG CLIN: HCPCS | Performed by: INTERNAL MEDICINE

## 2025-03-14 PROCEDURE — 1090F PRES/ABSN URINE INCON ASSESS: CPT | Performed by: INTERNAL MEDICINE

## 2025-03-14 PROCEDURE — 99214 OFFICE O/P EST MOD 30 MIN: CPT | Performed by: INTERNAL MEDICINE

## 2025-03-14 PROCEDURE — G8417 CALC BMI ABV UP PARAM F/U: HCPCS | Performed by: INTERNAL MEDICINE

## 2025-03-14 PROCEDURE — 85025 COMPLETE CBC W/AUTO DIFF WBC: CPT

## 2025-03-14 PROCEDURE — 3017F COLORECTAL CA SCREEN DOC REV: CPT | Performed by: INTERNAL MEDICINE

## 2025-03-14 PROCEDURE — 3075F SYST BP GE 130 - 139MM HG: CPT | Performed by: INTERNAL MEDICINE

## 2025-03-14 PROCEDURE — 1036F TOBACCO NON-USER: CPT | Performed by: INTERNAL MEDICINE

## 2025-03-14 PROCEDURE — G8399 PT W/DXA RESULTS DOCUMENT: HCPCS | Performed by: INTERNAL MEDICINE

## 2025-03-14 PROCEDURE — 3079F DIAST BP 80-89 MM HG: CPT | Performed by: INTERNAL MEDICINE

## 2025-03-14 PROCEDURE — 1123F ACP DISCUSS/DSCN MKR DOCD: CPT | Performed by: INTERNAL MEDICINE

## 2025-03-14 PROCEDURE — 1126F AMNT PAIN NOTED NONE PRSNT: CPT | Performed by: INTERNAL MEDICINE

## 2025-03-14 PROCEDURE — 83550 IRON BINDING TEST: CPT

## 2025-03-14 PROCEDURE — 80053 COMPREHEN METABOLIC PANEL: CPT

## 2025-03-14 PROCEDURE — 36415 COLL VENOUS BLD VENIPUNCTURE: CPT

## 2025-03-14 PROCEDURE — 83540 ASSAY OF IRON: CPT

## 2025-03-14 PROCEDURE — 82378 CARCINOEMBRYONIC ANTIGEN: CPT

## 2025-03-14 NOTE — PROGRESS NOTES
her back in 6 months, sooner if needed.       1. Rectal adenocarcinoma, pT1 disease s/p trans-anal resection 7/20 (SM3 disease). S/p chemo-RT w capecitabine 11/18/20. S/p FOLFOX x 4 months completed 5/21  2. RML 4 mm nodule, non-specific  3. L4 compression fracture, likely osteoporotic s/p kyphoplasty. Biopsy was benign  4. BRAYAN  5. Radiation colitis    PLAN:  - As above. Simple monitoring  - Serial CEA, iron panel  - Continue following with Dr. Bolton, as well as GI Dr Bautista for serial endoscopic needs. Last colo 10/23 w LUIS  - Pancreatic cyst 6 mm: Refer to GI.  Consider imaging down the line.  - MMR without loss of expression.  - LGSIL: Follows with Dr. Harp  - L4 and L5 compression fracture: seeing Dr Oden. Has h/o osteopenia: advised f/u w PCP also for bone directed therapy.   - Neuropathy: Gabapentin 600 3 times daily. Also on paxil   - BRAYAN: IV iron prn    Refer back to GI.  RTC in 6 months with MD with labs, CEA, iron panel, CT CAP. Sooner if needed.     Thank you Dr Bolton for allowing us to paticipate in care of this pleasant patient. Please call w/ any quesions    Nayely Mistry MD  [facility]  Hematology Oncology  [address] [place] [address]  Office : [phone]  Fax : [phone]

## 2025-03-14 NOTE — PATIENT INSTRUCTIONS
Patient Information from Today's Visit    The members of your [facility] are listed below:    Physician Provider: Nayely Mistry Medical Oncologist  Advanced Practice Clinician: Lucinda Steinberg NP  Registered Nurse: Juliana SANDERS RN  Navigator: Cordelia HELMS RN  Medical Assistant: She SANDERS MA  : Arianna SIERRA   Supportive Care Services: Bill RODRÍGUEZ LMSW    Diagnosis: rectal cancer      Follow Up Instructions:   - labs/scan reviewed  - make sure you follow up with your gastroenterologist- call their office at [phone] if you haven't heard from them by next week. Please bring copy of CT scan with you  - follow up in 6 months with a scan and lab work one week prior      Treatment Summary has been discussed and given to patient:No      Current Labs:   Hospital Outpatient Visit on 03/14/2025   Component Date Value Ref Range Status    WBC 03/14/2025 5.7  4.3 - 11.1 K/uL Final    RBC 03/14/2025 5.60 (H)  4.05 - 5.2 M/uL Final    Hemoglobin 03/14/2025 16.6 (H)  11.7 - 15.4 g/dL Final    Hematocrit 03/14/2025 48.9 (H)  35.8 - 46.3 % Final    MCV 03/14/2025 87.3  82.0 - 102.0 FL Final    MCH 03/14/2025 29.6  26.1 - 32.9 PG Final    MCHC 03/14/2025 33.9  31.4 - 35.0 g/dL Final    RDW 03/14/2025 12.9  11.9 - 14.6 % Final    Platelets 03/14/2025 318  150 - 450 K/uL Final    MPV 03/14/2025 10.3  9.4 - 12.3 FL Final    nRBC 03/14/2025 0.00  0.0 - 0.2 K/uL Final    **Note: Absolute NRBC parameter is now reported with Hemogram**    Differential Type 03/14/2025 AUTOMATED    Final    Neutrophils % 03/14/2025 61.5  43.0 - 78.0 % Final    Lymphocytes % 03/14/2025 23.7  13.0 - 44.0 % Final    Monocytes % 03/14/2025 8.6  4.0 - 12.0 % Final    Eosinophils % 03/14/2025 4.0  0.5 - 7.8 % Final    Basophils % 03/14/2025 1.8  0.0 - 2.0 % Final    Immature Granulocytes % 03/14/2025 0.4  0.0 - 5.0 % Final    Neutrophils Absolute 03/14/2025 3.50  1.70 - 8.20 K/UL Final    Lymphocytes Absolute 03/14/2025 1.35  0.50 - 4.60 K/UL Final

## 2025-03-18 DIAGNOSIS — F41.8 DEPRESSION WITH ANXIETY: ICD-10-CM

## 2025-03-19 ENCOUNTER — TRANSCRIBE ORDERS (OUTPATIENT)
Dept: SCHEDULING | Age: 75
End: 2025-03-19

## 2025-03-19 DIAGNOSIS — Z90.49 HISTORY OF COLON RESECTION: ICD-10-CM

## 2025-03-19 DIAGNOSIS — Z85.048 HISTORY OF RECTAL CANCER: ICD-10-CM

## 2025-03-19 DIAGNOSIS — R93.3 ABNORMAL CT SCAN, COLON: Primary | ICD-10-CM

## 2025-03-19 RX ORDER — PAROXETINE 20 MG/1
20 TABLET, FILM COATED ORAL DAILY
Qty: 90 TABLET | Refills: 1 | OUTPATIENT
Start: 2025-03-19

## 2025-03-25 ENCOUNTER — TELEPHONE (OUTPATIENT)
Dept: INTERNAL MEDICINE CLINIC | Facility: CLINIC | Age: 75
End: 2025-03-25

## 2025-03-25 DIAGNOSIS — E83.52 SERUM CALCIUM ELEVATED: Primary | ICD-10-CM

## 2025-03-25 NOTE — TELEPHONE ENCOUNTER
Pt's spouse states that pt's BS has been elevated since starting Jardiance. He will send me a [employer] message on pt's account with recent BS readings to be forwarded to Vaishali.

## 2025-03-25 NOTE — PROGRESS NOTES
New order for PTH and PTH related peptide due to elevated calcium. Patient will go to [facility] to get that done in next couple weeks.

## 2025-03-27 ENCOUNTER — HOSPITAL ENCOUNTER (OUTPATIENT)
Dept: CT IMAGING | Age: 75
Discharge: HOME | End: 2025-03-29
Attending: INTERNAL MEDICINE
Payer: MEDICARE

## 2025-03-27 DIAGNOSIS — Z85.048 HISTORY OF RECTAL CANCER: ICD-10-CM

## 2025-03-27 DIAGNOSIS — R93.3 ABNORMAL CT SCAN, COLON: ICD-10-CM

## 2025-03-27 DIAGNOSIS — Z90.49 HISTORY OF COLON RESECTION: ICD-10-CM

## 2025-03-27 PROCEDURE — 74261 CT COLONOGRAPHY DX: CPT

## 2025-04-07 ENCOUNTER — PATIENT MESSAGE (OUTPATIENT)
Dept: INTERNAL MEDICINE CLINIC | Facility: CLINIC | Age: 75
End: 2025-04-07

## 2025-04-07 DIAGNOSIS — I10 ESSENTIAL (PRIMARY) HYPERTENSION: ICD-10-CM

## 2025-04-11 ENCOUNTER — TELEPHONE (OUTPATIENT)
Dept: ONCOLOGY | Age: 75
End: 2025-04-11

## 2025-04-11 NOTE — TELEPHONE ENCOUNTER
Patient has not done PTH/PTHrP labs requested by Dr. Mistry a couple of weeks ago due to increased calcium level. Attempted to call patient to reminder her but voicemail full. Also sent [employer] message.

## 2025-05-01 ENCOUNTER — TELEPHONE (OUTPATIENT)
Dept: INTERNAL MEDICINE CLINIC | Facility: CLINIC | Age: 75
End: 2025-05-01

## 2025-05-01 NOTE — TELEPHONE ENCOUNTER
Pt's spouse dropped off pt's BS log along with a note asking if pt should start on Tresiba since it has been working well for her spouse. Please review the following.    4/21/25 @ 0935: BS= 193 (No food since 1900 the night before which was 2 eggs, 1 piece of buttered toast and 4 Aayush PB crackers, 12 oz of Diet Mountain Dew and 12 oz of water.)  @ 1210: Pt ate 4 PB crackers  @ 1245: BS= 218  @ 1355: Pt ate 1 cup of lima beans, 1/2 cup of potato salad, and 12 oz of water.  @ 1920: BS= 186 (Pt ate 2 eggs and 1 piece of buttered toast, 8 chocolate covered peanuts with 6 grams of carbs and 4.5 grams sugar and 12 oz of water)    4/22/25 @ 1130: Pt ate 4 Aayush PB crackers prior to checking her BS.  @ 1226: BS= 269  @ 1330: Pt ate 1 cup of lima beans and 1 cup of corn.  @ 1850: BS= 147  @ 1900: Pt ate 1 [employer] ham biscuit and 1 oatmeal cookie with no cream.    4/23/25 @ 1135: BS= 158 after fasting since 1900 the night before.  @ 1140: Pt had 2 sausage patties and 1 piece of buttered toast.  @ 1210: Pt had 1/2 cup of broccoli with butter.  @ 1325: BS= 226  @ 1815: Pt had 8 baked chicken fingers and 1/2 cup of broccoli with butter.  @ 2010: BS= 196  @ 2020: Pt had 1 oatmeal cookie with no cream.  @ 2115: Pt had 1/4 lb hamburger meg with tatum and salt (no bread).    4/24/25 @ 2415: Pt had 4 Aayush PB crackers.  @ 1250: BS= 254 (Fasting since 2415 for a total of 12.5 hours)  @ 1310: Pt had 1 frozen waffle with butter only and 4 eggs with cheese and salt.  @ 1800: Pt had 1/2 cup of black eyed peas and 1 oatmeal cookie.    4/25/25 @ 0905: BS= 195 (Fasting since 1800 the night before for a total of 15 hours fasting). Pt then went back to sleep until 1540.  @ 1600: Pt ate 1 frozen waffle with butter only, 1/2 cup of fried pork chop pieces, and 1/2 cup of fried onion rings.   @ 1740: BS= 264  @ 2005: Pt had 1 plain oatmeal cookie.  @ 2330: Pt had 2 frozen waffles with butter only.    4/26/25 @ 1250: BS= 171 (Fasting since 2330

## 2025-05-02 RX ORDER — LISINOPRIL 10 MG/1
10 TABLET ORAL DAILY
Qty: 90 TABLET | Refills: 0 | Status: SHIPPED | OUTPATIENT
Start: 2025-05-02

## 2025-05-07 ENCOUNTER — CLINICAL SUPPORT (OUTPATIENT)
Dept: INTERNAL MEDICINE CLINIC | Facility: CLINIC | Age: 75
End: 2025-05-07
Payer: MEDICARE

## 2025-05-07 DIAGNOSIS — E11.29 TYPE 2 DIABETES MELLITUS WITH MICROALBUMINURIA, WITHOUT LONG-TERM CURRENT USE OF INSULIN (HCC): Primary | ICD-10-CM

## 2025-05-07 DIAGNOSIS — R80.9 TYPE 2 DIABETES MELLITUS WITH MICROALBUMINURIA, WITHOUT LONG-TERM CURRENT USE OF INSULIN (HCC): Primary | ICD-10-CM

## 2025-05-07 PROCEDURE — 3051F HG A1C>EQUAL 7.0%<8.0%: CPT | Performed by: PHYSICIAN ASSISTANT

## 2025-05-07 PROCEDURE — 99211 OFF/OP EST MAY X REQ PHY/QHP: CPT | Performed by: PHYSICIAN ASSISTANT

## 2025-05-07 NOTE — PROGRESS NOTES
Applied Dexcom G6 Pro sensor and transmitter on pt's L lower abd for blinded session. Checked status of transmitter via clinic's  and received notification that connection was successful. Pt educated on Dexcom G6 and education handouts given. Pt verbalized understanding and has no further questions at this time. Results will be reviewed with Vaishali Rasmussen PA-C at upcoming visit on 5/20/2025.

## 2025-05-07 NOTE — PROGRESS NOTES
adjust time of dosing once you figure out how long it takes to make her sleepy  Move Paxil (Paroxetine) to morning dosing along with Jardiance  Monitor blood sugar 2-3 times/day - fasting, before dinner +/- 2 hours after dinner  Counseled to reduce peanut butter cracker packs to 4 packs only to help reduce carbohydrate volume  Recommend avoidance of bread & crackers in the same meal because it raises blood sugar too high  Discouraged from having bread with each meal during the day  Reminded to stay well hydrated with plenty of water  Continue chronic medications as prescribed        Return in about 6 weeks (around 7/1/2025) for diabetes f/u + foot exam - no labs needed.      Subjective   HPI  The patient is a 74 y.o. female who is seen for follow up of diabetes.  Current monitoring regimen:  BGs are consistently high .  Glucose monitoring frequency: 2-3 times daily; continuous during 10 day blinded period on Dexcom G6 Pro (May 7-17, 2025)   Home blood sugar records: fasting range: 150-255, pre-dinner range: 140-210, postprandial range: 190-265 ; 10 day average glucose: 205 mg/dL; time in range: 31%, time above range: 69% (high: 54%, very high: 15%), time below range: 0% (low: 0%, very low: 0%)   Any episodes of hypoglycemia? no  Known diabetic complications:  microalbuminuria  Current diabetic medications include: oral agent (monotherapy): Jardiance 25 mg daily .       Current Eye Exam (within one year): Yes - 2025  Current Urine Microalbumin: Yes, abnormal - Jan 2025  Is She on ACE inhibitor or angiotensin II receptor blocker?  Yes - lisinopril (generic)  Current Foot Exam (within one year): No - May 2024      Weight trend: stable  Prior visit with dietician: no  Current diet: meals per day on average: 2; restricting intake of the following: carbs & candy  Current exercise: none        Last HbA1C:    Lab Results   Component Value Date    LABA1C 7.8 (H) 05/08/2025     Lab Results   Component Value Date

## 2025-05-08 ENCOUNTER — LAB (OUTPATIENT)
Dept: INTERNAL MEDICINE CLINIC | Facility: CLINIC | Age: 75
End: 2025-05-08

## 2025-05-08 DIAGNOSIS — E78.2 MIXED HYPERLIPIDEMIA: ICD-10-CM

## 2025-05-08 DIAGNOSIS — E83.52 SERUM CALCIUM ELEVATED: ICD-10-CM

## 2025-05-08 DIAGNOSIS — R80.9 TYPE 2 DIABETES MELLITUS WITH MICROALBUMINURIA, WITHOUT LONG-TERM CURRENT USE OF INSULIN (HCC): ICD-10-CM

## 2025-05-08 DIAGNOSIS — I10 ESSENTIAL (PRIMARY) HYPERTENSION: ICD-10-CM

## 2025-05-08 DIAGNOSIS — E11.29 TYPE 2 DIABETES MELLITUS WITH MICROALBUMINURIA, WITHOUT LONG-TERM CURRENT USE OF INSULIN (HCC): ICD-10-CM

## 2025-05-08 LAB
ALBUMIN SERPL-MCNC: 4.4 G/DL (ref 3.2–4.6)
ALBUMIN/GLOB SERPL: 1.4 (ref 1–1.9)
ALP SERPL-CCNC: 53 U/L (ref 35–104)
ALT SERPL-CCNC: 34 U/L (ref 8–45)
ANION GAP SERPL CALC-SCNC: 13 MMOL/L (ref 7–16)
AST SERPL-CCNC: 33 U/L (ref 15–37)
BILIRUB SERPL-MCNC: 0.7 MG/DL (ref 0–1.2)
BUN SERPL-MCNC: 12 MG/DL (ref 8–23)
CALCIUM SERPL-MCNC: 10.1 MG/DL (ref 8.8–10.2)
CALCIUM SERPL-MCNC: 10.2 MG/DL (ref 8.8–10.2)
CHLORIDE SERPL-SCNC: 103 MMOL/L (ref 98–107)
CHOLEST SERPL-MCNC: 167 MG/DL (ref 0–200)
CO2 SERPL-SCNC: 23 MMOL/L (ref 20–29)
CREAT SERPL-MCNC: 1.08 MG/DL (ref 0.6–1.1)
EST. AVERAGE GLUCOSE BLD GHB EST-MCNC: 178 MG/DL
GLOBULIN SER CALC-MCNC: 3.2 G/DL (ref 2.3–3.5)
GLUCOSE SERPL-MCNC: 163 MG/DL (ref 70–99)
HBA1C MFR BLD: 7.8 % (ref 0–5.6)
HDLC SERPL-MCNC: 37 MG/DL (ref 40–60)
HDLC SERPL: 4.6 (ref 0–5)
LDLC SERPL CALC-MCNC: 84 MG/DL (ref 0–100)
POTASSIUM SERPL-SCNC: 4.1 MMOL/L (ref 3.5–5.1)
PROT SERPL-MCNC: 7.6 G/DL (ref 6.3–8.2)
PTH-INTACT SERPL-MCNC: 24.4 PG/ML (ref 15–65)
SODIUM SERPL-SCNC: 138 MMOL/L (ref 136–145)
TRIGL SERPL-MCNC: 234 MG/DL (ref 0–150)
VLDLC SERPL CALC-MCNC: 47 MG/DL (ref 6–23)

## 2025-05-12 ENCOUNTER — RESULTS FOLLOW-UP (OUTPATIENT)
Dept: INTERNAL MEDICINE CLINIC | Facility: CLINIC | Age: 75
End: 2025-05-12

## 2025-05-18 LAB — PTH RELATED PROT SERPL-SCNC: <2 PMOL/L

## 2025-05-19 ENCOUNTER — TELEPHONE (OUTPATIENT)
Dept: ONCOLOGY | Age: 75
End: 2025-05-19

## 2025-05-19 NOTE — TELEPHONE ENCOUNTER
Calcium/PTH/PTHrP reviewed by Dr. Mistry. Normal calcium levels. No further intervention needed at this time. Keep follow up as scheduled. Attempted to call patient x 2 but no answer and unable to LVM. [employer] message sent.

## 2025-05-20 ENCOUNTER — OFFICE VISIT (OUTPATIENT)
Dept: INTERNAL MEDICINE CLINIC | Facility: CLINIC | Age: 75
End: 2025-05-20
Payer: MEDICARE

## 2025-05-20 VITALS
OXYGEN SATURATION: 94 % | RESPIRATION RATE: 15 BRPM | WEIGHT: 143 LBS | DIASTOLIC BLOOD PRESSURE: 74 MMHG | SYSTOLIC BLOOD PRESSURE: 110 MMHG | HEIGHT: 61 IN | BODY MASS INDEX: 27 KG/M2 | TEMPERATURE: 98.1 F | HEART RATE: 79 BPM

## 2025-05-20 DIAGNOSIS — I10 ESSENTIAL (PRIMARY) HYPERTENSION: ICD-10-CM

## 2025-05-20 DIAGNOSIS — F51.04 CHRONIC INSOMNIA: ICD-10-CM

## 2025-05-20 DIAGNOSIS — E78.2 MIXED HYPERLIPIDEMIA: ICD-10-CM

## 2025-05-20 DIAGNOSIS — R80.9 TYPE 2 DIABETES MELLITUS WITH MICROALBUMINURIA, WITHOUT LONG-TERM CURRENT USE OF INSULIN (HCC): Primary | ICD-10-CM

## 2025-05-20 DIAGNOSIS — N28.9 RENAL INSUFFICIENCY: ICD-10-CM

## 2025-05-20 DIAGNOSIS — E11.29 TYPE 2 DIABETES MELLITUS WITH MICROALBUMINURIA, WITHOUT LONG-TERM CURRENT USE OF INSULIN (HCC): Primary | ICD-10-CM

## 2025-05-20 PROCEDURE — 1123F ACP DISCUSS/DSCN MKR DOCD: CPT | Performed by: PHYSICIAN ASSISTANT

## 2025-05-20 PROCEDURE — 3078F DIAST BP <80 MM HG: CPT | Performed by: PHYSICIAN ASSISTANT

## 2025-05-20 PROCEDURE — G8417 CALC BMI ABV UP PARAM F/U: HCPCS | Performed by: PHYSICIAN ASSISTANT

## 2025-05-20 PROCEDURE — 1090F PRES/ABSN URINE INCON ASSESS: CPT | Performed by: PHYSICIAN ASSISTANT

## 2025-05-20 PROCEDURE — 1036F TOBACCO NON-USER: CPT | Performed by: PHYSICIAN ASSISTANT

## 2025-05-20 PROCEDURE — 3017F COLORECTAL CA SCREEN DOC REV: CPT | Performed by: PHYSICIAN ASSISTANT

## 2025-05-20 PROCEDURE — 3051F HG A1C>EQUAL 7.0%<8.0%: CPT | Performed by: PHYSICIAN ASSISTANT

## 2025-05-20 PROCEDURE — 1159F MED LIST DOCD IN RCRD: CPT | Performed by: PHYSICIAN ASSISTANT

## 2025-05-20 PROCEDURE — 99215 OFFICE O/P EST HI 40 MIN: CPT | Performed by: PHYSICIAN ASSISTANT

## 2025-05-20 PROCEDURE — G2212 PROLONG OUTPT/OFFICE VIS: HCPCS | Performed by: PHYSICIAN ASSISTANT

## 2025-05-20 PROCEDURE — 2022F DILAT RTA XM EVC RTNOPTHY: CPT | Performed by: PHYSICIAN ASSISTANT

## 2025-05-20 PROCEDURE — 3074F SYST BP LT 130 MM HG: CPT | Performed by: PHYSICIAN ASSISTANT

## 2025-05-20 PROCEDURE — 95250 CONT GLUC MNTR PHYS/QHP EQP: CPT | Performed by: PHYSICIAN ASSISTANT

## 2025-05-20 PROCEDURE — G2211 COMPLEX E/M VISIT ADD ON: HCPCS | Performed by: PHYSICIAN ASSISTANT

## 2025-05-20 PROCEDURE — 1126F AMNT PAIN NOTED NONE PRSNT: CPT | Performed by: PHYSICIAN ASSISTANT

## 2025-05-20 PROCEDURE — 95251 CONT GLUC MNTR ANALYSIS I&R: CPT | Performed by: PHYSICIAN ASSISTANT

## 2025-05-20 PROCEDURE — G8399 PT W/DXA RESULTS DOCUMENT: HCPCS | Performed by: PHYSICIAN ASSISTANT

## 2025-05-20 PROCEDURE — G8427 DOCREV CUR MEDS BY ELIG CLIN: HCPCS | Performed by: PHYSICIAN ASSISTANT

## 2025-05-20 RX ORDER — ICOSAPENT ETHYL 1 G/1
2 CAPSULE ORAL 2 TIMES DAILY
Qty: 360 CAPSULE | Refills: 3 | Status: SHIPPED | OUTPATIENT
Start: 2025-05-20

## 2025-05-20 RX ORDER — TRAZODONE HYDROCHLORIDE 50 MG/1
50 TABLET ORAL NIGHTLY
Qty: 30 TABLET | Refills: 2 | Status: SHIPPED | OUTPATIENT
Start: 2025-05-20

## 2025-05-20 RX ORDER — INSULIN DEGLUDEC 100 U/ML
INJECTION, SOLUTION SUBCUTANEOUS
Qty: 6 ML | Refills: 5 | Status: SHIPPED | OUTPATIENT
Start: 2025-05-20 | End: 2025-05-22 | Stop reason: SDUPTHER

## 2025-05-20 RX ORDER — ROSUVASTATIN CALCIUM 10 MG/1
10 TABLET, COATED ORAL NIGHTLY
Qty: 90 TABLET | Refills: 3 | Status: SHIPPED | OUTPATIENT
Start: 2025-05-20

## 2025-05-20 RX ORDER — LISINOPRIL 10 MG/1
10 TABLET ORAL DAILY
Qty: 90 TABLET | Refills: 3 | Status: SHIPPED | OUTPATIENT
Start: 2025-05-20

## 2025-05-20 NOTE — PATIENT INSTRUCTIONS
Restart Vascepa 2 grams twice daily - alternative is OTC MegaRed supplement daily if cost of Vascepa is prohibitive  Start Tresiba 10 units every morning - increase by 2 units every 3 days until fasting glucose levels are consistently < 130  Start Trazodone 50 mg nightly - initially take it ~ 1 hour before planned bedtime, ok to relax on couch watching TV x 30 minutes but then NEED to go to bed and try to go to sleep in a dark room without any external stimuli from lights or screens, ok to adjust time of dosing once you figure out how long it takes to make her sleepy  Move Paxil (Paroxetine) to morning dosing along with Jardiance  Monitor blood sugar 2-3 times/day - fasting, before dinner +/- 2 hours after dinner  Counseled to reduce peanut butter cracker packs to 4 packs only to help reduce carbohydrate volume  Recommend avoidance of bread & crackers in the same meal because it raises blood sugar too high  Discouraged from having bread with each meal during the day  Reminded to stay well hydrated with plenty of water  Continue chronic medications as prescribed

## 2025-05-21 ENCOUNTER — TELEPHONE (OUTPATIENT)
Dept: INTERNAL MEDICINE CLINIC | Facility: CLINIC | Age: 75
End: 2025-05-21

## 2025-05-21 DIAGNOSIS — R80.9 TYPE 2 DIABETES MELLITUS WITH MICROALBUMINURIA, WITHOUT LONG-TERM CURRENT USE OF INSULIN (HCC): ICD-10-CM

## 2025-05-21 DIAGNOSIS — E11.29 TYPE 2 DIABETES MELLITUS WITH MICROALBUMINURIA, WITHOUT LONG-TERM CURRENT USE OF INSULIN (HCC): ICD-10-CM

## 2025-05-21 NOTE — TELEPHONE ENCOUNTER
Shaniqua with the pharmacy says they need clarification for a rx-its the tresiba flextouch-they need to know the max number of units she can inject daily

## 2025-05-22 RX ORDER — INSULIN DEGLUDEC 100 U/ML
INJECTION, SOLUTION SUBCUTANEOUS
Qty: 6 ML | Refills: 5 | Status: SHIPPED | OUTPATIENT
Start: 2025-05-22

## 2025-05-28 DIAGNOSIS — M81.8 OTHER OSTEOPOROSIS WITHOUT CURRENT PATHOLOGICAL FRACTURE: Primary | ICD-10-CM

## 2025-05-28 DIAGNOSIS — E78.2 MIXED HYPERLIPIDEMIA: ICD-10-CM

## 2025-05-28 DIAGNOSIS — F41.8 DEPRESSION WITH ANXIETY: ICD-10-CM

## 2025-05-28 RX ORDER — SODIUM CHLORIDE 9 MG/ML
INJECTION, SOLUTION INTRAVENOUS CONTINUOUS
OUTPATIENT
Start: 2025-05-28

## 2025-05-28 RX ORDER — DENOSUMAB 60 MG/ML
60 INJECTION SUBCUTANEOUS
Qty: 180 ML | Refills: 1
Start: 2025-05-28

## 2025-05-28 RX ORDER — ACETAMINOPHEN 325 MG/1
650 TABLET ORAL
OUTPATIENT
Start: 2025-05-28

## 2025-05-28 RX ORDER — EPINEPHRINE 1 MG/ML
0.3 INJECTION, SOLUTION, CONCENTRATE INTRAVENOUS PRN
OUTPATIENT
Start: 2025-05-28

## 2025-05-28 RX ORDER — ALBUTEROL SULFATE 90 UG/1
4 INHALANT RESPIRATORY (INHALATION) PRN
OUTPATIENT
Start: 2025-05-28

## 2025-05-28 RX ORDER — HYDROCORTISONE SODIUM SUCCINATE 100 MG/2ML
100 INJECTION INTRAMUSCULAR; INTRAVENOUS
OUTPATIENT
Start: 2025-05-28

## 2025-05-28 RX ORDER — ONDANSETRON 2 MG/ML
8 INJECTION INTRAMUSCULAR; INTRAVENOUS
OUTPATIENT
Start: 2025-05-28

## 2025-05-28 RX ORDER — DIPHENHYDRAMINE HYDROCHLORIDE 50 MG/ML
50 INJECTION, SOLUTION INTRAMUSCULAR; INTRAVENOUS
OUTPATIENT
Start: 2025-05-28

## 2025-06-03 RX ORDER — PAROXETINE 20 MG/1
20 TABLET, FILM COATED ORAL DAILY
Qty: 90 TABLET | Refills: 0 | Status: SHIPPED | OUTPATIENT
Start: 2025-06-03

## 2025-06-03 RX ORDER — FENOFIBRATE 160 MG/1
160 TABLET ORAL DAILY
Qty: 90 TABLET | Refills: 3 | Status: SHIPPED | OUTPATIENT
Start: 2025-06-03

## 2025-06-03 NOTE — TELEPHONE ENCOUNTER
Refills sent in - short term for Paxil (will provide long term prescription during next office visit) & long term fenofibrate since recently seen and discussed the need for this med.

## 2025-06-04 DIAGNOSIS — M81.8 OTHER OSTEOPOROSIS WITHOUT CURRENT PATHOLOGICAL FRACTURE: ICD-10-CM

## 2025-06-04 LAB
ALBUMIN SERPL-MCNC: 4.3 G/DL (ref 3.2–4.6)
ALBUMIN/GLOB SERPL: 1.3 (ref 1–1.9)
ALP SERPL-CCNC: 52 U/L (ref 35–104)
ALT SERPL-CCNC: 28 U/L (ref 8–45)
ANION GAP SERPL CALC-SCNC: 14 MMOL/L (ref 7–16)
AST SERPL-CCNC: 33 U/L (ref 15–37)
BILIRUB SERPL-MCNC: 0.6 MG/DL (ref 0–1.2)
BUN SERPL-MCNC: 12 MG/DL (ref 8–23)
CALCIUM SERPL-MCNC: 10.4 MG/DL (ref 8.8–10.2)
CHLORIDE SERPL-SCNC: 106 MMOL/L (ref 98–107)
CO2 SERPL-SCNC: 23 MMOL/L (ref 20–29)
CREAT SERPL-MCNC: 1.04 MG/DL (ref 0.6–1.1)
GLOBULIN SER CALC-MCNC: 3.3 G/DL (ref 2.3–3.5)
GLUCOSE SERPL-MCNC: 98 MG/DL (ref 70–99)
PHOSPHATE SERPL-MCNC: 3.8 MG/DL (ref 2.5–4.5)
POTASSIUM SERPL-SCNC: 4.2 MMOL/L (ref 3.5–5.1)
PROT SERPL-MCNC: 7.5 G/DL (ref 6.3–8.2)
SODIUM SERPL-SCNC: 142 MMOL/L (ref 136–145)

## 2025-06-05 ENCOUNTER — CLINICAL SUPPORT (OUTPATIENT)
Age: 75
End: 2025-06-05
Payer: MEDICARE

## 2025-06-05 VITALS
TEMPERATURE: 98.4 F | OXYGEN SATURATION: 96 % | RESPIRATION RATE: 20 BRPM | SYSTOLIC BLOOD PRESSURE: 105 MMHG | DIASTOLIC BLOOD PRESSURE: 66 MMHG | HEART RATE: 77 BPM

## 2025-06-05 DIAGNOSIS — M81.8 OTHER OSTEOPOROSIS WITHOUT CURRENT PATHOLOGICAL FRACTURE: Primary | ICD-10-CM

## 2025-06-05 PROCEDURE — 96372 THER/PROPH/DIAG INJ SC/IM: CPT | Performed by: PSYCHIATRY & NEUROLOGY

## 2025-06-05 RX ORDER — ONDANSETRON 2 MG/ML
8 INJECTION INTRAMUSCULAR; INTRAVENOUS
OUTPATIENT
Start: 2025-12-03

## 2025-06-05 RX ORDER — EPINEPHRINE 1 MG/ML
0.3 INJECTION, SOLUTION, CONCENTRATE INTRAVENOUS PRN
OUTPATIENT
Start: 2025-12-03

## 2025-06-05 RX ORDER — ALBUTEROL SULFATE 90 UG/1
4 INHALANT RESPIRATORY (INHALATION) PRN
OUTPATIENT
Start: 2025-12-03

## 2025-06-05 RX ORDER — DIPHENHYDRAMINE HYDROCHLORIDE 50 MG/ML
50 INJECTION, SOLUTION INTRAMUSCULAR; INTRAVENOUS
OUTPATIENT
Start: 2025-12-03

## 2025-06-05 RX ORDER — HYDROCORTISONE SODIUM SUCCINATE 100 MG/2ML
100 INJECTION INTRAMUSCULAR; INTRAVENOUS
OUTPATIENT
Start: 2025-12-03

## 2025-06-05 RX ORDER — SODIUM CHLORIDE 9 MG/ML
INJECTION, SOLUTION INTRAVENOUS CONTINUOUS
OUTPATIENT
Start: 2025-12-03

## 2025-06-05 RX ORDER — ACETAMINOPHEN 325 MG/1
650 TABLET ORAL
OUTPATIENT
Start: 2025-12-03

## 2025-06-05 NOTE — PROGRESS NOTES
[facility]  [address], [address] [place] [address]  Office : [phone], Fax: [phone]        Osteoporosis pre-infusion/injection questionnaire:     1. In the past month, have you had or are you planning to have any dental surgery or major dental procedures?  No     2. Are you taking a calcium and vitamin D supplement? Yes       3. When was your last osteoporosis treatment?  11/2024     4. In the last year, have you had any fractures? No        Patient arrived ambulatory to infusion suite today for a repeat Prolia subcutaneous injection.  Pertinent labs drawn 10.4 . Labs reviewed and are within medication administration parameters.  Prolia 60mg/ml injected SC into left arm. Patient tolerated injection well.    Advised patient to continue with calcium and vitamin D supplements post injection. Advised patient to call the ordering provider with any problems post injection.       Next Prolia appt scheduled 12/2025 prior to departure from infusion suite.     Pt ambulatory with steady gait out of infusion suite.

## 2025-06-14 DIAGNOSIS — I10 ESSENTIAL (PRIMARY) HYPERTENSION: ICD-10-CM

## 2025-06-14 DIAGNOSIS — F41.8 DEPRESSION WITH ANXIETY: ICD-10-CM

## 2025-06-16 RX ORDER — PAROXETINE 20 MG/1
20 TABLET, FILM COATED ORAL DAILY
Qty: 90 TABLET | Refills: 0 | OUTPATIENT
Start: 2025-06-16

## 2025-06-16 RX ORDER — LISINOPRIL 10 MG/1
10 TABLET ORAL DAILY
Qty: 90 TABLET | Refills: 3 | OUTPATIENT
Start: 2025-06-16

## 2025-07-09 RX ORDER — TRAZODONE HYDROCHLORIDE 50 MG/1
50 TABLET ORAL NIGHTLY
Qty: 30 TABLET | Refills: 2 | Status: CANCELLED | OUTPATIENT
Start: 2025-07-09

## 2025-07-09 NOTE — PROGRESS NOTES
Netta Diaz (:  1950) is a 75 y.o. female,Established patient, here for evaluation of the following chief complaint(s):  Diabetes (6 week diabetes f/u with foot exam. ), Hypertension, and Hyperlipidemia          Assessment/Plan  1. Type 2 diabetes mellitus with microalbuminuria, without long-term current use of insulin ([facility])  -      DIABETES FOOT EXAM  2. Depression with anxiety  -     PARoxetine (PAXIL) 20 MG tablet; Take 1 tablet by mouth every morning, Disp-90 tablet, R-3Normal  3. Sleep-wake cycle disorder  -     traZODone (DESYREL) 100 MG tablet; Take 1 tablet by mouth nightly, Disp-30 tablet, R-1Normal  4. Episodic confusion  -     Vitamin B12; Future  -     T Pallidum Screen W/Reflex; Future  -     Phosphorylated Tau 181 (pTau-181), Plasma; Future  -     Phosphorylated Tau 217 (pTau-217), Plasma; Future  -     TSH; Future  5. Cognitive impairment  -     Vitamin B12; Future  -     T Pallidum Screen W/Reflex; Future  -     Phosphorylated Tau 181 (pTau-181), Plasma; Future  -     Phosphorylated Tau 217 (pTau-217), Plasma; Future  -     TSH; Future  6. Mixed hyperlipidemia         Patient Instructions   Trial increase of Trazodone to 100 mg nightly - can double up on remaining supply of 50 mg tablets until gone.  If tolerating reasonably well please continue until our next appointment  Discussed the possibility of trying Belsomra or Quviviq to help suppress the wake cycle if we can't get any impact out of Trazodone  Reviewed concerns over lack of sleep and it's association with cognitive decline  Monitor blood sugar daily and keep record to bring to next appointment  Continue chronic medications as prescribed  Reminded to stay well hydrated with plenty of water  Praised dietary efforts to reduce sweets/snacks with significant improvement in glycemic control          Return in about 6 weeks (around 2025) for diabetes f/u w/ fasting labs 3-7 days prior.      Subjective   HPI  The

## 2025-07-14 ENCOUNTER — OFFICE VISIT (OUTPATIENT)
Dept: INTERNAL MEDICINE CLINIC | Facility: CLINIC | Age: 75
End: 2025-07-14
Payer: MEDICARE

## 2025-07-14 VITALS
SYSTOLIC BLOOD PRESSURE: 130 MMHG | HEART RATE: 78 BPM | DIASTOLIC BLOOD PRESSURE: 80 MMHG | TEMPERATURE: 98.1 F | HEIGHT: 61 IN | WEIGHT: 136 LBS | BODY MASS INDEX: 25.68 KG/M2 | OXYGEN SATURATION: 94 %

## 2025-07-14 DIAGNOSIS — F41.8 DEPRESSION WITH ANXIETY: ICD-10-CM

## 2025-07-14 DIAGNOSIS — G47.20 SLEEP-WAKE CYCLE DISORDER: ICD-10-CM

## 2025-07-14 DIAGNOSIS — R41.89 COGNITIVE IMPAIRMENT: ICD-10-CM

## 2025-07-14 DIAGNOSIS — E78.2 MIXED HYPERLIPIDEMIA: ICD-10-CM

## 2025-07-14 DIAGNOSIS — E11.29 TYPE 2 DIABETES MELLITUS WITH MICROALBUMINURIA, WITHOUT LONG-TERM CURRENT USE OF INSULIN (HCC): Primary | ICD-10-CM

## 2025-07-14 DIAGNOSIS — R80.9 TYPE 2 DIABETES MELLITUS WITH MICROALBUMINURIA, WITHOUT LONG-TERM CURRENT USE OF INSULIN (HCC): Primary | ICD-10-CM

## 2025-07-14 DIAGNOSIS — R41.0 EPISODIC CONFUSION: ICD-10-CM

## 2025-07-14 PROCEDURE — 1090F PRES/ABSN URINE INCON ASSESS: CPT | Performed by: PHYSICIAN ASSISTANT

## 2025-07-14 PROCEDURE — 1126F AMNT PAIN NOTED NONE PRSNT: CPT | Performed by: PHYSICIAN ASSISTANT

## 2025-07-14 PROCEDURE — G8427 DOCREV CUR MEDS BY ELIG CLIN: HCPCS | Performed by: PHYSICIAN ASSISTANT

## 2025-07-14 PROCEDURE — 1123F ACP DISCUSS/DSCN MKR DOCD: CPT | Performed by: PHYSICIAN ASSISTANT

## 2025-07-14 PROCEDURE — G8417 CALC BMI ABV UP PARAM F/U: HCPCS | Performed by: PHYSICIAN ASSISTANT

## 2025-07-14 PROCEDURE — 2022F DILAT RTA XM EVC RTNOPTHY: CPT | Performed by: PHYSICIAN ASSISTANT

## 2025-07-14 PROCEDURE — 99215 OFFICE O/P EST HI 40 MIN: CPT | Performed by: PHYSICIAN ASSISTANT

## 2025-07-14 PROCEDURE — 1036F TOBACCO NON-USER: CPT | Performed by: PHYSICIAN ASSISTANT

## 2025-07-14 PROCEDURE — 3051F HG A1C>EQUAL 7.0%<8.0%: CPT | Performed by: PHYSICIAN ASSISTANT

## 2025-07-14 PROCEDURE — G2211 COMPLEX E/M VISIT ADD ON: HCPCS | Performed by: PHYSICIAN ASSISTANT

## 2025-07-14 PROCEDURE — 3075F SYST BP GE 130 - 139MM HG: CPT | Performed by: PHYSICIAN ASSISTANT

## 2025-07-14 PROCEDURE — 1159F MED LIST DOCD IN RCRD: CPT | Performed by: PHYSICIAN ASSISTANT

## 2025-07-14 PROCEDURE — 3017F COLORECTAL CA SCREEN DOC REV: CPT | Performed by: PHYSICIAN ASSISTANT

## 2025-07-14 PROCEDURE — G8399 PT W/DXA RESULTS DOCUMENT: HCPCS | Performed by: PHYSICIAN ASSISTANT

## 2025-07-14 PROCEDURE — 3079F DIAST BP 80-89 MM HG: CPT | Performed by: PHYSICIAN ASSISTANT

## 2025-07-14 RX ORDER — TRAZODONE HYDROCHLORIDE 100 MG/1
100 TABLET ORAL NIGHTLY
Qty: 30 TABLET | Refills: 1 | Status: SHIPPED | OUTPATIENT
Start: 2025-07-14

## 2025-07-14 RX ORDER — PAROXETINE 20 MG/1
20 TABLET, FILM COATED ORAL EVERY MORNING
Qty: 90 TABLET | Refills: 3 | Status: SHIPPED | OUTPATIENT
Start: 2025-07-14

## 2025-07-14 NOTE — PATIENT INSTRUCTIONS
Trial increase of Trazodone to 100 mg nightly - can double up on remaining supply of 50 mg tablets until gone.  If tolerating reasonably well please continue until our next appointment  Discussed the possibility of trying Belsomra or Quviviq to help suppress the wake cycle if we can't get any impact out of Trazodone  Reviewed concerns over lack of sleep and it's association with cognitive decline  Monitor blood sugar daily and keep record to bring to next appointment  Continue chronic medications as prescribed  Reminded to stay well hydrated with plenty of water  Praised dietary efforts to reduce sweets/snacks with significant improvement in glycemic control

## 2025-08-13 ENCOUNTER — LAB (OUTPATIENT)
Dept: INTERNAL MEDICINE CLINIC | Facility: CLINIC | Age: 75
End: 2025-08-13

## 2025-08-13 DIAGNOSIS — E11.29 TYPE 2 DIABETES MELLITUS WITH MICROALBUMINURIA, WITHOUT LONG-TERM CURRENT USE OF INSULIN (HCC): ICD-10-CM

## 2025-08-13 DIAGNOSIS — R39.15 URINARY URGENCY: ICD-10-CM

## 2025-08-13 DIAGNOSIS — R35.0 URINARY FREQUENCY: ICD-10-CM

## 2025-08-13 DIAGNOSIS — R41.89 COGNITIVE IMPAIRMENT: ICD-10-CM

## 2025-08-13 DIAGNOSIS — R41.0 EPISODIC CONFUSION: ICD-10-CM

## 2025-08-13 DIAGNOSIS — N28.9 RENAL INSUFFICIENCY: ICD-10-CM

## 2025-08-13 DIAGNOSIS — I10 ESSENTIAL (PRIMARY) HYPERTENSION: ICD-10-CM

## 2025-08-13 DIAGNOSIS — E78.2 MIXED HYPERLIPIDEMIA: ICD-10-CM

## 2025-08-13 DIAGNOSIS — R35.0 URINARY FREQUENCY: Primary | ICD-10-CM

## 2025-08-13 DIAGNOSIS — R80.9 TYPE 2 DIABETES MELLITUS WITH MICROALBUMINURIA, WITHOUT LONG-TERM CURRENT USE OF INSULIN (HCC): ICD-10-CM

## 2025-08-13 LAB
ALBUMIN SERPL-MCNC: 4 G/DL (ref 3.2–4.6)
ALBUMIN/GLOB SERPL: 1.2 (ref 1–1.9)
ALP SERPL-CCNC: 64 U/L (ref 35–104)
ALT SERPL-CCNC: 22 U/L (ref 8–45)
ANION GAP SERPL CALC-SCNC: 14 MMOL/L (ref 7–16)
AST SERPL-CCNC: 23 U/L (ref 15–37)
BILIRUB SERPL-MCNC: 1 MG/DL (ref 0–1.2)
BUN SERPL-MCNC: 9 MG/DL (ref 8–23)
CALCIUM SERPL-MCNC: 9.9 MG/DL (ref 8.8–10.2)
CHLORIDE SERPL-SCNC: 102 MMOL/L (ref 98–107)
CHOLEST SERPL-MCNC: 338 MG/DL (ref 0–200)
CO2 SERPL-SCNC: 25 MMOL/L (ref 20–29)
CREAT SERPL-MCNC: 0.87 MG/DL (ref 0.6–1.1)
EST. AVERAGE GLUCOSE BLD GHB EST-MCNC: 140 MG/DL
GLOBULIN SER CALC-MCNC: 3.3 G/DL (ref 2.3–3.5)
GLUCOSE SERPL-MCNC: 107 MG/DL (ref 70–99)
HBA1C MFR BLD: 6.5 % (ref 0–5.6)
HDLC SERPL-MCNC: 44 MG/DL (ref 40–60)
HDLC SERPL: 7.7 (ref 0–5)
LDLC SERPL CALC-MCNC: ABNORMAL MG/DL (ref 0–100)
LDLC SERPL DIRECT ASSAY-MCNC: 213 MG/DL (ref 0–100)
POTASSIUM SERPL-SCNC: 3.9 MMOL/L (ref 3.5–5.1)
PROT SERPL-MCNC: 7.2 G/DL (ref 6.3–8.2)
SODIUM SERPL-SCNC: 142 MMOL/L (ref 136–145)
T PALLIDUM AB SER QL IA: NONREACTIVE
TRIGL SERPL-MCNC: 436 MG/DL (ref 0–150)
TSH, 3RD GENERATION: 0.99 UIU/ML (ref 0.27–4.2)
VIT B12 SERPL-MCNC: 327 PG/ML (ref 193–986)
VLDLC SERPL CALC-MCNC: 87 MG/DL (ref 6–23)

## 2025-08-14 LAB
BACTERIA SPEC CULT: NORMAL
SERVICE CMNT-IMP: NORMAL

## 2025-08-15 LAB — P-TAU181: 0.95 PG/ML (ref 0–0.97)

## 2025-08-18 LAB — IMMUNOLOGIST REVIEW: NORMAL

## 2025-08-19 ENCOUNTER — TRANSCRIBE ORDERS (OUTPATIENT)
Dept: SCHEDULING | Age: 75
End: 2025-08-19

## 2025-08-19 DIAGNOSIS — K62.4 RECTAL STRICTURE: Primary | ICD-10-CM

## 2025-08-26 ENCOUNTER — OFFICE VISIT (OUTPATIENT)
Dept: INTERNAL MEDICINE CLINIC | Facility: CLINIC | Age: 75
End: 2025-08-26
Payer: MEDICARE

## 2025-08-26 VITALS
HEIGHT: 61 IN | BODY MASS INDEX: 25.68 KG/M2 | SYSTOLIC BLOOD PRESSURE: 110 MMHG | WEIGHT: 136 LBS | TEMPERATURE: 98.1 F | OXYGEN SATURATION: 97 % | DIASTOLIC BLOOD PRESSURE: 78 MMHG | HEART RATE: 94 BPM

## 2025-08-26 DIAGNOSIS — R41.89 COGNITIVE IMPAIRMENT: ICD-10-CM

## 2025-08-26 DIAGNOSIS — F51.04 CHRONIC INSOMNIA: ICD-10-CM

## 2025-08-26 DIAGNOSIS — I10 ESSENTIAL (PRIMARY) HYPERTENSION: ICD-10-CM

## 2025-08-26 DIAGNOSIS — R79.89 LOW VITAMIN B12 LEVEL: ICD-10-CM

## 2025-08-26 DIAGNOSIS — E78.2 MIXED HYPERLIPIDEMIA: ICD-10-CM

## 2025-08-26 DIAGNOSIS — E11.29 TYPE 2 DIABETES MELLITUS WITH MICROALBUMINURIA, WITHOUT LONG-TERM CURRENT USE OF INSULIN (HCC): Primary | ICD-10-CM

## 2025-08-26 DIAGNOSIS — R80.9 TYPE 2 DIABETES MELLITUS WITH MICROALBUMINURIA, WITHOUT LONG-TERM CURRENT USE OF INSULIN (HCC): Primary | ICD-10-CM

## 2025-08-26 DIAGNOSIS — N28.9 RENAL INSUFFICIENCY: ICD-10-CM

## 2025-08-26 PROCEDURE — 3078F DIAST BP <80 MM HG: CPT | Performed by: PHYSICIAN ASSISTANT

## 2025-08-26 PROCEDURE — 1126F AMNT PAIN NOTED NONE PRSNT: CPT | Performed by: PHYSICIAN ASSISTANT

## 2025-08-26 PROCEDURE — 3074F SYST BP LT 130 MM HG: CPT | Performed by: PHYSICIAN ASSISTANT

## 2025-08-26 PROCEDURE — G8417 CALC BMI ABV UP PARAM F/U: HCPCS | Performed by: PHYSICIAN ASSISTANT

## 2025-08-26 PROCEDURE — 1090F PRES/ABSN URINE INCON ASSESS: CPT | Performed by: PHYSICIAN ASSISTANT

## 2025-08-26 PROCEDURE — 99215 OFFICE O/P EST HI 40 MIN: CPT | Performed by: PHYSICIAN ASSISTANT

## 2025-08-26 PROCEDURE — 1123F ACP DISCUSS/DSCN MKR DOCD: CPT | Performed by: PHYSICIAN ASSISTANT

## 2025-08-26 PROCEDURE — 3017F COLORECTAL CA SCREEN DOC REV: CPT | Performed by: PHYSICIAN ASSISTANT

## 2025-08-26 PROCEDURE — 1036F TOBACCO NON-USER: CPT | Performed by: PHYSICIAN ASSISTANT

## 2025-08-26 PROCEDURE — 3044F HG A1C LEVEL LT 7.0%: CPT | Performed by: PHYSICIAN ASSISTANT

## 2025-08-26 PROCEDURE — G8399 PT W/DXA RESULTS DOCUMENT: HCPCS | Performed by: PHYSICIAN ASSISTANT

## 2025-08-26 PROCEDURE — 1159F MED LIST DOCD IN RCRD: CPT | Performed by: PHYSICIAN ASSISTANT

## 2025-08-26 PROCEDURE — G2211 COMPLEX E/M VISIT ADD ON: HCPCS | Performed by: PHYSICIAN ASSISTANT

## 2025-08-26 PROCEDURE — 2022F DILAT RTA XM EVC RTNOPTHY: CPT | Performed by: PHYSICIAN ASSISTANT

## 2025-08-26 PROCEDURE — G8427 DOCREV CUR MEDS BY ELIG CLIN: HCPCS | Performed by: PHYSICIAN ASSISTANT

## 2025-09-02 ENCOUNTER — HOSPITAL ENCOUNTER (OUTPATIENT)
Age: 75
Discharge: HOME | End: 2025-09-04
Attending: INTERNAL MEDICINE
Payer: MEDICARE

## 2025-09-02 DIAGNOSIS — K62.4 RECTAL STRICTURE: ICD-10-CM

## 2025-09-02 PROCEDURE — 6360000004 HC RX CONTRAST MEDICATION: Performed by: INTERNAL MEDICINE

## 2025-09-02 PROCEDURE — 72197 MRI PELVIS W/O & W/DYE: CPT

## 2025-09-02 PROCEDURE — A9579 GAD-BASE MR CONTRAST NOS,1ML: HCPCS | Performed by: INTERNAL MEDICINE

## 2025-09-02 RX ORDER — GADOTERIDOL 279.3 MG/ML
12 INJECTION INTRAVENOUS
Status: COMPLETED | OUTPATIENT
Start: 2025-09-02 | End: 2025-09-02

## 2025-09-02 RX ADMIN — GADOTERIDOL 12 ML: 279.3 INJECTION, SOLUTION INTRAVENOUS at 16:38

## (undated) DEVICE — CONNECTOR TBNG OD5-7MM O2 END DISP

## (undated) DEVICE — Device

## (undated) DEVICE — BLLN KT O RING ENDOSCP US --

## (undated) DEVICE — GARMENT,MEDLINE,DVT,INT,CALF,MED, GEN2: Brand: MEDLINE

## (undated) DEVICE — SYR 10ML LUER LOK 1/5ML GRAD --

## (undated) DEVICE — KENDALL RADIOLUCENT FOAM MONITORING ELECTRODE RECTANGULAR SHAPE: Brand: KENDALL

## (undated) DEVICE — SUTURE VCRL SZ 3-0 L18IN ABSRB UD PS-2 L19MM 1/2 CIR J497G

## (undated) DEVICE — BANDAGE COBAN 4 IN COMPR W4INXL5YD FOAM COHESIVE QUIK STK SELF ADH SFT

## (undated) DEVICE — INTENDED TO BE USED TO OCCLUDE, RETRACT AND IDENTIFY ARTERIES, VEINS, TENDONS AND NERVES IN SURGICAL PROCEDURES: Brand: STERION®  VESSEL LOOP

## (undated) DEVICE — BONE CEMENT CX01B KYPHON XPEDE W MXR US: Brand: KYPHON® XPEDE™ BONE CEMENT AND KYPHON® MIXER PACK

## (undated) DEVICE — DRILL 2.5MM

## (undated) DEVICE — SURGIFOAM SPNG SZ 100

## (undated) DEVICE — 3M™ STERI-DRAPE™ U-DRAPE 1015: Brand: STERI-DRAPE™

## (undated) DEVICE — SYRINGE IRRIG 60ML SFT PLIABLE BLB EZ TO GRP 1 HND USE W/

## (undated) DEVICE — SYR LR LCK 1ML GRAD NSAF 30ML --

## (undated) DEVICE — PAD,NON-ADHERENT,3X8,STERILE,LF,1/PK: Brand: MEDLINE

## (undated) DEVICE — SUTURE VCRL SZ 0 L27IN ABSRB VLT L26MM CT-2 1/2 CIR J334H

## (undated) DEVICE — SLING ARM XL L20.25IN D9IN COT POLY BILAT WEB SHLDR STRP

## (undated) DEVICE — HAND PACK: Brand: MEDLINE INDUSTRIES, INC.

## (undated) DEVICE — CANNULA NSL ORAL AD FOR CAPNOFLEX CO2 O2 AIRLFE

## (undated) DEVICE — BUTTON SWITCH PENCIL BLADE ELECTRODE, HOLSTER: Brand: EDGE

## (undated) DEVICE — SURGICAL PROCEDURE PACK BASIC ST FRANCIS

## (undated) DEVICE — DRAPE SHT 3 QTR PROXIMA 53X77 --

## (undated) DEVICE — TRAY PREP DRY W/ PREM GLV 2 APPL 6 SPNG 2 UNDPD 1 OVERWRAP

## (undated) DEVICE — NDL PRT INJ NSAF BLNT 18GX1.5 --

## (undated) DEVICE — GOWN,REINF,POLY,ECL,PP SLV,XL: Brand: MEDLINE

## (undated) DEVICE — NEEDLE HYPO 21GA L1.5IN INTRAMUSCULAR S STL LATCH BVL UP

## (undated) DEVICE — STAPLER EXT SKIN 35 WIDE S STL STPL SQUEEZE HNDL VISISTAT

## (undated) DEVICE — SYR 3ML LL TIP 1/10ML GRAD --

## (undated) DEVICE — INTENDED FOR TISSUE SEPARATION, AND OTHER PROCEDURES THAT REQUIRE A SHARP SURGICAL BLADE TO PUNCTURE OR CUT.: Brand: BARD-PARKER SAFETY BLADES SIZE 15, STERILE

## (undated) DEVICE — GOWN,BREATHABLE SLV,AURORA,LG,STRL: Brand: MEDLINE

## (undated) DEVICE — SHEET, T, LAPAROTOMY, STERILE: Brand: MEDLINE

## (undated) DEVICE — SHEET, ORTHO, SPLIT, STERILE: Brand: MEDLINE

## (undated) DEVICE — CONTAINER PREFIL FRMLN 40ML --

## (undated) DEVICE — C-ARM: Brand: UNBRANDED

## (undated) DEVICE — MOUTHPIECE ENDOSCP 20X27MM --

## (undated) DEVICE — 3M™ IOBAN™ 2 ANTIMICROBIAL INCISE DRAPE 6650EZ: Brand: IOBAN™ 2

## (undated) DEVICE — TUBING, SUCTION, 1/4" X 10', STRAIGHT: Brand: MEDLINE

## (undated) DEVICE — BANDAGE,ELASTIC,ESMARK,STERILE,4"X9',LF: Brand: MEDLINE

## (undated) DEVICE — GLOVE ORANGE PI 7 1/2   MSG9075

## (undated) DEVICE — BLADE ES ELASTOMERIC COAT INSUL DURABLE BEND UPTO 90DEG

## (undated) DEVICE — SOLUTION IRRIG 1000ML 0.9% SOD CHL USP POUR PLAS BTL

## (undated) DEVICE — SUTURE VCRL SZ 2-0 L27IN ABSRB UD L26MM SH 1/2 CIR J417H

## (undated) DEVICE — SUTURE ABSORBABLE BRAIDED 2-0 CT-1 27 IN UD VICRYL J259H

## (undated) DEVICE — SUTURE FIBERWIRE SZ 2 W/ TAPERED NEEDLE BLUE L38IN NONABSORB BLU L26.5MM 1/2 CIRCLE AR7200

## (undated) DEVICE — SHEET,DRAPE,53X77,STERILE: Brand: MEDLINE

## (undated) DEVICE — DRILL 2.0MM

## (undated) DEVICE — STOCKINETTE ORTH W9XL36IN COT 2 PLY HLLW FOR HANDLING LMB

## (undated) DEVICE — SYR 5ML 1/5 GRAD LL NSAF LF --

## (undated) DEVICE — WATERPROOF, BACTERIA PROOF DRESSING WITH ABSORBENT SEE THROUGH PAD: Brand: OPSITE POST-OP VISIBLE 20X10CM CTN 20

## (undated) DEVICE — GOWN,PREVENTION PLUS,2XL,ST,22/CS: Brand: MEDLINE

## (undated) DEVICE — STANDARD HYPODERMIC NEEDLE,POLYPROPYLENE HUB: Brand: MONOJECT

## (undated) DEVICE — BONE TAMP KIT KPX153PB FF X2 15/3 1 STP: Brand: KYPHOPAK™ TRAY

## (undated) DEVICE — SUTURE ABSRB X-1 REV CUT 1/2 CIR 22MM UD BRAID 27IN SZ 3-0 J458H

## (undated) DEVICE — LITHOTOMY DRAPE: Brand: CONVERTORS

## (undated) DEVICE — PADDING CAST W3INXL4YD COT BLEND MIC PLEAT UNDERCAST SPEC

## (undated) DEVICE — LEGGINGS, PAIR, 31X48, STERILE: Brand: MEDLINE

## (undated) DEVICE — SYR 50ML SLIP TIP NSAF LF STRL --

## (undated) DEVICE — BONE BIOPSY DEVICE F05A BBD SIZE 3: Brand: MEDTRONIC REUSABLE INSTRUMENTS

## (undated) DEVICE — SNARE POLYP SM W13MMXL240CM SHTH DIA2.4MM OVL FLX DISP

## (undated) DEVICE — DRILL 2.7MM

## (undated) DEVICE — BIT DRL L80MM DIA2.7MM DISP FOR PROX ULNA PLT DSTL ELBW SET

## (undated) DEVICE — DRIVER SURG UNIV QUIK CONN T10 FOR VOLAR DST RAD PLATING

## (undated) DEVICE — AMD ANTIMICROBIAL GAUZE SPONGES,12 PLY USP TYPE VII, 0.2% POLYHEXAMETHYLENE BIGUANIDE HCI (PHMB): Brand: CURITY

## (undated) DEVICE — DRESSING,GAUZE,XEROFORM,CURAD,5"X9",ST: Brand: CURAD

## (undated) DEVICE — PREP SKN CHLRAPRP APL 26ML STR --

## (undated) DEVICE — DRAPE,UNDERBUTTOCKS,PCH,STERILE: Brand: MEDLINE

## (undated) DEVICE — AIRLIFE™ OXYGEN TUBING 7 FEET (2.1 M) CRUSH RESISTANT OXYGEN TUBING, VINYL TIPPED: Brand: AIRLIFE™

## (undated) DEVICE — ELECTRODE PT RET AD L9FT HI MOIST COND ADH HYDRGEL CORDED

## (undated) DEVICE — FORCEPS BX L240CM JAW DIA2.8MM L CAP W/ NDL MIC MESH TOOTH

## (undated) DEVICE — SUTURE NONABSORBABLE MONOFILAMENT 4-0 PS-2 18 IN BLU PROLENE 8682H

## (undated) DEVICE — SOLUTION IV 1000ML 0.9% SOD CHL

## (undated) DEVICE — SPONGE LAPAROTOMY W18XL18IN WHITE STRUNG RADIOPAQUE STERILE

## (undated) DEVICE — DISPOSABLE BIPOLAR CODE, 12' (3.66 M): Brand: CONMED

## (undated) DEVICE — YANKAUER,BULB TIP,W/O VENT,RIGID,STERILE: Brand: MEDLINE

## (undated) DEVICE — REM POLYHESIVE ADULT PATIENT RETURN ELECTRODE: Brand: VALLEYLAB

## (undated) DEVICE — APPLICATOR MEDICATED 26 CC SOLUTION HI LT ORNG CHLORAPREP

## (undated) DEVICE — LITHOTOMY: Brand: MEDLINE INDUSTRIES, INC.

## (undated) DEVICE — SIGMOIDOSCOPE MED L25CM DIA20MM W/ OBT DISP KLEENSPEC

## (undated) DEVICE — NEEDLE HYPO 25GA L1.5IN BLU POLYPR HUB S STL REG BVL STR

## (undated) DEVICE — JELLY LUBRICATING 10GM PREFIL SYR LUBE

## (undated) DEVICE — SUTURE VCRL SZ 0 L36IN ABSRB UD L36MM CT-1 1/2 CIR J946H

## (undated) DEVICE — 3M™ TEGADERM™ TRANSPARENT FILM DRESSING FRAME STYLE, 1624W, 2-3/8 IN X 2-3/4 IN (6 CM X 7 CM), 100/CT 4CT/CASE: Brand: 3M™ TEGADERM™